# Patient Record
Sex: MALE | Race: WHITE
[De-identification: names, ages, dates, MRNs, and addresses within clinical notes are randomized per-mention and may not be internally consistent; named-entity substitution may affect disease eponyms.]

---

## 2022-06-24 ENCOUNTER — HOSPITAL ENCOUNTER (INPATIENT)
Dept: HOSPITAL 75 - IRF | Age: 72
LOS: 12 days | Discharge: HOME | DRG: 560 | End: 2022-07-06
Attending: INTERNAL MEDICINE | Admitting: INTERNAL MEDICINE
Payer: MEDICARE

## 2022-06-24 VITALS — WEIGHT: 151.68 LBS | BODY MASS INDEX: 25.27 KG/M2 | HEIGHT: 65 IN

## 2022-06-24 VITALS — SYSTOLIC BLOOD PRESSURE: 127 MMHG | DIASTOLIC BLOOD PRESSURE: 46 MMHG

## 2022-06-24 VITALS — DIASTOLIC BLOOD PRESSURE: 53 MMHG | SYSTOLIC BLOOD PRESSURE: 127 MMHG
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
## 2022-06-24 DIAGNOSIS — K59.00: ICD-10-CM

## 2022-06-24 DIAGNOSIS — E11.65: ICD-10-CM

## 2022-06-24 DIAGNOSIS — E03.9: ICD-10-CM

## 2022-06-24 DIAGNOSIS — R44.2: ICD-10-CM

## 2022-06-24 DIAGNOSIS — I70.222: ICD-10-CM
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
## 2022-06-24 DIAGNOSIS — B35.1: ICD-10-CM

## 2022-06-24 DIAGNOSIS — T42.6X5A: ICD-10-CM

## 2022-06-24 DIAGNOSIS — I25.10: ICD-10-CM

## 2022-06-24 DIAGNOSIS — Z47.81: Primary | ICD-10-CM

## 2022-06-24 DIAGNOSIS — L89.610: ICD-10-CM

## 2022-06-24 DIAGNOSIS — I10: ICD-10-CM

## 2022-06-24 DIAGNOSIS — M19.91: ICD-10-CM

## 2022-06-24 DIAGNOSIS — K21.9: ICD-10-CM

## 2022-06-24 DIAGNOSIS — T87.89: ICD-10-CM

## 2022-06-24 DIAGNOSIS — E11.51: ICD-10-CM

## 2022-06-24 DIAGNOSIS — N40.0: ICD-10-CM

## 2022-06-24 DIAGNOSIS — E78.00: ICD-10-CM

## 2022-06-24 DIAGNOSIS — S46.012A: ICD-10-CM

## 2022-06-24 DIAGNOSIS — L85.9: ICD-10-CM

## 2022-06-24 DIAGNOSIS — E11.40: ICD-10-CM

## 2022-06-24 DIAGNOSIS — Z79.84: ICD-10-CM

## 2022-06-24 DIAGNOSIS — Z79.01: ICD-10-CM

## 2022-06-24 DIAGNOSIS — D64.9: ICD-10-CM

## 2022-06-24 DIAGNOSIS — Z88.8: ICD-10-CM

## 2022-06-24 DIAGNOSIS — L97.519: ICD-10-CM

## 2022-06-24 DIAGNOSIS — E11.649: ICD-10-CM

## 2022-06-24 PROCEDURE — 73030 X-RAY EXAM OF SHOULDER: CPT

## 2022-06-24 PROCEDURE — 82947 ASSAY GLUCOSE BLOOD QUANT: CPT

## 2022-06-24 PROCEDURE — 36415 COLL VENOUS BLD VENIPUNCTURE: CPT

## 2022-06-24 PROCEDURE — 73000 X-RAY EXAM OF COLLAR BONE: CPT

## 2022-06-24 PROCEDURE — 85025 COMPLETE CBC W/AUTO DIFF WBC: CPT

## 2022-06-24 PROCEDURE — 70450 CT HEAD/BRAIN W/O DYE: CPT

## 2022-06-24 PROCEDURE — 80053 COMPREHEN METABOLIC PANEL: CPT

## 2022-06-24 RX ADMIN — GLIMEPIRIDE SCH MG: 4 TABLET ORAL at 17:39

## 2022-06-24 RX ADMIN — INSULIN ASPART SCH UNIT: 100 INJECTION, SOLUTION INTRAVENOUS; SUBCUTANEOUS at 17:39

## 2022-06-24 RX ADMIN — DOCUSATE SODIUM AND SENNOSIDES SCH EA: 8.6; 5 TABLET, FILM COATED ORAL at 20:55

## 2022-06-24 RX ADMIN — METFORMIN HYDROCHLORIDE SCH MG: 500 TABLET, FILM COATED ORAL at 20:48

## 2022-06-24 RX ADMIN — POLYETHYLENE GLYCOL (3350) SCH GM: 17 POWDER, FOR SOLUTION ORAL at 20:46

## 2022-06-24 RX ADMIN — METFORMIN HYDROCHLORIDE SCH MG: 500 TABLET, FILM COATED ORAL at 17:39

## 2022-06-24 RX ADMIN — INSULIN ASPART SCH UNIT: 100 INJECTION, SOLUTION INTRAVENOUS; SUBCUTANEOUS at 20:55

## 2022-06-24 RX ADMIN — DOCUSATE SODIUM SCH MG: 100 CAPSULE ORAL at 20:48

## 2022-06-24 RX ADMIN — APIXABAN SCH MG: 5 TABLET, FILM COATED ORAL at 20:48

## 2022-06-24 RX ADMIN — POTASSIUM CHLORIDE SCH MEQ: 1500 TABLET, EXTENDED RELEASE ORAL at 17:39

## 2022-06-24 NOTE — PHYSICAL THERAPY EVALUATION
PT Evaluation-General


Medical Diagnosis


Admission Date


Jun 24, 2022 at 14:00


Medical Diagnosis:  s/p L BKA


Onset Date:  Jun 21, 2022





Therapy Diagnosis


Therapy Diagnosis:  impaired mobility, strength, endurance





Precautions


Precautions/Isolations:  Fall Prevention, Standard Precautions





Referral


Physician:  Noy Velez DO


Reason for Referral:  Evaluation/Treatment





Medical History


Pertinent Medical History:  DM, PVD


Additional Medical History


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


Current History


underwent stenting of occluded L superficial femoral artery ~6/17, necrosis of 

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 Pt 

transferred to ARU 6/24/22 for skilled therapy and medication management.


Reviewed History:  Yes





Social History


Home:  Single Level


Current Living Status:  Spouse


Entry Into Home:  Stairs With Railing


PT Steps Into Home:  4





Prior


Prior Level of Function


SCALE: Activities may be completed with or without assistive devices.





6-Indepedent-patient completes the activity by him/herself with no assistance 

from a helper.


5-Set-up or Clean-up Assistance-helper sets up or cleans up; patient completes 

activity. Ruth assists only prior to or  


    following the activity.


4-Supervision or Touching Assistance-helper provides verbal cues and/or 

touching/steadying and/or contact guard assistance as patient completes 

activity. Assistance may be provided   


    throughout the activity or intermittently.


3-Partial/Moderate Assistance-helper does LESS THAN HALF the effort. Ruth 

lifts, holds or supports trunk or limbs, but provides less than half the effort.


2-Substantial/Maximal Assistance-helper does MORE THAN HALF the effort. Ruth 

lifts or holds trunk or limbs and provides more than half the effort.


6-Zecoxbmku-fdtzbb does ALL the effort. Patient does none of the effort to 

complete the activity. Or, the assistance of 2 or more helpers is required for 

the patient to complete the  


    activity.


If activity was not attempted, code reason:


7-Patient Refused.


9-Not Applicable-not attempted and the patient did not perform the activity 

before the current illness, exacerbation or injury.


10-Not Attempted due to Environmental Limitations-(lack of equipment, weather 

restraints, etc.).


88-Not Attempted due to Medical Conditions or Safety Concerns.


Bed Mobility:  6


Transfers (B,C,W/C):  3


Gait:  3


Stairs:  3


Wheelchair Mobility:  6


Indoor Mobility (Ambulation):  Needed Some Help


Stairs:  Needed Some Help


Prior Devices Use:  Manual wheelchair, Walker





PT Evaluation-Current


Subjective


Patient in bed pre tx, agrees to PT, has 3/10 pain in left residual limb.  Will 

be co-treating with OT for part of tx due to poor patient mobility, strength, 

endurance, severe debility, coordinate UE and LE during activity, safety and 

reduce risk of falls.





Pt/Family Goals


to be independent at home





Objective


Patient Orientation:  Person, Place, Situation





ROM/Strength


ROM Lower Extremities


WNL, except left knee is in immobilizer


Strength Lower Extremities


RLE (hip flexion 3+/5, knee flexion 3+/5, knee extension 4-/5, dorsiflexion 

3/5), LLE hip flexion 3+/5





Integumentary/Posture


Integumentary


see nurse notes





Sensory


Vision:  Functional


Hearing:  Functional


Hand Dominance:  Right


Sensation Right Lower Extremit:  Impaired


Sensation Left Lower Extremity:  Impaired





Transfers


Roll Left & Right (QC):  4


Sit to Lying (QC):  3


Lying to Sitting/Side of Bed(Q:  3


Sit to Stand (QC):  3


Chair/Bed-to-Chair Xfer(QC):  3


Toilet Transfer (QC):  3


Car Transfer (QC):  3


Patient performed rolling with SBA, supine <-> sit mod assist, sit <-> stand and

transfers mod assist, car transfer mod assist.  Patient used a RW for transfers,

was able to pivot on right foot with some difficulty.





Gait


Does the Patient Walk?:  Yes


Mode of Locomotion:  Both


Anticipated Mode of Locomotion:  Both


Walk 10 feet (QC):  88


Walk 50 ft with 2 Turns(QC):  88


Walk 150 ft (QC):  88


Walking 10ft/uneven surface-QC:  88


Distance:  2'x3


Gait Assistive Device:  Parallel Bars


Comments/Gait Description


Patient can ambulate 2' in the parallel bars with mod assist, patient can take a

few small hops with right foot, poor foot clearance





Wheelchair Training


Does the Pt Use a Wheelchair?:  Yes


Distance:  150'x2


Wheel 50 ft with 2 turns (QC):  4


Wheel 150 ft (QC):  4


Type of Wheelchair:  Manual


SBA





Stairs


1 Step (curb) (QC):  88


4 Steps (QC):  88


12 Steps (QC):  88





Balance


Sitting Static:  Fair


Sitting Dynamic:  Fair


Standing Static:  Poor


 Standing Dynamic:  Poor


Picking up an Object (QC):  88





Treatment


Dressing and bathing, manually resisted leg press 3 sets of 10, standing 

activity in parallel bars working on letting go with one hand/alternating.  PT 

performed bed mobility and transfer training, WC mobility, ambulation, standing 

and balance and positioning during dressing and bathing, standing during 

standing activity, LE strengthening, OT performed bathing, dressing, standing 

activity, UE positioning and safety during activity.





Assessment/Needs


Patient in recliner post tx with nurse call, phone, tray, all needs met.  

Patient has impaired mobility, strength, endurance, balance.  Patient needs mod 

assist for sit to stand and transfers.  Good motivation.


Rehab Potential:  Fair





PT Short Term Goals


Short Term Goals


Time Frame:  Jul 1, 2022


Roll Left & Right:  6


Sit to lying:  3 (Kacie)


Lying to sitting on side of be:  3 (Kacie)


Sit to stand:  3 (Kacie)


Chair/bed-to-chair transfer:  3


Walk 10 feet:  3 (Kacie)





PT Long Term Goals


Long Term Goals


PT Long Term Goals Time Frame:  Jul 15, 2022


Roll Left & Right (QC):  6


Sit to Lying (QC):  4 (CGA)


Lying-Sitting on Side/Bed(QC):  4 (CGA)


Sit to Stand (QC):  4 (CGA)


Chair/Bed-to-Chair Xfer(QC):  4 (CGA)


Toilet Transfer (QC):  4 (CGA)


Car Transfer (QC):  4 (CGA)


Does the Patient Walk:  Yes


Walk 10 feet (QC):  4 (CGA)


Walk 50ft with 2 Turns (QC):  88


Walk 150 ft (QC):  88


Walking 10ft on Uneven Surface:  4 (CGA)


1 Step (curb) (QC):  88


4 Steps (QC):  88


12 Steps (QC):  88


Picking up an Object (QC):  4 (CGA)


Wheel 50 feet with 2 turns (QC:  6


Wheel 150 feet:  6





PT Plan


Problem List


Problem List:  Activity Tolerance, Functional Strength, Safety, Balance, Gait, 

Transfer, Bed Mobility, ROM





Treatment/Plan


Treatment Plan:  Continue Plan of Care


Treatment Plan:  Bed Mobility, Education, Functional Activity [place], Functional 

Strength, Group Therapy, Gait, Safety, Therapeutic Exercise, Transfers


Treatment Duration:  Jul 15, 2022


Frequency:  At least 5 of 7 days/Wk (IRF)


Estimated Hrs Per Day:  1.5 hours per day


Patient and/or Family Agrees t:  Yes





Safety Risks/Education


Patient Education:  Gait Training, Transfer Techniques, W/C Management, Safety 

Issues


Teaching Recipient:  Patient


Teaching Methods:  Demonstration, Discussion


Response to Teaching:  Reinforcement Needed





Discharge Recommendations


Plan


Patient will perform bed mobility and transfer training, balance and endurance 

training, functional strengthening, stair training, gait training, and 

education, to improve functional mobility and independence at home.


Therapy Discharge Recommendati:  Scheduled Assistance, Home & Family, Post Acute

PT





Time/GCodes


Time In:  1400


Time Out:  1540


Total Billed Treatment Time:  90


Total Billed Treatment


1 visit


EVM 10'


FA 80'





PT eval from [id number], OT eval from [id number], co-treat from [id number]











KIRAN RANGEL PT                Jun 24, 2022 15:34

## 2022-06-24 NOTE — PM&R POST ADMISSION ASSESSMENT
PM&R HP


Date of Visit:  2022


Time of Visit:  16:00


History of Present Illness


CC: Left BKA





HPI: This is a 71yoWM clinic patient of Dr Beltran in [place] and Dr Hickey 

[facility] [place] who presents to ARU following a left BKA on 22 following

vascular procedure of left leg who is in need of assistive devices and 

aggressive rehab in order to return home to live independently. He is currently 

constipated and obtains good pain control with Hydrocodone. I have reviewed his 

home meds and restarted all. Voiding well due to Lasix. PLOF was independent 

without use of assistive devices. CLOF moderate assist with bed mobility and 

maximum assist with sit to stand and total dependence in transfers.





Past Medical-Social-Family Hx


Past Med/Social Hx:  Reviewed Nursing Past Med/Soc Hx, Reviewed and Corrections 

made


Patient Social History


Marrital Status:  


Employed/Student:  retired


Alcohol Use:  Denies Use


Smoking Status:  Never a Smoker





Past Medical History


Surgeries:  Orthopedic


Cardiac:  Atrial Fibrillation, Chronic Edema/Swelling, Coronary Artery Disease, 

High Cholesterol, Hypertension, Peripheral Vascular


Neurological:  Neuropathy


Genitourinary:  Benign Prostatic Hyperpl


Gastrointestinal:  Gastroesophageal Reflux


Musculoskeletal:  Amputee, Arthritis


Endocrine:  Hypothyroidsim, Diabetes, Non-Insulin dep





Self Care:  Needed Some Help


Functional Cognition:  Independent


Eatin (per pt report)


Oral Hygiene:  5 (per pt report)


Shower/Bathe Self:  3 (Min A with sponge bath at bed level, pt required 

assistance washing buttocks.)


Upper Body Dressing:  3 (Mod A overall. Max A doffing shirt at bed level, min A 

donning shirt overhead at EOB.)


Lower Body Dressin (Max A donning/doffing pants at bed level. Total assist 

would be required with stump immobilizer)


On/Off Footwear:  3 (Min A donning/doffing R gripper sock.)


Toileting Hygiene:  2 (Max A at bed level with clothing management and posterior

hygiene. Pt able to complete pericare.)





PM&R Allergy/Meds/Data Review


Allergies


Coded Allergies:  


     lisinopril (Verified  Allergy, Unknown, 22)


     niacin (Verified  Allergy, Unknown, 22)


     oxycodone (Verified  Allergy, Unknown, 22)


     rivaroxaban (Verified  Allergy, Unknown, 22)


Uncoded Allergies:  


     CI Pigment Blue 63 (Adverse Reaction, Severe, Itching, 22)





Home Medications


Scheduled


Amiodarone HCl (Amiodarone HCl), 200 MG PO DAILY, (Reported)


Apixaban (Eliquis), 5 MG PO BID, (Reported)


Atorvastatin Calcium (Atorvastatin Calcium), 20 MG PO DAILY, (Reported)


Cholecalciferol (Vitamin D3) (Vitamin D3), 25 MCG PO DAILY, (Reported)


Clopidogrel Bisulfate (Plavix), 75 MG PO DAILY, (Reported)


Furosemide (Furosemide), 80 MG PO DAILY, (Reported)


Glimepiride (Glimepiride), 4 MG PO BID, (Reported)


Levothyroxine Sodium (Levothyroxine Sodium), 100 MCG PO DAILY, (Reported)


Losartan Potassium (Losartan Potassium), 50 MG PO DAILY, (Reported)


Metformin HCl (Metformin HCl), 500 MG PO QID, (Reported)


Metoprolol Succinate (Metoprolol Succinate), 25 MG PO DAILY, (Reported)


Pantoprazole Sodium (Pantoprazole Sodium), 40 MG PO DAILY, (Reported)


Potassium Chloride (K-Tab ER), 20 MEQ PO TID, (Reported)





Scheduled PRN


Hydrocodone/Acetaminophen (Hydrocodone-Acetamin  mg), 1 EACH PO Q6H PRN 

for PAIN-MODERATE (5-7), (Reported)





Current Medications


Current Medications


Reviewed





Review of Systems


Constitutional:  see HPI, malaise, weakness


EENTM:  no symptoms reported


Respiratory:  no symptoms reported


Cardiovascular:  no symptoms reported


Gastrointestinal:  constipation


Genitourinary:  no symptoms reported


Musculoskeletal:  no symptoms reported


Skin:  no symptoms reported


Psychiatric/Neurological:  No Symptoms Reported


All Other Systems Reviewed


Negative Unless Noted:  Yes





Physical Exam


Physical Exam


Vital Signs


Capillary Refill :


Height, Weight, BMI


Height: '"


Weight: lbs. oz. kg;  BMI


Method:


General Appearance:  No Apparent Distress, WD/WN, Chronically ill


Eyes:  Bilateral Eye Normal Inspection, Bilateral Eye PERRL


HEENT:  PERRL/EOMI, Normal ENT Inspection, Pharynx Normal


Neck:  Full Range of Motion, Normal Inspection, Non Tender, Supple, Carotid 

Bruit


Respiratory:  Chest Non Tender, Lungs Clear, Normal Breath Sounds, No Accessory 

Muscle Use, No Respiratory Distress


Cardiovascular:  No Edema, No Gallop, No JVD, No Murmur, Normal Peripheral 

Pulses, Irregularly Irregular


Gastrointestinal:  Normal Bowel Sounds, No Organomegaly, No Pulsatile Mass, Non 

Tender, Soft


Back:  Normal Inspection, No CVA Tenderness, No Vertebral Tenderness


Extremity:  Normal Capillary Refill, Normal Inspection, Normal Range of Motion, 

Non Tender, No Calf Tenderness, No Pedal Edema


Neurologic/Psychiatric:  Alert, Oriented x3, Normal Mood/Affect, CNs II-XII Norm

as Tested, Abnormal Gait, Motor Weakness (left sided lower extremity BKA)


Skin:  Normal Color, Warm/Dry


Lymphatic:  No Adenopathy





PM&R Medical Assessment & Plan


REHAB/MEDICAL ASSESSMENT AND PLAN:





REHAB IMPAIRMENT GROUP: 


[facility]





ETIOLOGIC DIAGNOSIS: 


LBKA





The comorbidities that impact the patients function and/or functional outcome 

by: left BKA, fall risk, dependence on transfers with sit to stand





REHAB PLAN:


The patient is being admitted to our comprehensive inpatient rehabilitation 

facility and can tolerate the intensity of service consisting of at least:


      180 minutes of therapy a day, 5 out of 7 days a week 


    


Rehab treatment will consist of:  PT OT will focus on regaining function in 

order to regain enough independence in order to return home with wife





The patient/family has a good understanding of our discharge process and will 

benefit from an interdisciplinary inpatient rehabilitation program. The patient 

has potential to make improvement and is in need of at least two of the 

following multidisciplinary therapies including but not limited to physical, 

occupational, speech, and prosthetics and orthotics.  Additionally the patient 

will need services from respiratory, nutritional services, wound care, 

psychology, etc. (Customize this to each patient). Given the patients complex 

condition and risk of further medical complications, rehabilitation services 

cannot be safely or effectively provided at a lower level of care such as a 

skilled nursing facility.





BARRIERS TO DISCHARGE:


[facility]





ESTIMATED LOS:


14 days





DISPOSITION:


Home





RELEVANT CHANGES SINCE PREADMISSION SCREENING: 


I have compared the patients medical and functional status at the time of the 

preadmission screening and there are: 


      no changes





PROGNOSIS:


Good





REHABILITATION GOALS:  


1.PT OT will focus on regaining function in order to regain enough independence 

in order to return home with wife











All the above goals were reviewed with the patient and he/she is in agreement.


By signing this document, I acknowledge that I have personally performed a full 

physical examination on this patient within 24 hours of admission to this 

inpatient rehabilitation facility and have determined the patient to be able to 

tolerate the above course of treatment at an intensive level for a reasonable 

period of time. I will be completing a detailed individualized Plan of Care for 

this patient by day #4 of the patients stay based upon the Preadmission Screen,

the Post-Admission Evaluation, and the therapy evaluations.


Admission Dx/Comorbidities:  


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


ICD Codes:  S88.112A - Complete traumatic amputation at level between knee and 

ankle, left lower leg, initial encounter





Assessment/Plan


Assessment and Plan


Assess & Plan/Chief Complaint


Assessment:


CORTNEY 22


Severe PVD


PAF


OAC


HTN


HLP


DM


Hypothyroidism





Plan:


Home meds


SSI


PT OT


Pain control


BM treatment











DIANA HENRIQUEZ DO                2022 15:36

## 2022-06-24 NOTE — OCCUPATIONAL THERAPY EVAL
OT Evaluation-General/PLF


Medical Diagnosis


Admission Date


Jun 24, 2022 at 14:00


Medical Diagnosis:  s/p L BKA


Onset Date:  Jun 21, 2022





Therapy Diagnosis


Therapy Diagnosis:  decreased ADL status, weakness





Precautions


Precautions/Isolations:  Fall Prevention, Standard Precautions





Weight Bear Status


Weight Bearing Restriction:  Non Weight Bearing


Location Restriction:  L LE





Referral


Physician:  Rosie Alegria Reason:  Evaluation/Treatment





Medical History


Additional Medical History


PVD, DM, afib, R 1st toe amputation


Current History


underwent stenting of occluded L superficial femoral artery ~6/17, necrosis of 

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 Pt 

transferred to ARU 6/24/22 for skilled therapy and medication management.





Social History


Home:  Single Level


Current Living Status:  Spouse


Entry Into Home:  Stairs With Railing


 Steps Into Home:  4





ADL-Prior Level of Function


SCALE: Activities may be completed with or without assistive devices.





6-Indepedent-patient completes the activity by him/herself with no assistance 

from a helper.


5-Set-up or Clean-up Assistance-helper sets up or cleans up; patient completes 

activity. [place] assists only prior to or  


    following the activity.


4-Supervision or Touching Assistance-helper provides verbal cues and/or 

touching/steadying and/or contact guard assistance as patient completes 

activity. Assistance may be provided   


    throughout the activity or intermittently.


3-Partial/Moderate Assistance-helper does LESS THAN HALF the effort. [place] 

lifts, holds or supports trunk or limbs, but provides less than half the effort.


2-Substantial/Maximal Assistance-helper does MORE THAN HALF the effort. [place] 

lifts or holds trunk or limbs and provides more than half the effort.


0-Qjyewhxtp-qeguvg does ALL the effort. Patient does none of the effort to 

complete the activity. Or, the assistance of 2 or more helpers is required for 

the patient to complete the  


    activity.


If activity was not attempted, code reason:


7-Patient Refused.


9-Not Applicable-not attempted and the patient did not perform the activity befo

re the current illness, exacerbation or injury.


10-Not Attempted due to Environmental Limitations-(lack of equipment, weather re

straints, etc.).


88-Not Attempted due to Medical Conditions or Safety Concerns.


ADL PLOF Comments


Pt reports requiring assistance with ADLs at PLOF, using a walker and w/c for 

functional mobility. His wife assists with showering, UE/LE dressing, and 

footwear, pt able to complete toileting, oral care and eating independently. He 

has a walk in shower with SC, GBS and hand held shower head


Self Care:  Needed Some Help


Functional Cognition:  Independent


DME/Equipment:  Bath Chair, Grab Bars, Shower, Shower Hose Extender


DME/Equipment Comments


walker, w/c





OT Current Status


Subjective


Pt agreeable to OT evaluation, then OT/PT cotreat. Rates pain 3-4/10 in L stump





Mental Status/Objective


Patient Orientation:  Person, Place, Situation


Attachments:  Other-See Comments (stump immobilizer L)





Current


Glasses/Contacts:  No


Hearing Aids:  No


Dentures/Partials:  No


Hand Dominance:  Right


Upper Extremity ROM


Decreased shoulder movement bilaterally (pt's PLOF). WFL at elbow/wrist/fingers.


RUE shoulder abduction to approx 40 degrees, LUE approx 20 degrees.


Upper Extremity Coordination


WFL


Upper Extremity Sensation


Tingling/numbness bilateral hands/fingers.


Upper Extremity Strength


grossly 2+/5 BUEs





ADL-Treatment


Eating (QC):  6 (per pt report)


Oral Hygiene (QC):  5 (per pt report)


Shower/Bathe Self (QC):  3 (Min A with sponge bath at bed level, pt required 

assistance washing buttocks.)


Upper Body Dressing (QC):  3 (Mod A overall. Max A doffing shirt at bed level, 

min A donning shirt overhead at EOB.)


Lower Body Dressing (QC):  2 (Max A donning/doffing pants at bed level. Total 

assist would be required with stump immobilizer)


On/Off Footwear (QC):  3 (Min A donning/doffing R gripper sock.)


Toileting Hygiene (QC):  2 (Max A at bed level with clothing management and 

posterior hygiene. Pt able to complete pericare.)





Other Treatments


OT evaluation complete. OT/PT cotreat due to skill of 2 clinicians required 

which a rehab tech could not perform in order to coordinate UE/LEs, decrease 

fall risk, and due to pt's limitations in strength, activity tolerance, 

transfers and mobility. Pt completed sponge bath and dressing at bed level. 

Transferred supine to sit EOB, then SPT to w/c. Pt performed w/c mobility in 

hallways, then to therapy gym. Pt stood x3 in parallel bars, attempted to take 

steps "hopping" on R foot with stands, seated rest breaks between trials. Pt 

then seated exercises, with focus on strengthening muscles in preparation for 

standing tasks. Pt stood in parallel bars, completing reaching task while 

standing. Pt attempted to reach for objects on L side and transition them across

midline towards R side, but pt had difficulty. On the next stand, pt focused on 

just removing 1 hand from the bar at a time, pt had difficulty removing L hand 

from parallel bar, only able to complete with R, x5 reps, min A with standing 

balance. Pt propelled w/c around ARU common area, then back to his room. SPT to 

recliner, mod A. Post tx, pt in recliner, call light in reach and all needs met.





Education


OT Patient Education:  Correct positioning, Energy conservation, Modified ADL 

techniques, Progress toward Goal/Update tx plan, Purpose of tx/functional 

activities, Rehab process


Teaching Recipient:  Patient


Teaching Methods:  Discussion


Response to Teaching:  Verbalize Understanding





OT Short Term Goals


Short Term Goals


Time Frame:  Jul 8, 2022


Shower/bathe self:  3


Lower body dressing:  3





OT Long Term Goals


Long Term Goals


Time Frame:  Jul 22, 2022


Eating (QC):  6


Oral Hygiene (QC):  6


Toileting Hygiene (QC):  6


Shower/Bathe Self (QC):  4


Upper Body Dressing (QC):  4


Lower Body Dressing (QC):  3


On/Off Footwear (QC):  3


Additional Goals:  1-Demonstrate ADL Tasks, 2-Verbalize Understanding, 3-

ImproveStrength/[place]


1=Demonstrate adherence to instructed precautions during ADL tasks.


2=Patient will verbalize/demonstrate understanding of assistive 

devices/modifications for ADL.


3=Patient will improve strength/tolerance for activity to enable patient to 

perform ADL's.





OT Education/Plan


Problem List/Assessment


Assessment:  Decreased Activ Tolerance, Decreased UE Strength, Dependent 

Transfers, Impaired Bed Mobility, Impaired Funct Balance, Impaired I ADL's, 

Impaired Self-Care Skills, Restricted Funct UE ROM





Discharge Recommendations


Plan/Recommendations:  Continue POC





Treatment Plan/Plan of Care


Patient would benefit from OT for education, treatment and training to promote 

independence in ADL's, mobility, safety and/or upper extremity function for 

ADL's.


Plan of Care:  ADL Retraining, Functional Mobility, Group Exercise/Act as Ind, 

UE Funct Exercise/Act, UE Neuromus Re-Ed/Coord, W/C Management Training


Treatment Duration:  Jul 22, 2022


Frequency:  At least 5 of 7 days/Wk (IRF)


Estimated Hrs Per Day:  1.5 hours per day


Agreement:  Yes


Rehab Potential:  Fair





Time/GCodes


Start Time:  14:10


Stop Time:  15:40


Total Time Billed (hr/min):  90


Billed Treatment Time


9588-1903 OT eval, [id number] OT/PT cotreat





1, EVM (10'), ADL 2 (30'), FA 3 (50')











DHIRAJ EDWARDS OT          Jun 24, 2022 14:51

## 2022-06-25 VITALS — SYSTOLIC BLOOD PRESSURE: 169 MMHG | DIASTOLIC BLOOD PRESSURE: 65 MMHG

## 2022-06-25 VITALS — SYSTOLIC BLOOD PRESSURE: 95 MMHG | DIASTOLIC BLOOD PRESSURE: 58 MMHG

## 2022-06-25 VITALS — DIASTOLIC BLOOD PRESSURE: 38 MMHG | SYSTOLIC BLOOD PRESSURE: 99 MMHG

## 2022-06-25 VITALS — DIASTOLIC BLOOD PRESSURE: 47 MMHG | SYSTOLIC BLOOD PRESSURE: 107 MMHG

## 2022-06-25 LAB
ALBUMIN SERPL-MCNC: 3.3 GM/DL (ref 3.2–4.5)
ALP SERPL-CCNC: 56 U/L (ref 40–136)
ALT SERPL-CCNC: 13 U/L (ref 0–55)
BASOPHILS # BLD AUTO: 0 10^3/UL (ref 0–0.1)
BASOPHILS NFR BLD AUTO: 0 % (ref 0–10)
BILIRUB SERPL-MCNC: 0.5 MG/DL (ref 0.1–1)
BUN/CREAT SERPL: 20
CALCIUM SERPL-MCNC: 9.3 MG/DL (ref 8.5–10.1)
CHLORIDE SERPL-SCNC: 100 MMOL/L (ref 98–107)
CO2 SERPL-SCNC: 26 MMOL/L (ref 21–32)
CREAT SERPL-MCNC: 0.94 MG/DL (ref 0.6–1.3)
EOSINOPHIL # BLD AUTO: 0.1 10^3/UL (ref 0–0.3)
EOSINOPHIL NFR BLD AUTO: 1 % (ref 0–10)
GFR SERPLBLD BASED ON 1.73 SQ M-ARVRAT: 87 ML/MIN
GLUCOSE SERPL-MCNC: 64 MG/DL (ref 70–105)
HCT VFR BLD CALC: 36 % (ref 40–54)
HGB BLD-MCNC: 10.8 G/DL (ref 13.3–17.7)
LYMPHOCYTES # BLD AUTO: 1.2 10^3/UL (ref 1–4)
LYMPHOCYTES NFR BLD AUTO: 12 % (ref 12–44)
MANUAL DIFFERENTIAL PERFORMED BLD QL: NO
MCH RBC QN AUTO: 25 PG (ref 25–34)
MCHC RBC AUTO-ENTMCNC: 30 G/DL (ref 32–36)
MCV RBC AUTO: 83 FL (ref 80–99)
MONOCYTES # BLD AUTO: 0.9 10^3/UL (ref 0–1)
MONOCYTES NFR BLD AUTO: 9 % (ref 0–12)
NEUTROPHILS # BLD AUTO: 7.7 10^3/UL (ref 1.8–7.8)
NEUTROPHILS NFR BLD AUTO: 78 % (ref 42–75)
PLATELET # BLD: 228 10^3/UL (ref 130–400)
PMV BLD AUTO: 11.4 FL (ref 9–12.2)
POTASSIUM SERPL-SCNC: 3.7 MMOL/L (ref 3.6–5)
PROT SERPL-MCNC: 7.3 GM/DL (ref 6.4–8.2)
SODIUM SERPL-SCNC: 139 MMOL/L (ref 135–145)
WBC # BLD AUTO: 9.9 10^3/UL (ref 4.3–11)

## 2022-06-25 RX ADMIN — INSULIN ASPART SCH UNIT: 100 INJECTION, SOLUTION INTRAVENOUS; SUBCUTANEOUS at 17:10

## 2022-06-25 RX ADMIN — GLIMEPIRIDE SCH MG: 4 TABLET ORAL at 10:38

## 2022-06-25 RX ADMIN — METFORMIN HYDROCHLORIDE SCH MG: 500 TABLET, FILM COATED ORAL at 17:26

## 2022-06-25 RX ADMIN — DOCUSATE SODIUM AND SENNOSIDES SCH EA: 8.6; 5 TABLET, FILM COATED ORAL at 10:37

## 2022-06-25 RX ADMIN — POTASSIUM CHLORIDE SCH MEQ: 1500 TABLET, EXTENDED RELEASE ORAL at 19:35

## 2022-06-25 RX ADMIN — PANTOPRAZOLE SODIUM SCH MG: 40 TABLET, DELAYED RELEASE ORAL at 10:38

## 2022-06-25 RX ADMIN — METFORMIN HYDROCHLORIDE SCH MG: 500 TABLET, FILM COATED ORAL at 14:24

## 2022-06-25 RX ADMIN — FUROSEMIDE SCH MG: 40 TABLET ORAL at 10:38

## 2022-06-25 RX ADMIN — AMIODARONE HYDROCHLORIDE SCH MG: 200 TABLET ORAL at 10:39

## 2022-06-25 RX ADMIN — INSULIN ASPART SCH UNIT: 100 INJECTION, SOLUTION INTRAVENOUS; SUBCUTANEOUS at 11:29

## 2022-06-25 RX ADMIN — APIXABAN SCH MG: 5 TABLET, FILM COATED ORAL at 20:30

## 2022-06-25 RX ADMIN — POLYETHYLENE GLYCOL (3350) SCH GM: 17 POWDER, FOR SOLUTION ORAL at 10:40

## 2022-06-25 RX ADMIN — CLOPIDOGREL BISULFATE SCH MG: 75 TABLET, FILM COATED ORAL at 10:39

## 2022-06-25 RX ADMIN — APIXABAN SCH MG: 5 TABLET, FILM COATED ORAL at 10:38

## 2022-06-25 RX ADMIN — LEVOTHYROXINE SODIUM SCH MCG: 100 TABLET ORAL at 05:42

## 2022-06-25 RX ADMIN — Medication SCH MCG: at 10:37

## 2022-06-25 RX ADMIN — POTASSIUM CHLORIDE SCH MEQ: 1500 TABLET, EXTENDED RELEASE ORAL at 10:39

## 2022-06-25 RX ADMIN — DOCUSATE SODIUM SCH MG: 100 CAPSULE ORAL at 20:29

## 2022-06-25 RX ADMIN — POTASSIUM CHLORIDE SCH MEQ: 1500 TABLET, EXTENDED RELEASE ORAL at 14:24

## 2022-06-25 RX ADMIN — GLIMEPIRIDE SCH MG: 4 TABLET ORAL at 17:26

## 2022-06-25 RX ADMIN — INSULIN ASPART SCH UNIT: 100 INJECTION, SOLUTION INTRAVENOUS; SUBCUTANEOUS at 20:30

## 2022-06-25 RX ADMIN — DOCUSATE SODIUM AND SENNOSIDES SCH EA: 8.6; 5 TABLET, FILM COATED ORAL at 20:30

## 2022-06-25 RX ADMIN — POLYETHYLENE GLYCOL (3350) SCH GM: 17 POWDER, FOR SOLUTION ORAL at 20:30

## 2022-06-25 RX ADMIN — DOCUSATE SODIUM SCH MG: 100 CAPSULE ORAL at 10:37

## 2022-06-25 RX ADMIN — METFORMIN HYDROCHLORIDE SCH MG: 500 TABLET, FILM COATED ORAL at 10:39

## 2022-06-25 RX ADMIN — INSULIN ASPART SCH UNIT: 100 INJECTION, SOLUTION INTRAVENOUS; SUBCUTANEOUS at 06:16

## 2022-06-25 NOTE — PM&R PROGRESS NOTE
Subjective


HPI/CC On Admission


Date Seen by Provider:  Jun 25, 2022


Time Seen by Provider:  11:30


Subjective/Events-last exam


6/25/2022:


Patient doing really well


Blood sugars reviewed and holding metformin and oral hypoglycemic agent due to 

hypoglycemia


Pain is controlled


Laxatives given


Checked meds and labs





Review of Systems


General:  Fatigue


Musculoskeletal:  leg pain





Objective


Exam


Vital Signs





Vital Signs








  Date Time  Temp Pulse Resp B/P (MAP) Pulse Ox O2 Delivery O2 Flow Rate FiO2


 


6/25/22 20:42      Room Air  


 


6/25/22 20:00 36.8 77 20 169/65 (99) 97   





Capillary Refill :


General Appearance:  No Apparent Distress, WD/WN, Chronically ill


HEENT:  PERRL/EOMI, Normal ENT Inspection, Pharynx Normal


Neck:  Full Range of Motion, Normal Inspection, Non Tender, Supple, Carotid 

Bruit


Respiratory:  Chest Non Tender, Lungs Clear, Normal Breath Sounds, No Accessory 

Muscle Use, No Respiratory Distress


Cardiovascular:  No Edema, No Gallop, No JVD, No Murmur, Normal Peripheral 

Pulses, Irregularly Irregular


Gastrointestinal:  Normal Bowel Sounds, No Organomegaly, No Pulsatile Mass, Non 

Tender, Soft


Back:  Normal Inspection, No CVA Tenderness, No Vertebral Tenderness


Extremity:  Normal Capillary Refill, Normal Inspection, Normal Range of Motion, 

Non Tender, No Calf Tenderness, No Pedal Edema


Neurologic/Psychiatric:  Alert, Oriented x3, Normal Mood/Affect, CNs II-XII Norm

as Tested, Abnormal Gait, Motor Weakness (left sided lower extremity BKA)


Skin:  Normal Color, Warm/Dry


Lymphatic:  No Adenopathy





Results/Procedures


Lab


Patient resulted labs reviewed.





FIM


Transfers


Therapy Code Descriptions/Definitions 





Functional [place] Measure:


0=Not Assessed/NA        4=Minimal Assistance


1=Total Assistance        5=Supervision or Setup


2=Maximal Assistance  6=Modified [place]


3=Moderate Assistance 7=Complete IndependenceSCALE: Activities may be completed 

with or without assistive devices.





6-Indepedent-patient completes the activity by him/herself with no assistance 

from a helper.


5-Set-up or Clean-up Assistance-helper sets up or cleans up; patient completes 

activity. [place] assists only prior to or  


    following the activity.


4-Supervision or Touching Assistance-helper provides verbal cues and/or 

touching/steadying and/or contact guard assistance as patient completes 

activity. Assistance may be provided   


    throughout the activity or intermittently.


3-Partial/Moderate Assistance-helper does LESS THAN HALF the effort. [place] 

lifts, holds or supports trunk or limbs, but provides less than half the effort.


2-Substantial/Maximal Assistance-helper does MORE THAN HALF the effort. [place] 

lifts or holds trunk or limbs and provides more than half the effort.


5-Grjxgunnj-xloexc does ALL the effort. Patient does none of the effort to 

complete the activity. Or, the assistance of 2 or more helpers is required for 

the patient to complete the  


    activity.


If activity was not attempted, code reason:


7-Patient Refused.


9-Not Applicable-not attempted and the patient did not perform the activity 

before the current illness, exacerbation or injury.


10-Not Attempted due to Environmental Limitations-(lack of equipment, weather 

restraints, etc.).


88-Not Attempted due to Medical Conditions or Safety Concerns.


Roll Left to Right (QC):  4


Sit to Lying (QC):  3


Sit to Stand (QC):  3


Chair/Bed-to-Chair Xfer(QC):  3


Car Transfer (QC):  3





Gait Training


Does the Patient Walk?:  Yes


Walk 10 feet (QC):  88


Walk 50 ft with 2 Turns(QC):  88


Walk 150 ft (QC):  88


Walking 10ft/uneven surface-QC:  88


Gait Assistive Device:  Parallel Bars





Wheelchair Training


Does the Pt Use a Wheelchair?:  Yes


Distance:  150'x2


Wheel 50 ft with 2 turns (QC):  4


Wheel 150 ft (QC):  4


Type of Wheelchair:  Manual





Stair Training


1 Step (curb) (QC):  88


4 Steps (QC):  88


12 Steps (QC):  88





Balance


Picking up an Object (QC):  88





ADL-Treatment


Eating (QC):  6 (per pt report)


Oral Hygiene (QC):  5 (per pt report)


Shower/Bathe Self (QC):  3 (Min A with sponge bath at bed level, pt required 

assistance washing buttocks.)


Upper Body Dressing (QC):  3 (Mod A overall. Max A doffing shirt at bed level, 

min A donning shirt overhead at EOB.)


Lower Body Dressing (QC):  2 (Max A donning/doffing pants at bed level. Total 

assist would be required with stump immobilizer)


On/Off Footwear (QC):  3 (Min A donning/doffing R gripper sock.)


Toileting Hygiene (QC):  2 (Max A at bed level with clothing management and 

posterior hygiene. Pt able to complete pericare.)





Assessment/Plan


Assessment and Plan


Assess & Plan/Chief Complaint


Assessment:


CORTNEY 6/21/22


Severe PVD


PAF


OAC


HTN


HLP


DM


Hypothyroidism


Hypoglycemia holding metformin and glimepiride


Constipation





Plan:


Home meds


SSI


PT OT


Pain control


BM treatment





6/25/2022:


Hold diabetic meds due to hypoglycemia


Pain control


Bowel regimen





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











DIANA HENRIQUEZ DO                Jun 25, 2022 06:07

## 2022-06-25 NOTE — PHYSICAL THERAPY DAILY NOTE
PT Daily Note-Current


Subjective


Pt up in chair, initially agreeable but then kept stating, "I'm sorry, I just 

don't feel well." Pt groaning frequently and stating he was "really cold". 

Nursing made aware of Pt's poor tolerance and current c/o.





Mental Status


Patient Orientation:  Person, Place, Time, Situation


Attachments:  Knee Immobilizer





Transfers


SCALE: Activities may be completed with or without assistive devices.





6-Indepedent-patient completes the activity by him/herself with no assistance 

from a helper.


5-Set-up or Clean-up Assistance-helper sets up or cleans up; patient completes 

activity. [place] assists only prior to or  


    following the activity.


4-Supervision or Touching Assistance-helper provides verbal cues and/or 

touching/steadying and/or contact guard assistance as patient completes 

activity. Assistance may be provided   


    throughout the activity or intermittently.


3-Partial/Moderate Assistance-helper does LESS THAN HALF the effort. [place] 

lifts, holds or supports trunk or limbs, but provides less than half the effort.


2-Substantial/Maximal Assistance-helper does MORE THAN HALF the effort. [place] 

lifts or holds trunk or limbs and provides more than half the effort.


6-Qazvtqcem-mnohzv does ALL the effort. Patient does none of the effort to 

complete the activity. Or, the assistance of 2 or more helpers is required for 

the patient to complete the  


    activity.


If activity was not attempted, code reason:


7-Patient Refused.


9-Not Applicable-not attempted and the patient did not perform the activity be

fore the current illness, exacerbation or injury.


10-Not Attempted due to Environmental Limitations-(lack of equipment, weather 

restraints, etc.).


88-Not Attempted due to Medical Conditions or Safety Concerns.


Roll Left & Right (QC):  6


Sit to Lying (QC):  6


Sit to Stand (QC):  3


Chair/Bed-to-Chair Xfer(QC):  3


Attempted sit<->stand to walker for transfer to [facility]. Pt stood with mod A x 1 but

unable to obtain upright balance and returned to sitting in recliner. Pt 

reported feeling significantly weaker and agreed to return to bed for LE ex. At 

that time, recliner moved to allow transfer to (L) and performed SPT to (L) with

mod A x 1.





Exercises


Supine Ex:  Ankle pumps, Heel Slides, Hip abd/add


Supine Reps:  15





Treatments


Transfers and LE ex. Treatment modified due to Pt feeling unwell. In bed on (R) 

side with all needs met and nursing made aware of Pt c/o.





Assessment


Current Status:  Poor Progress


Pt activity limited this date due to feeling unwell.





PT Short Term Goals


Short Term Goals


Time Frame:  Jul 1, 2022


Roll Left & Right:  6


Sit to lying:  3 (Kacie)


Lying to sitting on side of be:  3 (Kacie)


Sit to stand:  3 (Kacie)


Chair/bed-to-chair transfer:  3


Walk 10 feet:  3 (Kacie)





PT Long Term Goals


Long Term Goals


PT Long Term Goals Time Frame:  Jul 15, 2022


Roll Left & Right (QC):  6


Sit to Lying (QC):  4 (CGA)


Lying-Sitting on Side/Bed(QC):  4 (CGA)


Sit to Stand (QC):  4 (CGA)


Chair/Bed-to-Chair Xfer(QC):  4 (CGA)


Toilet Transfer (QC):  4 (CGA)


Car Transfer (QC):  4 (CGA)


Does the Patient Walk:  Yes


Walk 10 feet (QC):  4 (CGA)


Walk 50ft with 2 Turns (QC):  88


Walk 150 ft (QC):  88


Walking 10ft on Uneven Surface:  4 (CGA)


1 Step (curb) (QC):  88


4 Steps (QC):  88


12 Steps (QC):  88


Picking up an Object (QC):  4 (CGA)


Wheel 50 feet with 2 turns (QC:  6


Wheel 150 feet:  6





PT Plan


Problem List


Problem List:  Activity Tolerance, Functional Strength, Safety, Balance, Gait, 

Transfer, Bed Mobility, ROM





Treatment/Plan


Treatment Plan:  Continue Plan of Care


Treatment Plan:  Bed Mobility, Education, Functional Activity [place], Functional 

Strength, Group Therapy, Gait, Safety, Therapeutic Exercise, Transfers


Treatment Duration:  Jul 15, 2022


Frequency:  At least 5 of 7 days/Wk (IRF)


Estimated Hrs Per Day:  1.5 hours per day


Patient and/or Family Agrees t:  Yes





Time/GCodes


Time In:  0800


Time Out:  0824


Total Billed Treatment Time:  24


Total Billed Treatment


1, FA x 16', Ex x 8'











SHANEKA VICENTE DPT              Jun 25, 2022 10:27

## 2022-06-25 NOTE — INDIVIDUALIZED PLAN OF CARE
Individualized Plan of Care


Rehab Nursing IPOC Order


Admission Date


Jun 24, 2022 at 14:00


Current Orders





Orders


Admission Order(Inpt,Obs,Sdc) (6/24/22 11:40)


Vital Signs: Per Unit Policy ( 08,16,00 (6/24/22 11:40)


Mario Hose 09,21 (6/24/22 11:40)


Sequential Compression Device (6/24/22 11:40)


-Inpt Rehab Con (6/24/22 11:40)


Rehab Nursing Orders-Ipoc (6/24/22 11:40)


Physical Therapy Rehab Orders (6/24/22 11:40)


Occupational Therapy Rehab Ord (6/24/22 11:40)


Speech Therapy Rehab Orders (6/24/22 11:40)


Cbc With Automated Diff (6/25/22 06:00)


Comprehensive Metabolic Panel (6/25/22 06:00)


Precautions (Aru) (6/24/22 11:40)


Weekly Weight WEEK (6/24/22 11:40)


Rehab-Intensity Of Therapy (6/24/22 11:40)


Initiate Admission Nursing Pro .admission (6/24/22 11:40)


Alprazolam Tablet (Xanax Tablet) (6/24/22 11:45)


Calcium Carbonate Chew Tablet (Antacid C (6/24/22 11:45)


Diphenhydramine Tablet (Benadryl Tablet) (6/24/22 11:45)


Docusate Sodium Capsule (Colace Capsule) (6/24/22 21:00)


Docusate Sodium Capsule (Colace Capsule) (6/24/22 11:45)


Bisacodyl Suppository (Dulcolax Supposit (6/24/22 11:45)


Lactulose Oral Solution (Enulose Oral So (6/24/22 11:45)


Na Phos/Na Biphos Enema (Fleet Enema Angelito (6/24/22 11:45)


Guaifenesin/Codeine Syrup (Robitussin Ac (6/24/22 11:45)


Loperamide Tablet (Imodium Tablet) (6/24/22 11:45)


Melatonin  Tablet (Melatonin  Tablet) (6/24/22 11:45)


Polyethylene Glycol Powder Pkt (Miralax (6/24/22 21:00)


Ondansetron  Oral Dissolve Tab (Zofran (6/24/22 11:45)


Senna S Tablet (Senokot S Tablet) (6/24/22 21:00)


Acetaminophen Tablet/Caplet (Tylenol  T (6/24/22 11:45)


Code/Resuscitation (6/24/22 11:40)


Initiate Admission Nursing Pro .admission (6/24/22 11:40)


Admission Arrival Bed Request (6/24/22 14:04)


Patient Visit (6/24/22 )


Pt Eval Moderate Complexity (6/24/22 )


Functional Activities, Ea 15 (6/24/22 )


Amiodarone Tablet (Cordarone Tablet) (6/25/22 09:00)


Apixaban Tablet (Eliquis Tablet) (6/24/22 21:00)


Atorvastatin Tablet (Lipitor Tablet) (6/25/22 09:00)


Clopidogrel Tablet (Plavix Tablet) (6/25/22 09:00)


Glimepiride Tablet (Amaryl Tablet) (6/24/22 18:00)


Hydrocodone/Apap 10/325 Tablet (Lortab 1 (6/24/22 15:45)


Levothyroxine Tablet (Synthroid Tablet) (6/25/22 06:30)


Losartan Tablet (Cozaar Tablet) (6/25/22 09:00)


Metformin Tablet (Glucophage Tablet) (6/24/22 17:00)


Metoprolol Succinate (Xl) Tab (Toprol Xl (6/25/22 09:00)


Pantoprazole Tablet (Protonix Tablet) (6/25/22 09:00)


(Nf) Cholecalciferol (Vitamin D3) (Vitam (6/25/22 09:00)


(Nf) Furosemide (6/25/22 09:00)


(Nf) Potassium Chloride (K-Tab Er) (6/24/22 21:00)


Accucheck Achs ACHS (6/24/22 15:41)


Insulin Aspart (Novolog) (Novolog (Charg (6/24/22 16:00)


Potassium Chloride (Tablet) (K Dur Table (6/24/22 18:00)


Furosemide  Tablet (Lasix  Tablet) (6/25/22 09:00)


Cholecalciferol Capsule/Tablet (Vitamin (6/25/22 09:00)


Cho 75g/M 1snack ( Jamari) (6/24/22 Dinner)


Patient Visit (6/25/22 )


Functional Activities, Ea 15 (6/25/22 )


Exercise Therap, Ea 15 Min (6/25/22 )


Magnesium Citrate Oral Soln (Citrate Of (6/25/22 12:30)


Consult Podiatry (6/25/22 12:29)


Consult Wound Care Physician (6/25/22 12:29)





Rehab Nursing Orders:  Ongoing Assess. of Function Status, Bladder Management, 

Bladder Scan, Bladder Training, Bowel Management, Bowel Training, Disease 

Management & Educaiton, DVT Prophylaxis, Fall Prevention, 

Fluid/Electrolyte/Nutrition Mgmt, Infection Prevention, Medication Management & 

Education, Management of Risks & Complications, Management of Skin Intergrity, 

Nutrition Management, Pain Management, Patient/Family Support, Safety 

Management, Wound Management





Intensity of Therapy to be met


Patient to be seen:  Min.3h per day/5 of 7d





PT IPOC


Problem List:  Activity Tolerance, Functional Strength, Safety, Balance, Gait, 

Transfer, Bed Mobility, ROM


Treatment Plan:  Continue Plan of Care


Bed Mobility, Education, Functional Activity [place], Functional Strength, Group 

Therapy, Gait, Safety, Therapeutic Exercise, Transfers


Treatment Duration:  Jul 15, 2022


Frequency:  At least 5 of 7 days/Wk (IRF)


Estimated Hrs Per Day:  1.5 hours per day





OT IPOC


Problems:  Decreased Activ Tolerance, Decreased UE Strength, Dependent 

Transfers, Impaired Bed Mobility, Impaired Funct Balance, Impaired I ADL's, 

Impaired Self-Care Skills, Restricted Funct UE ROM


OT Treatment, Training and Edu:  Yes


Plan of Care:  ADL Retraining, Functional Mobility, Group Exercise/Act as Ind, 

UE Funct Exercise/Act, UE Neuromus Re-Ed/Coord, W/C Management Training


Treatment Duration:  Jul 22, 2022


Frequency:  At least 5 of 7 days/Wk (IRF)


Estimated Hrs Per Day:  1.5 hours per day





ST IPOC


Speech Therapy Treatment Plan:  Discontinue ST


Treatment Duration:  Jun 24, 2022


Frequency:  Modified Program (IRF)


Estimated Hrs Per Day:  Other





/Case Mgmt


/Case Managemen:  Discharge Planning





Dietitian/Nutritionist


Dietitian/Nutritionist to monitor nutritional status and make changes and/or 

recommendations as needed and work with speech pathology on dietary upgrades as 

the occur.





Physician IPOC


Medical Issues being managed closely and that require the 24 hour availability 

of a physician:





Complicated cardiac history with diabetes and hypoglycemia episodes with recent 

left below the knee amputation will require close monitoring for any 

decompensation


Medical Issues:  Bowel/Bladder Function, DVT Prophylaxis, Falls Precautions, 

Fluid/Electrolyte/Nutrition Balance, Infection Protection, Pain Management, 

Wound Care


Brief Synthesis of Preadmission Screen, Post-Admission Evaluation, and Therapy 

Evaluations:





PT and OT will focus on the use of assistive devices in order to prevent falls 

at home and regain return back to independent living with spouse


Medical Prognosis:  Fair


Anticipated Length of Stay:  14 days











DIANA HENRIQUEZ DO                Jun 25, 2022 06:07

## 2022-06-26 VITALS — DIASTOLIC BLOOD PRESSURE: 65 MMHG | SYSTOLIC BLOOD PRESSURE: 144 MMHG

## 2022-06-26 VITALS — DIASTOLIC BLOOD PRESSURE: 46 MMHG | SYSTOLIC BLOOD PRESSURE: 101 MMHG

## 2022-06-26 RX ADMIN — DOCUSATE SODIUM AND SENNOSIDES SCH EA: 8.6; 5 TABLET, FILM COATED ORAL at 08:26

## 2022-06-26 RX ADMIN — PANTOPRAZOLE SODIUM SCH MG: 40 TABLET, DELAYED RELEASE ORAL at 08:26

## 2022-06-26 RX ADMIN — CLOPIDOGREL BISULFATE SCH MG: 75 TABLET, FILM COATED ORAL at 08:26

## 2022-06-26 RX ADMIN — DOCUSATE SODIUM AND SENNOSIDES SCH EA: 8.6; 5 TABLET, FILM COATED ORAL at 21:02

## 2022-06-26 RX ADMIN — INSULIN ASPART SCH UNIT: 100 INJECTION, SOLUTION INTRAVENOUS; SUBCUTANEOUS at 17:11

## 2022-06-26 RX ADMIN — INSULIN ASPART SCH UNIT: 100 INJECTION, SOLUTION INTRAVENOUS; SUBCUTANEOUS at 11:58

## 2022-06-26 RX ADMIN — FUROSEMIDE SCH MG: 40 TABLET ORAL at 08:27

## 2022-06-26 RX ADMIN — APIXABAN SCH MG: 5 TABLET, FILM COATED ORAL at 08:26

## 2022-06-26 RX ADMIN — POLYETHYLENE GLYCOL (3350) SCH GM: 17 POWDER, FOR SOLUTION ORAL at 08:27

## 2022-06-26 RX ADMIN — INSULIN ASPART SCH UNIT: 100 INJECTION, SOLUTION INTRAVENOUS; SUBCUTANEOUS at 20:57

## 2022-06-26 RX ADMIN — Medication SCH MCG: at 08:26

## 2022-06-26 RX ADMIN — POTASSIUM CHLORIDE SCH MEQ: 1500 TABLET, EXTENDED RELEASE ORAL at 08:26

## 2022-06-26 RX ADMIN — DOCUSATE SODIUM SCH MG: 100 CAPSULE ORAL at 08:26

## 2022-06-26 RX ADMIN — INSULIN ASPART SCH UNIT: 100 INJECTION, SOLUTION INTRAVENOUS; SUBCUTANEOUS at 05:18

## 2022-06-26 RX ADMIN — POTASSIUM CHLORIDE SCH MEQ: 1500 TABLET, EXTENDED RELEASE ORAL at 12:45

## 2022-06-26 RX ADMIN — GABAPENTIN SCH MG: 100 CAPSULE ORAL at 20:56

## 2022-06-26 RX ADMIN — POLYETHYLENE GLYCOL (3350) SCH GM: 17 POWDER, FOR SOLUTION ORAL at 21:01

## 2022-06-26 RX ADMIN — APIXABAN SCH MG: 5 TABLET, FILM COATED ORAL at 20:56

## 2022-06-26 RX ADMIN — POTASSIUM CHLORIDE SCH MEQ: 1500 TABLET, EXTENDED RELEASE ORAL at 17:12

## 2022-06-26 RX ADMIN — AMIODARONE HYDROCHLORIDE SCH MG: 200 TABLET ORAL at 08:26

## 2022-06-26 RX ADMIN — LEVOTHYROXINE SODIUM SCH MCG: 100 TABLET ORAL at 06:30

## 2022-06-26 RX ADMIN — DOCUSATE SODIUM SCH MG: 100 CAPSULE ORAL at 20:55

## 2022-06-26 RX ADMIN — GABAPENTIN SCH MG: 100 CAPSULE ORAL at 21:02

## 2022-06-26 NOTE — PM&R PROGRESS NOTE
Subjective


HPI/CC On Admission


Date Seen by Provider:  Jun 26, 2022


Time Seen by Provider:  12:00


Subjective/Events-last exam


6/26/2022:


Patient doing pretty well


Wife at bedside


Blood sugar is 99 after being very low


Suppository produced a small bowel movement so we will initiate more laxatives o

r


Holding metoprolol due to low blood pressure


Holding losartan as of yesterday due to low blood pressure


Mag citrate is being used for constipation today











6/25/2022:


Patient doing really well


Blood sugars reviewed and holding metformin and oral hypoglycemic agent due to 

hypoglycemia


Pain is controlled


Laxatives given


Checked meds and labs





Review of Systems


Gastrointestinal:  Constipation


Musculoskeletal:  leg pain





Objective


Exam


Vital Signs





Vital Signs








  Date Time  Temp Pulse Resp B/P (MAP) Pulse Ox O2 Delivery O2 Flow Rate FiO2


 


6/26/22 08:18 36.7 78 20 101/46 (64) 97 Room Air  





Capillary Refill :


General Appearance:  No Apparent Distress, WD/WN, Chronically ill


HEENT:  PERRL/EOMI, Normal ENT Inspection, Pharynx Normal


Neck:  Full Range of Motion, Normal Inspection, Non Tender, Supple, Carotid 

Bruit


Respiratory:  Chest Non Tender, Lungs Clear, Normal Breath Sounds, No Accessory 

Muscle Use, No Respiratory Distress


Cardiovascular:  No Edema, No Gallop, No JVD, No Murmur, Normal Peripheral 

Pulses, Irregularly Irregular


Gastrointestinal:  Normal Bowel Sounds, No Organomegaly, No Pulsatile Mass, Non 

Tender, Soft


Back:  Normal Inspection, No CVA Tenderness, No Vertebral Tenderness


Extremity:  Normal Capillary Refill, Normal Inspection, Normal Range of Motion, 

Non Tender, No Calf Tenderness, No Pedal Edema


Neurologic/Psychiatric:  Alert, Oriented x3, Normal Mood/Affect, CNs II-XII Norm

as Tested, Abnormal Gait, Motor Weakness (left sided lower extremity BKA)


Skin:  Normal Color, Warm/Dry


Lymphatic:  No Adenopathy





Results/Procedures


Lab


Patient resulted labs reviewed.





FIM


Transfers


Therapy Code Descriptions/Definitions 





Functional [place] Measure:


0=Not Assessed/NA        4=Minimal Assistance


1=Total Assistance        5=Supervision or Setup


2=Maximal Assistance  6=Modified [place]


3=Moderate Assistance 7=Complete IndependenceSCALE: Activities may be completed 

with or without assistive devices.





6-Indepedent-patient completes the activity by him/herself with no assistance 

from a helper.


5-Set-up or Clean-up Assistance-helper sets up or cleans up; patient completes 

activity. [place] assists only prior to or  


    following the activity.


4-Supervision or Touching Assistance-helper provides verbal cues and/or 

touching/steadying and/or contact guard assistance as patient completes activ

ity. Assistance may be provided   


    throughout the activity or intermittently.


3-Partial/Moderate Assistance-helper does LESS THAN HALF the effort. [place] 

lifts, holds or supports trunk or limbs, but provides less than half the effort.


2-Substantial/Maximal Assistance-helper does MORE THAN HALF the effort. [place] 

lifts or holds trunk or limbs and provides more than half the effort.


5-Sqmwkktqd-yeyvds does ALL the effort. Patient does none of the effort to 

complete the activity. Or, the assistance of 2 or more helpers is required for 

the patient to complete the  


    activity.


If activity was not attempted, code reason:


7-Patient Refused.


9-Not Applicable-not attempted and the patient did not perform the activity 

before the current illness, exacerbation or injury.


10-Not Attempted due to Environmental Limitations-(lack of equipment, weather 

restraints, etc.).


88-Not Attempted due to Medical Conditions or Safety Concerns.


Roll Left to Right (QC):  6


Sit to Lying (QC):  6


Sit to Stand (QC):  3


Chair/Bed-to-Chair Xfer(QC):  3


Car Transfer (QC):  3





Gait Training


Does the Patient Walk?:  Yes


Walk 10 feet (QC):  88


Walk 50 ft with 2 Turns(QC):  88


Walk 150 ft (QC):  88


Walking 10ft/uneven surface-QC:  88


Gait Assistive Device:  Parallel Bars





Wheelchair Training


Does the Pt Use a Wheelchair?:  Yes


Distance:  150'x2


Wheel 50 ft with 2 turns (QC):  4


Wheel 150 ft (QC):  4


Type of Wheelchair:  Manual





Stair Training


1 Step (curb) (QC):  88


4 Steps (QC):  88


12 Steps (QC):  88





Balance


Picking up an Object (QC):  88





ADL-Treatment


Eating (QC):  6 (per pt report)


Oral Hygiene (QC):  5 (per pt report)


Shower/Bathe Self (QC):  3 (Min A with sponge bath at bed level, pt required 

assistance washing buttocks.)


Upper Body Dressing (QC):  3 (Mod A overall. Max A doffing shirt at bed level, 

min A donning shirt overhead at EOB.)


Lower Body Dressing (QC):  2 (Max A donning/doffing pants at bed level. Total 

assist would be required with stump immobilizer)


On/Off Footwear (QC):  3 (Min A donning/doffing R gripper sock.)


Toileting Hygiene (QC):  2 (Max A at bed level with clothing management and 

posterior hygiene. Pt able to complete pericare.)





Assessment/Plan


Assessment and Plan


Assess & Plan/Chief Complaint


Assessment:


[place] 6/21/22


Severe PVD


PAF


OAC


HTN


HLP


DM


Hypothyroidism


Hypoglycemia holding metformin and glimepiride


Constipation





Plan:


Home meds


SSI


PT OT


Pain control


BM treatment





6/25/2022:


Hold diabetic meds due to hypoglycemia


Pain control


Bowel regimen





6/26/2022:


BM regimen


Monitor glucose





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











DIANA HENRIQUEZ DO                Jun 26, 2022 07:35

## 2022-06-27 VITALS — DIASTOLIC BLOOD PRESSURE: 64 MMHG | SYSTOLIC BLOOD PRESSURE: 143 MMHG

## 2022-06-27 VITALS — SYSTOLIC BLOOD PRESSURE: 140 MMHG | DIASTOLIC BLOOD PRESSURE: 57 MMHG

## 2022-06-27 RX ADMIN — DOCUSATE SODIUM AND SENNOSIDES SCH EA: 8.6; 5 TABLET, FILM COATED ORAL at 20:18

## 2022-06-27 RX ADMIN — POLYETHYLENE GLYCOL (3350) SCH GM: 17 POWDER, FOR SOLUTION ORAL at 08:05

## 2022-06-27 RX ADMIN — FUROSEMIDE SCH MG: 40 TABLET ORAL at 07:50

## 2022-06-27 RX ADMIN — INSULIN ASPART SCH UNIT: 100 INJECTION, SOLUTION INTRAVENOUS; SUBCUTANEOUS at 11:48

## 2022-06-27 RX ADMIN — AMIODARONE HYDROCHLORIDE SCH MG: 200 TABLET ORAL at 07:49

## 2022-06-27 RX ADMIN — POLYETHYLENE GLYCOL (3350) SCH GM: 17 POWDER, FOR SOLUTION ORAL at 20:19

## 2022-06-27 RX ADMIN — POTASSIUM CHLORIDE SCH MEQ: 1500 TABLET, EXTENDED RELEASE ORAL at 14:01

## 2022-06-27 RX ADMIN — CLOPIDOGREL BISULFATE SCH MG: 75 TABLET, FILM COATED ORAL at 07:49

## 2022-06-27 RX ADMIN — APIXABAN SCH MG: 5 TABLET, FILM COATED ORAL at 07:49

## 2022-06-27 RX ADMIN — PANTOPRAZOLE SODIUM SCH MG: 40 TABLET, DELAYED RELEASE ORAL at 07:50

## 2022-06-27 RX ADMIN — GABAPENTIN SCH MG: 100 CAPSULE ORAL at 14:01

## 2022-06-27 RX ADMIN — APIXABAN SCH MG: 5 TABLET, FILM COATED ORAL at 20:18

## 2022-06-27 RX ADMIN — DOCUSATE SODIUM SCH MG: 100 CAPSULE ORAL at 08:05

## 2022-06-27 RX ADMIN — INSULIN ASPART SCH UNIT: 100 INJECTION, SOLUTION INTRAVENOUS; SUBCUTANEOUS at 05:55

## 2022-06-27 RX ADMIN — DOCUSATE SODIUM AND SENNOSIDES SCH EA: 8.6; 5 TABLET, FILM COATED ORAL at 08:05

## 2022-06-27 RX ADMIN — GABAPENTIN SCH MG: 100 CAPSULE ORAL at 20:18

## 2022-06-27 RX ADMIN — INSULIN ASPART SCH UNIT: 100 INJECTION, SOLUTION INTRAVENOUS; SUBCUTANEOUS at 20:19

## 2022-06-27 RX ADMIN — GABAPENTIN SCH MG: 100 CAPSULE ORAL at 07:50

## 2022-06-27 RX ADMIN — POTASSIUM CHLORIDE SCH MEQ: 1500 TABLET, EXTENDED RELEASE ORAL at 17:21

## 2022-06-27 RX ADMIN — POTASSIUM CHLORIDE SCH MEQ: 1500 TABLET, EXTENDED RELEASE ORAL at 07:49

## 2022-06-27 RX ADMIN — Medication SCH MCG: at 07:49

## 2022-06-27 RX ADMIN — LEVOTHYROXINE SODIUM SCH MCG: 100 TABLET ORAL at 06:28

## 2022-06-27 RX ADMIN — DOCUSATE SODIUM SCH MG: 100 CAPSULE ORAL at 20:18

## 2022-06-27 RX ADMIN — INSULIN ASPART SCH UNIT: 100 INJECTION, SOLUTION INTRAVENOUS; SUBCUTANEOUS at 17:22

## 2022-06-27 NOTE — OCCUPATIONAL THER DAILY NOTE
OT Current Status-Daily Note


Subjective


Pt alert, sitting EOB.  Pt stated wife assisted with sponge bath and dressing 

today.  Pt agrees to therapy.  Discussed with wife about teaching pt to do more 

bathing by himself, wife in agreement.





Mental Status/Objective


Patient Orientation:  Person, Place, Time, Situation





ADL-Treatment


Wife stated that pt was able to don/doff sock in bed though pt stated that it 

was hard to do.  Equipment given and pt educated on using for donning/doffing 

sock with increased independence.  Pt will need wide sock aide with hip kit at 

discharge.  Pt will need practice to become proficient with AE for footwear.  Pt

attempted to stand 2x's to urinate in toilet, pt very fearful and decided to 

just sit and use urinal then pt able to place and empty by self.  Later in 

session, pt requested to use toilet for BM.  Max A x1 for SPT and assist from 

2nd person to manipulate hike clothing over hips.  Pt able to cleanse self on 

toilet then hike pants while sitting on toilet prior to transfer.  Pt washed 

hands at sink then completed oral care sitting at sink independently.


Therapy Code Descriptions/Definitions 





Functional [place] Measure:


0=Not Assessed/NA        4=Minimal Assistance


1=Total Assistance        5=Supervision or Setup


2=Maximal Assistance  6=Modified [place]


3=Moderate Assistance 7=Complete IndependenceSCALE: Activities may be completed 

with or without assistive devices.





6-Indepedent-patient completes the activity by him/herself with no assistance 

from a helper.


5-Set-up or Clean-up Assistance-helper sets up or cleans up; patient completes 

activity. [place] assists only prior to or  


    following the activity.


4-Supervision or Touching Assistance-helper provides verbal cues and/or touch

ing/steadying and/or contact guard assistance as patient completes activity. 

Assistance may be provided   


    throughout the activity or intermittently.


3-Partial/Moderate Assistance-helper does LESS THAN HALF the effort. [place] 

lifts, holds or supports trunk or limbs, but provides less than half the effort.


2-Substantial/Maximal Assistance-helper does MORE THAN HALF the effort. [place] 

lifts or holds trunk or limbs and provides more than half the effort.


7-Nrshtjqjs-ygcska does ALL the effort. Patient does none of the effort to 

complete the activity. Or, the assistance of 2 or more helpers is required for 

the patient to complete the  


    activity.


If activity was not attempted, code reason:


7-Patient Refused.


9-Not Applicable-not attempted and the patient did not perform the activity 

before the current illness, exacerbation or injury.


10-Not Attempted due to Environmental Limitations-(lack of equipment, weather 

restraints, etc.).


88-Not Attempted due to Medical Conditions or Safety Concerns.


Oral Hygiene (QC):  6


On/Off Footwear:  2


Toileting Hygiene (QC):  1


Toilet Transfer (QC):  2





Other Treatment


Pt propelled w/c to therapy gym.  Mod A for squat pivot transfer to therapy mat.

 Pt working on dynamic sitting balance by leaning side to side and front to back

20+ reps without only SBA no LOB noted.  Pt then simulated lifting hip for 

toileting/dressing on mat table by placing 5 bean bags under each hip, with SBA 

for safety.  Pt then requested to use toilet.  After therapy, pt sitting in 

recliner with call light/phone in reach.  All needs met in room.





Education


OT Patient Education:  Instructions to caregiver, Modified ADL techniques, 

Transfer techniques, Use of adapted equipment, W/C management


Teaching Recipient:  Patient, Family


Teaching Methods:  Demonstration, Discussion


Response to Teaching:  Verbalize Understanding, Return Demonstration, 

Reinforcement Needed





OT Short Term Goals


Short Term Goals


Time Frame:  Jul 8, 2022


Shower/bathe self:  3


Lower body dressing:  3





OT Long Term Goals


Long Term Goals


Time Frame:  Jul 22, 2022


Eating (QC):  6


Oral Hygiene (QC):  6


Toileting Hygiene (QC):  6


Shower/Bathe Self (QC):  4


Upper Body Dressing (QC):  4


Lower Body Dressing (QC):  3


On/Off Footwear (QC):  3


Additional Goals:  1-Demonstrate ADL Tasks, 2-Verbalize Understanding, 3-

ImproveStrength/[place]


1=Demonstrate adherence to instructed precautions during ADL tasks.


2=Patient will verbalize/demonstrate understanding of assistive 

devices/modifications for ADL.


3=Patient will improve strength/tolerance for activity to enable patient to 

perform ADL's.





OT Education/Plan


Problem List/Assessment


Assessment:  Decreased Activ Tolerance, Decreased UE Strength, Impaired Funct 

Balance, Impaired Self-Care Skills, Restricted Funct UE ROM





Discharge Recommendations


Plan/Recommendations:  Continue POC





Treatment Plan/Plan of Care


Patient would benefit from OT for education, treatment and training to promote 

independence in ADL's, mobility, safety and/or upper extremity function for 

ADL's.


Plan of Care:  ADL Retraining, Functional Mobility, Group Exercise/Act as Ind, 

UE Funct Exercise/Act, UE Neuromus Re-Ed/Coord, W/C Management Training


Treatment Duration:  Jul 22, 2022


Frequency:  At least 5 of 7 days/Wk (IRF)


Estimated Hrs Per Day:  1.5 hours per day


Agreement:  Yes


Rehab Potential:  Fair





Time/GCodes


Start Time:  08:45


Stop Time:  10:00


Total Time Billed (hr/min):  75


Billed Treatment Time


1 visit-ADL 3 (45 min)  FA 2 (30 min)











MICHAEL ALVAREZ               Jun 27, 2022 10:18

## 2022-06-27 NOTE — ST COGNITIVE LINGUISTIC EVAL
Speech Evaluation-General


Medical Diagnosis


s/p L BKA


Onset Date:  Jun 21, 2022





Therapy Diagnosis


Therapy Diagnosis:  Intact Cognitive Linguistic Skills





Precautions


Precautions:  Fall


Precautions/Isolations:  Fall Prevention, Standard Precautions





Referral


Referring Physician:  Dr. Noy Velez


Reason for Referral:  Evaluation/Treatment





Medical History


Pertinent Medical History:  DM, PVD


Current History


The patient is a 71 year-old male with a past medical history of atrial 

fibrillation, CAD, high cholesterol, HTN GERD, diabetes, and hyothyroidism, who 

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 

the left leg.


Reviewed History:  Yes





Social History


Current Living Status:  Spouse





Speech PLF-Current Status


Prior Level of Function





The patient denied prior challenges with his speech, language, or


cognition.





Subjective


The patient was seated upright in his recliner, awake and alert upon entrance to

his room by the clinician. The patient greeted the clinician appropriately and 

was agreeable to participation in the cognitive linguistic assessment.





Language Eval: Auditory


Comprehends Simple Yes/No Ques:  Functional


Indent/Objects Multiple Fields:  Functional


Ident/Pics in Multiple Fields:  Functional


Follows 1-Step Commands:  Functional


Follows Complex Directions:  Functional


Follows General Conversations:  Functional





Language Eval: Verbal Language


Completes Spontaneous Greeting:  Functional


Produces Auto, Serial Info:  Functional


Imitates Simple Words/Phrases:  Functional


Word Finding:  Functional


Requests Basic Needs:  Functional


States Basic Personal Info:  Functional


Expresses Complex Ideas:  Functional





Language Evaluation: Reading


Follows Simple Written Direct:  Functional





Language Evaluation: Writing


Writes to Simple Dictation:  Functional





Cognitive


Patient Orientation


The patient was independently oriented to self, location, month, day of week, 

date, and year.





Objective Cognitive Domain


Attention:  WNL


Memory:  WNL


Problem Solving:  Functional


Executive Functions:  WNL


Visuospatial Skills:  WNL


Composite Severity Rating:  WNL


Clock Drawing Severity Rating:  WNL





Objective


Formal/Standardized Tests


[employer] Mental Status Evaluation ([facility])


Results


The patient demonstrated a result of +27/30 correlating to a score of within 

normal limits.


Oral Motor/Speech Production


The patient does not display dysarthria or apraxia of speech. The patient is 

100% intelligible in known and unknown contexts.


Impression


The patient displays cognitive linguistic skills within normal limits. The 

patient displayed one error throughout delayed recall of paragraph information, 

question 11.





Speech Patient Assess


Expression of Ideas/Wants:  Expression (4)


Understanding Verbal Content:  Understands (4)


Brief Interview-Mental Status:  Yes


Repetition of Three Words:  Three (3)


Temporal Orientation: Year:  Correct (3)


Temporal Orientation: Month:  Accurate within 5 days(2)


Temporal Orientation: Day:  Correct (1)


Recall : Wear to say "Sock":  Yes, no cue required (2)


Recall : Color:  Yes, no cue required (2)


Recall : Bed:  Yes,after cueing (1)


Memory/Recall Ability:  Current season, Staff names and faces, That he or she is

in a hsp/hsp unit





Speech-Plan


Treatment Plan


Speech Therapy Treatment Plan:  Discontinue ST


Treatment Duration:  Jun 24, 2022


Frequency:  1 time per week


Estimated Hrs Per Day:  .5 hour per day


Rehab Potential:  Fair





Safety Risks/Education


Teaching Recipient:  Patient, Family


Teaching Methods:  Discussion


Response to Teaching:  Verbalize Understanding


Education Topics Provided:  


Results of ALLEY POC





Time


Speech Therapy Time In:  11:15


Speech Therapy Time Out:  11:45


Total Billed Time:  30


Billed Treatment Time


1, MARY FINCH [address] ST               Jun 27, 2022 10:56

## 2022-06-27 NOTE — PM&R PROGRESS NOTE
Subjective


HPI/CC On Admission


Date Seen by Provider:  Jun 27, 2022


Time Seen by Provider:  10:30


Subjective/Events-last exam


6/27/2022:


Patient doing a lot better


Blood sugars 164, 113


Declines to take morphine for addiction potential


Wife really helps the patient








6/26/2022:


Patient doing pretty well


Wife at bedside


Blood sugar is 99 after being very low


Suppository produced a small bowel movement so we will initiate more laxatives 

or


Holding metoprolol due to low blood pressure


Holding losartan as of yesterday due to low blood pressure


Mag citrate is being used for constipation today











6/25/2022:


Patient doing really well


Blood sugars reviewed and holding metformin and oral hypoglycemic agent due to 

hypoglycemia


Pain is controlled


Laxatives given


Checked meds and labs





Review of Systems


General:  Fatigue, Malaise


Musculoskeletal:  leg pain





Objective


Exam


Vital Signs





Vital Signs








  Date Time  Temp Pulse Resp B/P (MAP) Pulse Ox O2 Delivery O2 Flow Rate FiO2


 


6/27/22 20:34     97 Room Air  


 


6/27/22 20:33 36.6 72 18 140/57 (84)    





Capillary Refill :


General Appearance:  No Apparent Distress, WD/WN, Chronically ill


HEENT:  PERRL/EOMI, Normal ENT Inspection, Pharynx Normal


Neck:  Full Range of Motion, Normal Inspection, Non Tender, Supple, Carotid Bru

it


Respiratory:  Chest Non Tender, Lungs Clear, Normal Breath Sounds, No Accessory 

Muscle Use, No Respiratory Distress


Cardiovascular:  No Edema, No Gallop, No JVD, No Murmur, Normal Peripheral 

Pulses, Irregularly Irregular


Gastrointestinal:  Normal Bowel Sounds, No Organomegaly, No Pulsatile Mass, Non 

Tender, Soft


Back:  Normal Inspection, No CVA Tenderness, No Vertebral Tenderness


Extremity:  Normal Capillary Refill, Normal Inspection, Normal Range of Motion, 

Non Tender, No Calf Tenderness, No Pedal Edema


Neurologic/Psychiatric:  Alert, Oriented x3, Normal Mood/Affect, CNs II-XII Norm

as Tested, Abnormal Gait, Motor Weakness (left sided lower extremity BKA)


Skin:  Normal Color, Warm/Dry


Lymphatic:  No Adenopathy





Results/Procedures


Lab


Patient resulted labs reviewed.





FIM


Transfers


Therapy Code Descriptions/Definitions 





Functional [place] Measure:


0=Not Assessed/NA        4=Minimal Assistance


1=Total Assistance        5=Supervision or Setup


2=Maximal Assistance  6=Modified [place]


3=Moderate Assistance 7=Complete IndependenceSCALE: Activities may be completed 

with or without assistive devices.





6-Indepedent-patient completes the activity by him/herself with no assistance 

from a helper.


5-Set-up or Clean-up Assistance-helper sets up or cleans up; patient completes 

activity. [place] assists only prior to or  


    following the activity.


4-Supervision or Touching Assistance-helper provides verbal cues and/or 

touching/steadying and/or contact guard assistance as patient completes 

activity. Assistance may be provided   


    throughout the activity or intermittently.


3-Partial/Moderate Assistance-helper does LESS THAN HALF the effort. [place] 

lifts, holds or supports trunk or limbs, but provides less than half the effort.


2-Substantial/Maximal Assistance-helper does MORE THAN HALF the effort. [place] 

lifts or holds trunk or limbs and provides more than half the effort.


4-[id number]-qmfwog does ALL the effort. Patient does none of the effort to 

complete the activity. Or, the assistance of 2 or more helpers is required for 

the patient to complete the  


    activity.


If activity was not attempted, code reason:


7-Patient Refused.


9-Not Applicable-not attempted and the patient did not perform the activity 

before the current illness, exacerbation or injury.


10-Not Attempted due to Environmental Limitations-(lack of equipment, weather 

restraints, etc.).


88-Not Attempted due to Medical Conditions or Safety Concerns.


Roll Left to Right (QC):  6


Sit to Lying (QC):  6


Sit to Stand (QC):  3


Chair/Bed-to-Chair Xfer(QC):  3


Car Transfer (QC):  3





Gait Training


Does the Patient Walk?:  Yes


Walk 10 feet (QC):  88


Walk 50 ft with 2 Turns(QC):  88


Walk 150 ft (QC):  88


Walking 10ft/uneven surface-QC:  88


Gait Assistive Device:  Parallel Bars





Wheelchair Training


Does the Pt Use a Wheelchair?:  Yes


Distance:  150'x2


Wheel 50 ft with 2 turns (QC):  4


Wheel 150 ft (QC):  4


Type of Wheelchair:  Manual





Stair Training


1 Step (curb) (QC):  88


4 Steps (QC):  88


12 Steps (QC):  88





Balance


Picking up an Object (QC):  88





ADL-Treatment


Eating (QC):  6 (per pt report)


Oral Hygiene (QC):  5 (per pt report)


Shower/Bathe Self (QC):  3 (Min A with sponge bath at bed level, pt required 

assistance washing buttocks.)


Upper Body Dressing (QC):  3 (Mod A overall. Max A doffing shirt at bed level, 

min A donning shirt overhead at EOB.)


Lower Body Dressing (QC):  2 (Max A donning/doffing pants at bed level. Total 

assist would be required with stump immobilizer)


On/Off Footwear (QC):  3 (Min A donning/doffing R gripper sock.)


Toileting Hygiene (QC):  2 (Max A at bed level with clothing management and 

posterior hygiene. Pt able to complete pericare.)





Assessment/Plan


Assessment and Plan


Assess & Plan/Chief Complaint


Assessment:


LBKA 6/21/22


Severe PVD


PAF


OAC


HTN


HLP


DM


Hypothyroidism


Hypoglycemia holding metformin and glimepiride


Constipation





Plan:


Home meds


SSI


PT OT


Pain control


BM treatment





6/25/2022:


Hold diabetic meds due to hypoglycemia


Pain control


Bowel regimen





6/26/2022:


BM regimen


Monitor glucose





6/27/2022:


Monitor sugar


Hypoglycemia high risk





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











DIANA HENRIQUEZ DO                Jun 27, 2022 06:15

## 2022-06-27 NOTE — WOUND CARE ASSESSMENT
Wound Care Assessment


Date Seen by Provider:  Jun 27, 2022


Time Seen by Provider:  16:00


Chief Complaint


L. BKA incision


R. heel ulcer


HPI


This pleasant 71 year old gentleman with h/o severe PAD was admitted to 

inpatient rehab following BKA. Per patient and wife, surgery on 6-21-22. He had 

a long h/o PAD with intervention/angioplasty per Dr. Hickey in past. His wound

healing will be complicated by PAD, DM2, obesity, CAD and anemia (post-

operative). Eddie has an area of ecchymosis with some associated sloughing 

superior to his incision site. The incision is clean and dry and appears to be 

healing well. The stump is cool to touch. He states that he initially had 

significant swelling  but this is now resolved. He was initially in a 

compressive brace that was very painful. I do wonder if the area of ecchymosis 

and sloughing is as result of this device. He is at risk of flap failure with 

underlying arterial disease. At this point I would recommend a moist dressing to

reduce chances of further sloughing. I would also recommend against compressive 

dressings that might cause further injury to the area of concern. Nursing staff 

will need to clarify when staples are to be removed and follow up with surgery. 

Unstageable PU to right heel as well. He had arterial intervention on this side 

as well. Stable eschar noted on exam today.


Smoking Status:  Never a Smoker


Alcohol Use:  Denies Use


Exam





Vital Signs








  Date Time  Temp Pulse Resp B/P (MAP) Pulse Ox O2 Delivery O2 Flow Rate FiO2


 


6/27/22 07:30 36.5 81 16 143/64 (90) 96 Room Air  





Capillary Refill :


General Appearance:  WD/WN, no apparent distress


Neck:  full range of motion


Respiratory:  no respiratory distress, no accessory muscle use


Extremities:  normal range of motion, pedal edema, other (Significant pain at 

stump site. No erythema or induration)


Neurologic/Psychiatric:  alert, normal mood/affect, oriented x 3


Skin:  cool (stump site), ecchymosis (superior to incision site), pallor (at 

stump site), other (sloughing blister superior to stump site)


Wound assessment:





Incision: clean, dry, intact. No dehiscence. No erythema, no induration. Tender 

to touch. No drainage


Nonwound: Ecchymosis with evidence of recent blistering and sloughing superior 

to incision. No drainage. Not currently open.


R. heel: the epithelialization is none. There is no tunneling or undermining. 

There is no drainage. Granulation is none. Necrotic is large and eschar.





Results


Laboratory Tests


6/26/22 17:07: Glucometer 154H


6/26/22 20:47: Glucometer 164H


6/27/22 05:37: Glucometer 113H


6/27/22 11:36: Glucometer 165H


6/27/22 15:21: Glucometer 199H





Assessment/Plan/Dx


Assessment:





1. L. BKA incision


2. PAD s/p angioplasty


3. DM2 with hyperglycemia


4. Anemia (post-operative)


5. Lymphedema


6. Unstageable pressure ulcer R. heel with stable eschar





Plan:


1. Cleanse daily with saline or wound cleanser. Apply xeroform to incision and 

blister. Cover with kerlix and stump sock. Change daily. 


2. Monitor closely for signs of flap failure. Clarify date of staple removal and

follow up from surgeon


3. Good glycemic control per primary team


4. Per primary team


5. Stump sock. I would avoid tightly compressive braces to stump at this time. 

Cool to touch and at risk for flap failure. 


6. Paint liberally daily with betadine. Cover wtih BFD and use Primo to further 

off load in bed











JOSE G GALLARDO MD            Jun 27, 2022 16:58

## 2022-06-28 VITALS — DIASTOLIC BLOOD PRESSURE: 56 MMHG | SYSTOLIC BLOOD PRESSURE: 134 MMHG

## 2022-06-28 VITALS — SYSTOLIC BLOOD PRESSURE: 132 MMHG | DIASTOLIC BLOOD PRESSURE: 59 MMHG

## 2022-06-28 RX ADMIN — DOCUSATE SODIUM SCH MG: 100 CAPSULE ORAL at 20:35

## 2022-06-28 RX ADMIN — ACETAMINOPHEN PRN MG: 325 TABLET ORAL at 08:59

## 2022-06-28 RX ADMIN — POTASSIUM CHLORIDE SCH MEQ: 1500 TABLET, EXTENDED RELEASE ORAL at 07:52

## 2022-06-28 RX ADMIN — INSULIN ASPART SCH UNIT: 100 INJECTION, SOLUTION INTRAVENOUS; SUBCUTANEOUS at 20:35

## 2022-06-28 RX ADMIN — GABAPENTIN SCH MG: 100 CAPSULE ORAL at 20:35

## 2022-06-28 RX ADMIN — POTASSIUM CHLORIDE SCH MEQ: 1500 TABLET, EXTENDED RELEASE ORAL at 18:05

## 2022-06-28 RX ADMIN — AMIODARONE HYDROCHLORIDE SCH MG: 200 TABLET ORAL at 07:52

## 2022-06-28 RX ADMIN — INSULIN ASPART SCH UNIT: 100 INJECTION, SOLUTION INTRAVENOUS; SUBCUTANEOUS at 05:38

## 2022-06-28 RX ADMIN — Medication SCH MCG: at 07:53

## 2022-06-28 RX ADMIN — INSULIN ASPART SCH UNIT: 100 INJECTION, SOLUTION INTRAVENOUS; SUBCUTANEOUS at 11:31

## 2022-06-28 RX ADMIN — CLOPIDOGREL BISULFATE SCH MG: 75 TABLET, FILM COATED ORAL at 07:53

## 2022-06-28 RX ADMIN — POLYETHYLENE GLYCOL (3350) SCH GM: 17 POWDER, FOR SOLUTION ORAL at 07:56

## 2022-06-28 RX ADMIN — APIXABAN SCH MG: 5 TABLET, FILM COATED ORAL at 20:35

## 2022-06-28 RX ADMIN — GABAPENTIN SCH MG: 100 CAPSULE ORAL at 12:44

## 2022-06-28 RX ADMIN — FUROSEMIDE SCH MG: 40 TABLET ORAL at 07:52

## 2022-06-28 RX ADMIN — POLYETHYLENE GLYCOL (3350) SCH GM: 17 POWDER, FOR SOLUTION ORAL at 20:35

## 2022-06-28 RX ADMIN — INSULIN ASPART SCH UNIT: 100 INJECTION, SOLUTION INTRAVENOUS; SUBCUTANEOUS at 16:27

## 2022-06-28 RX ADMIN — APIXABAN SCH MG: 5 TABLET, FILM COATED ORAL at 07:52

## 2022-06-28 RX ADMIN — GABAPENTIN SCH MG: 100 CAPSULE ORAL at 07:52

## 2022-06-28 RX ADMIN — DOCUSATE SODIUM AND SENNOSIDES SCH EA: 8.6; 5 TABLET, FILM COATED ORAL at 20:35

## 2022-06-28 RX ADMIN — DOCUSATE SODIUM SCH MG: 100 CAPSULE ORAL at 07:52

## 2022-06-28 RX ADMIN — DOCUSATE SODIUM AND SENNOSIDES SCH EA: 8.6; 5 TABLET, FILM COATED ORAL at 07:52

## 2022-06-28 RX ADMIN — LEVOTHYROXINE SODIUM SCH MCG: 100 TABLET ORAL at 06:12

## 2022-06-28 RX ADMIN — POTASSIUM CHLORIDE SCH MEQ: 1500 TABLET, EXTENDED RELEASE ORAL at 12:44

## 2022-06-28 RX ADMIN — PANTOPRAZOLE SODIUM SCH MG: 40 TABLET, DELAYED RELEASE ORAL at 07:53

## 2022-06-28 NOTE — OCCUPATIONAL THER DAILY NOTE
OT Current Status-Daily Note


Subjective


Pt seated EOB upon OT arrival, agreeable to tx. Pt reported being more tired 

today, and he required additional encouragement throughout some standing 

activities d/t fear of falling.





Mental Status/Objective


Patient Orientation:  Person, Place, Situation





ADL-Treatment


Therapy Code Descriptions/Definitions 





Functional [place] Measure:


0=Not Assessed/NA        4=Minimal Assistance


1=Total Assistance        5=Supervision or Setup


2=Maximal Assistance  6=Modified [place]


3=Moderate Assistance 7=Complete IndependenceSCALE: Activities may be completed 

with or without assistive devices.





6-Indepedent-patient completes the activity by him/herself with no assistance 

from a helper.


5-Set-up or Clean-up Assistance-helper sets up or cleans up; patient completes 

activity. [place] assists only prior to or  


    following the activity.


4-Supervision or Touching Assistance-helper provides verbal cues and/or 

touching/steadying and/or contact guard assistance as patient completes 

activity. Assistance may be provided   


    throughout the activity or intermittently.


3-Partial/Moderate Assistance-helper does LESS THAN HALF the effort. [place] 

lifts, holds or supports trunk or limbs, but provides less than half the effort.


2-Substantial/Maximal Assistance-helper does MORE THAN HALF the effort. [place] 

lifts or holds trunk or limbs and provides more than half the effort.


6-Ulriouvon-deazty does ALL the effort. Patient does none of the effort to 

complete the activity. Or, the assistance of 2 or more helpers is required for 

the patient to complete the  


    activity.


If activity was not attempted, code reason:


7-Patient Refused.


9-Not Applicable-not attempted and the patient did not perform the activity 

before the current illness, exacerbation or injury.


10-Not Attempted due to Environmental Limitations-(lack of equipment, weather 

restraints, etc.).


88-Not Attempted due to Medical Conditions or Safety Concerns.


Shower/Bathe Self (QC):  5 (cover dressings and wounds)


Upper Body Dressing (QC):  3 (Min A to don shirt in back)


Lower Body Dressing (QC):  1 (Assist x2 in stand. Pt attempted to don pants 

seated but unable to fully get over hips.)


On/Off Footwear:  4 (v/c's for sequencing with sock aid for R foot)





Other Treatment


OT/PT cotreat due to skill of 2 clinicians required which a rehab tech could not

perform in order to coordinate UE/LEs, decrease fall risk, and due to pt's 

limitations in strength, activity tolerance, transfers and mobility. Pt used FWW

to transfer seated EOB to shower w/c, Mod A. Pt pushed to bathroom to complete 

showering, dressing, and footwear, 100% in seated position. Pt transferred back 

to w/c, Mod A, and wheeled to therapy gym to participate in standing functional 

activities at the parallel bars. Pt stood with Mod A, and alternated between 

raising L and R hands off bars, ~10x each hand. He then progressed to two rounds

of standing while touching hands to contralateral shoulder, ~10 each hand, 

seated RB between rounds. Pt required v/c's to stand upright during standing 

activities. Pt pushed back to room, and transferred from w/c to recliner with 

FWW, Mod A. Post tx, pt left in recliner with call light in reach and all needs 

met.





Education


OT Patient Education:  Correct positioning, Energy conservation, Exercise 

program, Modified ADL techniques, Progress toward Goal/Update tx plan, Purpose 

of tx/functional activities, Rehab process, Transfer techniques, Use of adapted 

equipment, W/C management


Teaching Recipient:  Patient


Teaching Methods:  Demonstration, Discussion


Response to Teaching:  Verbalize Understanding, Return Demonstration





OT Short Term Goals


Short Term Goals


Time Frame:  Jul 8, 2022


Shower/bathe self:  3


Lower body dressing:  3





OT Long Term Goals


Long Term Goals


Time Frame:  Jul 22, 2022


Eating (QC):  6


Oral Hygiene (QC):  6


Toileting Hygiene (QC):  6


Shower/Bathe Self (QC):  4


Upper Body Dressing (QC):  4


Lower Body Dressing (QC):  3


On/Off Footwear (QC):  3


Additional Goals:  1-Demonstrate ADL Tasks, 2-Verbalize Understanding, 

3-ImproveStrength/[place]


1=Demonstrate adherence to instructed precautions during ADL tasks.


2=Patient will verbalize/demonstrate understanding of assistive 

devices/modifications for ADL.


3=Patient will improve strength/tolerance for activity to enable patient to 

perform ADL's.





OT Education/Plan


Problem List/Assessment


Assessment:  Decreased Activ Tolerance, Decreased UE Strength, Impaired Funct 

Balance, Impaired I ADL's, Impaired Self-Care Skills, Restricted Funct UE ROM





Discharge Recommendations


Plan/Recommendations:  Continue POC


Equpiment Recommendations-D/C:  Extended Bath Bench, Hip Kit (wide sock aide)





Treatment Plan/Plan of Care


Patient would benefit from OT for education, treatment and training to promote 

independence in ADL's, mobility, safety and/or upper extremity function for 

ADL's.


Plan of Care:  ADL Retraining, Functional Mobility, Group Exercise/Act as Ind, 

UE Funct Exercise/Act, UE Neuromus Re-Ed/Coord, W/C Management Training


Treatment Duration:  Jul 22, 2022


Frequency:  At least 5 of 7 days/Wk (IRF)


Estimated Hrs Per Day:  1.5 hours per day


Agreement:  Yes


Rehab Potential:  Fair





Time/GCodes


Start Time:  09:15


Stop Time:  10:15


Total Time Billed (hr/min):  60


Billed Treatment Time


[id number]: OT/PT cotreat


1, ADL 3 (40), FA 1 (20')











DHIRAJ EDWARDS OT          Jun 28, 2022 10:13

## 2022-06-28 NOTE — OCCUPATIONAL THER DAILY NOTE
OT Current Status-Daily Note


Subjective


Pt alert, sitting in recliner. Pt pleasant to work with.  Wife present in room. 

Pt agrees to therapy.  Co-treat with PT [id number], skills of 2 clinicians 

required for instruction and modifications on B UE/LE exercises due to increase 

shldr, hip and wound pain.  PT focusing on B LE strengthening and OT focusing on

B UE strengthening.





Mental Status/Objective


Patient Orientation:  Person, Place, Time, Situation





ADL-Treatment


Using shoe funnel, pt able to don shoe independently.


Therapy Code Descriptions/Definitions 





Functional [place] Measure:


0=Not Assessed/NA        4=Minimal Assistance


1=Total Assistance        5=Supervision or Setup


2=Maximal Assistance  6=Modified [place]


3=Moderate Assistance 7=Complete IndependenceSCALE: Activities may be completed 

with or without assistive devices.





6-Indepedent-patient completes the activity by him/herself with no assistance 

from a helper.


5-Set-up or Clean-up Assistance-helper sets up or cleans up; patient completes 

activity. [place] assists only prior to or  


    following the activity.


4-Supervision or Touching Assistance-helper provides verbal cues and/or 

touching/steadying and/or contact guard assistance as patient completes 

activity. Assistance may be provided   


    throughout the activity or intermittently.


3-Partial/Moderate Assistance-helper does LESS THAN HALF the effort. [place] 

lifts, holds or supports trunk or limbs, but provides less than half the effort.


2-Substantial/Maximal Assistance-helper does MORE THAN HALF the effort. [place] 

lifts or holds trunk or limbs and provides more than half the effort.


3-Afsldxdmm-fkrltl does ALL the effort. Patient does none of the effort to 

complete the activity. Or, the assistance of 2 or more helpers is required for 

the patient to complete the  


    activity.


If activity was not attempted, code reason:


7-Patient Refused.


9-Not Applicable-not attempted and the patient did not perform the activity 

before the current illness, exacerbation or injury.


10-Not Attempted due to Environmental Limitations-(lack of equipment, weather 

restraints, etc.).


88-Not Attempted due to Medical Conditions or Safety Concerns.





Other Treatment


Pt given blue 5.8# resistance theraband and green medium heavy therapy sponge to

work on strengthening B UE.  Skilled instruction to complete each exercise with 

correct technique and modifications when necessary.  Pt has limited AROM of B 

shldrs requiring modifications of positioning with shldr exercises.  Pt has 

previous broken L thumb that required assistance to complete pinch/ 

exercises with sponge using correct technique.  Pt fatigues quickly with 

exercises and requires recovery breaks between each.  See PT notes for B LE 

exercises.  After session, pt sitting in recliner with call light/phone in 

reach.  All needs met in room.





OT Short Term Goals


Short Term Goals


Time Frame:  Jul 8, 2022


Shower/bathe self:  3


Lower body dressing:  3





OT Long Term Goals


Long Term Goals


Time Frame:  Jul 22, 2022


Eating (QC):  6


Oral Hygiene (QC):  6


Toileting Hygiene (QC):  6


Shower/Bathe Self (QC):  4


Upper Body Dressing (QC):  4


Lower Body Dressing (QC):  3


On/Off Footwear (QC):  3


Additional Goals:  1-Demonstrate ADL Tasks, 2-Verbalize Understanding, 3-

ImproveStrength/[place]


1=Demonstrate adherence to instructed precautions during ADL tasks.


2=Patient will verbalize/demonstrate understanding of assistive 

devices/modifications for ADL.


3=Patient will improve strength/tolerance for activity to enable patient to 

perform ADL's.





OT Education/Plan


Problem List/Assessment


Assessment:  Decreased Activ Tolerance, Decreased UE Strength, Impaired Self-

Care Skills





Discharge Recommendations


Plan/Recommendations:  Continue POC





Treatment Plan/Plan of Care


Patient would benefit from OT for education, treatment and training to promote 

independence in ADL's, mobility, safety and/or upper extremity function for 

ADL's.


Plan of Care:  ADL Retraining, Functional Mobility, Group Exercise/Act as Ind, 

UE Funct Exercise/Act, UE Neuromus Re-Ed/Coord, W/C Management Training


Treatment Duration:  Jul 22, 2022


Frequency:  At least 5 of 7 days/Wk (IRF)


Estimated Hrs Per Day:  1.5 hours per day


Agreement:  Yes


Rehab Potential:  Fair





Time/GCodes


Start Time:  13:15


Stop Time:  13:45


Total Time Billed (hr/min):  30


Billed Treatment Time


1 visit-EX 2 (30 min)











MICHAEL ALVAREZ               Jun 28, 2022 14:05

## 2022-06-28 NOTE — PODIATRY PROGRESS NOTE
Standard Progress Note


Progress Notes/Assess & Plan


Date Seen by a Provider:  Jun 28, 2022


Time Seen by a Provider:  17:28


Progress/Assessment & Plan


Consultation dictated.  Foot care given.


Final Diagnosis


Diabetic Neuropathy,


PVD


Onychomycosis


Partial thickness wound, right foot











GABY BROUSSARD DPM              Jun 28, 2022 17:29

## 2022-06-28 NOTE — PHYSICAL THERAPY DAILY NOTE
PT Daily Note-Current


Subjective


Patient in bed pre tx, agrees to PT, has unrated pain in left residual limb.  

Will be co-treating with OT due to poor patient mobility, strength, endurance, 

severe pain with activity, coordinate UE and LE with activity, reduce risk of 

falls.





Appearance


Patient in recliner post tx with nurse call, phone, tray, all needs met.





Mental Status


Patient Orientation:  Person, Place, Situation





Transfers


SCALE: Activities may be completed with or without assistive devices.





6-Indepedent-patient completes the activity by him/herself with no assistance 

from a helper.


5-Set-up or Clean-up Assistance-helper sets up or cleans up; patient completes 

activity. [place] assists only prior to or  


    following the activity.


4-Supervision or Touching Assistance-helper provides verbal cues and/or 

touching/steadying and/or contact guard assistance as patient completes 

activity. Assistance may be provided   


    throughout the activity or intermittently.


3-Partial/Moderate Assistance-helper does LESS THAN HALF the effort. [place] 

lifts, holds or supports trunk or limbs, but provides less than half the effort.


2-Substantial/Maximal Assistance-helper does MORE THAN HALF the effort. [place] 

lifts or holds trunk or limbs and provides more than half the effort.


2-Qgromzodm-dfmnnc does ALL the effort. Patient does none of the effort to 

complete the activity. Or, the assistance of 2 or more helpers is required for 

the patient to complete the  


    activity.


If activity was not attempted, code reason:


7-Patient Refused.


9-Not Applicable-not attempted and the patient did not perform the activity 

before the current illness, exacerbation or injury.


10-Not Attempted due to Environmental Limitations-(lack of equipment, weather 

restraints, etc.).


88-Not Attempted due to Medical Conditions or Safety Concerns.


Roll Left & Right (QC):  6


Lying to Sitting/Side of Bed(Q:  6


Sit to Stand (QC):  3


Chair/Bed-to-Chair Xfer(QC):  3


Patient transfers to shower chair, undresses, put in shower, showers, dresses 

again, transfers to .





Wheelchair Training


Does the Pt Use a Wheelchair?:  Yes


Wheel 50 ft with 2 turns (QC):  4


Type of Wheelchair:  Manual


100' SBA, very slow, only goes a few feet before resting, patient states he is 

very tired today





Exercises


standing in parallel bars x3 for about 45sec to a minute each time, letting go 

with one hand, crossing chest





Treatments


PT performed bed mobility and transfers, standing, WC mobility, positioning and 

safety during bathing and dressing, OT performed bathing, dressing, UE activity,

UE positioning and safety during activity





Assessment


Current Status:  Fair Progress


slightly improved transfers and sit to stand but still needs mod assist.





PT Short Term Goals


Short Term Goals


Time Frame:  Jul 1, 2022


Roll Left & Right:  6


Sit to lying:  3 (Kacie)


Lying to sitting on side of be:  3 (Kacie)


Sit to stand:  3 (Kacie)


Chair/bed-to-chair transfer:  3


Walk 10 feet:  3 (Kacie)





PT Long Term Goals


Long Term Goals


PT Long Term Goals Time Frame:  Jul 15, 2022


Roll Left & Right (QC):  6


Sit to Lying (QC):  4 (CGA)


Lying-Sitting on Side/Bed(QC):  4 (CGA)


Sit to Stand (QC):  4 (CGA)


Chair/Bed-to-Chair Xfer(QC):  4 (CGA)


Toilet Transfer (QC):  4 (CGA)


Car Transfer (QC):  4 (CGA)


Does the Patient Walk:  Yes


Walk 10 feet (QC):  4 (CGA)


Walk 50ft with 2 Turns (QC):  88


Walk 150 ft (QC):  88


Walking 10ft on Uneven Surface:  4 (CGA)


1 Step (curb) (QC):  88


4 Steps (QC):  88


12 Steps (QC):  88


Picking up an Object (QC):  4 (CGA)


Wheel 50 feet with 2 turns (QC:  6


Wheel 150 feet:  6





PT Plan


Problem List


Problem List:  Activity Tolerance, Functional Strength, Safety, Balance, Gait, 

Transfer, Bed Mobility, ROM





Treatment/Plan


Treatment Plan:  Continue Plan of Care


Treatment Plan:  Bed Mobility, Education, Functional Activity [place], Functional 

Strength, Group Therapy, Gait, Safety, Therapeutic Exercise, Transfers


Treatment Duration:  Jul 15, 2022


Frequency:  At least 5 of 7 days/Wk (IRF)


Estimated Hrs Per Day:  1.5 hours per day


Patient and/or Family Agrees t:  Yes





Safety Risks/Education


Patient Education:  Transfer Techniques, Correct Positioning, W/C Management, 

Safety Issues


Teaching Recipient:  Patient


Teaching Methods:  Demonstration, Discussion


Response to Teaching:  Reinforcement Needed





Time/GCodes


Time In:  0915


Time Out:  1015


Total Billed Treatment Time:  60


Total Billed Treatment


1 visit


EX 15'


FA 45'











KIRAN RANGEL PT                Jun 28, 2022 10:12

## 2022-06-28 NOTE — PHYSICAL THERAPY DAILY NOTE
PT Daily Note-Current


Subjective


Pt alert, sitting in recliner. Pt pleasant to work with.  Wife present in room. 

Pt agrees to therapy.  Co-treat with PT [id number], skills of 2 clinicians 

required for instruction and modifications on B UE/LE exercises due to increase 

shldr, hip and wound pain.  PT focusing on B LE strengthening and OT focusing on

B UE strengthening.





Pain





   Location:  No Pain Reported





Mental Status


Patient Orientation:  Person, Place, Situation





Transfers


SCALE: Activities may be completed with or without assistive devices.





6-Indepedent-patient completes the activity by him/herself with no assistance 

from a helper.


5-Set-up or Clean-up Assistance-helper sets up or cleans up; patient completes 

activity. [place] assists only prior to or  


    following the activity.


4-Supervision or Touching Assistance-helper provides verbal cues and/or 

touching/steadying and/or contact guard assistance as patient completes 

activity. Assistance may be provided   


    throughout the activity or intermittently.


3-Partial/Moderate Assistance-helper does LESS THAN HALF the effort. [place] 

lifts, holds or supports trunk or limbs, but provides less than half the effort.


2-Substantial/Maximal Assistance-helper does MORE THAN HALF the effort. [place] 

lifts or holds trunk or limbs and provides more than half the effort.


3-Frfxjfuhr-kogdxi does ALL the effort. Patient does none of the effort to 

complete the activity. Or, the assistance of 2 or more helpers is required for 

the patient to complete the  


    activity.


If activity was not attempted, code reason:


7-Patient Refused.


9-Not Applicable-not attempted and the patient did not perform the activity 

before the current illness, exacerbation or injury.


10-Not Attempted due to Environmental Limitations-(lack of equipment, weather 

restraints, etc.).


88-Not Attempted due to Medical Conditions or Safety Concerns.





Exercises


Seated Therapy Exercises:  Ankle pumps, Long arc quads, Hip flexion, Hip 

abd/add, Glut set


Seated Reps:  15





Treatments


Pt given blue 5.8# resistance theraband and green medium heavy therapy sponge to

work on strengthening B UE.  Skilled instruction to complete each exercise with 

correct technique and modifications when necessary.  Pt has limited AROM of B 

shldrs requiring modifications of positioning with shldr exercises. Pt completes

Seated EX at recliner.  Pt fatigues quickly with exercises and requires recovery

breaks between each.  After session, pt sitting in recliner with call 

light/phone in reach.  All needs met in room.





Assessment


Current Status:  Fair Progress


Pt fatigues easily, needing frequent RB.





PT Short Term Goals


Short Term Goals


Time Frame:  Jul 1, 2022


Roll Left & Right:  6


Sit to lying:  3 (Kacie)


Lying to sitting on side of be:  3 (Kacie)


Sit to stand:  3 (Kacie)


Chair/bed-to-chair transfer:  3


Walk 10 feet:  3 (Kacie)





PT Long Term Goals


Long Term Goals


PT Long Term Goals Time Frame:  Jul 15, 2022


Roll Left & Right (QC):  6


Sit to Lying (QC):  4 (CGA)


Lying-Sitting on Side/Bed(QC):  4 (CGA)


Sit to Stand (QC):  4 (CGA)


Chair/Bed-to-Chair Xfer(QC):  4 (CGA)


Toilet Transfer (QC):  4 (CGA)


Car Transfer (QC):  4 (CGA)


Does the Patient Walk:  Yes


Walk 10 feet (QC):  4 (CGA)


Walk 50ft with 2 Turns (QC):  88


Walk 150 ft (QC):  88


Walking 10ft on Uneven Surface:  4 (CGA)


1 Step (curb) (QC):  88


4 Steps (QC):  88


12 Steps (QC):  88


Picking up an Object (QC):  4 (CGA)


Wheel 50 feet with 2 turns (QC:  6


Wheel 150 feet:  6





PT Plan


Problem List


Problem List:  Activity Tolerance, Functional Strength





Treatment/Plan


Treatment Plan:  Continue Plan of Care


Treatment Plan:  Bed Mobility, Education, Functional Activity [place], Functional 

Strength, Group Therapy, Gait, Safety, Therapeutic Exercise, Transfers


Treatment Duration:  Jul 15, 2022


Frequency:  At least 5 of 7 days/Wk (IRF)


Estimated Hrs Per Day:  1.5 hours per day


Patient and/or Family Agrees t:  Yes





Safety Risks/Education


Patient Education:  Correct Positioning


Teaching Recipient:  Patient, Significant Other


Teaching Methods:  Discussion


Response to Teaching:  Verbalize Understanding





Time/GCodes


Time In:  1315


Time Out:  1345


Total Billed Treatment Time:  30


Total Billed Treatment


Co-treat w/OT for 30m   1, EX x2 (30m)











SERGO AGUILERA PTA              Jun 28, 2022 14:58

## 2022-06-28 NOTE — PM&R PROGRESS NOTE
Subjective


HPI/CC On Admission


Date Seen by Provider:  Jun 28, 2022


Time Seen by Provider:  10:00


Subjective/Events-last exam


6/28/2022:


Patient doing a lot better


Complaining of pain but declines any pain medication


Sugar is much improved after hypoglycemia


Bowels are moving now


Wife at bedside








6/27/2022:


Patient doing a lot better


Blood sugars 164, 113


Declines to take morphine for addiction potential


Wife really helps the patient








6/26/2022:


Patient doing pretty well


Wife at bedside


Blood sugar is 99 after being very low


Suppository produced a small bowel movement so we will initiate more laxatives 

or


Holding metoprolol due to low blood pressure


Holding losartan as of yesterday due to low blood pressure


Mag citrate is being used for constipation today











6/25/2022:


Patient doing really well


Blood sugars reviewed and holding metformin and oral hypoglycemic agent due to 

hypoglycemia


Pain is controlled


Laxatives given


Checked meds and labs





Review of Systems


General:  Fatigue, Malaise


Musculoskeletal:  leg pain





Objective


Exam


Vital Signs





Vital Signs








  Date Time  Temp Pulse Resp B/P (MAP) Pulse Ox O2 Delivery O2 Flow Rate FiO2


 


6/28/22 20:45      Room Air  


 


6/28/22 19:42 36.6 67 18 134/56 (82) 96   





Capillary Refill :


General Appearance:  No Apparent Distress, WD/WN, Chronically ill


HEENT:  PERRL/EOMI, Normal ENT Inspection, Pharynx Normal


Neck:  Full Range of Motion, Normal Inspection, Non Tender, Supple, Carotid 

Bruit


Respiratory:  Chest Non Tender, Lungs Clear, Normal Breath Sounds, No Accessory 

Muscle Use, No Respiratory Distress


Cardiovascular:  No Edema, No Gallop, No JVD, No Murmur, Normal Peripheral 

Pulses, Irregularly Irregular


Gastrointestinal:  Normal Bowel Sounds, No Organomegaly, No Pulsatile Mass, Non 

Tender, Soft


Back:  Normal Inspection, No CVA Tenderness, No Vertebral Tenderness


Extremity:  Normal Capillary Refill, Normal Inspection, Normal Range of Motion, 

Non Tender, No Calf Tenderness, No Pedal Edema


Neurologic/Psychiatric:  Alert, Oriented x3, Normal Mood/Affect, CNs II-XII Norm

as Tested, Abnormal Gait, Motor Weakness (left sided lower extremity BKA)


Skin:  Normal Color, Warm/Dry


Lymphatic:  No Adenopathy





Results/Procedures


Lab


Patient resulted labs reviewed.





FIM


Transfers


Therapy Code Descriptions/Definitions 





Functional [place] Measure:


0=Not Assessed/NA        4=Minimal Assistance


1=Total Assistance        5=Supervision or Setup


2=Maximal Assistance  6=Modified [place]


3=Moderate Assistance 7=Complete IndependenceSCALE: Activities may be completed 

with or without assistive devices.





6-Indepedent-patient completes the activity by him/herself with no assistance 

from a helper.


5-Set-up or Clean-up Assistance-helper sets up or cleans up; patient completes 

activity. [place] assists only prior to or  


    following the activity.


4-Supervision or Touching Assistance-helper provides verbal cues and/or 

touching/steadying and/or contact guard assistance as patient completes 

activity. Assistance may be provided   


    throughout the activity or intermittently.


3-Partial/Moderate Assistance-helper does LESS THAN HALF the effort. [place] 

lifts, holds or supports trunk or limbs, but provides less than half the effort.


2-Substantial/Maximal Assistance-helper does MORE THAN HALF the effort. [place] 

lifts or holds trunk or limbs and provides more than half the effort.


8-Dttdejqja-kcqzbz does ALL the effort. Patient does none of the effort to 

complete the activity. Or, the assistance of 2 or more helpers is required for 

the patient to complete the  


    activity.


If activity was not attempted, code reason:


7-Patient Refused.


9-Not Applicable-not attempted and the patient did not perform the activity 

before the current illness, exacerbation or injury.


10-Not Attempted due to Environmental Limitations-(lack of equipment, weather 

restraints, etc.).


88-Not Attempted due to Medical Conditions or Safety Concerns.


Roll Left to Right (QC):  6


Sit to Lying (QC):  4


Sit to Stand (QC):  3


Chair/Bed-to-Chair Xfer(QC):  3


Car Transfer (QC):  3





Gait Training


Does the Patient Walk?:  Yes


Walk 10 feet (QC):  88


Walk 50 ft with 2 Turns(QC):  88


Walk 150 ft (QC):  88


Walking 10ft/uneven surface-QC:  88


Gait Assistive Device:  Parallel Bars





Wheelchair Training


Does the Pt Use a Wheelchair?:  Yes


Distance:  150'x2


Wheel 50 ft with 2 turns (QC):  4


Wheel 150 ft (QC):  4


Type of Wheelchair:  Manual





Stair Training


1 Step (curb) (QC):  88


4 Steps (QC):  88


12 Steps (QC):  88





Balance


Picking up an Object (QC):  88





ADL-Treatment


Eating (QC):  6 (per pt report)


Oral Hygiene (QC):  6


Shower/Bathe Self (QC):  3 (Min A with sponge bath at bed level, pt required 

assistance washing buttocks.)


Upper Body Dressing (QC):  3 (Mod A overall. Max A doffing shirt at bed level, 

min A donning shirt overhead at EOB.)


Lower Body Dressing (QC):  2 (Max A donning/doffing pants at bed level. Total 

assist would be required with stump immobilizer)


On/Off Footwear (QC):  2


Toileting Hygiene (QC):  1


Toilet Transfer (QC):  2





Assessment/Plan


Assessment and Plan


Assess & Plan/Chief Complaint


Assessment:


CORTNEY 6/21/22


Severe PVD


PAF


OAC


HTN


HLP


DM


Hypothyroidism


Hypoglycemia holding metformin and glimepiride


Constipation





Plan:


Home meds


SSI


PT OT


Pain control


BM treatment





6/25/2022:


Hold diabetic meds due to hypoglycemia


Pain control


Bowel regimen





6/26/2022:


BM regimen


Monitor glucose





6/27/2022:


Monitor sugar


Hypoglycemia high risk





6/28/2022:


Monitor closely


Pain control 


Sugar monitoring





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











DIANA HENRIQUEZ DO                Jun 28, 2022 06:06

## 2022-06-29 VITALS — SYSTOLIC BLOOD PRESSURE: 115 MMHG | DIASTOLIC BLOOD PRESSURE: 51 MMHG

## 2022-06-29 VITALS — SYSTOLIC BLOOD PRESSURE: 146 MMHG | DIASTOLIC BLOOD PRESSURE: 75 MMHG

## 2022-06-29 RX ADMIN — Medication SCH MCG: at 08:11

## 2022-06-29 RX ADMIN — APIXABAN SCH MG: 5 TABLET, FILM COATED ORAL at 08:12

## 2022-06-29 RX ADMIN — DOCUSATE SODIUM SCH MG: 100 CAPSULE ORAL at 20:17

## 2022-06-29 RX ADMIN — INSULIN ASPART SCH UNIT: 100 INJECTION, SOLUTION INTRAVENOUS; SUBCUTANEOUS at 20:17

## 2022-06-29 RX ADMIN — POTASSIUM CHLORIDE SCH MEQ: 1500 TABLET, EXTENDED RELEASE ORAL at 08:11

## 2022-06-29 RX ADMIN — LEVOTHYROXINE SODIUM SCH MCG: 100 TABLET ORAL at 05:59

## 2022-06-29 RX ADMIN — GABAPENTIN SCH MG: 100 CAPSULE ORAL at 20:17

## 2022-06-29 RX ADMIN — APIXABAN SCH MG: 5 TABLET, FILM COATED ORAL at 20:17

## 2022-06-29 RX ADMIN — GABAPENTIN SCH MG: 100 CAPSULE ORAL at 08:11

## 2022-06-29 RX ADMIN — POTASSIUM CHLORIDE SCH MEQ: 1500 TABLET, EXTENDED RELEASE ORAL at 17:46

## 2022-06-29 RX ADMIN — INSULIN ASPART SCH UNIT: 100 INJECTION, SOLUTION INTRAVENOUS; SUBCUTANEOUS at 12:31

## 2022-06-29 RX ADMIN — GABAPENTIN SCH MG: 100 CAPSULE ORAL at 12:30

## 2022-06-29 RX ADMIN — DOCUSATE SODIUM AND SENNOSIDES SCH EA: 8.6; 5 TABLET, FILM COATED ORAL at 20:17

## 2022-06-29 RX ADMIN — DOCUSATE SODIUM AND SENNOSIDES SCH EA: 8.6; 5 TABLET, FILM COATED ORAL at 08:11

## 2022-06-29 RX ADMIN — POLYETHYLENE GLYCOL (3350) SCH GM: 17 POWDER, FOR SOLUTION ORAL at 20:17

## 2022-06-29 RX ADMIN — POTASSIUM CHLORIDE SCH MEQ: 1500 TABLET, EXTENDED RELEASE ORAL at 12:29

## 2022-06-29 RX ADMIN — PANTOPRAZOLE SODIUM SCH MG: 40 TABLET, DELAYED RELEASE ORAL at 08:12

## 2022-06-29 RX ADMIN — CLOPIDOGREL BISULFATE SCH MG: 75 TABLET, FILM COATED ORAL at 08:11

## 2022-06-29 RX ADMIN — INSULIN ASPART SCH UNIT: 100 INJECTION, SOLUTION INTRAVENOUS; SUBCUTANEOUS at 05:41

## 2022-06-29 RX ADMIN — POLYETHYLENE GLYCOL (3350) SCH GM: 17 POWDER, FOR SOLUTION ORAL at 08:12

## 2022-06-29 RX ADMIN — FUROSEMIDE SCH MG: 40 TABLET ORAL at 08:11

## 2022-06-29 RX ADMIN — AMIODARONE HYDROCHLORIDE SCH MG: 200 TABLET ORAL at 08:12

## 2022-06-29 RX ADMIN — INSULIN ASPART SCH UNIT: 100 INJECTION, SOLUTION INTRAVENOUS; SUBCUTANEOUS at 15:56

## 2022-06-29 RX ADMIN — DOCUSATE SODIUM SCH MG: 100 CAPSULE ORAL at 08:11

## 2022-06-29 RX ADMIN — METFORMIN HYDROCHLORIDE SCH MG: 500 TABLET, FILM COATED ORAL at 15:58
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
## 2022-06-29 NOTE — PHYSICAL THERAPY DAILY NOTE
PT Daily Note-Current


Subjective


Pt. and wife happy to announce that Sabine WILKINSON will be installing a ramp at pts home.

Pt. agrees to Rx and shares that he likes the recliner but does not know how to 

recline the head. This was taught. Wife also willing to participate in TRFs and 

was taught to apply gait belt





Pain





   Numeric Pain Scale:  5-Moderate Pain


   Location:  Left


   Location Body Site:  Hip


   Pain Description:  Ache





Mental Status


Patient Orientation:  Normal For Age





Transfers


SCALE: Activities may be completed with or without assistive devices.





6-Indepedent-patient completes the activity by him/herself with no assistance 

from a helper.


5-Set-up or Clean-up Assistance-helper sets up or cleans up; patient completes 

activity. [place] assists only prior to or  


    following the activity.


4-Supervision or Touching Assistance-helper provides verbal cues and/or 

touching/steadying and/or contact guard assistance as patient completes act

ivity. Assistance may be provided   


    throughout the activity or intermittently.


3-Partial/Moderate Assistance-helper does LESS THAN HALF the effort. [place] 

lifts, holds or supports trunk or limbs, but provides less than half the effort.


2-Substantial/Maximal Assistance-helper does MORE THAN HALF the effort. [place] 

lifts or holds trunk or limbs and provides more than half the effort.


0-Xvcltwwic-hgcshl does ALL the effort. Patient does none of the effort to 

complete the activity. Or, the assistance of 2 or more helpers is required for 

the patient to complete the  


    activity.


If activity was not attempted, code reason:


7-Patient Refused.


9-Not Applicable-not attempted and the patient did not perform the activity 

before the current illness, exacerbation or injury.


10-Not Attempted due to Environmental Limitations-(lack of equipment, weather 

restraints, etc.).


88-Not Attempted due to Medical Conditions or Safety Concerns.


SPT w/c to recliner with mod assist, wife present and was instructed in use of 

gait belt as well as safe stance to assist pt with TRFs





Exercises


Supine Ex:  Bridging, Quad Set, Glut sets, Knee to chest, Straight leg raise, 

Hip abd/add


Supine Reps:  15





Treatments


TRFs, caregiver instruction, therex in recliner and use of recliner





Assessment


Current Status:  Good Progress


slight improvement in SPT noted





PT Short Term Goals


Short Term Goals


Time Frame:  Jul 1, 2022


Roll Left & Right:  6


Sit to lying:  3 (Kacie)


Lying to sitting on side of be:  3 (Kacie)


Sit to stand:  3 (Kacie)


Chair/bed-to-chair transfer:  3


Walk 10 feet:  3 (Kacie)





PT Long Term Goals


Long Term Goals


PT Long Term Goals Time Frame:  Jul 15, 2022


Roll Left & Right (QC):  6


Sit to Lying (QC):  4 (CGA)


Lying-Sitting on Side/Bed(QC):  4 (CGA)


Sit to Stand (QC):  4 (CGA)


Chair/Bed-to-Chair Xfer(QC):  4 (CGA)


Toilet Transfer (QC):  4 (CGA)


Car Transfer (QC):  4 (CGA)


Does the Patient Walk:  Yes


Walk 10 feet (QC):  4 (CGA)


Walk 50ft with 2 Turns (QC):  88


Walk 150 ft (QC):  88


Walking 10ft on Uneven Surface:  4 (CGA)


1 Step (curb) (QC):  88


4 Steps (QC):  88


12 Steps (QC):  88


Picking up an Object (QC):  4 (CGA)


Wheel 50 feet with 2 turns (QC:  6


Wheel 150 feet:  6





PT Plan


Treatment/Plan


Treatment Plan:  Continue Plan of Care


Treatment Plan:  Bed Mobility, Education, Functional Activity [place], Functional 

Strength, Group Therapy, Gait, Safety, Therapeutic Exercise, Transfers


Treatment Duration:  Jul 15, 2022


Frequency:  At least 5 of 7 days/Wk (IRF)


Estimated Hrs Per Day:  1.5 hours per day


Patient and/or Family Agrees t:  Yes





Safety Risks/Education


Patient Education:  Transfer Techniques, Issued Written HEP, Correct 

Positioning, Safety Issues


Teaching Recipient:  Family, Significant Other


Teaching Methods:  Demonstration, Discussion


Response to Teaching:  Verbalize Understanding, Return Demonstration, 

Reinforcement Needed





Time/GCodes


Time In:  1100


Time Out:  1130


Total Billed Treatment Time:  30


Total Billed Treatment


1,FA20m,EX10m











AARON PORTILLO PTA             Jun 29, 2022 11:33

## 2022-06-29 NOTE — OCCUPATIONAL THER DAILY NOTE
OT Current Status-Daily Note


Subjective


Pt resting in recliner upon OT arrival, agreeable to tx.





Mental Status/Objective


Patient Orientation:  Person, Place, Situation





ADL-Treatment


Therapy Code Descriptions/Definitions 





Functional [place] Measure:


0=Not Assessed/NA        4=Minimal Assistance


1=Total Assistance        5=Supervision or Setup


2=Maximal Assistance  6=Modified [place]


3=Moderate Assistance 7=Complete IndependenceSCALE: Activities may be completed 

with or without assistive devices.





6-Indepedent-patient completes the activity by him/herself with no assistance 

from a helper.


5-Set-up or Clean-up Assistance-helper sets up or cleans up; patient completes 

activity. [place] assists only prior to or  


    following the activity.


4-Supervision or Touching Assistance-helper provides verbal cues and/or 

touching/steadying and/or contact guard assistance as patient completes 

activity. Assistance may be provided   


    throughout the activity or intermittently.


3-Partial/Moderate Assistance-helper does LESS THAN HALF the effort. [place] 

lifts, holds or supports trunk or limbs, but provides less than half the effort.


2-Substantial/Maximal Assistance-helper does MORE THAN HALF the effort. [place] 

lifts or holds trunk or limbs and provides more than half the effort.


5-Fdlunayca-kyxkyj does ALL the effort. Patient does none of the effort to 

complete the activity. Or, the assistance of 2 or more helpers is required for 

the patient to complete the  


    activity.


If activity was not attempted, code reason:


7-Patient Refused.


9-Not Applicable-not attempted and the patient did not perform the activity be

fore the current illness, exacerbation or injury.


10-Not Attempted due to Environmental Limitations-(lack of equipment, weather 

restraints, etc.).


88-Not Attempted due to Medical Conditions or Safety Concerns.





Other Treatment


Pt remained reclined in recliner to complete BUE AAROM exercises with a dowel 

lien. Pt educated on the importance of increasing bilateral shoulder ROM to 

increase IND in ADLs and IADLs, pt and wife agreeable. Pt completed 2x10 of 

shoulder flexion (OT assist above ~ degrees), chest presses, horizontal 

abduction and adduction, shoulder internal/external rotation, and elbow flexion.

Pt required v/c's to breathe during exercises and take RBs PRN as he verbalized 

tightness and discomfort as ROM increased. OT also modified his shoes with 

adhesive velcro in order for pt's shoes to fit better, pt and wife verbalized 

agreement to modification. Post tx, pt left in recliner with call light in reach

and all needs met.





Education


OT Patient Education:  Correct positioning, Energy conservation, Exercise 

program, Instructions to caregiver, Modified ADL techniques, Progress toward 

Goal/Update tx plan, Purpose of tx/functional activities, Rehab process


Teaching Recipient:  Patient, Family


Teaching Methods:  Demonstration, Discussion


Response to Teaching:  Verbalize Understanding, Return Demonstration, 

Reinforcement Needed





OT Short Term Goals


Short Term Goals


Time Frame:  Jul 8, 2022


Shower/bathe self:  3


Lower body dressing:  3





OT Long Term Goals


Long Term Goals


Time Frame:  Jul 22, 2022


Eating (QC):  6


Oral Hygiene (QC):  6


Toileting Hygiene (QC):  6


Shower/Bathe Self (QC):  4


Upper Body Dressing (QC):  4


Lower Body Dressing (QC):  3


On/Off Footwear (QC):  3


Additional Goals:  1-Demonstrate ADL Tasks, 2-Verbalize Understanding, 3-

ImproveStrength/[place]


1=Demonstrate adherence to instructed precautions during ADL tasks.


2=Patient will verbalize/demonstrate understanding of assistive 

devices/modifications for ADL.


3=Patient will improve strength/tolerance for activity to enable patient to 

perform ADL's.





OT Education/Plan


Problem List/Assessment


Assessment:  Decreased Activ Tolerance, Decreased UE Strength, Impaired Funct 

Balance, Impaired I ADL's, Impaired Self-Care Skills, Restricted Funct UE ROM





Discharge Recommendations


Plan/Recommendations:  Continue POC





Treatment Plan/Plan of Care


Patient would benefit from OT for education, treatment and training to promote 

independence in ADL's, mobility, safety and/or upper extremity function for 

ADL's.


Plan of Care:  ADL Retraining, Functional Mobility, Group Exercise/Act as Ind, 

UE Funct Exercise/Act, UE Neuromus Re-Ed/Coord, W/C Management Training


Treatment Duration:  Jul 22, 2022


Frequency:  At least 5 of 7 days/Wk (IRF)


Estimated Hrs Per Day:  1.5 hours per day


Agreement:  Yes


Rehab Potential:  Fair





Time/GCodes


Start Time:  13:00


Stop Time:  13:30


Total Time Billed (hr/min):  30


Billed Treatment Time


1, Ex 2 (30')











DHIRAJ EDWARDS OT          Jun 29, 2022 13:33

## 2022-06-29 NOTE — PHYSICAL THERAPY DAILY NOTE
PT Daily Note-Current


Subjective


Pt. sitting EOB, head resting on pillow on bedside table, wife states " he 

sleeps like this a lot" Pt. and wife explain they have 4 steps with one rail to 

get in to the house. This PTA recommends pt and wife pursue someone to build a 

ramp that is w/c ready. Wife shares she would not be able to pull the pt. up the

steps in a w/c . 


Pt. c/o pain in left residual limb at 4/10 .





Pain





   Numeric Pain Scale:  4


   Location:  Left


   Location Body Site:  Calf


   Pain Description:  Throbbing





Mental Status


Patient Orientation:  Normal For Age





Transfers


SCALE: Activities may be completed with or without assistive devices.





6-Indepedent-patient completes the activity by him/herself with no assistance 

from a helper.


5-Set-up or Clean-up Assistance-helper sets up or cleans up; patient completes 

activity. [place] assists only prior to or  


    following the activity.


4-Supervision or Touching Assistance-helper provides verbal cues and/or 

touching/steadying and/or contact guard assistance as patient completes 

activity. Assistance may be provided   


    throughout the activity or intermittently.


3-Partial/Moderate Assistance-helper does LESS THAN HALF the effort. [place] 

lifts, holds or supports trunk or limbs, but provides less than half the effort.


2-Substantial/Maximal Assistance-helper does MORE THAN HALF the effort. [place] 

lifts or holds trunk or limbs and provides more than half the effort.


0-Jsqumiktx-alyvqm does ALL the effort. Patient does none of the effort to 

complete the activity. Or, the assistance of 2 or more helpers is required for 

the patient to complete the  


    activity.


If activity was not attempted, code reason:


7-Patient Refused.


9-Not Applicable-not attempted and the patient did not perform the activity 

before the current illness, exacerbation or injury.


10-Not Attempted due to Environmental Limitations-(lack of equipment, weather 

restraints, etc.).


88-Not Attempted due to Medical Conditions or Safety Concerns.


Roll Left & Right (QC):  6


Sit to Lying (QC):  6


Lying to Sitting/Side of Bed(Q:  6


Sit to Stand (QC):  3


Chair/Bed-to-Chair Xfer(QC):  3


pt required mod assist of 1-2 for SPT w/c to bed and bed to w/c , pt. reaching 

for destination with RUE/ hand and requires assist in front and back for mod 

plus asst. pt. does not come to full standing with right knee extended . Pt. 

admits fear of standing and states it is very hard to overcome, pt. resists 

somewhat during the TRF and limits himself





Wheelchair Training


Does the Pt Use a Wheelchair?:  Yes


Wheel 50 ft with 2 turns (QC):  4


Type of Wheelchair:  Manual


pts own w/c was brought in from wifes car to practice with as this what pt. will

use at home. cushion was placed in seat for height





Exercises


Supine Ex:  Bridging, Ankle pumps (ight), Quad Set, Rolling, Glut sets, Heel 

Slides (right), Short Arc Quads, Straight leg raise, Hip abd/add


Supine Reps:  15


Seated Therapy Exercises:  Ankle pumps, Sit to stand, Long arc quads, Hip 

flexion


Seated Reps:  10





Treatments


PT OT co Rx secondary to poor safety, poor strength and mobility, coordination 

for UE and LE skills for TRFs and stance, as well as assessment of w/c for 

efficiency and viability of use.


see Rx above.





Assessment


Current Status:  Good Progress


pt. fearful of falling, dependent of wife at home. mod asst for TRFs at this 

point





PT Short Term Goals


Short Term Goals


Time Frame:  Jul 1, 2022


Roll Left & Right:  6


Sit to lying:  3 (Kacie)


Lying to sitting on side of be:  3 (Kacie)


Sit to stand:  3 (Kacie)


Chair/bed-to-chair transfer:  3


Walk 10 feet:  3 (Kacie)





PT Long Term Goals


Long Term Goals


PT Long Term Goals Time Frame:  Jul 15, 2022


Roll Left & Right (QC):  6


Sit to Lying (QC):  4 (CGA)


Lying-Sitting on Side/Bed(QC):  4 (CGA)


Sit to Stand (QC):  4 (CGA)


Chair/Bed-to-Chair Xfer(QC):  4 (CGA)


Toilet Transfer (QC):  4 (CGA)


Car Transfer (QC):  4 (CGA)


Does the Patient Walk:  Yes


Walk 10 feet (QC):  4 (CGA)


Walk 50ft with 2 Turns (QC):  88


Walk 150 ft (QC):  88


Walking 10ft on Uneven Surface:  4 (CGA)


1 Step (curb) (QC):  88


4 Steps (QC):  88


12 Steps (QC):  88


Picking up an Object (QC):  4 (CGA)


Wheel 50 feet with 2 turns (QC:  6


Wheel 150 feet:  6





PT Plan


Treatment/Plan


Treatment Plan:  Continue Plan of Care


Treatment Plan:  Bed Mobility, Education, Functional Activity [place], Functional 

Strength, Group Therapy, Gait, Safety, Therapeutic Exercise, Transfers


Treatment Duration:  Jul 15, 2022


Frequency:  At least 5 of 7 days/Wk (IRF)


Estimated Hrs Per Day:  1.5 hours per day


Patient and/or Family Agrees t:  Yes





Safety Risks/Education


Patient Education:  Transfer Techniques, Correct Positioning, W/C Management, 

Safety Issues


Teaching Recipient:  Patient


Teaching Methods:  Demonstration, Discussion


Response to Teaching:  Verbalize Understanding, Return Demonstration, 

Reinforcement Needed





Time/GCodes


Time In:  900


Time Out:  1000


Total Billed Treatment Time:  60


Total Billed Treatment


1,EX20m,FA25m,w/c 15m (45 m co Rx)











AARON PORTILLO PTA             Jun 29, 2022 10:00

## 2022-06-29 NOTE — PM&R PROGRESS NOTE
Subjective


HPI/CC On Admission


Date Seen by Provider:  Jun 29, 2022


Time Seen by Provider:  10:00


Subjective/Events-last exam


6/29/2022:


Pt is doing really well


Bowels moved two days ago


Dr. Parsons did treat his toenails


Dr. Duenas wants a general surgeon to debride it or the pt can go back over to 

Dr. Duenas at [place] to have that done with his leg








6/28/2022:


Patient doing a lot better


Complaining of pain but declines any pain medication


Sugar is much improved after hypoglycemia


Bowels are moving now


Wife at bedside








6/27/2022:


Patient doing a lot better


Blood sugars 164, 113


Declines to take morphine for addiction potential


Wife really helps the patient








6/26/2022:


Patient doing pretty well


Wife at bedside


Blood sugar is 99 after being very low


Suppository produced a small bowel movement so we will initiate more laxatives 

or


Holding metoprolol due to low blood pressure


Holding losartan as of yesterday due to low blood pressure


Mag citrate is being used for constipation today











6/25/2022:


Patient doing really well


Blood sugars reviewed and holding metformin and oral hypoglycemic agent due to 

hypoglycemia


Pain is controlled


Laxatives given


Checked meds and labs





Review of Systems


General:  Fatigue, Malaise


Musculoskeletal:  leg pain





Objective


Exam


Vital Signs





Vital Signs








  Date Time  Temp Pulse Resp B/P (MAP) Pulse Ox O2 Delivery O2 Flow Rate FiO2


 


6/29/22 19:56 36.4 90 18 115/51 (72) 91 Room Air  





Capillary Refill :


General Appearance:  No Apparent Distress, WD/WN, Chronically ill


HEENT:  PERRL/EOMI, Normal ENT Inspection, Pharynx Normal


Neck:  Full Range of Motion, Normal Inspection, Non Tender, Supple, Carotid 

Bruit


Respiratory:  Chest Non Tender, Lungs Clear, Normal Breath Sounds, No Accessory 

Muscle Use, No Respiratory Distress


Cardiovascular:  No Edema, No Gallop, No JVD, No Murmur, Normal Peripheral 

Pulses, Irregularly Irregular


Gastrointestinal:  Normal Bowel Sounds, No Organomegaly, No Pulsatile Mass, Non 

Tender, Soft


Back:  Normal Inspection, No CVA Tenderness, No Vertebral Tenderness


Extremity:  Normal Capillary Refill, Normal Inspection, Normal Range of Motion, 

Non Tender, No Calf Tenderness, No Pedal Edema


Neurologic/Psychiatric:  Alert, Oriented x3, Normal Mood/Affect, CNs II-XII Norm

as Tested, Abnormal Gait, Motor Weakness (left sided lower extremity BKA)


Skin:  Normal Color, Warm/Dry


Lymphatic:  No Adenopathy





Results/Procedures


Lab


Patient resulted labs reviewed.





FIM


Transfers


Therapy Code Descriptions/Definitions 





Functional [place] Measure:


0=Not Assessed/NA        4=Minimal Assistance


1=Total Assistance        5=Supervision or Setup


2=Maximal Assistance  6=Modified [place]


3=Moderate Assistance 7=Complete IndependenceSCALE: Activities may be completed 

with or without assistive devices.





6-Indepedent-patient completes the activity by him/herself with no assistance 

from a helper.


5-Set-up or Clean-up Assistance-helper sets up or cleans up; patient completes 

activity. [place] assists only prior to or  


    following the activity.


4-Supervision or Touching Assistance-helper provides verbal cues and/or 

touching/steadying and/or contact guard assistance as patient completes 

activity. Assistance may be provided   


    throughout the activity or intermittently.


3-Partial/Moderate Assistance-helper does LESS THAN HALF the effort. [place] 

lifts, holds or supports trunk or limbs, but provides less than half the effort.


2-Substantial/Maximal Assistance-helper does MORE THAN HALF the effort. [place] 

lifts or holds trunk or limbs and provides more than half the effort.


8-Vxecwvhed-qdqsvh does ALL the effort. Patient does none of the effort to 

complete the activity. Or, the assistance of 2 or more helpers is required for 

the patient to complete the  


    activity.


If activity was not attempted, code reason:


7-Patient Refused.


9-Not Applicable-not attempted and the patient did not perform the activity 

before the current illness, exacerbation or injury.


10-Not Attempted due to Environmental Limitations-(lack of equipment, weather 

restraints, etc.).


88-Not Attempted due to Medical Conditions or Safety Concerns.


Roll Left to Right (QC):  6


Sit to Lying (QC):  4


Sit to Stand (QC):  3


Chair/Bed-to-Chair Xfer(QC):  3


Car Transfer (QC):  3





Gait Training


Does the Patient Walk?:  Yes


Walk 10 feet (QC):  88


Walk 50 ft with 2 Turns(QC):  88


Walk 150 ft (QC):  88


Walking 10ft/uneven surface-QC:  88


Gait Assistive Device:  Parallel Bars





Wheelchair Training


Does the Pt Use a Wheelchair?:  Yes


Distance:  150'x2


Wheel 50 ft with 2 turns (QC):  4


Wheel 150 ft (QC):  4


Type of Wheelchair:  Manual





Stair Training


1 Step (curb) (QC):  88


4 Steps (QC):  88


12 Steps (QC):  88





Balance


Picking up an Object (QC):  88





ADL-Treatment


Eating (QC):  6 (per pt report)


Oral Hygiene (QC):  6


Shower/Bathe Self (QC):  5 (cover dressings and wounds)


Upper Body Dressing (QC):  3 (Min A to don shirt in back)


Lower Body Dressing (QC):  1 (Assist x2 in stand. Pt attempted to don pants 

seated but unable to fully get over hips.)


On/Off Footwear (QC):  4 (v/c's for sequencing with sock aid for R foot)


Toileting Hygiene (QC):  1


Toilet Transfer (QC):  2





Assessment/Plan


Assessment and Plan


Assess & Plan/Chief Complaint


Assessment:


[place] 6/21/22


Severe PVD


PAF


OAC


HTN


HLP


DM


Hypothyroidism


Hypoglycemia holding metformin and glimepiride


Constipation





Plan:


Home meds


SSI


PT OT


Pain control


BM treatment





6/25/2022:


Hold diabetic meds due to hypoglycemia


Pain control


Bowel regimen





6/26/2022:


BM regimen


Monitor glucose





6/27/2022:


Monitor sugar


Hypoglycemia high risk





6/28/2022:


Monitor closely


Pain control 


Sugar monitoring





6/29/2022:


Consult Dr Kaiser


Restart Metformin





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











DIANA HENRIQUEZ DO                Jun 29, 2022 06:10

## 2022-06-29 NOTE — OCCUPATIONAL THER DAILY NOTE
OT Current Status-Daily Note


Subjective


Pt seated at EOB with PT upon OT arrival. Pt says he feels "tired" today, but is

agreeable to OT/PT cotreat.





Mental Status/Objective


Patient Orientation:  Person, Place, Situation





ADL-Treatment


Therapy Code Descriptions/Definitions 





Functional [place] Measure:


0=Not Assessed/NA        4=Minimal Assistance


1=Total Assistance        5=Supervision or Setup


2=Maximal Assistance  6=Modified [place]


3=Moderate Assistance 7=Complete IndependenceSCALE: Activities may be completed 

with or without assistive devices.





6-Indepedent-patient completes the activity by him/herself with no assistance 

from a helper.


5-Set-up or Clean-up Assistance-helper sets up or cleans up; patient completes 

activity. [place] assists only prior to or  


    following the activity.


4-Supervision or Touching Assistance-helper provides verbal cues and/or 

touching/steadying and/or contact guard assistance as patient completes 

activity. Assistance may be provided   


    throughout the activity or intermittently.


3-Partial/Moderate Assistance-helper does LESS THAN HALF the effort. [place] 

lifts, holds or supports trunk or limbs, but provides less than half the effort.


2-Substantial/Maximal Assistance-helper does MORE THAN HALF the effort. [place] 

lifts or holds trunk or limbs and provides more than half the effort.


2-Unvhafbos-lqrahe does ALL the effort. Patient does none of the effort to 

complete the activity. Or, the assistance of 2 or more helpers is required for 

the patient to complete the  


    activity.


If activity was not attempted, code reason:


7-Patient Refused.


9-Not Applicable-not attempted and the patient did not perform the activity 

before the current illness, exacerbation or injury.


10-Not Attempted due to Environmental Limitations-(lack of equipment, weather 

restraints, etc.).


88-Not Attempted due to Medical Conditions or Safety Concerns.


Toileting Hygiene (QC):  5 (with urinal at EOB)





Other Treatment


OT/PT cotreat due to skill of 2 clinicians required which a rehab tech could not

perform in order to coordinate UE/LEs, decrease fall risk, and due to pt's 

limitations in strength, activity tolerance, transfers and mobility. OT focused 

on UE placement and cues for sequencing and safety, PT focused on LE placement, 

gross overall movement, transfers/mobility. Pt and wife educated on need for 

ramp to enter home, both are in agreement and wife searched for possible 

contractors during tx. Pt transferred from seated EOB to w/c using modified 

squat pivot, Mod A. He wheeled into therapy gym to participate in BUE and BLE 

exercises and functional activities. He required mod assist x2 to transfer from 

w/c to therapy mat. Pt completed BLE exercises with PT (see PT note for 

exercises) and BUE AAROM shoulder flexion exercises with dowel lien in supine, 

x5. He was able to flex his shoulders ~30 degrees before needing assistance to 

raise dowel lien to ~110 degrees of shoulder flexion. Pt's wife brought in his 

personal w/c, so PT and OT assessed/adjusted w/c to meet pt's current medical 

needs. He required mod assist x2 to transfer off mat to w/c and back again to 

mat. End of cotreat. Pt participated in functional activities while seated on 

the edge of the mat to focus on increasing BUE/core strength and endurance and 

seated balance. He shuffled R and then L to edge of the mat 1x. He then 

simulated LBD with looped yellow Theraband first then simulated LBD with bean 

bags (4bags per LE, 2 under thigh and 2 under buttocks) 1x. Pt transferred back 

to w/c, mod assist x2, and wheeled back to room, pt requested to remain in w/c. 

Post tx, pt left in w/c with call light in reach and all needs met.





Education


OT Patient Education:  Correct positioning, Energy conservation, Exercise 

program, Instructions to caregiver, Modified ADL techniques, Progress toward 

Goal/Update tx plan, Purpose of tx/functional activities, Rehab process, Safety 

issues, Transfer techniques, W/C management


Teaching Recipient:  Patient, Significant Other


Teaching Methods:  Demonstration, Discussion


Response to Teaching:  Verbalize Understanding, Return Demonstration, 

Reinforcement Needed





OT Short Term Goals


Short Term Goals


Time Frame:  Jul 8, 2022


Shower/bathe self:  3


Lower body dressing:  3





OT Long Term Goals


Long Term Goals


Time Frame:  Jul 22, 2022


Eating (QC):  6


Oral Hygiene (QC):  6


Toileting Hygiene (QC):  6


Shower/Bathe Self (QC):  4


Upper Body Dressing (QC):  4


Lower Body Dressing (QC):  3


On/Off Footwear (QC):  3


Additional Goals:  1-Demonstrate ADL Tasks, 2-Verbalize Understanding, 3-

ImproveStrength/[place]


1=Demonstrate adherence to instructed precautions during ADL tasks.


2=Patient will verbalize/demonstrate understanding of assistive 

devices/modifications for ADL.


3=Patient will improve strength/tolerance for activity to enable patient to 

perform ADL's.





OT Education/Plan


Problem List/Assessment


Assessment:  Decreased Activ Tolerance, Decreased UE Strength, Impaired Funct 

Balance, Impaired I ADL's, Impaired Self-Care Skills, Restricted Funct UE ROM





Discharge Recommendations


Plan/Recommendations:  Continue POC





Treatment Plan/Plan of Care


Patient would benefit from OT for education, treatment and training to promote 

independence in ADL's, mobility, safety and/or upper extremity function for 

ADL's.


Plan of Care:  ADL Retraining, Functional Mobility, Group Exercise/Act as Ind, 

UE Funct Exercise/Act, UE Neuromus Re-Ed/Coord, W/C Management Training


Treatment Duration:  Jul 22, 2022


Frequency:  At least 5 of 7 days/Wk (IRF)


Estimated Hrs Per Day:  1.5 hours per day


Agreement:  Yes


Rehab Potential:  Fair





Time/GCodes


Start Time:  09:15


Stop Time:  10:15


Total Time Billed (hr/min):  60


Billed Treatment Time


8925-7790: PT/OT cotreat


[id number]: OT tx





1, Ex 2 (30), FA 2 (30)











DHIRAJ EDWARDS OT          Jun 29, 2022 10:06

## 2022-06-30 VITALS — SYSTOLIC BLOOD PRESSURE: 154 MMHG | DIASTOLIC BLOOD PRESSURE: 60 MMHG

## 2022-06-30 VITALS — SYSTOLIC BLOOD PRESSURE: 130 MMHG | DIASTOLIC BLOOD PRESSURE: 56 MMHG

## 2022-06-30 RX ADMIN — POTASSIUM CHLORIDE SCH MEQ: 1500 TABLET, EXTENDED RELEASE ORAL at 07:56

## 2022-06-30 RX ADMIN — GABAPENTIN SCH MG: 100 CAPSULE ORAL at 07:56

## 2022-06-30 RX ADMIN — INSULIN ASPART SCH UNIT: 100 INJECTION, SOLUTION INTRAVENOUS; SUBCUTANEOUS at 05:38

## 2022-06-30 RX ADMIN — INSULIN ASPART SCH UNIT: 100 INJECTION, SOLUTION INTRAVENOUS; SUBCUTANEOUS at 21:00

## 2022-06-30 RX ADMIN — METFORMIN HYDROCHLORIDE SCH MG: 500 TABLET, FILM COATED ORAL at 06:04

## 2022-06-30 RX ADMIN — ACETAMINOPHEN PRN MG: 325 TABLET ORAL at 21:45

## 2022-06-30 RX ADMIN — POTASSIUM CHLORIDE SCH MEQ: 1500 TABLET, EXTENDED RELEASE ORAL at 12:33

## 2022-06-30 RX ADMIN — INSULIN ASPART SCH UNIT: 100 INJECTION, SOLUTION INTRAVENOUS; SUBCUTANEOUS at 16:19

## 2022-06-30 RX ADMIN — GABAPENTIN SCH MG: 100 CAPSULE ORAL at 12:33

## 2022-06-30 RX ADMIN — MELATONIN 3 MG ORAL TABLET PRN MG: 3 TABLET ORAL at 21:44

## 2022-06-30 RX ADMIN — GABAPENTIN SCH MG: 100 CAPSULE ORAL at 21:44

## 2022-06-30 RX ADMIN — AMIODARONE HYDROCHLORIDE SCH MG: 200 TABLET ORAL at 07:56

## 2022-06-30 RX ADMIN — APIXABAN SCH MG: 5 TABLET, FILM COATED ORAL at 07:55

## 2022-06-30 RX ADMIN — INSULIN ASPART SCH UNIT: 100 INJECTION, SOLUTION INTRAVENOUS; SUBCUTANEOUS at 11:22

## 2022-06-30 RX ADMIN — PANTOPRAZOLE SODIUM SCH MG: 40 TABLET, DELAYED RELEASE ORAL at 07:57

## 2022-06-30 RX ADMIN — GLIMEPIRIDE SCH MG: 1 TABLET ORAL at 13:43

## 2022-06-30 RX ADMIN — CLOPIDOGREL BISULFATE SCH MG: 75 TABLET, FILM COATED ORAL at 07:56

## 2022-06-30 RX ADMIN — FUROSEMIDE SCH MG: 40 TABLET ORAL at 07:55

## 2022-06-30 RX ADMIN — METFORMIN HYDROCHLORIDE SCH MG: 500 TABLET, FILM COATED ORAL at 16:16

## 2022-06-30 RX ADMIN — Medication SCH MCG: at 07:56

## 2022-06-30 RX ADMIN — POLYETHYLENE GLYCOL (3350) SCH GM: 17 POWDER, FOR SOLUTION ORAL at 08:00

## 2022-06-30 RX ADMIN — POTASSIUM CHLORIDE SCH MEQ: 1500 TABLET, EXTENDED RELEASE ORAL at 18:08

## 2022-06-30 RX ADMIN — DOCUSATE SODIUM SCH MG: 100 CAPSULE ORAL at 07:55

## 2022-06-30 RX ADMIN — APIXABAN SCH MG: 5 TABLET, FILM COATED ORAL at 21:44

## 2022-06-30 RX ADMIN — DOCUSATE SODIUM AND SENNOSIDES SCH EA: 8.6; 5 TABLET, FILM COATED ORAL at 21:00

## 2022-06-30 RX ADMIN — LEVOTHYROXINE SODIUM SCH MCG: 100 TABLET ORAL at 06:04

## 2022-06-30 RX ADMIN — DOCUSATE SODIUM AND SENNOSIDES SCH EA: 8.6; 5 TABLET, FILM COATED ORAL at 07:56

## 2022-06-30 RX ADMIN — DOCUSATE SODIUM SCH MG: 100 CAPSULE ORAL at 21:00

## 2022-06-30 RX ADMIN — POLYETHYLENE GLYCOL (3350) SCH GM: 17 POWDER, FOR SOLUTION ORAL at 21:00

## 2022-06-30 NOTE — PM&R PROGRESS NOTE
Subjective


HPI/CC On Admission


Date Seen by Provider:  Jun 30, 2022


Time Seen by Provider:  12:30


Subjective/Events-last exam


6/30/2022:


Pt is doing well


Dr. Kaiser thought the leg looked better today and didn't require surgery


Will take Melatonin tonight to help him sleep


Bowels moved yesterday


Sugars are elevated so will start Amaryl 1 mg to the Metformin 500 BID started 

yesterday














6/29/2022:


Pt is doing really well


Bowels moved two days ago


Dr. Parsons did treat his toenails


Dr. Duenas wants a general surgeon to debride it or the pt can go back over to 

Dr. Duenas at [facility] to have that done with his leg








6/28/2022:


Patient doing a lot better


Complaining of pain but declines any pain medication


Sugar is much improved after hypoglycemia


Bowels are moving now


Wife at bedside








6/27/2022:


Patient doing a lot better


Blood sugars 164, 113


Declines to take morphine for addiction potential


Wife really helps the patient








6/26/2022:


Patient doing pretty well


Wife at bedside


Blood sugar is 99 after being very low


Suppository produced a small bowel movement so we will initiate more laxatives 

or


Holding metoprolol due to low blood pressure


Holding losartan as of yesterday due to low blood pressure


Mag citrate is being used for constipation today











6/25/2022:


Patient doing really well


Blood sugars reviewed and holding metformin and oral hypoglycemic agent due to 

hypoglycemia


Pain is controlled


Laxatives given


Checked meds and labs





Review of Systems


General:  Fatigue, Malaise


Musculoskeletal:  leg pain





Objective


Exam


Vital Signs





Vital Signs








  Date Time  Temp Pulse Resp B/P (MAP) Pulse Ox O2 Delivery O2 Flow Rate FiO2


 


6/30/22 09:00      Room Air  


 


6/30/22 07:56 35.8 73 14 154/60 (91) 96   





Capillary Refill :


General Appearance:  No Apparent Distress, WD/WN, Chronically ill


HEENT:  PERRL/EOMI, Normal ENT Inspection, Pharynx Normal


Neck:  Full Range of Motion, Normal Inspection, Non Tender, Supple, Carotid 

Bruit


Respiratory:  Chest Non Tender, Lungs Clear, Normal Breath Sounds, No Accessory 

Muscle Use, No Respiratory Distress


Cardiovascular:  No Edema, No Gallop, No JVD, No Murmur, Normal Peripheral 

Pulses, Irregularly Irregular


Gastrointestinal:  Normal Bowel Sounds, No Organomegaly, No Pulsatile Mass, Non 

Tender, Soft


Back:  Normal Inspection, No CVA Tenderness, No Vertebral Tenderness


Extremity:  Normal Capillary Refill, Normal Inspection, Normal Range of Motion, 

Non Tender, No Calf Tenderness, No Pedal Edema


Neurologic/Psychiatric:  Alert, Oriented x3, Normal Mood/Affect, CNs II-XII Norm

as Tested, Abnormal Gait, Motor Weakness (left sided lower extremity BKA)


Skin:  Normal Color, Warm/Dry


Lymphatic:  No Adenopathy





Results/Procedures


Lab


Patient resulted labs reviewed.





FIM


Transfers


Therapy Code Descriptions/Definitions 





Functional [place] Measure:


0=Not Assessed/NA        4=Minimal Assistance


1=Total Assistance        5=Supervision or Setup


2=Maximal Assistance  6=Modified [place]


3=Moderate Assistance 7=Complete IndependenceSCALE: Activities may be completed 

with or without assistive devices.





6-Indepedent-patient completes the activity by him/herself with no assistance f

rom a helper.


5-Set-up or Clean-up Assistance-helper sets up or cleans up; patient completes 

activity. [place] assists only prior to or  


    following the activity.


4-Supervision or Touching Assistance-helper provides verbal cues and/or 

touching/steadying and/or contact guard assistance as patient completes 

activity. Assistance may be provided   


    throughout the activity or intermittently.


3-Partial/Moderate Assistance-helper does LESS THAN HALF the effort. [place] 

lifts, holds or supports trunk or limbs, but provides less than half the effort.


2-Substantial/Maximal Assistance-helper does MORE THAN HALF the effort. [place] 

lifts or holds trunk or limbs and provides more than half the effort.


9-Pxesakjjo-mnckml does ALL the effort. Patient does none of the effort to 

complete the activity. Or, the assistance of 2 or more helpers is required for 

the patient to complete the  


    activity.


If activity was not attempted, code reason:


7-Patient Refused.


9-Not Applicable-not attempted and the patient did not perform the activity 

before the current illness, exacerbation or injury.


10-Not Attempted due to Environmental Limitations-(lack of equipment, weather 

restraints, etc.).


88-Not Attempted due to Medical Conditions or Safety Concerns.


Roll Left to Right (QC):  6


Sit to Lying (QC):  6


Sit to Stand (QC):  3


Chair/Bed-to-Chair Xfer(QC):  3


Car Transfer (QC):  3





Gait Training


Does the Patient Walk?:  No and Walking Goal IS indicated


Walk 10 feet (QC):  88


Walk 50 ft with 2 Turns(QC):  88


Walk 150 ft (QC):  88


Walking 10ft/uneven surface-QC:  88


Gait Assistive Device:  Parallel Bars





Wheelchair Training


Does the Pt Use a Wheelchair?:  Yes


Distance:  150'x2


Wheel 50 ft with 2 turns (QC):  4


Wheel 150 ft (QC):  4


Type of Wheelchair:  Manual





Stair Training


1 Step (curb) (QC):  88


4 Steps (QC):  88


12 Steps (QC):  88





Balance


Picking up an Object (QC):  88





ADL-Treatment


Eating (QC):  6 (per pt report)


Oral Hygiene (QC):  6


Shower/Bathe Self (QC):  5 (cover dressings and wounds)


Upper Body Dressing (QC):  3 (Min A to don shirt in back)


Lower Body Dressing (QC):  1 (Assist x2 in stand. Pt attempted to don pants 

seated but unable to fully get over hips.)


On/Off Footwear (QC):  4 (v/c's for sequencing with sock aid for R foot)


Toileting Hygiene (QC):  5 (with urinal at EOB)


Toilet Transfer (QC):  2





Assessment/Plan


Assessment and Plan


Assess & Plan/Chief Complaint


Assessment:


CORTNEY 6/21/22


Severe PVD


PAF


OAC


HTN


HLP


DM


Hypothyroidism


Hypoglycemia holding metformin and glimepiride


Constipation





Plan:


Home meds


SSI


PT OT


Pain control


BM treatment





6/25/2022:


Hold diabetic meds due to hypoglycemia


Pain control


Bowel regimen





6/26/2022:


BM regimen


Monitor glucose





6/27/2022:


Monitor sugar


Hypoglycemia high risk





6/28/2022:


Monitor closely


Pain control 


Sugar monitoring





6/29/2022:


Consult Dr Kaiser


Restart Metformin





6/30/2022:


Appreciate Dr Kaiser


Add Amaryl





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











DIANA HENRIQUEZ DO                Jun 30, 2022 06:06

## 2022-06-30 NOTE — OCCUPATIONAL THER DAILY NOTE
OT Current Status-Daily Note


Subjective


Pt alert, sitting in recliner.  Pt agrees to therapy.  No c/o pain.





Mental Status/Objective


Patient Orientation:  Person, Place, Time, Situation





ADL-Treatment


Pt requests to use toilet.  Family training with wife for squat pivot transfer 

from chair to w/c, w/c <--> toilet.  Wife demonstrates understanding though 

requires practice to become proficient.  Pt able to doff/don pants and complete 

hygiene while sitting on toilet.  Mod A for transfer, independent for toileting.

 Pt educated on donning/doffing shirt with modified technique due to decreased 

AROM of B shldrs.  Pt demonstrated understanding of technique though will need 

further practice to become proficient.  Discussion with pt and wife on what AE 

to order for dressing.  Pt verbalized understanding and found items on [employer] to

get.  After session, pt sitting in w/c with call light/phone in reach.  All 

needs met in room.


Therapy Code Descriptions/Definitions 





Functional [place] Measure:


0=Not Assessed/NA        4=Minimal Assistance


1=Total Assistance        5=Supervision or Setup


2=Maximal Assistance  6=Modified [place]


3=Moderate Assistance 7=Complete IndependenceSCALE: Activities may be completed 

with or without assistive devices.





6-Indepedent-patient completes the activity by him/herself with no assistance 

from a helper.


5-Set-up or Clean-up Assistance-helper sets up or cleans up; patient completes 

activity. [place] assists only prior to or  


    following the activity.


4-Supervision or Touching Assistance-helper provides verbal cues and/or 

touching/steadying and/or contact guard assistance as patient completes 

activity. Assistance may be provided   


    throughout the activity or intermittently.


3-Partial/Moderate Assistance-helper does LESS THAN HALF the effort. [place] 

lifts, holds or supports trunk or limbs, but provides less than half the effort.


2-Substantial/Maximal Assistance-helper does MORE THAN HALF the effort. [place] 

lifts or holds trunk or limbs and provides more than half the effort.


5-Ytivlkvdh-bzivvb does ALL the effort. Patient does none of the effort to 

complete the activity. Or, the assistance of 2 or more helpers is required for 

the patient to complete the  


    activity.


If activity was not attempted, code reason:


7-Patient Refused.


9-Not Applicable-not attempted and the patient did not perform the activity 

before the current illness, exacerbation or injury.


10-Not Attempted due to Environmental Limitations-(lack of equipment, weather 

restraints, etc.).


88-Not Attempted due to Medical Conditions or Safety Concerns.


Oral Hygiene (QC):  6


Upper Body Dressing (QC):  5


Toileting Hygiene (QC):  6


Toilet Transfer (QC):  3





OT Short Term Goals


Short Term Goals


Time Frame:  Jul 8, 2022


Shower/bathe self:  3


Lower body dressing:  3





OT Long Term Goals


Long Term Goals


Time Frame:  Jul 22, 2022


Eating (QC):  6


Oral Hygiene (QC):  6


Toileting Hygiene (QC):  6


Shower/Bathe Self (QC):  4


Upper Body Dressing (QC):  4


Lower Body Dressing (QC):  3


On/Off Footwear (QC):  3


Additional Goals:  1-Demonstrate ADL Tasks, 2-Verbalize Understanding, 3-

ImproveStrength/[place]


1=Demonstrate adherence to instructed precautions during ADL tasks.


2=Patient will verbalize/demonstrate understanding of assistive 

devices/modifications for ADL.


3=Patient will improve strength/tolerance for activity to enable patient to 

perform ADL's.





OT Education/Plan


Problem List/Assessment


Assessment:  Decreased Activ Tolerance, Impaired Self-Care Skills, Restricted 

Funct UE ROM





Discharge Recommendations


Plan/Recommendations:  Continue POC





Treatment Plan/Plan of Care


Patient would benefit from OT for education, treatment and training to promote 

independence in ADL's, mobility, safety and/or upper extremity function for 

ADL's.


Plan of Care:  ADL Retraining, Functional Mobility, Group Exercise/Act as Ind, 

UE Funct Exercise/Act, UE Neuromus Re-Ed/Coord, W/C Management Training


Treatment Duration:  Jul 22, 2022


Frequency:  At least 5 of 7 days/Wk (IRF)


Estimated Hrs Per Day:  1.5 hours per day


Agreement:  Yes


Rehab Potential:  Fair





Time/GCodes


Start Time:  12:36


Stop Time:  13:20


Total Time Billed (hr/min):  44


Billed Treatment Time


1 visit-ADL 3 (44 min)











MICHAEL ALVAREZ               Jun 30, 2022 13:41

## 2022-06-30 NOTE — PHYSICAL THERAPY DAILY NOTE
PT Daily Note-Current


Subjective


Pt. agrees to Rx, wife present, supportive and involved.  Pt. c/o pain in 

residual limb off on during Rx, positional and brushing up against something 

etc.





Mental Status


Patient Orientation:  Normal For Age





Transfers


SCALE: Activities may be completed with or without assistive devices.





6-Indepedent-patient completes the activity by him/herself with no assistance 

from a helper.


5-Set-up or Clean-up Assistance-helper sets up or cleans up; patient completes 

activity. [place] assists only prior to or  


    following the activity.


4-Supervision or Touching Assistance-helper provides verbal cues and/or 

touching/steadying and/or contact guard assistance as patient completes 

activity. Assistance may be provided   


    throughout the activity or intermittently.


3-Partial/Moderate Assistance-helper does LESS THAN HALF the effort. [place] 

lifts, holds or supports trunk or limbs, but provides less than half the effort.


2-Substantial/Maximal Assistance-helper does MORE THAN HALF the effort. [place] 

lifts or holds trunk or limbs and provides more than half the effort.


9-Zvsomvhne-sgsdqo does ALL the effort. Patient does none of the effort to comp

lete the activity. Or, the assistance of 2 or more helpers is required for the 

patient to complete the  


    activity.


If activity was not attempted, code reason:


7-Patient Refused.


9-Not Applicable-not attempted and the patient did not perform the activity 

before the current illness, exacerbation or injury.


10-Not Attempted due to Environmental Limitations-(lack of equipment, weather 

restraints, etc.).


88-Not Attempted due to Medical Conditions or Safety Concerns.


Roll Left & Right (QC):  6


Sit to Lying (QC):  6


Lying to Sitting/Side of Bed(Q:  6


Sit to Stand (QC):  4


Chair/Bed-to-Chair Xfer(QC):  3


improved stance with TRFs, pt. gives full effort and seems less fearful today





Wheelchair Training


Does the Pt Use a Wheelchair?:  Yes


Wheel 50 ft with 2 turns (QC):  6


Type of Wheelchair:  Manual


braking, in out elevator, over threshhold etc, 180 deg turns all with 

instruction only, up down ADA ramp 75 ft x 2, down forwrd, up frwrd and back 

with steer asst





Exercises


Supine Ex:  Ankle pumps, Quad Set, Rolling, Glut sets, Scooting, Straight leg 

raise, Hip abd/add


Supine Reps:  15





Treatments


emphasis on sit to stand, stood x 3 in // bars approx 30-40 sec with good 

alignment, practiced 1st pulling on // bars , then advanced to pushing up from 

w/c arms, mod asst , then in stance nolt CGA, w/c mob as described above, supine

ex, ramp w/c mob , wife present for all and helped brain storm their bed and 

bedroom space for TRFs.


co Rx with OT for TRFs, U&L ext coordination as well as planning for possible 

new w/c for pt.





Assessment


Current Status:  Good Progress


good progress and noted increased confidence today





PT Short Term Goals


Short Term Goals


Time Frame:  Jul 1, 2022


Roll Left & Right:  6


Sit to lying:  3 (Kacie)


Lying to sitting on side of be:  3 (Kacie)


Sit to stand:  3 (Kacie)


Chair/bed-to-chair transfer:  3


Walk 10 feet:  3 (Kacie)





PT Long Term Goals


Long Term Goals


PT Long Term Goals Time Frame:  Jul 15, 2022


Roll Left & Right (QC):  6


Sit to Lying (QC):  4 (CGA)


Lying-Sitting on Side/Bed(QC):  4 (CGA)


Sit to Stand (QC):  4 (CGA)


Chair/Bed-to-Chair Xfer(QC):  4 (CGA)


Toilet Transfer (QC):  4 (CGA)


Car Transfer (QC):  4 (CGA)


Does the Patient Walk:  Yes


Walk 10 feet (QC):  4 (CGA)


Walk 50ft with 2 Turns (QC):  88


Walk 150 ft (QC):  88


Walking 10ft on Uneven Surface:  4 (CGA)


1 Step (curb) (QC):  88


4 Steps (QC):  88


12 Steps (QC):  88


Picking up an Object (QC):  4 (CGA)


Wheel 50 feet with 2 turns (QC:  6


Wheel 150 feet:  6





PT Plan


Treatment/Plan


Treatment Plan:  Continue Plan of Care


Treatment Plan:  Bed Mobility, Education, Functional Activity [place], Functional 

Strength, Group Therapy, Gait, Safety, Therapeutic Exercise, Transfers


Treatment Duration:  Jul 15, 2022


Frequency:  At least 5 of 7 days/Wk (IRF)


Estimated Hrs Per Day:  1.5 hours per day


Patient and/or Family Agrees t:  Yes





Safety Risks/Education


Patient Education:  Transfer Techniques, Correct Positioning, W/C Management, 

Safety Issues


Teaching Recipient:  Patient, Family


Teaching Methods:  Demonstration, Discussion


Response to Teaching:  Verbalize Understanding, Return Demonstration, Apolinar

nforcement Needed





Time/GCodes


Time In:  900


Time Out:  1000


Total Billed Treatment Time:  60


Total Billed Treatment


1,FA35m,w/c 15m,EX10m (60m co Rx)











AARON PORTILLO PTA             Jun 30, 2022 10:05

## 2022-06-30 NOTE — OCCUPATIONAL THER DAILY NOTE
OT Current Status-Daily Note


Subjective


Pt sitting in recliner upon OT arrival. Pt reports being tired, but agreeable to

OT/PT cotreat.





Mental Status/Objective


Patient Orientation:  Person, Place, Situation





ADL-Treatment


Therapy Code Descriptions/Definitions 





Functional [place] Measure:


0=Not Assessed/NA        4=Minimal Assistance


1=Total Assistance        5=Supervision or Setup


2=Maximal Assistance  6=Modified [place]


3=Moderate Assistance 7=Complete IndependenceSCALE: Activities may be completed 

with or without assistive devices.





6-Indepedent-patient completes the activity by him/herself with no assistance 

from a helper.


5-Set-up or Clean-up Assistance-helper sets up or cleans up; patient completes 

activity. [place] assists only prior to or  


    following the activity.


4-Supervision or Touching Assistance-helper provides verbal cues and/or 

touching/steadying and/or contact guard assistance as patient completes 

activity. Assistance may be provided   


    throughout the activity or intermittently.


3-Partial/Moderate Assistance-helper does LESS THAN HALF the effort. [place] 

lifts, holds or supports trunk or limbs, but provides less than half the effort.


2-Substantial/Maximal Assistance-helper does MORE THAN HALF the effort. [place] 

lifts or holds trunk or limbs and provides more than half the effort.


2-Wjgluyodt-ppcpca does ALL the effort. Patient does none of the effort to 

complete the activity. Or, the assistance of 2 or more helpers is required for 

the patient to complete the  


    activity.


If activity was not attempted, code reason:


7-Patient Refused.


9-Not Applicable-not attempted and the patient did not perform the activity 

before the current illness, exacerbation or injury.


10-Not Attempted due to Environmental Limitations-(lack of equipment, weather 

restraints, etc.).


88-Not Attempted due to Medical Conditions or Safety Concerns.


Oral Hygiene (QC):  5 (Bedside set up per pt and wife report)


On/Off Footwear:  3 (Min A with velcro straps per pt and wife report. Pt able to

put shoe on with shoe funnel.)


Toileting Hygiene (QC):  5 (with urinal at bedside )





Other Treatment


OT/PT cotreat due to skill of 2 clinicians required which a rehab tech could not

perform in order to coordinate UE/LEs, decrease fall risk, and due to pt's 

limitations in strength, activity tolerance, transfers and mobility. OT focused 

on UE placement and cues for sequencing and safety, PT focused on LE placement, 

gross overall movement, transfers/mobility. Pt and wife educated on safe w/c 

transfers, focusing on w/c placement, transferring to the R, using BUE to push 

off w/c arms, and full R knee extension in standing. Both demonstrated 

understanding, implementing techniques during SPT from recliner to w/c. Pt stood

in parallel bars x3, 30-40 seconds each stand. Pt wheeled to therapy gym for fu

nctional transfer training and BUE and BLE exercises (BUE ex: shuffled R and 

then L to edge of the mat 2x; BLE ex: see PT note) that focused on increasing 

IND in transfers. SPT from edge of mat to w/c to address community mobility. Pt 

navigated hallways, elevator, and ramp with w/c. OT/PT provided education about 

navigating ramps, such as going up ramp backwards, using R leg to propel/push 

w/c, and using marks in floor to assist wheeling in straight line when going up 

the ramp backwards. Pt pushed back to room and transferred back to recliner. Pt 

and wife educated on keeping L knee extended to prevent contractures and elevate

legs to promote circulation, both verbalized understanding. Post tx, pt left in 

recliner with call light in reach and all needs met. 





SPT ranged from min-mod A, with assist of 1-2, cues required for UE placement 

and sequencing with transfers.





Education


OT Patient Education:  Correct positioning, Energy conservation, Exercise 

program, Instructions to caregiver, Modified ADL techniques, Progress toward 

Goal/Update tx plan, Purpose of tx/functional activities, Rehab process, Safety 

issues, Transfer techniques, W/C management


Teaching Recipient:  Patient, Significant Other


Teaching Methods:  Demonstration, Discussion


Response to Teaching:  Verbalize Understanding, Return Demonstration





OT Short Term Goals


Short Term Goals


Time Frame:  Jul 8, 2022


Shower/bathe self:  3


Lower body dressing:  3





OT Long Term Goals


Long Term Goals


Time Frame:  Jul 22, 2022


Eating (QC):  6


Oral Hygiene (QC):  6


Toileting Hygiene (QC):  6


Shower/Bathe Self (QC):  4


Upper Body Dressing (QC):  4


Lower Body Dressing (QC):  3


On/Off Footwear (QC):  3


Additional Goals:  1-Demonstrate ADL Tasks, 2-Verbalize Understanding, 3-

ImproveStrength/[place]


1=Demonstrate adherence to instructed precautions during ADL tasks.


2=Patient will verbalize/demonstrate understanding of assistive devices/crys

fications for ADL.


3=Patient will improve strength/tolerance for activity to enable patient to 

perform ADL's.





OT Education/Plan


Problem List/Assessment


Assessment:  Decreased Activ Tolerance, Decreased UE Strength, Impaired Funct 

Balance, Impaired I ADL's, Impaired Self-Care Skills, Restricted Funct UE ROM





Discharge Recommendations


Plan/Recommendations:  Continue POC





Treatment Plan/Plan of Care


Patient would benefit from OT for education, treatment and training to promote 

independence in ADL's, mobility, safety and/or upper extremity function for 

ADL's.


Plan of Care:  ADL Retraining, Functional Mobility, Group Exercise/Act as Ind, 

UE Funct Exercise/Act, UE Neuromus Re-Ed/Coord, W/C Management Training


Treatment Duration:  Jul 22, 2022


Frequency:  At least 5 of 7 days/Wk (IRF)


Estimated Hrs Per Day:  1.5 hours per day


Agreement:  Yes


Rehab Potential:  Fair





Time/GCodes


Start Time:  09:00


Stop Time:  10:00


Total Time Billed (hr/min):  60


Billed Treatment Time


2725-5194: OT/PT cotreat





1, Ex 2 (30'), FA 2 (30')











DHIRAJ EDWARDS OT          Jun 30, 2022 10:13

## 2022-06-30 NOTE — PHYSICAL THERAPY DAILY NOTE
PT Daily Note-Current


Subjective


Pt. and wife present.  Pt. states he is feeling so much more confident in 

himself and knows he will be protected and guided well here.  Pt. states , 

"Wanna see me try to do that TRF by myself"? PT: "not just yet" Wife laughs





Pain





   Numeric Pain Scale:  4


   Location:  Left


   Location Body Site:  Calf (residual limb)


   Pain Description:  Throbbing





Mental Status


Patient Orientation:  Normal For Age





Transfers


SCALE: Activities may be completed with or without assistive devices.





6-Indepedent-patient completes the activity by him/herself with no assistance 

from a helper.


5-Set-up or Clean-up Assistance-helper sets up or cleans up; patient completes a

ctivity. [place] assists only prior to or  


    following the activity.


4-Supervision or Touching Assistance-helper provides verbal cues and/or 

touching/steadying and/or contact guard assistance as patient completes 

activity. Assistance may be provided   


    throughout the activity or intermittently.


3-Partial/Moderate Assistance-helper does LESS THAN HALF the effort. [place] 

lifts, holds or supports trunk or limbs, but provides less than half the effort.


2-Substantial/Maximal Assistance-helper does MORE THAN HALF the effort. [place] 

lifts or holds trunk or limbs and provides more than half the effort.


9-Hrbikdgum-qyiaun does ALL the effort. Patient does none of the effort to 

complete the activity. Or, the assistance of 2 or more helpers is required for 

the patient to complete the  


    activity.


If activity was not attempted, code reason:


7-Patient Refused.


9-Not Applicable-not attempted and the patient did not perform the activity 

before the current illness, exacerbation or injury.


10-Not Attempted due to Environmental Limitations-(lack of equipment, weather 

restraints, etc.).


88-Not Attempted due to Medical Conditions or Safety Concerns.


SPT w/c to recliner mod to min assist. Pt. positioning himself by wheeling w/c 

to recliner in good readied position.  Wife was reviewed in how to apply gait 

belt, pt. reaches for arm of recliner and moves safely with mod assist.





Exercises


Supine Ex:  Knee to chest, Straight leg raise, Hip abd/add


Supine Reps:  10





Treatments


SPTs, pt. positions self in recliner and utilizes all levers etc with cues, pt. 

instructed again in elevating LEs for circulation etc.





Assessment


Current Status:  Good Progress





PT Short Term Goals


Short Term Goals


Time Frame:  Jul 1, 2022


Roll Left & Right:  6


Sit to lying:  3 (Kacie)


Lying to sitting on side of be:  3 (Kaice)


Sit to stand:  3 (Kacie)


Chair/bed-to-chair transfer:  3


Walk 10 feet:  3 (Kacie)





PT Long Term Goals


Long Term Goals


PT Long Term Goals Time Frame:  Jul 15, 2022


Roll Left & Right (QC):  6


Sit to Lying (QC):  4 (CGA)


Lying-Sitting on Side/Bed(QC):  4 (CGA)


Sit to Stand (QC):  4 (CGA)


Chair/Bed-to-Chair Xfer(QC):  4 (CGA)


Toilet Transfer (QC):  4 (CGA)


Car Transfer (QC):  4 (CGA)


Does the Patient Walk:  Yes


Walk 10 feet (QC):  4 (CGA)


Walk 50ft with 2 Turns (QC):  88


Walk 150 ft (QC):  88


Walking 10ft on Uneven Surface:  4 (CGA)


1 Step (curb) (QC):  88


4 Steps (QC):  88


12 Steps (QC):  88


Picking up an Object (QC):  4 (CGA)


Wheel 50 feet with 2 turns (QC:  6


Wheel 150 feet:  6





PT Plan


Treatment/Plan


Treatment Plan:  Continue Plan of Care


Treatment Plan:  Bed Mobility, Education, Functional Activity [place], Functional 

Strength, Group Therapy, Gait, Safety, Therapeutic Exercise, Transfers


Treatment Duration:  Jul 15, 2022


Frequency:  At least 5 of 7 days/Wk (IRF)


Estimated Hrs Per Day:  1.5 hours per day


Patient and/or Family Agrees t:  Yes





Safety Risks/Education


Patient Education:  Transfer Techniques, Correct Positioning, Safety Issues


Teaching Recipient:  Patient, Family


Teaching Methods:  Demonstration, Discussion


Response to Teaching:  Verbalize Understanding, Return Demonstration, 

Reinforcement Needed





Time/GCodes


Time In:  1330


Time Out:  1400


Total Billed Treatment Time:  30


Total Billed Treatment


1,EX10m,FA20m











AARON PORTILLO PTA             Jun 30, 2022 14:08

## 2022-06-30 NOTE — PROGRESS NOTE - SURGERY
Subjective


Time Seen by a Provider:  11:31


Subjective/Events-last exam


Pt seen and examined, he states he thinks he is doing better.


Review of Systems


Pulmonary:  No Dyspnea, No Cough


Cardiovascular:  No: Chest Pain, Palpitations


Gastrointestinal:  No: Nausea, Vomiting, Abdominal Pain


Musculoskeletal:  leg pain





Objective


Exam





Vital Signs








  Date Time  Temp Pulse Resp B/P (MAP) Pulse Ox O2 Delivery O2 Flow Rate FiO2


 


6/30/22 09:00      Room Air  


 


6/30/22 07:56 35.8 73 14 154/60 (91) 96 Room Air  


 


6/29/22 20:30      Room Air  


 


6/29/22 19:56 36.4 90 18 115/51 (72) 91 Room Air  





Capillary Refill :


General Appearance:  No Apparent Distress, Chronically ill


HEENT:  PERRL/EOMI, Moist Mucous Membranes


Neck:  Carotid Bruit


Respiratory:  Chest Non Tender, Lungs Clear, Normal Breath Sounds, No Accessory 

Muscle Use, No Respiratory Distress


Cardiovascular:  No Murmur, Irregularly Irregular


Peripheral Pulses:  0 Femoral (R) (I can't feel either, pt states they get them 

with doppler), 0 Femoral (L); 1+ Radial Pulses (R), 1+ Radial Pulses (L)


Gastrointestinal:  non tender, soft, no organomegaly


Extremity:  Other (Left BKA, skin at staple line actually looks much better, 

compression damage is about the same)


Neurologic/Psychiatric:  Alert, Oriented x3





Results


Lab


Laboratory Tests


6/29/22 15:17: Glucometer 180H


6/29/22 20:11: Glucometer 180H


6/30/22 05:34: Glucometer 141H


6/30/22 10:46: Glucometer 243H





Assessment/Plan


Left BKA


Peripheral Vascular Disease


Ischemia of Left limb





The skin actually looks less whitish today, the rest is not worse.  I think it 

can recover a little bit more at least and would recommend continuing non-

operative management.  The damage was caused by the compression and stabilizing 

devices they put on after surgery. 


We are trying to avoid an AKA.  He is also at risk for ischemia because he has 

such bad peripheral vascular disease.  Will continue to monitor.











GUMARO PENG DO               Jun 30, 2022 14:08

## 2022-07-01 VITALS — SYSTOLIC BLOOD PRESSURE: 143 MMHG | DIASTOLIC BLOOD PRESSURE: 66 MMHG

## 2022-07-01 VITALS — DIASTOLIC BLOOD PRESSURE: 68 MMHG | SYSTOLIC BLOOD PRESSURE: 162 MMHG

## 2022-07-01 RX ADMIN — POLYETHYLENE GLYCOL (3350) SCH GM: 17 POWDER, FOR SOLUTION ORAL at 09:33

## 2022-07-01 RX ADMIN — INSULIN ASPART SCH UNIT: 100 INJECTION, SOLUTION INTRAVENOUS; SUBCUTANEOUS at 20:30

## 2022-07-01 RX ADMIN — GLIMEPIRIDE SCH MG: 1 TABLET ORAL at 06:16

## 2022-07-01 RX ADMIN — DOCUSATE SODIUM AND SENNOSIDES SCH EA: 8.6; 5 TABLET, FILM COATED ORAL at 09:33

## 2022-07-01 RX ADMIN — POTASSIUM CHLORIDE SCH MEQ: 1500 TABLET, EXTENDED RELEASE ORAL at 13:50

## 2022-07-01 RX ADMIN — DOCUSATE SODIUM AND SENNOSIDES SCH EA: 8.6; 5 TABLET, FILM COATED ORAL at 21:30

## 2022-07-01 RX ADMIN — INSULIN ASPART SCH UNIT: 100 INJECTION, SOLUTION INTRAVENOUS; SUBCUTANEOUS at 11:33

## 2022-07-01 RX ADMIN — DOCUSATE SODIUM SCH MG: 100 CAPSULE ORAL at 09:33

## 2022-07-01 RX ADMIN — CLOPIDOGREL BISULFATE SCH MG: 75 TABLET, FILM COATED ORAL at 10:04

## 2022-07-01 RX ADMIN — Medication SCH MCG: at 08:23

## 2022-07-01 RX ADMIN — INSULIN ASPART SCH UNIT: 100 INJECTION, SOLUTION INTRAVENOUS; SUBCUTANEOUS at 06:11

## 2022-07-01 RX ADMIN — ACETAMINOPHEN PRN MG: 325 TABLET ORAL at 18:35

## 2022-07-01 RX ADMIN — APIXABAN SCH MG: 5 TABLET, FILM COATED ORAL at 08:23

## 2022-07-01 RX ADMIN — INSULIN ASPART SCH UNIT: 100 INJECTION, SOLUTION INTRAVENOUS; SUBCUTANEOUS at 16:14

## 2022-07-01 RX ADMIN — DOCUSATE SODIUM SCH MG: 100 CAPSULE ORAL at 21:30

## 2022-07-01 RX ADMIN — GABAPENTIN SCH MG: 100 CAPSULE ORAL at 13:50

## 2022-07-01 RX ADMIN — PANTOPRAZOLE SODIUM SCH MG: 40 TABLET, DELAYED RELEASE ORAL at 10:04

## 2022-07-01 RX ADMIN — POTASSIUM CHLORIDE SCH MEQ: 1500 TABLET, EXTENDED RELEASE ORAL at 17:28

## 2022-07-01 RX ADMIN — GABAPENTIN SCH MG: 100 CAPSULE ORAL at 21:25

## 2022-07-01 RX ADMIN — LEVOTHYROXINE SODIUM SCH MCG: 100 TABLET ORAL at 06:16

## 2022-07-01 RX ADMIN — POTASSIUM CHLORIDE SCH MEQ: 1500 TABLET, EXTENDED RELEASE ORAL at 08:23

## 2022-07-01 RX ADMIN — APIXABAN SCH MG: 5 TABLET, FILM COATED ORAL at 21:25

## 2022-07-01 RX ADMIN — GABAPENTIN SCH MG: 100 CAPSULE ORAL at 08:23

## 2022-07-01 RX ADMIN — POLYETHYLENE GLYCOL (3350) SCH GM: 17 POWDER, FOR SOLUTION ORAL at 21:30

## 2022-07-01 RX ADMIN — METFORMIN HYDROCHLORIDE SCH MG: 500 TABLET, FILM COATED ORAL at 16:41

## 2022-07-01 RX ADMIN — MELATONIN 3 MG ORAL TABLET PRN MG: 3 TABLET ORAL at 21:25

## 2022-07-01 RX ADMIN — AMIODARONE HYDROCHLORIDE SCH MG: 200 TABLET ORAL at 08:23

## 2022-07-01 RX ADMIN — FUROSEMIDE SCH MG: 40 TABLET ORAL at 08:23

## 2022-07-01 RX ADMIN — METFORMIN HYDROCHLORIDE SCH MG: 500 TABLET, FILM COATED ORAL at 06:16

## 2022-07-01 NOTE — PHYSICAL THERAPY DAILY NOTE
PT Daily Note-Current


Subjective


Pt. states he slept in the recliner again, "its so much more comfortable" wife 

defends pt. and says he will probably sleep in his recliner at home. Educated 

again regarding skin protection and position changes. Pt. states pain in approx 

5/10. Dr Kaiser enters not long in to Rx time to undress residual limb to view 

wound/incision.





Pain





   Numeric Pain Scale:  5-Moderate Pain


   Location:  Left


   Location Body Site:  Calf


   Pain Description:  Pressure





Appearance


incision veiwed





Mental Status


Patient Orientation:  Normal For Age





Transfers


SCALE: Activities may be completed with or without assistive devices.





6-Indepedent-patient completes the activity by him/herself with no assistance 

from a helper.


5-Set-up or Clean-up Assistance-helper sets up or cleans up; patient completes 

activity. [place] assists only prior to or  


    following the activity.


4-Supervision or Touching Assistance-helper provides verbal cues and/or 

touching/steadying and/or contact guard assistance as patient completes 

activity. Assistance may be provided   


    throughout the activity or intermittently.


3-Partial/Moderate Assistance-helper does LESS THAN HALF the effort. [place] 

lifts, holds or supports trunk or limbs, but provides less than half the effort.


2-Substantial/Maximal Assistance-helper does MORE THAN HALF the effort. [place] 

lifts or holds trunk or limbs and provides more than half the effort.


8-Yxfukypar-ybenix does ALL the effort. Patient does none of the effort to 

complete the activity. Or, the assistance of 2 or more helpers is required for 

the patient to complete the  


    activity.


If activity was not attempted, code reason:


7-Patient Refused.


9-Not Applicable-not attempted and the patient did not perform the activity 

before the current illness, exacerbation or injury.


10-Not Attempted due to Environmental Limitations-(lack of equipment, weather 

restraints, etc.).


88-Not Attempted due to Medical Conditions or Safety Concerns.


Roll Left & Right (QC):  6


Sit to Stand (QC):  4


Chair/Bed-to-Chair Xfer(QC):  4


sit to stand and SPT training with wife requiring only CGA of OT and min asst of

wife to SPT toward right recliner into w/c





Exercises


Supine Ex:  Ankle pumps (right), Quad Set, Glut sets, Heel Slides, Scooting (up 

in recliner), Straight leg raise, Hip abd/add


Supine Reps:  15


Seated Therapy Exercises:  Sit to stand, Long arc quads


Seated Reps:  10





Treatments


supine LE ex as above as well as education for wife and pt. on donning stump 

sock, SPTs etc, wound was uncovered until nurse came to redress giving pt. and 

wife opportunity to view it and watch dressing and help reapply etc, pt. was 

actively involved in this as well





Assessment


Current Status:  Good Progress


improved TRF skills and increased mobility





PT Short Term Goals


Short Term Goals


Time Frame:  Jul 1, 2022


Roll Left & Right:  6


Sit to lying:  3 (Kacie)


Lying to sitting on side of be:  3 (Kacie)


Sit to stand:  3 (Kacie)


Chair/bed-to-chair transfer:  3


Walk 10 feet:  3 (Kacie)





PT Long Term Goals


Long Term Goals


PT Long Term Goals Time Frame:  Jul 15, 2022


Roll Left & Right (QC):  6


Sit to Lying (QC):  4 (CGA)


Lying-Sitting on Side/Bed(QC):  4 (CGA)


Sit to Stand (QC):  4 (CGA)


Chair/Bed-to-Chair Xfer(QC):  4 (CGA)


Toilet Transfer (QC):  4 (CGA)


Car Transfer (QC):  4 (CGA)


Does the Patient Walk:  Yes


Walk 10 feet (QC):  4 (CGA)


Walk 50ft with 2 Turns (QC):  88


Walk 150 ft (QC):  88


Walking 10ft on Uneven Surface:  4 (CGA)


1 Step (curb) (QC):  88


4 Steps (QC):  88


12 Steps (QC):  88


Picking up an Object (QC):  4 (CGA)


Wheel 50 feet with 2 turns (QC:  6


Wheel 150 feet:  6





PT Plan


Treatment/Plan


Treatment Plan:  Continue Plan of Care


Treatment Plan:  Bed Mobility, Education, Functional Activity [place], Functional 

Strength, Group Therapy, Gait, Safety, Therapeutic Exercise, Transfers


Treatment Duration:  Jul 15, 2022


Frequency:  At least 5 of 7 days/Wk (IRF)


Estimated Hrs Per Day:  1.5 hours per day


Patient and/or Family Agrees t:  Yes





Safety Risks/Education


Patient Education:  Transfer Techniques, Reviewed Precautions, Correct 

Positioning, Disease Process, Safety Issues


Teaching Recipient:  Patient


Teaching Methods:  Demonstration, Discussion


Response to Teaching:  Verbalize Understanding, Return Demonstration, 

Reinforcement Needed





Time/GCodes


Time In:  845


Time Out:  930


Total Billed Treatment Time:  45


Total Billed Treatment


1,EX25m,FA20m (OT co Rx 30m) for TRF skills and family education/training











AARON PORTILLO PTA              Jul 1, 2022 09:37

## 2022-07-01 NOTE — OCCUPATIONAL THER DAILY NOTE
OT Current Status-Daily Note


Subjective


Pt sitting EOB, resting head on tray table upon OT arrival, agreeable to tx.





Mental Status/Objective


Patient Orientation:  Person, Place, Situation





ADL-Treatment


Therapy Code Descriptions/Definitions 





Functional [place] Measure:


0=Not Assessed/NA        4=Minimal Assistance


1=Total Assistance        5=Supervision or Setup


2=Maximal Assistance  6=Modified [place]


3=Moderate Assistance 7=Complete IndependenceSCALE: Activities may be completed 

with or without assistive devices.





6-Indepedent-patient completes the activity by him/herself with no assistance 

from a helper.


5-Set-up or Clean-up Assistance-helper sets up or cleans up; patient completes 

activity. [place] assists only prior to or  


    following the activity.


4-Supervision or Touching Assistance-helper provides verbal cues and/or 

touching/steadying and/or contact guard assistance as patient completes 

activity. Assistance may be provided   


    throughout the activity or intermittently.


3-Partial/Moderate Assistance-helper does LESS THAN HALF the effort. [place] 

lifts, holds or supports trunk or limbs, but provides less than half the effort.


2-Substantial/Maximal Assistance-helper does MORE THAN HALF the effort. [place] 

lifts or holds trunk or limbs and provides more than half the effort.


9-Mcnztgqrh-dwhtpq does ALL the effort. Patient does none of the effort to 

complete the activity. Or, the assistance of 2 or more helpers is required for 

the patient to complete the  


    activity.


If activity was not attempted, code reason:


7-Patient Refused.


9-Not Applicable-not attempted and the patient did not perform the activity 

before the current illness, exacerbation or injury.


10-Not Attempted due to Environmental Limitations-(lack of equipment, weather 

restraints, etc.).


88-Not Attempted due to Medical Conditions or Safety Concerns.


Eating (QC):  5 (Assistance opening some containers per pt)





Other Treatment


Pt remained seated EOB, unsupported, throughout tx. Pt participated in nuts and 

bolts activity with the goal of increasing bilateral shoulder ROM, fine motor 

coordination, and BUE activity tolerance. He completed the whole board but 

required physical assistance with L elbow placement with nuts and bolts at top 

of board and he rotated the board to reach nuts and bolts furthest away from him

(d/t limited bilateral shoulder ROM). Pt was educated on the importance of 

increasing ROM prior to extensive strengthening and the importance of using BUE 

to complete tasks to prevent further restrictions from non-use. Pt understood 

and verbalized agreement. Post tx, pt left seated EOB with tray table in front, 

call light within reach and all needs met.





Education


OT Patient Education:  Correct positioning, Energy conservation, Exercise 

program, Modified ADL techniques, Progress toward Goal/Update tx plan, Purpose 

of tx/functional activities, Rehab process


Teaching Recipient:  Patient


Teaching Methods:  Discussion


Response to Teaching:  Verbalize Understanding





OT Short Term Goals


Short Term Goals


Time Frame:  Jul 8, 2022


Shower/bathe self:  3


Lower body dressing:  3





OT Long Term Goals


Long Term Goals


Time Frame:  Jul 22, 2022


Eating (QC):  6


Oral Hygiene (QC):  6


Toileting Hygiene (QC):  6


Shower/Bathe Self (QC):  4


Upper Body Dressing (QC):  4


Lower Body Dressing (QC):  3


On/Off Footwear (QC):  3


Additional Goals:  1-Demonstrate ADL Tasks, 2-Verbalize Understanding, 3-

ImproveStrength/[place]


1=Demonstrate adherence to instructed precautions during ADL tasks.


2=Patient will verbalize/demonstrate understanding of assistive 

devices/modifications for ADL.


3=Patient will improve strength/tolerance for activity to enable patient to 

perform ADL's.





OT Education/Plan


Problem List/Assessment


Assessment:  Decreased Activ Tolerance, Decreased UE Strength, Impaired Funct 

Balance, Impaired I ADL's, Impaired Self-Care Skills, Restricted Funct UE ROM





Discharge Recommendations


Plan/Recommendations:  Continue POC





Treatment Plan/Plan of Care


Patient would benefit from OT for education, treatment and training to promote 

independence in ADL's, mobility, safety and/or upper extremity function for 

ADL's.


Plan of Care:  ADL Retraining, Functional Mobility, Group Exercise/Act as Ind, 

UE Funct Exercise/Act, UE Neuromus Re-Ed/Coord, W/C Management Training


Treatment Duration:  Jul 22, 2022


Frequency:  At least 5 of 7 days/Wk (IRF)


Estimated Hrs Per Day:  1.5 hours per day


Agreement:  Yes


Rehab Potential:  Fair





Time/GCodes


Start Time:  13:00


Stop Time:  13:30


Total Time Billed (hr/min):  30


Billed Treatment Time


1, FA 2 (30')











DHIRAJ EDWARDS OT           Jul 1, 2022 13:46

## 2022-07-01 NOTE — PROGRESS NOTE - SURGERY
MAUREEN WALLACE S 7/1/22 0809:


Subjective


Date Seen by a Provider:  Jul 1, 2022


Time Seen by a Provider:  07:20


Subjective/Events-last exam


Mr. Rico is being followed for wound care of his left leg stump s/p left BKA.

This morning he reports pain but he thinks it is improved. His appetite is adeq

uate and he reports having a bowel movement yesterday. No other concerns of 

complaints this morning.


Review of Systems


General:  No Chills, No Fatigue


HEENT:  No Head Aches, No Visual Changes


Pulmonary:  No Dyspnea, No Cough


Cardiovascular:  No: Chest Pain, Palpitations


Gastrointestinal:  No: Nausea, Vomiting


Musculoskeletal:  leg pain


Neurological:  No: Weakness, Confusion





Objective


Exam





Vital Signs








  Date Time  Temp Pulse Resp B/P (MAP) Pulse Ox O2 Delivery O2 Flow Rate FiO2


 


7/1/22 07:56 36.3 78 18 143/66 (91) 93 Room Air  


 


6/30/22 21:00      Room Air  


 


6/30/22 20:25 36.1 67 18 130/56 (80) 97 Room Air  


 


6/30/22 09:00      Room Air  





Capillary Refill :


General Appearance:  No Apparent Distress, WD/WN, Chronically ill


HEENT:  PERRL/EOMI; No Scleral Icterus (L), No Scleral Icterus (R)


Respiratory:  Chest Non Tender, Lungs Clear, Normal Breath Sounds, No Accessory 

Muscle Use, No Respiratory Distress


Cardiovascular:  Regular Rate, Rhythm (Patient has paroxysmal AFIB), No Murmur; 

No Normal Peripheral Pulses


Peripheral Pulses:  0 Dorsalis Pedis (R); 2+ Radial Pulses (R), 2+ Radial Pulses

(L)


Extremity:  Other (Pain in left leg stump. Dressing and stocking in place. 

Stocking removed and dressing in tact, clean, and dry. Tender with 

manipulation.)


Neurologic/Psychiatric:  Alert, Oriented x3, No Motor/Sensory Deficits, Normal 

Mood/Affect





Results


Lab


Laboratory Tests


6/30/22 10:46: Glucometer 243H


6/30/22 15:53: Glucometer 171H


6/30/22 20:24: Glucometer 126H


7/1/22 06:04: Glucometer 101





Assessment/Plan


Assessment:





s/p Left BKA


Peripheral Vascular Disease


Ischemia of Left limb





Plan:





Continue with non-operative management at this time.


Damage likely d/t compression and stabilizing devices placed on stump after 

surgery. Trying to avoid AKA.


Patient at risk for ischemia.


Continue pain control as needed, monitor for status changes.


Dressing changes daily.





ANTOINE KAISER DO 7/1/22 1028:


Subjective


Time Seen by a Provider:  08:52


Subjective/Events-last exam


Pt seen and examined, states he thinks the pain is better.  Tolerating diet, +BM


Review of Systems


General:  No Chills


Pulmonary:  No Dyspnea, No Cough


Cardiovascular:  No: Chest Pain, Palpitations


Gastrointestinal:  No: Nausea, Vomiting





Objective


Exam


General Appearance:  No Apparent Distress, Chronically ill


HEENT:  PERRL/EOMI, Moist Mucous Membranes


Respiratory:  Lungs Clear, Normal Breath Sounds, No Accessory Muscle Use, No 

Respiratory Distress


Cardiovascular:  Regular Rate, Rhythm (Patient has paroxysmal AFIB), No Murmur


Extremity:  Other (Pain in left leg stump. Dressing and stocking removed on top 

just below knee looks like bad sunburn with loss of superficial skin, on lateral

aspect it appear a little worse color is dark yellow, below skin which will 

probably fall off, medial aspect the skin is black (unsure if this is an eschar 

or necrosis, could be both).)





Assessment/Plan


s/p Left BKA


Peripheral Vascular Disease


Ischemia of Left limb





Plan:





Continue with non-operative management at this time. I told pt that we were not 

going to be aggressive and will monitor to see how it heals and then what needs 

to be done.  Damage likely d/t compression and stabilizing devices placed on 

stump after surgery. Trying to avoid AKA.


Patient at risk for ischemia. Continue pain control as needed, monitor for 

status changes. Dressing changes daily.





Supervisory-Addendum Brief


Verification & Attestation


Participated in pt care:  history, MDM, physical


Personally performed:  exam, history, MDM, supervision of care


Care discussed with:  Medical Student


Procedures:  n/a


Verification and Attestation of Medical Student E/M Service





A medical student performed and documented this service. I then reviewed and 

verified all information documented by the medical student and made 

modifications to such information, when appropriate. I personally performed a 

physical exam, medical decision making and then discussed any differences 

between the notes and made revisions as necessary to create one note.





Antoine Kaiser , 7/1/22 , 10:28











MAUREEN WALLACE                  Jul 1, 2022 08:09


ANTOINE KAISER DO                Jul 1, 2022 10:28

## 2022-07-01 NOTE — PHYSICAL THERAPY DAILY NOTE
PT Daily Note-Current


Subjective


Patient is very agreeable to participate with PT.





Mental Status


Patient Orientation:  Normal For Age





Transfers


SCALE: Activities may be completed with or without assistive devices.





6-Indepedent-patient completes the activity by him/herself with no assistance 

from a helper.


5-Set-up or Clean-up Assistance-helper sets up or cleans up; patient completes 

activity. [place] assists only prior to or  


    following the activity.


4-Supervision or Touching Assistance-helper provides verbal cues and/or 

touching/steadying and/or contact guard assistance as patient completes 

activity. Assistance may be provided   


    throughout the activity or intermittently.


3-Partial/Moderate Assistance-helper does LESS THAN HALF the effort. [place] 

lifts, holds or supports trunk or limbs, but provides less than half the effort.


2-Substantial/Maximal Assistance-helper does MORE THAN HALF the effort. [place] 

lifts or holds trunk or limbs and provides more than half the effort.


1-Trsaxdmkh-mojacr does ALL the effort. Patient does none of the effort to 

complete the activity. Or, the assistance of 2 or more helpers is required for 

the patient to complete the  


    activity.


If activity was not attempted, code reason:


7-Patient Refused.


9-Not Applicable-not attempted and the patient did not perform the activity 

before the current illness, exacerbation or injury.


10-Not Attempted due to Environmental Limitations-(lack of equipment, weather 

restraints, etc.).


88-Not Attempted due to Medical Conditions or Safety Concerns.


Sit to Stand (QC):  3 (x 5 sets in // bars standing x 1.5 min each)


Chair/Bed-to-Chair Xfer(QC):  3





Wheelchair Training


Does the Pt Use a Wheelchair?:  Yes


Wheel 50 ft with 2 turns (QC):  4


Wheel 150 ft (QC):  4


Type of Wheelchair:  Manual


200' x 4 SBA with VC's for hand placement to propel





Exercises


Seated Therapy Exercises:  Sit to stand (x 5 sets), Long arc quads (20 reps)





Assessment


Patient requires recovery periods due to fatigue.  Patient improving with gross 

motor skill. Noted increase in assistance this p.m. due to fatigue.





PT Short Term Goals


Short Term Goals


Time Frame:  Jul 1, 2022


Roll Left & Right:  6


Sit to lying:  3 (Kacie)


Lying to sitting on side of be:  3 (Kacie)


Sit to stand:  3 (Kacie)


Chair/bed-to-chair transfer:  3


Walk 10 feet:  3 (Kacie)





PT Long Term Goals


Long Term Goals


PT Long Term Goals Time Frame:  Jul 15, 2022


Roll Left & Right (QC):  6


Sit to Lying (QC):  4 (CGA)


Lying-Sitting on Side/Bed(QC):  4 (CGA)


Sit to Stand (QC):  4 (CGA)


Chair/Bed-to-Chair Xfer(QC):  4 (CGA)


Toilet Transfer (QC):  4 (CGA)


Car Transfer (QC):  4 (CGA)


Does the Patient Walk:  Yes


Walk 10 feet (QC):  4 (CGA)


Walk 50ft with 2 Turns (QC):  88


Walk 150 ft (QC):  88


Walking 10ft on Uneven Surface:  4 (CGA)


1 Step (curb) (QC):  88


4 Steps (QC):  88


12 Steps (QC):  88


Picking up an Object (QC):  4 (CGA)


Wheel 50 feet with 2 turns (QC:  6


Wheel 150 feet:  6





PT Plan


Treatment/Plan


Treatment Plan:  Continue Plan of Care


Treatment Plan:  Bed Mobility, Education, Functional Activity [place], Functional 

Strength, Group Therapy, Gait, Safety, Therapeutic Exercise, Transfers


Treatment Duration:  Jul 15, 2022


Frequency:  At least 5 of 7 days/Wk (IRF)


Estimated Hrs Per Day:  1.5 hours per day


Patient and/or Family Agrees t:  Yes





Time/GCodes


Time In:  1330


Time Out:  1415


Total Billed Treatment Time:  45


Total Billed Treatment


1 visit


[facility] x 2 27 min


EX 18 min











DOYLE MOORE PT               Jul 1, 2022 14:16

## 2022-07-01 NOTE — OCCUPATIONAL THER DAILY NOTE
OT Current Status-Daily Note


Subjective


Pt seated in recliner, working with PT upon OT arrival, pt agreeable to PT/OT 

cotreat.





Mental Status/Objective


Patient Orientation:  Person, Place, Situation





ADL-Treatment


Therapy Code Descriptions/Definitions 





Functional [place] Measure:


0=Not Assessed/NA        4=Minimal Assistance


1=Total Assistance        5=Supervision or Setup


2=Maximal Assistance  6=Modified [place]


3=Moderate Assistance 7=Complete IndependenceSCALE: Activities may be completed 

with or without assistive devices.





6-Indepedent-patient completes the activity by him/herself with no assistance 

from a helper.


5-Set-up or Clean-up Assistance-helper sets up or cleans up; patient completes 

activity. [place] assists only prior to or  


    following the activity.


4-Supervision or Touching Assistance-helper provides verbal cues and/or touchi

ng/steadying and/or contact guard assistance as patient completes activity. 

Assistance may be provided   


    throughout the activity or intermittently.


3-Partial/Moderate Assistance-helper does LESS THAN HALF the effort. [place] 

lifts, holds or supports trunk or limbs, but provides less than half the effort.


2-Substantial/Maximal Assistance-helper does MORE THAN HALF the effort. [place] 

lifts or holds trunk or limbs and provides more than half the effort.


9-Hpwfdczpk-nuphqx does ALL the effort. Patient does none of the effort to 

complete the activity. Or, the assistance of 2 or more helpers is required for 

the patient to complete the  


    activity.


If activity was not attempted, code reason:


7-Patient Refused.


9-Not Applicable-not attempted and the patient did not perform the activity 

before the current illness, exacerbation or injury.


10-Not Attempted due to Environmental Limitations-(lack of equipment, weather 

restraints, etc.).


88-Not Attempted due to Medical Conditions or Safety Concerns.


Shower/Bathe Self (QC):  5 (Set up at bed level per pt and wife report)


Upper Body Dressing (QC):  5 (set up at bed level per pt and wife report)


Lower Body Dressing (QC):  3 (Min A with pant hike over hips per pt and wife 

report)





Other Treatment


OT/PT cotreat due to skill of 2 clinicians required which a rehab tech could not

perform in order to coordinate UE/LEs, decrease fall risk, and due to pt's 

limitations in strength, activity tolerance, transfers and mobility. OT focused 

on UE placement and cues for sequencing and safety, PT focused on LE placement, 

gross overall movement, transfers/mobility. Pt completed preparatory exercises 

for mobility/transfers, wife education on donning stump sock and SPTs. End of 

cotreat. He transferred from recliner to w/c with Min A from wife, CGA from OT, 

then wheeled to therapy gym to participate in exercises and functional 

activities focused on increasing ROM in bilateral shoulders and increasing 

overall BUE strength and endurance. He completed 7minutes of controlled 

alternating shoulder flexion while seated at the pulley, and then two rounds 

seated at the ring arc (12 rings per hand), RBs PRN. Pt performed w/c mobility 

throughout common area and hallways on 2nd floor before returning to room. Pt 

transferred from w/c to bed, min A x2 (wife and OT) and SBA for EOB to supine. 

Post tx, pt left in bed with call light in reach and all needs met.





Education


OT Patient Education:  Correct positioning, Energy conservation, Exercise 

program, Instructions to caregiver, Modified ADL techniques, Progress toward Go

al/Update tx plan, Purpose of tx/functional activities, Rehab process, Safety 

issues, Transfer techniques, W/C management


Teaching Recipient:  Patient, Significant Other


Teaching Methods:  Discussion


Response to Teaching:  Verbalize Understanding





OT Short Term Goals


Short Term Goals


Time Frame:  Jul 8, 2022


Shower/bathe self:  3


Lower body dressing:  3





OT Long Term Goals


Long Term Goals


Time Frame:  Jul 22, 2022


Eating (QC):  6


Oral Hygiene (QC):  6


Toileting Hygiene (QC):  6


Shower/Bathe Self (QC):  4


Upper Body Dressing (QC):  4


Lower Body Dressing (QC):  3


On/Off Footwear (QC):  3


Additional Goals:  1-Demonstrate ADL Tasks, 2-Verbalize Understanding, 3-

ImproveStrength/[place]


1=Demonstrate adherence to instructed precautions during ADL tasks.


2=Patient will verbalize/demonstrate understanding of assistive 

devices/modifications for ADL.


3=Patient will improve strength/tolerance for activity to enable patient to 

perform ADL's.





OT Education/Plan


Problem List/Assessment


Assessment:  Decreased Activ Tolerance, Decreased UE Strength, Impaired Funct 

Balance, Impaired I ADL's, Impaired Self-Care Skills, Restricted Funct UE ROM





Discharge Recommendations


Plan/Recommendations:  Continue POC





Treatment Plan/Plan of Care


Patient would benefit from OT for education, treatment and training to promote 

independence in ADL's, mobility, safety and/or upper extremity function for 

ADL's.


Plan of Care:  ADL Retraining, Functional Mobility, Group Exercise/Act as Ind, 

UE Funct Exercise/Act, UE Neuromus Re-Ed/Coord, W/C Management Training


Treatment Duration:  Jul 22, 2022


Frequency:  At least 5 of 7 days/Wk (IRF)


Estimated Hrs Per Day:  1.5 hours per day


Agreement:  Yes


Rehab Potential:  Fair





Time/GCodes


Start Time:  09:00


Stop Time:  10:00


Total Time Billed (hr/min):  60


Billed Treatment Time


8165-3643: OT/PT cotreat


0637-7497: OT tx





1, Ex 3 (40'), FA (20')











DHIRAJ EDWARDS OT           Jul 1, 2022 10:06

## 2022-07-01 NOTE — PM&R PROGRESS NOTE
Subjective


HPI/CC On Admission


Date Seen by Provider:  Jul 1, 2022


Time Seen by Provider:  11:30


Subjective/Events-last exam


7/1/2022:


Pt is doing a lot better


Loose bowel movement noted


Gabapentin 200 mg TID has given much improvement in pain


No other falls


Checked meds and labs








6/30/2022:


Pt is doing well


Dr. Kaiser thought the leg looked better today and didn't require surgery


Will take Melatonin tonight to help him sleep


Bowels moved yesterday


Sugars are elevated so will start Amaryl 1 mg to the Metformin 500 BID started 

yesterday








6/29/2022:


Pt is doing really well


Bowels moved two days ago


Dr. Parsons did treat his toenails


Dr. Duenas wants a general surgeon to debride it or the pt can go back over to 

Dr. Duenas at [place] to have that done with his leg








6/28/2022:


Patient doing a lot better


Complaining of pain but declines any pain medication


Sugar is much improved after hypoglycemia


Bowels are moving now


Wife at bedside








6/27/2022:


Patient doing a lot better


Blood sugars 164, 113


Declines to take morphine for addiction potential


Wife really helps the patient








6/26/2022:


Patient doing pretty well


Wife at bedside


Blood sugar is 99 after being very low


Suppository produced a small bowel movement so we will initiate more laxatives 

or


Holding metoprolol due to low blood pressure


Holding losartan as of yesterday due to low blood pressure


Mag citrate is being used for constipation today











6/25/2022:


Patient doing really well


Blood sugars reviewed and holding metformin and oral hypoglycemic agent due to 

hypoglycemia


Pain is controlled


Laxatives given


Checked meds and labs





Review of Systems


General:  Fatigue, Malaise


Musculoskeletal:  leg pain





Objective


Exam


Vital Signs





Vital Signs








  Date Time  Temp Pulse Resp B/P (MAP) Pulse Ox O2 Delivery O2 Flow Rate FiO2


 


7/1/22 19:44 36.6 75 20 162/68 (99) 97 Room Air  





Capillary Refill :


General Appearance:  No Apparent Distress, WD/WN, Chronically ill


HEENT:  PERRL/EOMI, Normal ENT Inspection, Pharynx Normal


Neck:  Full Range of Motion, Normal Inspection, Non Tender, Supple, Carotid 

Bruit


Respiratory:  Chest Non Tender, Lungs Clear, Normal Breath Sounds, No Accessory 

Muscle Use, No Respiratory Distress


Cardiovascular:  No Edema, No Gallop, No JVD, No Murmur, Normal Peripheral 

Pulses, Irregularly Irregular


Gastrointestinal:  Normal Bowel Sounds, No Organomegaly, No Pulsatile Mass, Non 

Tender, Soft


Back:  Normal Inspection, No CVA Tenderness, No Vertebral Tenderness


Extremity:  Normal Capillary Refill, Normal Inspection, Normal Range of Motion, 

Non Tender, No Calf Tenderness, No Pedal Edema


Neurologic/Psychiatric:  Alert, Oriented x3, Normal Mood/Affect, CNs II-XII Norm

as Tested, Abnormal Gait, Motor Weakness (left sided lower extremity BKA)


Skin:  Normal Color, Warm/Dry


Lymphatic:  No Adenopathy





Results/Procedures


Lab


Patient resulted labs reviewed.





FIM


Transfers


Therapy Code Descriptions/Definitions 





Functional [place] Measure:


0=Not Assessed/NA        4=Minimal Assistance


1=Total Assistance        5=Supervision or Setup


2=Maximal Assistance  6=Modified [place]


3=Moderate Assistance 7=Complete IndependenceSCALE: Activities may be completed 

with or without assistive devices.





6-Indepedent-patient completes the activity by him/herself with no assistance 

from a helper.


5-Set-up or Clean-up Assistance-helper sets up or cleans up; patient completes 

activity. [place] assists only prior to or  


    following the activity.


4-Supervision or Touching Assistance-helper provides verbal cues and/or 

touching/steadying and/or contact guard assistance as patient completes 

activity. Assistance may be provided   


    throughout the activity or intermittently.


3-Partial/Moderate Assistance-helper does LESS THAN HALF the effort. [place] 

lifts, holds or supports trunk or limbs, but provides less than half the effort.


2-Substantial/Maximal Assistance-helper does MORE THAN HALF the effort. [place] 

lifts or holds trunk or limbs and provides more than half the effort.


9-Ghhzvpsyh-nnsgxz does ALL the effort. Patient does none of the effort to 

complete the activity. Or, the assistance of 2 or more helpers is required for 

the patient to complete the  


    activity.


If activity was not attempted, code reason:


7-Patient Refused.


9-Not Applicable-not attempted and the patient did not perform the activity 

before the current illness, exacerbation or injury.


10-Not Attempted due to Environmental Limitations-(lack of equipment, weather 

restraints, etc.).


88-Not Attempted due to Medical Conditions or Safety Concerns.


Roll Left to Right (QC):  6


Sit to Lying (QC):  6


Sit to Stand (QC):  4


Chair/Bed-to-Chair Xfer(QC):  3


Car Transfer (QC):  3





Gait Training


Does the Patient Walk?:  No and Walking Goal IS indicated


Walk 10 feet (QC):  88


Walk 50 ft with 2 Turns(QC):  88


Walk 150 ft (QC):  88


Walking 10ft/uneven surface-QC:  88


Gait Assistive Device:  Parallel Bars





Wheelchair Training


Does the Pt Use a Wheelchair?:  Yes


Distance:  150'x2


Wheel 50 ft with 2 turns (QC):  6


Wheel 150 ft (QC):  4


Type of Wheelchair:  Manual





Stair Training


1 Step (curb) (QC):  88


4 Steps (QC):  88


12 Steps (QC):  88





Balance


Picking up an Object (QC):  88





ADL-Treatment


Eating (QC):  6 (per pt report)


Oral Hygiene (QC):  6


Shower/Bathe Self (QC):  5 (cover dressings and wounds)


Upper Body Dressing (QC):  5


Lower Body Dressing (QC):  1 (Assist x2 in stand. Pt attempted to don pants 

seated but unable to fully get over hips.)


On/Off Footwear (QC):  3 (Min A with velcro straps per pt and wife report. Pt 

able to put shoe on with shoe funnel.)


Toileting Hygiene (QC):  6


Toilet Transfer (QC):  3





Assessment/Plan


Assessment and Plan


Assess & Plan/Chief Complaint


Assessment:


CORTNEY 6/21/22


Severe PVD


PAF


OAC


HTN


HLP


DM


Hypothyroidism


Hypoglycemia holding metformin and glimepiride


Constipation





Plan:


Home meds


SSI


PT OT


Pain control


BM treatment





6/25/2022:


Hold diabetic meds due to hypoglycemia


Pain control


Bowel regimen





6/26/2022:


BM regimen


Monitor glucose





6/27/2022:


Monitor sugar


Hypoglycemia high risk





6/28/2022:


Monitor closely


Pain control 


Sugar monitoring





6/29/2022:


Consult Dr Kaiser


Restart Metformin





6/30/2022:


Appreciate Dr Kaiser


Add Amaryl





7/1/2022:


Improved sugars


Increase Gabapentin slowly





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











DIANA HENRIQUEZ DO                 Jul 1, 2022 06:13

## 2022-07-02 VITALS — DIASTOLIC BLOOD PRESSURE: 75 MMHG | SYSTOLIC BLOOD PRESSURE: 134 MMHG

## 2022-07-02 VITALS — SYSTOLIC BLOOD PRESSURE: 119 MMHG | DIASTOLIC BLOOD PRESSURE: 63 MMHG

## 2022-07-02 RX ADMIN — INSULIN ASPART SCH UNIT: 100 INJECTION, SOLUTION INTRAVENOUS; SUBCUTANEOUS at 06:10

## 2022-07-02 RX ADMIN — APIXABAN SCH MG: 5 TABLET, FILM COATED ORAL at 20:57

## 2022-07-02 RX ADMIN — AMIODARONE HYDROCHLORIDE SCH MG: 200 TABLET ORAL at 08:16

## 2022-07-02 RX ADMIN — DOCUSATE SODIUM SCH MG: 100 CAPSULE ORAL at 19:45

## 2022-07-02 RX ADMIN — Medication SCH MCG: at 08:16

## 2022-07-02 RX ADMIN — GABAPENTIN SCH MG: 100 CAPSULE ORAL at 13:42

## 2022-07-02 RX ADMIN — FUROSEMIDE SCH MG: 40 TABLET ORAL at 08:16

## 2022-07-02 RX ADMIN — DOCUSATE SODIUM SCH MG: 100 CAPSULE ORAL at 09:00

## 2022-07-02 RX ADMIN — METFORMIN HYDROCHLORIDE SCH MG: 500 TABLET, FILM COATED ORAL at 18:00

## 2022-07-02 RX ADMIN — INSULIN ASPART SCH UNIT: 100 INJECTION, SOLUTION INTRAVENOUS; SUBCUTANEOUS at 17:18

## 2022-07-02 RX ADMIN — POLYETHYLENE GLYCOL (3350) SCH GM: 17 POWDER, FOR SOLUTION ORAL at 19:45

## 2022-07-02 RX ADMIN — POTASSIUM CHLORIDE SCH MEQ: 1500 TABLET, EXTENDED RELEASE ORAL at 13:41

## 2022-07-02 RX ADMIN — APIXABAN SCH MG: 5 TABLET, FILM COATED ORAL at 08:16

## 2022-07-02 RX ADMIN — GABAPENTIN SCH MG: 100 CAPSULE ORAL at 08:17

## 2022-07-02 RX ADMIN — PANTOPRAZOLE SODIUM SCH MG: 40 TABLET, DELAYED RELEASE ORAL at 08:16

## 2022-07-02 RX ADMIN — DOCUSATE SODIUM AND SENNOSIDES SCH EA: 8.6; 5 TABLET, FILM COATED ORAL at 19:45

## 2022-07-02 RX ADMIN — DOCUSATE SODIUM AND SENNOSIDES SCH EA: 8.6; 5 TABLET, FILM COATED ORAL at 09:00

## 2022-07-02 RX ADMIN — MELATONIN 3 MG ORAL TABLET PRN MG: 3 TABLET ORAL at 20:57

## 2022-07-02 RX ADMIN — POTASSIUM CHLORIDE SCH MEQ: 1500 TABLET, EXTENDED RELEASE ORAL at 08:16

## 2022-07-02 RX ADMIN — METFORMIN HYDROCHLORIDE SCH MG: 500 TABLET, FILM COATED ORAL at 06:19

## 2022-07-02 RX ADMIN — INSULIN ASPART SCH UNIT: 100 INJECTION, SOLUTION INTRAVENOUS; SUBCUTANEOUS at 11:49

## 2022-07-02 RX ADMIN — POTASSIUM CHLORIDE SCH MEQ: 1500 TABLET, EXTENDED RELEASE ORAL at 18:00

## 2022-07-02 RX ADMIN — GLIMEPIRIDE SCH MG: 1 TABLET ORAL at 06:19

## 2022-07-02 RX ADMIN — LEVOTHYROXINE SODIUM SCH MCG: 100 TABLET ORAL at 06:19

## 2022-07-02 RX ADMIN — GABAPENTIN SCH MG: 100 CAPSULE ORAL at 20:57

## 2022-07-02 RX ADMIN — INSULIN ASPART SCH UNIT: 100 INJECTION, SOLUTION INTRAVENOUS; SUBCUTANEOUS at 20:56

## 2022-07-02 RX ADMIN — CLOPIDOGREL BISULFATE SCH MG: 75 TABLET, FILM COATED ORAL at 08:16

## 2022-07-02 RX ADMIN — POLYETHYLENE GLYCOL (3350) SCH GM: 17 POWDER, FOR SOLUTION ORAL at 09:00

## 2022-07-02 NOTE — PHYSICAL THERAPY DAILY NOTE
PT Daily Note-Current


Subjective


Pt reported (L) residual limb pain on arrival.





Pain





   Numeric Pain Scale:  5-Moderate Pain


   Location:  Left, Lower


   Location Body Site:  Knee


   Pain Description:  Ache, Dull





Mental Status


Patient Orientation:  Person, Place, Time, Situation





Transfers


SCALE: Activities may be completed with or without assistive devices.





6-Indepedent-patient completes the activity by him/herself with no assistance 

from a helper.


5-Set-up or Clean-up Assistance-helper sets up or cleans up; patient completes 

activity. [place] assists only prior to or  


    following the activity.


4-Supervision or Touching Assistance-helper provides verbal cues and/or 

touching/steadying and/or contact guard assistance as patient completes 

activity. Assistance may be provided   


    throughout the activity or intermittently.


3-Partial/Moderate Assistance-helper does LESS THAN HALF the effort. [place] 

lifts, holds or supports trunk or limbs, but provides less than half the effort.


2-Substantial/Maximal Assistance-helper does MORE THAN HALF the effort. [place] 

lifts or holds trunk or limbs and provides more than half the effort.


8-Xaqeunerx-twnbps does ALL the effort. Patient does none of the effort to 

complete the activity. Or, the assistance of 2 or more helpers is required for 

the patient to complete the  


    activity.


If activity was not attempted, code reason:


7-Patient Refused.


9-Not Applicable-not attempted and the patient did not perform the activity 

before the current illness, exacerbation or injury.


10-Not Attempted due to Environmental Limitations-(lack of equipment, weather 

restraints, etc.).


88-Not Attempted due to Medical Conditions or Safety Concerns.


Sit to Stand (QC):  2


Chair/Bed-to-Chair Xfer(QC):  2





Gait Training


Does the Patient Walk?:  No and Walking Goal IS indicated





Wheelchair Training


Does the Pt Use a Wheelchair?:  Yes


Wheel 50 ft with 2 turns (QC):  4


Wheel 150 ft (QC):  4


Type of Wheelchair:  Manual





Exercises


Seated Therapy Exercises:  LE Protocol


Seated Reps:  20


Standing:  Mini squats, Sit to Stand


Standing Reps:  20


NuStep Minutes:  6


 NuStep Workload:  5





Assessment


Current Status:  Good Progress


Pt notes (R) LE is too weak to hold him during transfers.  Worked on partial 

squat with prolonged hold in partial flexion (3-5 second hold).  Pt performed 5 

reps for 4 sets.  He was very fatigued when done.





PT Short Term Goals


Short Term Goals


Time Frame:  Jul 1, 2022


Roll Left & Right:  6


Sit to lying:  3 ([place]acie)


Lying to sitting on side of be:  3 (Kacie)


Sit to stand:  3 (Kacie)


Chair/bed-to-chair transfer:  3


Walk 10 feet:  3 (Kacie)





PT Long Term Goals


Long Term Goals


PT Long Term Goals Time Frame:  Jul 15, 2022


Roll Left & Right (QC):  6


Sit to Lying (QC):  4 (CGA)


Lying-Sitting on Side/Bed(QC):  4 (CGA)


Sit to Stand (QC):  4 (CGA)


Chair/Bed-to-Chair Xfer(QC):  4 (CGA)


Toilet Transfer (QC):  4 (CGA)


Car Transfer (QC):  4 (CGA)


Does the Patient Walk:  Yes


Walk 10 feet (QC):  4 (CGA)


Walk 50ft with 2 Turns (QC):  88


Walk 150 ft (QC):  88


Walking 10ft on Uneven Surface:  4 (CGA)


1 Step (curb) (QC):  88


4 Steps (QC):  88


12 Steps (QC):  88


Picking up an Object (QC):  4 (CGA)


Wheel 50 feet with 2 turns (QC:  6


Wheel 150 feet:  6





PT Plan


Treatment/Plan


Treatment Plan:  Continue Plan of Care


Treatment Plan:  Bed Mobility, Education, Functional Activity [place], Functional 

Strength, Group Therapy, Gait, Safety, Therapeutic Exercise, Transfers


Treatment Duration:  Jul 15, 2022


Frequency:  At least 5 of 7 days/Wk (IRF)


Estimated Hrs Per Day:  1.5 hours per day


Patient and/or Family Agrees t:  Yes





Time/GCodes


Time In:  1050


Time Out:  1120


Total Billed Treatment Time:  30


Total Billed Treatment


1, ex x2 (30)











TAYLOR KEMP PT             Jul 2, 2022 12:38

## 2022-07-02 NOTE — PM&R PROGRESS NOTE
Subjective


HPI/CC On Admission


Date Seen by Provider:  Jul 2, 2022


Subjective/Events-last exam


7/2/2022:


Pt doing pretty well


Is more comfortable sitting on the side of the bed leaned over on a pillow and 

his leg doesn't hurt as much


Blood sugars are much improved with the Amaryl 1mg and Metformin 500mg BID








7/1/2022:


Pt is doing a lot better


Loose bowel movement noted


Gabapentin 200 mg TID has given much improvement in pain


No other falls


Checked meds and labs








6/30/2022:


Pt is doing well


Dr. Kaiser thought the leg looked better today and didn't require surgery


Will take Melatonin tonight to help him sleep


Bowels moved yesterday


Sugars are elevated so will start Amaryl 1 mg to the Metformin 500 BID started 

yesterday








6/29/2022:


Pt is doing really well


Bowels moved two days ago


Dr. Parsons did treat his toenails


Dr. Duenas wants a general surgeon to debride it or the pt can go back over to 

Dr. Duenas at [place] to have that done with his leg








6/28/2022:


Patient doing a lot better


Complaining of pain but declines any pain medication


Sugar is much improved after hypoglycemia


Bowels are moving now


Wife at bedside








6/27/2022:


Patient doing a lot better


Blood sugars 164, 113


Declines to take morphine for addiction potential


Wife really helps the patient








6/26/2022:


Patient doing pretty well


Wife at bedside


Blood sugar is 99 after being very low


Suppository produced a small bowel movement so we will initiate more laxatives 

or


Holding metoprolol due to low blood pressure


Holding losartan as of yesterday due to low blood pressure


Mag citrate is being used for constipation today











6/25/2022:


Patient doing really well


Blood sugars reviewed and holding metformin and oral hypoglycemic agent due to 

hypoglycemia


Pain is controlled


Laxatives given


Checked meds and labs





Review of Systems


General:  Fatigue, Malaise





Objective


Exam


Vital Signs





Vital Signs








  Date Time  Temp Pulse Resp B/P (MAP) Pulse Ox O2 Delivery O2 Flow Rate FiO2


 


7/2/22 09:00      Room Air  


 


7/2/22 07:30 36.0 64 18 134/75 (94) 98   





Capillary Refill :


General Appearance:  No Apparent Distress, WD/WN, Chronically ill


HEENT:  PERRL/EOMI, Normal ENT Inspection, Pharynx Normal


Neck:  Full Range of Motion, Normal Inspection, Non Tender, Supple, Carotid 

Bruit


Respiratory:  Chest Non Tender, Lungs Clear, Normal Breath Sounds, No Accessory 

Muscle Use, No Respiratory Distress


Cardiovascular:  No Edema, No Gallop, No JVD, No Murmur, Normal Peripheral 

Pulses, Irregularly Irregular


Gastrointestinal:  Normal Bowel Sounds, No Organomegaly, No Pulsatile Mass, Non 

Tender, Soft


Back:  Normal Inspection, No CVA Tenderness, No Vertebral Tenderness


Extremity:  Normal Capillary Refill, Normal Inspection, Normal Range of Motion, 

Non Tender, No Calf Tenderness, No Pedal Edema


Neurologic/Psychiatric:  Alert, Oriented x3, Normal Mood/Affect, CNs II-XII Norm

as Tested, Abnormal Gait, Motor Weakness (left sided lower extremity BKA)


Skin:  Normal Color, Warm/Dry


Lymphatic:  No Adenopathy





Results/Procedures


Lab


Patient resulted labs reviewed.





FIM


Transfers


Therapy Code Descriptions/Definitions 





Functional [place] Measure:


0=Not Assessed/NA        4=Minimal Assistance


1=Total Assistance        5=Supervision or Setup


2=Maximal Assistance  6=Modified [place]


3=Moderate Assistance 7=Complete IndependenceSCALE: Activities may be completed 

with or without assistive devices.





6-Indepedent-patient completes the activity by him/herself with no assistance 

from a helper.


5-Set-up or Clean-up Assistance-helper sets up or cleans up; patient completes 

activity. [place] assists only prior to or  


    following the activity.


4-Supervision or Touching Assistance-helper provides verbal cues and/or touch

ing/steadying and/or contact guard assistance as patient completes activity. 

Assistance may be provided   


    throughout the activity or intermittently.


3-Partial/Moderate Assistance-helper does LESS THAN HALF the effort. [place] 

lifts, holds or supports trunk or limbs, but provides less than half the effort.


2-Substantial/Maximal Assistance-helper does MORE THAN HALF the effort. [place] 

lifts or holds trunk or limbs and provides more than half the effort.


5-Yqovjqmwz-eqzovh does ALL the effort. Patient does none of the effort to 

complete the activity. Or, the assistance of 2 or more helpers is required for 

the patient to complete the  


    activity.


If activity was not attempted, code reason:


7-Patient Refused.


9-Not Applicable-not attempted and the patient did not perform the activity 

before the current illness, exacerbation or injury.


10-Not Attempted due to Environmental Limitations-(lack of equipment, weather 

restraints, etc.).


88-Not Attempted due to Medical Conditions or Safety Concerns.


Roll Left to Right (QC):  6


Sit to Lying (QC):  6


Sit to Stand (QC):  3 (x 5 sets in // bars standing x 1.5 min each)


Chair/Bed-to-Chair Xfer(QC):  3


Car Transfer (QC):  3





Gait Training


Does the Patient Walk?:  No and Walking Goal IS indicated


Walk 10 feet (QC):  88


Walk 50 ft with 2 Turns(QC):  88


Walk 150 ft (QC):  88


Walking 10ft/uneven surface-QC:  88


Gait Assistive Device:  Parallel Bars





Wheelchair Training


Does the Pt Use a Wheelchair?:  Yes


Distance:  150'x2


Wheel 50 ft with 2 turns (QC):  4


Wheel 150 ft (QC):  4


Type of Wheelchair:  Manual





Stair Training


1 Step (curb) (QC):  88


4 Steps (QC):  88


12 Steps (QC):  88





Balance


Picking up an Object (QC):  88





ADL-Treatment


Eating (QC):  5 (Assistance opening some containers per pt)


Oral Hygiene (QC):  6


Shower/Bathe Self (QC):  5 (Set up at bed level per pt and wife report)


Upper Body Dressing (QC):  5 (set up at bed level per pt and wife report)


Lower Body Dressing (QC):  3 (Min A with pant hike over hips per pt and wife 

report)


On/Off Footwear (QC):  3 (Min A with velcro straps per pt and wife report. Pt 

able to put shoe on with shoe funnel.)


Toileting Hygiene (QC):  6


Toilet Transfer (QC):  3





Assessment/Plan


Assessment and Plan


Assess & Plan/Chief Complaint


Assessment:


LBKA 6/21/22


Severe PVD


PAF


OAC


HTN


HLP


DM


Hypothyroidism


Hypoglycemia holding metformin and glimepiride


Constipation





Plan:


Home meds


SSI


PT OT


Pain control


BM treatment





6/25/2022:


Hold diabetic meds due to hypoglycemia


Pain control


Bowel regimen





6/26/2022:


BM regimen


Monitor glucose





6/27/2022:


Monitor sugar


Hypoglycemia high risk





6/28/2022:


Monitor closely


Pain control 


Sugar monitoring





6/29/2022:


Consult Dr Kaiser


Restart Metformin





6/30/2022:


Appreciate Dr Job Lee Amaryl





7/1/2022:


Improved sugars


Increase Gabapentin slowly





7/2/2022:


Pain is improved


Supportive care





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











DIANA HENRIQUEZ DO                 Jul 2, 2022 05:56

## 2022-07-02 NOTE — PROGRESS NOTE - SURGERY
Subjective


Date Seen by a Provider:  Jul 2, 2022


Time Seen by a Provider:  11:15


Subjective/Events-last exam


Pain controlled. Feels leg is looking better.  Doing PT exercises.  Denies n/v 

fever sweats chills shortness of breath or chest pain at this time.





Objective


Exam





Vital Signs








  Date Time  Temp Pulse Resp B/P (MAP) Pulse Ox O2 Delivery O2 Flow Rate FiO2


 


7/2/22 07:30 36.0 64 18 134/75 (94) 98 Room Air  


 


7/1/22 20:20      Room Air  


 


7/1/22 19:44 36.6 75 20 162/68 (99) 97 Room Air  





Capillary Refill :


General Appearance:  No Apparent Distress, WD/WN, Chronically ill


HEENT:  PERRL/EOMI, Normal ENT Inspection


Neck:  Full Range of Motion, Normal Inspection, Non Tender, Supple


Respiratory:  Chest Non Tender, No Accessory Muscle Use, No Respiratory Distress


Cardiovascular:  No JVD, Irregularly Irregular


Peripheral Pulses:  0 Dorsalis Pedis (R); 2+ Radial Pulses (R), 2+ Radial Pulses

(L)


Gastrointestinal:  non tender, soft


Extremity:  Other (left bka, left variable degrees of wound ischemia to distal 

stump likely from compression)


Neurologic/Psychiatric:  Alert, Oriented x3, Normal Mood/Affect, CNs II-XII Norm

as Tested, Abnormal Gait, Motor Weakness (left sided lower extremity BKA)


Skin:  Normal Color, Warm/Dry


Lymphatic:  No Adenopathy





Results


Lab


Laboratory Tests


7/1/22 11:21: Glucometer 141H


7/1/22 15:38: Glucometer 150H


7/1/22 20:27: Glucometer 140H


7/2/22 06:06: Glucometer 87


7/2/22 10:59: Glucometer 104





Assessment/Plan


s/p Left BKA


Peripheral Vascular Disease


Ischemia of Left limb





Pain control


Dressing changes daily.


Dressing changed today.


Currently possibility of ischemia that is superficial, and will heal from inside

out.  Patient does understand potential for aka.











BELKIS PACK DO               Jul 2, 2022 11:15

## 2022-07-03 VITALS — DIASTOLIC BLOOD PRESSURE: 76 MMHG | SYSTOLIC BLOOD PRESSURE: 143 MMHG

## 2022-07-03 VITALS — SYSTOLIC BLOOD PRESSURE: 143 MMHG | DIASTOLIC BLOOD PRESSURE: 75 MMHG

## 2022-07-03 RX ADMIN — APIXABAN SCH MG: 5 TABLET, FILM COATED ORAL at 08:41

## 2022-07-03 RX ADMIN — METFORMIN HYDROCHLORIDE SCH MG: 500 TABLET, FILM COATED ORAL at 16:58

## 2022-07-03 RX ADMIN — POTASSIUM CHLORIDE SCH MEQ: 1500 TABLET, EXTENDED RELEASE ORAL at 08:41

## 2022-07-03 RX ADMIN — POLYETHYLENE GLYCOL (3350) SCH GM: 17 POWDER, FOR SOLUTION ORAL at 19:44

## 2022-07-03 RX ADMIN — APIXABAN SCH MG: 5 TABLET, FILM COATED ORAL at 20:57

## 2022-07-03 RX ADMIN — POLYETHYLENE GLYCOL (3350) SCH GM: 17 POWDER, FOR SOLUTION ORAL at 09:34

## 2022-07-03 RX ADMIN — GLIMEPIRIDE SCH MG: 1 TABLET ORAL at 06:12

## 2022-07-03 RX ADMIN — DOCUSATE SODIUM SCH MG: 100 CAPSULE ORAL at 09:34

## 2022-07-03 RX ADMIN — AMIODARONE HYDROCHLORIDE SCH MG: 200 TABLET ORAL at 08:41

## 2022-07-03 RX ADMIN — GABAPENTIN SCH MG: 100 CAPSULE ORAL at 08:41

## 2022-07-03 RX ADMIN — DOCUSATE SODIUM AND SENNOSIDES SCH EA: 8.6; 5 TABLET, FILM COATED ORAL at 09:34

## 2022-07-03 RX ADMIN — INSULIN ASPART SCH UNIT: 100 INJECTION, SOLUTION INTRAVENOUS; SUBCUTANEOUS at 06:08

## 2022-07-03 RX ADMIN — MELATONIN 3 MG ORAL TABLET PRN MG: 3 TABLET ORAL at 20:57

## 2022-07-03 RX ADMIN — METFORMIN HYDROCHLORIDE SCH MG: 500 TABLET, FILM COATED ORAL at 06:12

## 2022-07-03 RX ADMIN — DOCUSATE SODIUM AND SENNOSIDES SCH EA: 8.6; 5 TABLET, FILM COATED ORAL at 19:44

## 2022-07-03 RX ADMIN — INSULIN ASPART SCH UNIT: 100 INJECTION, SOLUTION INTRAVENOUS; SUBCUTANEOUS at 16:52

## 2022-07-03 RX ADMIN — LEVOTHYROXINE SODIUM SCH MCG: 100 TABLET ORAL at 06:12

## 2022-07-03 RX ADMIN — CLOPIDOGREL BISULFATE SCH MG: 75 TABLET, FILM COATED ORAL at 08:41

## 2022-07-03 RX ADMIN — DOCUSATE SODIUM SCH MG: 100 CAPSULE ORAL at 19:44

## 2022-07-03 RX ADMIN — INSULIN ASPART SCH UNIT: 100 INJECTION, SOLUTION INTRAVENOUS; SUBCUTANEOUS at 20:55

## 2022-07-03 RX ADMIN — GABAPENTIN SCH MG: 100 CAPSULE ORAL at 20:58

## 2022-07-03 RX ADMIN — GABAPENTIN SCH MG: 100 CAPSULE ORAL at 13:14

## 2022-07-03 RX ADMIN — FUROSEMIDE SCH MG: 40 TABLET ORAL at 08:41

## 2022-07-03 RX ADMIN — PANTOPRAZOLE SODIUM SCH MG: 40 TABLET, DELAYED RELEASE ORAL at 08:41

## 2022-07-03 RX ADMIN — INSULIN ASPART SCH UNIT: 100 INJECTION, SOLUTION INTRAVENOUS; SUBCUTANEOUS at 11:47

## 2022-07-03 RX ADMIN — Medication SCH MCG: at 08:41

## 2022-07-03 NOTE — PM&R PROGRESS NOTE
Subjective


HPI/CC On Admission


Date Seen by Provider:  Jul 3, 2022


Time Seen by Provider:  06:00


Subjective/Events-last exam


7/3/2022:


Patient doing really well


Blood sugars reviewed


Pain is improved








7/2/2022:


Pt doing pretty well


Is more comfortable sitting on the side of the bed leaned over on a pillow and 

his leg doesn't hurt as much


Blood sugars are much improved with the Amaryl 1mg and Metformin 500mg BID








7/1/2022:


Pt is doing a lot better


Loose bowel movement noted


Gabapentin 200 mg TID has given much improvement in pain


No other falls


Checked meds and labs








6/30/2022:


Pt is doing well


Dr. Kaiser thought the leg looked better today and didn't require surgery


Will take Melatonin tonight to help him sleep


Bowels moved yesterday


Sugars are elevated so will start Amaryl 1 mg to the Metformin 500 BID started 

yesterday








6/29/2022:


Pt is doing really well


Bowels moved two days ago


Dr. Parsons did treat his toenails


Dr. Duenas wants a general surgeon to debride it or the pt can go back over to 

Dr. Duenas at [place] to have that done with his leg








6/28/2022:


Patient doing a lot better


Complaining of pain but declines any pain medication


Sugar is much improved after hypoglycemia


Bowels are moving now


Wife at bedside








6/27/2022:


Patient doing a lot better


Blood sugars 164, 113


Declines to take morphine for addiction potential


Wife really helps the patient








6/26/2022:


Patient doing pretty well


Wife at bedside


Blood sugar is 99 after being very low


Suppository produced a small bowel movement so we will initiate more laxatives 

or


Holding metoprolol due to low blood pressure


Holding losartan as of yesterday due to low blood pressure


Mag citrate is being used for constipation today











6/25/2022:


Patient doing really well


Blood sugars reviewed and holding metformin and oral hypoglycemic agent due to 

hypoglycemia


Pain is controlled


Laxatives given


Checked meds and labs





Review of Systems


General:  Fatigue, Malaise


Musculoskeletal:  leg pain





Objective


Exam


Vital Signs





Vital Signs








  Date Time  Temp Pulse Resp B/P (MAP) Pulse Ox O2 Delivery O2 Flow Rate FiO2


 


7/3/22 09:00      Room Air  


 


7/3/22 07:30 36.7 75 20 143/76 (98) 95   





Capillary Refill :


General Appearance:  No Apparent Distress, WD/WN, Chronically ill


HEENT:  PERRL/EOMI, Normal ENT Inspection, Pharynx Normal


Neck:  Full Range of Motion, Normal Inspection, Non Tender, Supple, Carotid 

Bruit


Respiratory:  Chest Non Tender, Lungs Clear, Normal Breath Sounds, No Accessory 

Muscle Use, No Respiratory Distress


Cardiovascular:  No Edema, No Gallop, No JVD, No Murmur, Normal Peripheral 

Pulses, Irregularly Irregular


Gastrointestinal:  Normal Bowel Sounds, No Organomegaly, No Pulsatile Mass, Non 

Tender, Soft


Back:  Normal Inspection, No CVA Tenderness, No Vertebral Tenderness


Extremity:  Normal Capillary Refill, Normal Inspection, Normal Range of Motion, 

Non Tender, No Calf Tenderness, No Pedal Edema


Neurologic/Psychiatric:  Alert, Oriented x3, Normal Mood/Affect, CNs II-XII Norm

as Tested, Abnormal Gait, Motor Weakness (left sided lower extremity BKA)


Skin:  Normal Color, Warm/Dry


Lymphatic:  No Adenopathy





Results/Procedures


Lab


Patient resulted labs reviewed.





FIM


Transfers


Therapy Code Descriptions/Definitions 





Functional [place] Measure:


0=Not Assessed/NA        4=Minimal Assistance


1=Total Assistance        5=Supervision or Setup


2=Maximal Assistance  6=Modified [place]


3=Moderate Assistance 7=Complete IndependenceSCALE: Activities may be completed 

with or without assistive devices.





6-Indepedent-patient completes the activity by him/herself with no assistance 

from a helper.


5-Set-up or Clean-up Assistance-helper sets up or cleans up; patient completes 

activity. [place] assists only prior to or  


    following the activity.


4-Supervision or Touching Assistance-helper provides verbal cues and/or 

touching/steadying and/or contact guard assistance as patient completes 

activity. Assistance may be provided   


    throughout the activity or intermittently.


3-Partial/Moderate Assistance-helper does LESS THAN HALF the effort. [place] 

lifts, holds or supports trunk or limbs, but provides less than half the effort.


2-Substantial/Maximal Assistance-helper does MORE THAN HALF the effort. [place] 

lifts or holds trunk or limbs and provides more than half the effort.


4-Qnhpnocgu-hijsfn does ALL the effort. Patient does none of the effort to 

complete the activity. Or, the assistance of 2 or more helpers is required for 

the patient to complete the  


    activity.


If activity was not attempted, code reason:


7-Patient Refused.


9-Not Applicable-not attempted and the patient did not perform the activity 

before the current illness, exacerbation or injury.


10-Not Attempted due to Environmental Limitations-(lack of equipment, weather 

restraints, etc.).


88-Not Attempted due to Medical Conditions or Safety Concerns.


Roll Left to Right (QC):  6


Sit to Lying (QC):  6


Sit to Stand (QC):  2


Chair/Bed-to-Chair Xfer(QC):  2


Car Transfer (QC):  3





Gait Training


Does the Patient Walk?:  No and Walking Goal IS indicated


Walk 10 feet (QC):  88


Walk 50 ft with 2 Turns(QC):  88


Walk 150 ft (QC):  88


Walking 10ft/uneven surface-QC:  88


Gait Assistive Device:  Parallel Bars





Wheelchair Training


Does the Pt Use a Wheelchair?:  Yes


Distance:  150'x2


Wheel 50 ft with 2 turns (QC):  4


Wheel 150 ft (QC):  4


Type of Wheelchair:  Manual





Stair Training


1 Step (curb) (QC):  88


4 Steps (QC):  88


12 Steps (QC):  88





Balance


Picking up an Object (QC):  88





ADL-Treatment


Eating (QC):  5 (Assistance opening some containers per pt)


Oral Hygiene (QC):  6


Shower/Bathe Self (QC):  5 (Set up at bed level per pt and wife report)


Upper Body Dressing (QC):  5 (set up at bed level per pt and wife report)


Lower Body Dressing (QC):  3 (Min A with pant hike over hips per pt and wife 

report)


On/Off Footwear (QC):  3 (Min A with velcro straps per pt and wife report. Pt 

able to put shoe on with shoe funnel.)


Toileting Hygiene (QC):  6


Toilet Transfer (QC):  3





Assessment/Plan


Assessment and Plan


Assess & Plan/Chief Complaint


Assessment:


LBKA 6/21/22


Severe PVD


PAF


OAC


HTN


HLP


DM


Hypothyroidism


Hypoglycemia holding metformin and glimepiride


Constipation





Plan:


Home meds


SSI


PT OT


Pain control


BM treatment





6/25/2022:


Hold diabetic meds due to hypoglycemia


Pain control


Bowel regimen





6/26/2022:


BM regimen


Monitor glucose





6/27/2022:


Monitor sugar


Hypoglycemia high risk





6/28/2022:


Monitor closely


Pain control 


Sugar monitoring





6/29/2022:


Consult Dr Kaiser


Restart Metformin





6/30/2022:


Appreciate Dr Kaiser


Add Amaryl





7/1/2022:


Improved sugars


Increase Gabapentin slowly





7/2/2022:


Pain is improved


Supportive care





7/3/2022:


Pain is improved 


Blood sugar improved





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











DIANA HENRIQUEZ DO                 Jul 3, 2022 05:55

## 2022-07-03 NOTE — PROGRESS NOTE - SURGERY
Subjective


Date Seen by a Provider:  Jul 3, 2022


Time Seen by a Provider:  12:02


Subjective/Events-last exam


Patient slight discomfort left leg.  Pain fairly controlled. Working on range of

motion.  Sitting in chair.  No other complaints at this time.


Denies n/v fever sweats chills shortness of breath or chest pain.





Objective


Exam





Vital Signs








  Date Time  Temp Pulse Resp B/P (MAP) Pulse Ox O2 Delivery O2 Flow Rate FiO2


 


7/3/22 09:00      Room Air  


 


7/3/22 07:30 36.7 75 20 143/76 (98) 95 Room Air  


 


7/2/22 20:20      Room Air  


 


7/2/22 20:01 36.6 64 20 119/63 (81) 98 Room Air  





Capillary Refill :


General Appearance:  No Apparent Distress, WD/WN, Chronically ill


HEENT:  PERRL/EOMI, Normal ENT Inspection, Pharynx Normal


Neck:  Full Range of Motion, Normal Inspection, Non Tender, Supple, Carotid 

Bruit


Respiratory:  Chest Non Tender, No Accessory Muscle Use, No Respiratory Distress


Cardiovascular:  No JVD, Irregularly Irregular


Peripheral Pulses:  0 Dorsalis Pedis (R); 2+ Radial Pulses (R), 2+ Radial Pulses

(L)


Gastrointestinal:  non tender, soft


Extremity:  Normal Capillary Refill, No Pedal Edema, Other (left bka with skin 

ischemia variable thickness, multiple areas of  the stump left side.)


Neurologic/Psychiatric:  Alert, Oriented x3, Normal Mood/Affect, CNs II-XII Norm

as Tested, Abnormal Gait, Motor Weakness (left sided lower extremity BKA)


Skin:  Normal Color, Warm/Dry


Lymphatic:  No Adenopathy





Results


Lab


Laboratory Tests


7/2/22 15:28: Glucometer 115H


7/2/22 20:39: Glucometer 123H


7/3/22 05:55: Glucometer 112H


7/3/22 11:08: Glucometer 126H





Assessment/Plan


s/p Left BKA


Peripheral Vascular Disease


Ischemia of Left limb





Pain control


Dressing changes daily.


Dressing changed today tolerated without difficulty


Currently possibility of ischemia that is superficial, and will heal from inside

out.  Patient does understand potential for aka.











BELKIS PACK DO               Jul 3, 2022 12:47

## 2022-07-04 VITALS — SYSTOLIC BLOOD PRESSURE: 143 MMHG | DIASTOLIC BLOOD PRESSURE: 62 MMHG

## 2022-07-04 VITALS — DIASTOLIC BLOOD PRESSURE: 57 MMHG | SYSTOLIC BLOOD PRESSURE: 136 MMHG

## 2022-07-04 RX ADMIN — LEVOTHYROXINE SODIUM SCH MCG: 100 TABLET ORAL at 06:07

## 2022-07-04 RX ADMIN — FUROSEMIDE SCH MG: 40 TABLET ORAL at 08:49

## 2022-07-04 RX ADMIN — METFORMIN HYDROCHLORIDE SCH MG: 500 TABLET, FILM COATED ORAL at 18:03

## 2022-07-04 RX ADMIN — APIXABAN SCH MG: 5 TABLET, FILM COATED ORAL at 21:29

## 2022-07-04 RX ADMIN — POLYETHYLENE GLYCOL (3350) SCH GM: 17 POWDER, FOR SOLUTION ORAL at 21:14

## 2022-07-04 RX ADMIN — INSULIN ASPART SCH UNIT: 100 INJECTION, SOLUTION INTRAVENOUS; SUBCUTANEOUS at 20:33

## 2022-07-04 RX ADMIN — Medication SCH MCG: at 08:49

## 2022-07-04 RX ADMIN — POTASSIUM CHLORIDE SCH MEQ: 1500 TABLET, EXTENDED RELEASE ORAL at 08:49

## 2022-07-04 RX ADMIN — GABAPENTIN SCH MG: 100 CAPSULE ORAL at 21:29

## 2022-07-04 RX ADMIN — DOCUSATE SODIUM AND SENNOSIDES SCH EA: 8.6; 5 TABLET, FILM COATED ORAL at 21:14

## 2022-07-04 RX ADMIN — CLOPIDOGREL BISULFATE SCH MG: 75 TABLET, FILM COATED ORAL at 08:49

## 2022-07-04 RX ADMIN — GABAPENTIN SCH MG: 100 CAPSULE ORAL at 08:49

## 2022-07-04 RX ADMIN — INSULIN ASPART SCH UNIT: 100 INJECTION, SOLUTION INTRAVENOUS; SUBCUTANEOUS at 05:39

## 2022-07-04 RX ADMIN — AMIODARONE HYDROCHLORIDE SCH MG: 200 TABLET ORAL at 09:58

## 2022-07-04 RX ADMIN — GABAPENTIN SCH MG: 100 CAPSULE ORAL at 12:12

## 2022-07-04 RX ADMIN — POLYETHYLENE GLYCOL (3350) SCH GM: 17 POWDER, FOR SOLUTION ORAL at 09:07

## 2022-07-04 RX ADMIN — DOCUSATE SODIUM AND SENNOSIDES SCH EA: 8.6; 5 TABLET, FILM COATED ORAL at 09:07

## 2022-07-04 RX ADMIN — DOCUSATE SODIUM SCH MG: 100 CAPSULE ORAL at 21:14

## 2022-07-04 RX ADMIN — METFORMIN HYDROCHLORIDE SCH MG: 500 TABLET, FILM COATED ORAL at 06:07

## 2022-07-04 RX ADMIN — DOCUSATE SODIUM SCH MG: 100 CAPSULE ORAL at 09:07

## 2022-07-04 RX ADMIN — INSULIN ASPART SCH UNIT: 100 INJECTION, SOLUTION INTRAVENOUS; SUBCUTANEOUS at 11:59

## 2022-07-04 RX ADMIN — MELATONIN 3 MG ORAL TABLET PRN MG: 3 TABLET ORAL at 21:29

## 2022-07-04 RX ADMIN — APIXABAN SCH MG: 5 TABLET, FILM COATED ORAL at 08:49

## 2022-07-04 RX ADMIN — GLIMEPIRIDE SCH MG: 1 TABLET ORAL at 06:07

## 2022-07-04 RX ADMIN — INSULIN ASPART SCH UNIT: 100 INJECTION, SOLUTION INTRAVENOUS; SUBCUTANEOUS at 16:20

## 2022-07-04 RX ADMIN — PANTOPRAZOLE SODIUM SCH MG: 40 TABLET, DELAYED RELEASE ORAL at 08:49

## 2022-07-04 NOTE — OCCUPATIONAL THER DAILY NOTE
OT Current Status-Daily Note


Subjective


Pt resting in w/c upon OT arrival. Pt verbalized being tired and felt that his 

RLE was weaker today, but he was agreeable to tx.





Mental Status/Objective


Patient Orientation:  Person, Place, Situation





ADL-Treatment


Therapy Code Descriptions/Definitions 





Functional [place] Measure:


0=Not Assessed/NA        4=Minimal Assistance


1=Total Assistance        5=Supervision or Setup


2=Maximal Assistance  6=Modified [place]


3=Moderate Assistance 7=Complete IndependenceSCALE: Activities may be completed 

with or without assistive devices.





6-Indepedent-patient completes the activity by him/herself with no assistance 

from a helper.


5-Set-up or Clean-up Assistance-helper sets up or cleans up; patient completes 

activity. [place] assists only prior to or  


    following the activity.


4-Supervision or Touching Assistance-helper provides verbal cues and/or 

touching/steadying and/or contact guard assistance as patient completes 

activity. Assistance may be provided   


    throughout the activity or intermittently.


3-Partial/Moderate Assistance-helper does LESS THAN HALF the effort. [place] 

lifts, holds or supports trunk or limbs, but provides less than half the effort.


2-Substantial/Maximal Assistance-helper does MORE THAN HALF the effort. [place] 

lifts or holds trunk or limbs and provides more than half the effort.


1-Xugpinbcx-uvebeq does ALL the effort. Patient does none of the effort to 

complete the activity. Or, the assistance of 2 or more helpers is required for 

the patient to complete the  


    activity.


If activity was not attempted, code reason:


7-Patient Refused.


9-Not Applicable-not attempted and the patient did not perform the activity 

before the current illness, exacerbation or injury.


10-Not Attempted due to Environmental Limitations-(lack of equipment, weather 

restraints, etc.).


88-Not Attempted due to Medical Conditions or Safety Concerns.


Oral Hygiene (QC):  5 (seated at the sink)


Shower/Bathe Self (QC):  5 (Covering wound dressings. Shower completed 100% 

seated)


Upper Body Dressing (QC):  3 (Min A overall. Pt required v/c's to recall 

technique taught last week. Assistance pulling shirt down in back.)


Lower Body Dressing (QC):  1 (Pt able to doff pants while seated, needed min A 

to thread legs through pants, and was able to hike pants to buttocks, assist x2 

to hike remainder of way in stand)


On/Off Footwear:  3 (Mod A overall. Pt able to doff gripper sock, but required 

total A to don compression sock. Pt able to don sock and shoe with min A.)


Toileting Hygiene (QC):  1 (Assist x2 for doffing/donning cloths)





Other Treatment


[facility] Pt pushed into

shower to complete undressing and bathing. After showering, pt completed 

toileting while seated in wheeled SC, placed over toilet. [facility], requiring assist x2 to hike pants up over buttocks 

in stand. Cont education provided to wife on the importance of letting pt be as 

IND as possible in self-care, she is understanding and verbalized that she is 

working on letting him do more on his own. Pt transferred back to / with mod 

assistx2, then wheeled to sink to complete oral hygiene/grooming tasks. 

Afterwards, he returned to recliner, min-mod assistx2. Post tx, pt left in 

recliner with call light in reach and all needs met.





Education


OT Patient Education:  Correct positioning, Energy conservation, Exercise 

program, Instructions to caregiver, Modified ADL techniques, Progress toward 

Goal/Update tx plan, Purpose of tx/functional activities, Rehab process, 

Transfer techniques, Use of adapted equipment


Teaching Recipient:  Patient, Significant Other


Teaching Methods:  Discussion


Response to Teaching:  Verbalize Understanding, Reinforcement Needed





OT Short Term Goals


Short Term Goals


Time Frame:  Jul 8, 2022


Shower/bathe self:  3


Lower body dressing:  3





OT Long Term Goals


Long Term Goals


Time Frame:  Jul 22, 2022


Eating (QC):  6


Oral Hygiene (QC):  6


Toileting Hygiene (QC):  6


Shower/Bathe Self (QC):  4


Upper Body Dressing (QC):  4


Lower Body Dressing (QC):  3


On/Off Footwear (QC):  3


Additional Goals:  1-Demonstrate ADL Tasks, 2-Verbalize Understanding, 3-

ImproveStrength/[place]


1=Demonstrate adherence to instructed precautions during ADL tasks.


2=Patient will verbalize/demonstrate understanding of assistive 

devices/modifications for ADL.


3=Patient will improve strength/tolerance for activity to enable patient to 

perform ADL's.





OT Education/Plan


Problem List/Assessment


Assessment:  Decreased Activ Tolerance, Decreased UE Strength, Impaired 

Coordination, Impaired Funct Balance, Impaired I ADL's, Impaired Self-Care 

Skills, Restricted Funct UE ROM





Discharge Recommendations


Plan/Recommendations:  Continue POC





Treatment Plan/Plan of Care


Patient would benefit from OT for education, treatment and training to promote 

independence in ADL's, mobility, safety and/or upper extremity function for 

ADL's.


Plan of Care:  ADL Retraining, Functional Mobility, Group Exercise/Act as Ind, 

UE Funct Exercise/Act, UE Neuromus Re-Ed/Coord, W/C Management Training


Treatment Duration:  Jul 22, 2022


Frequency:  At least 5 of 7 days/Wk (IRF)


Estimated Hrs Per Day:  1.5 hours per day


Agreement:  Yes


Rehab Potential:  Fair





Time/GCodes


Start Time:  09:00


Stop Time:  10:00


Total Time Billed (hr/min):  60


Billed Treatment Time


1, ADL 4 (60')











DHIRAJ EDWARDS OT           Jul 4, 2022 10:00

## 2022-07-04 NOTE — PM&R PROGRESS NOTE
Subjective


HPI/CC On Admission


Date Seen by Provider:  Jul 4, 2022


Time Seen by Provider:  06:00


Subjective/Events-last exam


7/4/2022:


Patient doing really well


Blood sugars are perfect


Pain is an issue








7/3/2022:


Patient doing really well


Blood sugars reviewed


Pain is improved








7/2/2022:


Pt doing pretty well


Is more comfortable sitting on the side of the bed leaned over on a pillow and 

his leg doesn't hurt as much


Blood sugars are much improved with the Amaryl 1mg and Metformin 500mg BID








7/1/2022:


Pt is doing a lot better


Loose bowel movement noted


Gabapentin 200 mg TID has given much improvement in pain


No other falls


Checked meds and labs








6/30/2022:


Pt is doing well


Dr. Kaiser thought the leg looked better today and didn't require surgery


Will take Melatonin tonight to help him sleep


Bowels moved yesterday


Sugars are elevated so will start Amaryl 1 mg to the Metformin 500 BID started 

yesterday








6/29/2022:


Pt is doing really well


Bowels moved two days ago


Dr. Parsons did treat his toenails


Dr. Duenas wants a general surgeon to debride it or the pt can go back over to 

Dr. Duenas at [place] to have that done with his leg








6/28/2022:


Patient doing a lot better


Complaining of pain but declines any pain medication


Sugar is much improved after hypoglycemia


Bowels are moving now


Wife at bedside








6/27/2022:


Patient doing a lot better


Blood sugars 164, 113


Declines to take morphine for addiction potential


Wife really helps the patient








6/26/2022:


Patient doing pretty well


Wife at bedside


Blood sugar is 99 after being very low


Suppository produced a small bowel movement so we will initiate more laxatives 

or


Holding metoprolol due to low blood pressure


Holding losartan as of yesterday due to low blood pressure


Mag citrate is being used for constipation today











6/25/2022:


Patient doing really well


Blood sugars reviewed and holding metformin and oral hypoglycemic agent due to 

hypoglycemia


Pain is controlled


Laxatives given


Checked meds and labs





Review of Systems


Musculoskeletal:  leg pain





Objective


Exam


Vital Signs





Vital Signs








  Date Time  Temp Pulse Resp B/P (MAP) Pulse Ox O2 Delivery O2 Flow Rate FiO2


 


7/4/22 09:04      Room Air  


 


7/4/22 07:56 36.6 72 14 136/57 (83) 91   





Capillary Refill :


General Appearance:  No Apparent Distress, WD/WN, Chronically ill


HEENT:  PERRL/EOMI, Normal ENT Inspection, Pharynx Normal


Neck:  Full Range of Motion, Normal Inspection, Non Tender, Supple, Carotid 

Bruit


Respiratory:  Chest Non Tender, Lungs Clear, Normal Breath Sounds, No Accessory 

Muscle Use, No Respiratory Distress


Cardiovascular:  No Edema, No Gallop, No JVD, No Murmur, Normal Peripheral 

Pulses, Irregularly Irregular


Gastrointestinal:  Normal Bowel Sounds, No Organomegaly, No Pulsatile Mass, Non 

Tender, Soft


Back:  Normal Inspection, No CVA Tenderness, No Vertebral Tenderness


Extremity:  Normal Capillary Refill, Normal Inspection, Normal Range of Motion, 

Non Tender, No Calf Tenderness, No Pedal Edema


Neurologic/Psychiatric:  Alert, Oriented x3, Normal Mood/Affect, CNs II-XII Norm

as Tested, Abnormal Gait, Motor Weakness (left sided lower extremity BKA)


Skin:  Normal Color, Warm/Dry


Lymphatic:  No Adenopathy





Results/Procedures


Lab


Patient resulted labs reviewed.





FIM


Transfers


Therapy Code Descriptions/Definitions 





Functional [place] Measure:


0=Not Assessed/NA        4=Minimal Assistance


1=Total Assistance        5=Supervision or Setup


2=Maximal Assistance  6=Modified [place]


3=Moderate Assistance 7=Complete IndependenceSCALE: Activities may be completed 

with or without assistive devices.





6-Indepedent-patient completes the activity by him/herself with no assistance 

from a helper.


5-Set-up or Clean-up Assistance-helper sets up or cleans up; patient completes 

activity. [place] assists only prior to or  


    following the activity.


4-Supervision or Touching Assistance-helper provides verbal cues and/or 

touching/steadying and/or contact guard assistance as patient completes 

activity. Assistance may be provided   


    throughout the activity or intermittently.


3-Partial/Moderate Assistance-helper does LESS THAN HALF the effort. [place] 

lifts, holds or supports trunk or limbs, but provides less than half the effort.


2-Substantial/Maximal Assistance-helper does MORE THAN HALF the effort. [place] 

lifts or holds trunk or limbs and provides more than half the effort.


0-Ulmwjrsqa-lywive does ALL the effort. Patient does none of the effort to 

complete the activity. Or, the assistance of 2 or more helpers is required for 

the patient to complete the  


    activity.


If activity was not attempted, code reason:


7-Patient Refused.


9-Not Applicable-not attempted and the patient did not perform the activity 

before the current illness, exacerbation or injury.


10-Not Attempted due to Environmental Limitations-(lack of equipment, weather 

restraints, etc.).


88-Not Attempted due to Medical Conditions or Safety Concerns.


Roll Left to Right (QC):  6


Sit to Lying (QC):  6


Sit to Stand (QC):  2


Chair/Bed-to-Chair Xfer(QC):  2


Car Transfer (QC):  3





Gait Training


Does the Patient Walk?:  No and Walking Goal IS indicated


Walk 10 feet (QC):  88


Walk 50 ft with 2 Turns(QC):  88


Walk 150 ft (QC):  88


Walking 10ft/uneven surface-QC:  88


Gait Assistive Device:  Parallel Bars





Wheelchair Training


Does the Pt Use a Wheelchair?:  Yes


Distance:  150'x2


Wheel 50 ft with 2 turns (QC):  4


Wheel 150 ft (QC):  4


Type of Wheelchair:  Manual





Stair Training


1 Step (curb) (QC):  88


4 Steps (QC):  88


12 Steps (QC):  88





Balance


Picking up an Object (QC):  88





ADL-Treatment


Eating (QC):  5 (Assistance opening some containers per pt)


Oral Hygiene (QC):  6


Shower/Bathe Self (QC):  5 (Set up at bed level per pt and wife report)


Upper Body Dressing (QC):  5 (set up at bed level per pt and wife report)


Lower Body Dressing (QC):  3 (Min A with pant hike over hips per pt and wife 

report)


On/Off Footwear (QC):  3 (Min A with velcro straps per pt and wife report. Pt 

able to put shoe on with shoe funnel.)


Toileting Hygiene (QC):  6


Toilet Transfer (QC):  3





Assessment/Plan


Assessment and Plan


Assess & Plan/Chief Complaint


Assessment:


LBKA 6/21/22


Severe PVD


PAF


OAC


HTN


HLP


DM


Hypothyroidism


Hypoglycemia holding metformin and glimepiride


Constipation





Plan:


Home meds


SSI


PT OT


Pain control


BM treatment





6/25/2022:


Hold diabetic meds due to hypoglycemia


Pain control


Bowel regimen





6/26/2022:


BM regimen


Monitor glucose





6/27/2022:


Monitor sugar


Hypoglycemia high risk





6/28/2022:


Monitor closely


Pain control 


Sugar monitoring





6/29/2022:


Consult Dr Kaiser


Restart Metformin





6/30/2022:


Appreciate Dr Kaiser


Add Amaryl





7/1/2022:


Improved sugars


Increase Gabapentin slowly





7/2/2022:


Pain is improved


Supportive care





7/3/2022:


Pain is improved 


Blood sugar improved





7/4/2022:


Blood sugars perfect





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











DIANA HENRIQUEZ DO                 Jul 4, 2022 06:02

## 2022-07-04 NOTE — THERAPY GROUP DAILY NOTE
Therapy Daily Group Note


Patient Education Topic


Home Safety, Exercises, Other List Below (Nutrition/Health)





Exercises


LE Seated Exercise, UE Exercise





Session


Ratio (pt:therapist):  4:1


Goal of Session:  Home Safety Strategies, UE/LE Strengthing, Other (list) 

(Nutrition/Health)


Goal Met for this Session:  Yes


Pt Benefit of Group:  Contributions to Others, F/U Use of Strategies @Home, 

Increased Functional Safety, Increased Functional Strength, Improved Cognition, 

Recognition of Peers, Socialization





Other/Notes


Pt participated in  Nutrition and health promotion group in [facility] of [facility]. Pt 

participated in group session, first with introductions (Name, where pt is from,

and favorite place to watch fireworks). This group treatment was better than 

individual to address socialization and recognition of peers. Pt met goals of 

group as demonstrated by answering questions related to overall health and 

nutrition, and participating in UE/LE seated exercises. Pt demonstrated the 

following abilities during group activity: good problem solving and safety 

skills, improved attention span and ability to participate in group without 

being distracted by the environment. Pt returned to his room via w/c, SPT to 

recliner. Post tx, pt in recliner, call light in reach and all needs met.


Start Time:  12:00


Stop Time:  13:00


Total Billed Treatment Time:  60


Total Billed Treatment


1, GRP











DHIRAJ EDWARDS OT           Jul 4, 2022 13:18

## 2022-07-04 NOTE — PHYSICAL THERAPY DAILY NOTE
PT Daily Note-Current


Subjective


Patient in recliner pre tx, agrees to PT, has 3/10 pain in left residual limb





Appearance


Patient in WC at bedside post tx with nurse call, phone, tray, all needs met, h

as OT right after PT





Mental Status


Patient Orientation:  Person, Place, Situation





Transfers


SCALE: Activities may be completed with or without assistive devices.





6-Indepedent-patient completes the activity by him/herself with no assistance 

from a helper.


5-Set-up or Clean-up Assistance-helper sets up or cleans up; patient completes 

activity. [place] assists only prior to or  


    following the activity.


4-Supervision or Touching Assistance-helper provides verbal cues and/or 

touching/steadying and/or contact guard assistance as patient completes 

activity. Assistance may be provided   


    throughout the activity or intermittently.


3-Partial/Moderate Assistance-helper does LESS THAN HALF the effort. [place] 

lifts, holds or supports trunk or limbs, but provides less than half the effort.


2-Substantial/Maximal Assistance-helper does MORE THAN HALF the effort. [place] 

lifts or holds trunk or limbs and provides more than half the effort.


0-Plygcesyp-cukgkr does ALL the effort. Patient does none of the effort to 

complete the activity. Or, the assistance of 2 or more helpers is required for 

the patient to complete the  


    activity.


If activity was not attempted, code reason:


7-Patient Refused.


9-Not Applicable-not attempted and the patient did not perform the activity 

before the current illness, exacerbation or injury.


10-Not Attempted due to Environmental Limitations-(lack of equipment, weather 

restraints, etc.).


88-Not Attempted due to Medical Conditions or Safety Concerns.


Roll Left & Right (QC):  4


Sit to Lying (QC):  4


Lying to Sitting/Side of Bed(Q:  3


Sit to Stand (QC):  3


Chair/Bed-to-Chair Xfer(QC):  3


Patient needed min assist for supine to sit but SBA for sit to supine, he could 

not stand from the recliner but could from the parallel bars with mod assist 

(stood 3 times for about 1-2 min each time), from the recliner to the  patient

required max assist to stand pivot but from the therapy table he was able to 

perform a sliding transfer (without the use of a board) with min assist





Wheelchair Training


Does the Pt Use a Wheelchair?:  Yes


Wheel 50 ft with 2 turns (QC):  4


Wheel 150 ft (QC):  4


Type of Wheelchair:  Manual


300', 150'





Exercises


prone hip stretch 5 min, supine knee extension stretch 5 min, alternating LAQ 

for 5 min





Treatments


bed mobility and transfers, standing, WC mobility, stretching, ROM





Assessment


Current Status:  Poor Progress


Patient declined in sit to stand, right leg seems weaker.  Patient has poor pain

tolerance and that prevents him from fully extending his left knee.  Patient has

bad shoulders and a manual WC wouldn't be good for him, he would do better with 

a power WC or scooter.





PT Short Term Goals


Short Term Goals


Time Frame:  Jul 1, 2022


Roll Left & Right:  6


Sit to lying:  3 (Kacie)


Lying to sitting on side of be:  3 (Kacie)


Sit to stand:  3 (Kacie)


Chair/bed-to-chair transfer:  3


Walk 10 feet:  3 (Kacie)





PT Long Term Goals


Long Term Goals


PT Long Term Goals Time Frame:  Jul 15, 2022


Roll Left & Right (QC):  6


Sit to Lying (QC):  4 (CGA)


Lying-Sitting on Side/Bed(QC):  4 (CGA)


Sit to Stand (QC):  4 (CGA)


Chair/Bed-to-Chair Xfer(QC):  4 (CGA)


Toilet Transfer (QC):  4 (CGA)


Car Transfer (QC):  4 (CGA)


Does the Patient Walk:  Yes


Walk 10 feet (QC):  4 (CGA)


Walk 50ft with 2 Turns (QC):  88


Walk 150 ft (QC):  88


Walking 10ft on Uneven Surface:  4 (CGA)


1 Step (curb) (QC):  88


4 Steps (QC):  88


12 Steps (QC):  88


Picking up an Object (QC):  4 (CGA)


Wheel 50 feet with 2 turns (QC:  6


Wheel 150 feet:  6





PT Plan


Problem List


Problem List:  Activity Tolerance, Functional Strength, Safety, Balance, Gait, 

Transfer, Bed Mobility, ROM





Treatment/Plan


Treatment Plan:  Continue Plan of Care


Treatment Plan:  Bed Mobility, Education, Functional Activity [place], Functional 

Strength, Group Therapy, Gait, Safety, Therapeutic Exercise, Transfers


Treatment Duration:  Jul 15, 2022


Frequency:  At least 5 of 7 days/Wk (IRF)


Estimated Hrs Per Day:  1.5 hours per day


Patient and/or Family Agrees t:  Yes





Safety Risks/Education


Patient Education:  Transfer Techniques, Correct Positioning, W/C Management, 

Safety Issues


Teaching Recipient:  Patient


Teaching Methods:  Demonstration, Discussion


Response to Teaching:  Reinforcement Needed





Time/GCodes


Time In:  0800


Time Out:  0900


Total Billed Treatment Time:  60


Total Billed Treatment


1 visit


EX 30'


FA 30'











KIRAN RANGEL PT                 Jul 4, 2022 08:55

## 2022-07-05 VITALS — DIASTOLIC BLOOD PRESSURE: 57 MMHG | SYSTOLIC BLOOD PRESSURE: 131 MMHG

## 2022-07-05 VITALS — SYSTOLIC BLOOD PRESSURE: 116 MMHG | DIASTOLIC BLOOD PRESSURE: 81 MMHG

## 2022-07-05 LAB
ALBUMIN SERPL-MCNC: 3.6 GM/DL (ref 3.2–4.5)
ALP SERPL-CCNC: 59 U/L (ref 40–136)
ALT SERPL-CCNC: 17 U/L (ref 0–55)
BASOPHILS # BLD AUTO: 0.1 10^3/UL (ref 0–0.1)
BASOPHILS NFR BLD AUTO: 0 % (ref 0–10)
BILIRUB SERPL-MCNC: 0.5 MG/DL (ref 0.1–1)
BUN/CREAT SERPL: 32
CALCIUM SERPL-MCNC: 9.7 MG/DL (ref 8.5–10.1)
CHLORIDE SERPL-SCNC: 97 MMOL/L (ref 98–107)
CO2 SERPL-SCNC: 23 MMOL/L (ref 21–32)
CREAT SERPL-MCNC: 0.92 MG/DL (ref 0.6–1.3)
EOSINOPHIL # BLD AUTO: 0.2 10^3/UL (ref 0–0.3)
EOSINOPHIL NFR BLD AUTO: 1 % (ref 0–10)
GFR SERPLBLD BASED ON 1.73 SQ M-ARVRAT: 89 ML/MIN
GLUCOSE SERPL-MCNC: 100 MG/DL (ref 70–105)
HCT VFR BLD CALC: 35 % (ref 40–54)
HGB BLD-MCNC: 10.4 G/DL (ref 13.3–17.7)
LYMPHOCYTES # BLD AUTO: 1.4 10^3/UL (ref 1–4)
LYMPHOCYTES NFR BLD AUTO: 13 % (ref 12–44)
MANUAL DIFFERENTIAL PERFORMED BLD QL: NO
MCH RBC QN AUTO: 25 PG (ref 25–34)
MCHC RBC AUTO-ENTMCNC: 30 G/DL (ref 32–36)
MCV RBC AUTO: 83 FL (ref 80–99)
MONOCYTES # BLD AUTO: 1.1 10^3/UL (ref 0–1)
MONOCYTES NFR BLD AUTO: 10 % (ref 0–12)
NEUTROPHILS # BLD AUTO: 8.4 10^3/UL (ref 1.8–7.8)
NEUTROPHILS NFR BLD AUTO: 75 % (ref 42–75)
PLATELET # BLD: 327 10^3/UL (ref 130–400)
PMV BLD AUTO: 11.7 FL (ref 9–12.2)
POTASSIUM SERPL-SCNC: 3.7 MMOL/L (ref 3.6–5)
PROT SERPL-MCNC: 7.3 GM/DL (ref 6.4–8.2)
SODIUM SERPL-SCNC: 134 MMOL/L (ref 135–145)
WBC # BLD AUTO: 11.2 10^3/UL (ref 4.3–11)

## 2022-07-05 RX ADMIN — APIXABAN SCH MG: 5 TABLET, FILM COATED ORAL at 07:35

## 2022-07-05 RX ADMIN — COLLAGENASE SANTYL SCH GM: 250 OINTMENT TOPICAL at 18:46

## 2022-07-05 RX ADMIN — AMIODARONE HYDROCHLORIDE SCH MG: 200 TABLET ORAL at 07:36

## 2022-07-05 RX ADMIN — POLYETHYLENE GLYCOL (3350) SCH GM: 17 POWDER, FOR SOLUTION ORAL at 21:08

## 2022-07-05 RX ADMIN — INSULIN ASPART SCH UNIT: 100 INJECTION, SOLUTION INTRAVENOUS; SUBCUTANEOUS at 15:43

## 2022-07-05 RX ADMIN — GABAPENTIN SCH MG: 100 CAPSULE ORAL at 13:00

## 2022-07-05 RX ADMIN — INSULIN ASPART SCH UNIT: 100 INJECTION, SOLUTION INTRAVENOUS; SUBCUTANEOUS at 05:58

## 2022-07-05 RX ADMIN — GLIMEPIRIDE SCH MG: 1 TABLET ORAL at 06:01

## 2022-07-05 RX ADMIN — METFORMIN HYDROCHLORIDE SCH MG: 500 TABLET, FILM COATED ORAL at 06:01

## 2022-07-05 RX ADMIN — POLYETHYLENE GLYCOL (3350) SCH GM: 17 POWDER, FOR SOLUTION ORAL at 08:28

## 2022-07-05 RX ADMIN — DOCUSATE SODIUM AND SENNOSIDES SCH EA: 8.6; 5 TABLET, FILM COATED ORAL at 21:08

## 2022-07-05 RX ADMIN — INSULIN ASPART SCH UNIT: 100 INJECTION, SOLUTION INTRAVENOUS; SUBCUTANEOUS at 12:57

## 2022-07-05 RX ADMIN — PANTOPRAZOLE SODIUM SCH MG: 40 TABLET, DELAYED RELEASE ORAL at 07:36

## 2022-07-05 RX ADMIN — DOCUSATE SODIUM AND SENNOSIDES SCH EA: 8.6; 5 TABLET, FILM COATED ORAL at 07:35

## 2022-07-05 RX ADMIN — GABAPENTIN SCH MG: 100 CAPSULE ORAL at 07:35

## 2022-07-05 RX ADMIN — DOCUSATE SODIUM SCH MG: 100 CAPSULE ORAL at 07:36

## 2022-07-05 RX ADMIN — LEVOTHYROXINE SODIUM SCH MCG: 100 TABLET ORAL at 06:01

## 2022-07-05 RX ADMIN — INSULIN ASPART SCH UNIT: 100 INJECTION, SOLUTION INTRAVENOUS; SUBCUTANEOUS at 21:07

## 2022-07-05 RX ADMIN — Medication SCH MCG: at 07:35

## 2022-07-05 RX ADMIN — APIXABAN SCH MG: 5 TABLET, FILM COATED ORAL at 21:07

## 2022-07-05 RX ADMIN — FUROSEMIDE SCH MG: 40 TABLET ORAL at 07:36

## 2022-07-05 RX ADMIN — POTASSIUM CHLORIDE SCH MEQ: 1500 TABLET, EXTENDED RELEASE ORAL at 07:35

## 2022-07-05 RX ADMIN — METFORMIN HYDROCHLORIDE SCH MG: 500 TABLET, FILM COATED ORAL at 16:14

## 2022-07-05 RX ADMIN — CLOPIDOGREL BISULFATE SCH MG: 75 TABLET, FILM COATED ORAL at 07:36

## 2022-07-05 RX ADMIN — DOCUSATE SODIUM SCH MG: 100 CAPSULE ORAL at 21:13

## 2022-07-05 RX ADMIN — GABAPENTIN SCH MG: 100 CAPSULE ORAL at 21:07

## 2022-07-05 NOTE — PROGRESS NOTE - SURGERY
MAUREEN WALLACE 7/5/22 0759:


Subjective


Date Seen by a Provider:  Jul 5, 2022


Time Seen by a Provider:  07:15


Subjective/Events-last exam


Mr. Rico is being followed for wound care of his left leg stump s/p BKA. This

morning he reports his pain is under control and he only has minimal pain with 

dressing changes. He thinks dressing changes are done daily but he is not 

positive. Tolerating diet well. His only complaint this morning is having a hard

time sleeping. He endorses vivid dreams and hallucinations that are preventing 

him from sleeping. He thinks it started about 4 days ago about he was started on

a medicine; he is not sure which medicine.


Review of Systems


General:  No Chills, No Fatigue


HEENT:  No Head Aches, No Visual Changes


Pulmonary:  No Dyspnea, No Cough


Cardiovascular:  No: Chest Pain, Palpitations


Gastrointestinal:  No: Nausea, Vomiting, Abdominal Pain


Musculoskeletal:  leg pain (Minor with pressure, left)


Neurological:  No: Weakness, Confusion





Objective


Exam





Vital Signs








  Date Time  Temp Pulse Resp B/P (MAP) Pulse Ox O2 Delivery O2 Flow Rate FiO2


 


7/5/22 07:19 36.3 74 16 116/81 (93) 95 Room Air  


 


7/4/22 21:12      Room Air  


 


7/4/22 19:57 36.4 75 18 143/62 (89) 94 Room Air  


 


7/4/22 09:04      Room Air  


 


7/4/22 07:56 36.6 72 14 136/57 (83) 91 Room Air  





Capillary Refill :


General Appearance:  No Apparent Distress, WD/WN


HEENT:  PERRL/EOMI; No Scleral Icterus (L), No Scleral Icterus (R)


Neck:  Non Tender, Supple


Respiratory:  Chest Non Tender, Lungs Clear, Normal Breath Sounds, No Accessory 

Muscle Use, No Respiratory Distress


Cardiovascular:  Regular Rate, Rhythm (PAF), No Edema, Normal Peripheral Pulses


Peripheral Pulses:  2+ Radial Pulses (R), 2+ Radial Pulses (L)


Gastrointestinal:  non tender, soft


Extremity:  No Pedal Edema, Other (Left BKA with sock and dressing in place. 

Minor dried blood present on dressing and sock.)


Neurologic/Psychiatric:  Alert, Oriented x3, Normal Mood/Affect, CNs II-XII Norm

as Tested


Skin:  Normal Color, Warm/Dry





Results


Lab


Laboratory Tests


7/4/22 11:05: Glucometer 194H


7/4/22 15:37: Glucometer 125H


7/4/22 20:10: Glucometer 129H


7/5/22 05:07: 


White Blood Count 11.2H, Red Blood Count 4.19L, Hemoglobin 10.4L, Hematocrit 35L

, Mean Corpuscular Volume 83, Mean Corpuscular Hemoglobin 25, Mean Corpuscular 

Hemoglobin Concent 30L, Red Cell Distribution Width 19.1H, Platelet Count 327, 

Mean Platelet Volume 11.7, Immature Granulocyte % (Auto) 0, Neutrophils (%) 

(Auto) 75, Lymphocytes (%) (Auto) 13, Monocytes (%) (Auto) 10, Eosinophils (%) 

(Auto) 1, Basophils (%) (Auto) 0, Neutrophils # (Auto) 8.4H, Lymphocytes # 

(Auto) 1.4, Monocytes # (Auto) 1.1H, Eosinophils # (Auto) 0.2, Basophils # 

(Auto) 0.1, Immature Granulocyte # (Auto) 0.0, Sodium Level 134L, Potassium 

Level 3.7, Chloride Level 97L, Carbon Dioxide Level 23, Anion Gap 14, Blood Urea

Nitrogen 29H, Creatinine 0.92, Estimat Glomerular Filtration Rate 89, 

BUN/Creatinine Ratio 32, Glucose Level 100, Calcium Level 9.7, Corrected Calcium

10.0, Total Bilirubin 0.5, Aspartate Amino Transf (AST/SGOT) 21, Alanine 

Aminotransferase (ALT/SGPT) 17, Alkaline Phosphatase 59, Total Protein 7.3, 

Albumin 3.6





Assessment/Plan


Assessment:





s/p Left BKA


Peripheral Vascular Disease


Ischemia of Left limb


Elevated BUN


- BUN/Cr > 20:1





Plan:





Consider d/c melatonin d/t vivid dreams


Encourage adequate fluid intake


Pain control


Dressing changes daily.


Currently possibility of ischemia that is superficial, and will heal from inside

out.  Patient does understand potential for aka.





ANTOINE KAISER DO 7/5/22 1203:


Subjective


Time Seen by a Provider:  11:36


Subjective/Events-last exam


Pt seen and examined, states pain is under control but does have some when they 

change bandage.


Review of Systems


General:  No Chills


HEENT:  No Head Aches, No Visual Changes


Pulmonary:  No Dyspnea, No Cough


Cardiovascular:  No: Chest Pain, Palpitations


Gastrointestinal:  No: Nausea, Vomiting, Abdominal Pain


Musculoskeletal:  leg pain (Minor with pressure, left)





Objective


Exam


General Appearance:  No Apparent Distress, WD/WN


HEENT:  PERRL/EOMI


Respiratory:  Chest Non Tender, Lungs Clear, Normal Breath Sounds, No Accessory 

Muscle Use, No Respiratory Distress


Cardiovascular:  Regular Rate, Rhythm (PAF), Normal Peripheral Pulses


Gastrointestinal:  non tender, soft


Extremity:  Other (Left BKA leg is looking worse, starting to demarcate and you 

can see on the skin where the compression brace caused ischemia, there is some 

dead skin sloughing off, areas of erythem and serous drainage.  Does not appear 

to be infected, but the area around the staples looks worse than it did when I 

saw it Friday.)





Assessment/Plan


s/p Left BKA


Peripheral Vascular Disease


Ischemia of Left limb


Elevated BUN


- BUN/Cr > 20:1





Plan:





Consider d/c melatonin d/t vivid dreams


Encourage adequate fluid intake


Pain control


Dressing changes daily.


Currently possibility of ischemia that is superficial, and will heal from inside

out.  Patient does understand potential for AKA if it does not start healing or 

if it gets infected





Supervisory-Addendum Brief


Verification & Attestation


Participated in pt care:  history, MDM, physical


Personally performed:  exam, history, MDM, supervision of care


Care discussed with:  Medical Student


Procedures:  n/a


Verification and Attestation of Medical Student E/M Service





A medical student performed and documented this service. I then reviewed and 

verified all information documented by the medical student and made 

modifications to such information, when appropriate. I personally performed a 

physical exam, medical decision making and then discussed any differences 

between the notes and made revisions as necessary to create one note.





Antoine Kaiser , 7/5/22 , 12:04











MAUREEN WALLACE                  Jul 5, 2022 07:59


ANTOINE KAISER DO                Jul 5, 2022 12:03

## 2022-07-05 NOTE — OCCUPATIONAL THER DAILY NOTE
OT Current Status-Daily Note


Subjective


Pt on toilet upon OT arrival, agreeable for OT to take over for nursing and 

participate in therapy.





Mental Status/Objective


Patient Orientation:  Person, Place, Situation





ADL-Treatment


Therapy Code Descriptions/Definitions 





Functional [place] Measure:


0=Not Assessed/NA        4=Minimal Assistance


1=Total Assistance        5=Supervision or Setup


2=Maximal Assistance  6=Modified [place]


3=Moderate Assistance 7=Complete IndependenceSCALE: Activities may be completed 

with or without assistive devices.





6-Indepedent-patient completes the activity by him/herself with no assistance 

from a helper.


5-Set-up or Clean-up Assistance-helper sets up or cleans up; patient completes 

activity. [place] assists only prior to or  


    following the activity.


4-Supervision or Touching Assistance-helper provides verbal cues and/or 

touching/steadying and/or contact guard assistance as patient completes 

activity. Assistance may be provided   


    throughout the activity or intermittently.


3-Partial/Moderate Assistance-helper does LESS THAN HALF the effort. [place] 

lifts, holds or supports trunk or limbs, but provides less than half the effort.


2-Substantial/Maximal Assistance-helper does MORE THAN HALF the effort. [place] 

lifts or holds trunk or limbs and provides more than half the effort.


4-Tekasdsrg-cmwbjq does ALL the effort. Patient does none of the effort to 

complete the activity. Or, the assistance of 2 or more helpers is required for 

the patient to complete the  


    activity.


If activity was not attempted, code reason:


7-Patient Refused.


9-Not Applicable-not attempted and the patient did not perform the activity 

before the current illness, exacerbation or injury.


10-Not Attempted due to Environmental Limitations-(lack of equipment, weather 

restraints, etc.).


88-Not Attempted due to Medical Conditions or Safety Concerns.


Toileting Hygiene (QC):  1 (Assist x2 to hike pants up )





Other Treatment


Pt transferred from toilet to w/c, mod A assist x2. Pt pushed to therapy gym to 

participate in functional activities and exercises focused on strengthening BUE 

and increasing shoulder ROM in order to increase IND in ADLs. Pt stood at 

parallel bars, mod-max assistx2, and touched shoulder with alternating hands, 

x20. Pt unable to lift hands off bars on the second round d/t poor positioning, 

but he was able to complete ~10 touches on the third round before needing a RB. 

OT provided continued education on importance of extending R leg to stand tall 

to increase stability and enable BUE function while standing, pt verbalized 

understanding but needed reinforcement throughout standing activities. While 

sitting in w/c, he pushed dowel lien forwards across top of parallel bars to inc

rease shoulder flexion, x10. A yellow theraband (low resistance) was held in 

place on the dowel lien and pt completed ~15 modified rows followed by ~15 chest 

presses, RBs in between. Pt pushed back to room and requested to remain in w/c. 

Post tx, pt left in w/c with call light in reach and all needs met.





Education


OT Patient Education:  Correct positioning, Energy conservation, Exercise 

program, Modified ADL techniques, Progress toward Goal/Update tx plan, Purpose 

of tx/functional activities, Rehab process, Safety issues, Transfer techniques, 

Use of adapted equipment


Teaching Recipient:  Patient


Teaching Methods:  Demonstration, Discussion


Response to Teaching:  Verbalize Understanding, Return Demonstration, 

Reinforcement Needed





OT Short Term Goals


Short Term Goals


Time Frame:  Jul 8, 2022


Shower/bathe self:  3


Lower body dressing:  3





OT Long Term Goals


Long Term Goals


Time Frame:  Jul 22, 2022


Eating (QC):  6 (not met)


Oral Hygiene (QC):  6 (not met)


Toileting Hygiene (QC):  6 (not met)


Shower/Bathe Self (QC):  4 (met)


Upper Body Dressing (QC):  4 (not met)


Lower Body Dressing (QC):  3 (not met)


On/Off Footwear (QC):  3 (nt met)


Additional Goals:  1-Demonstrate ADL Tasks, 2-Verbalize Understanding, 3-

ImproveStrength/[place]


1=Demonstrate adherence to instructed precautions during ADL tasks.


2=Patient will verbalize/demonstrate understanding of assistive 

devices/modifications for ADL.


3=Patient will improve strength/tolerance for activity to enable patient to 

perform ADL's.





OT Education/Plan


Problem List/Assessment


Assessment:  Decreased Activ Tolerance, Decreased UE Strength, Impaired Funct 

Balance, Impaired I ADL's, Impaired Self-Care Skills, Restricted Funct UE ROM





Discharge Recommendations


Plan/Recommendations:  Continue POC





Treatment Plan/Plan of Care


Patient would benefit from OT for education, treatment and training to promote 

independence in ADL's, mobility, safety and/or upper extremity function for 

ADL's.


Plan of Care:  ADL Retraining, Functional Mobility, Group Exercise/Act as Ind, 

UE Funct Exercise/Act, UE Neuromus Re-Ed/Coord, W/C Management Training


Treatment Duration:  Jul 22, 2022


Frequency:  At least 5 of 7 days/Wk (IRF)


Estimated Hrs Per Day:  1.5 hours per day


Agreement:  Yes


Rehab Potential:  Fair





Time/GCodes


Start Time:  13:30


Stop Time:  14:00


Total Time Billed (hr/min):  30


Billed Treatment Time


1, Ex (15'), FA (15')











DHIRAJ EDWARDS OT           Jul 5, 2022 14:09

## 2022-07-05 NOTE — PHYSICAL THERAPY DAILY NOTE
PT Daily Note-Current


Subjective


Patient in recliner pre tx, agrees to PT, voices no complaints of pain





Appearance


Patient in WC at bedside post tx, has OT right after PT, has nurse call and 

tray.





Mental Status


Patient Orientation:  Normal For Age





Transfers


SCALE: Activities may be completed with or without assistive devices.





6-Indepedent-patient completes the activity by him/herself with no assistance 

from a helper.


5-Set-up or Clean-up Assistance-helper sets up or cleans up; patient completes 

activity. [place] assists only prior to or  


    following the activity.


4-Supervision or Touching Assistance-helper provides verbal cues and/or to

uching/steadying and/or contact guard assistance as patient completes activity. 

Assistance may be provided   


    throughout the activity or intermittently.


3-Partial/Moderate Assistance-helper does LESS THAN HALF the effort. [place] 

lifts, holds or supports trunk or limbs, but provides less than half the effort.


2-Substantial/Maximal Assistance-helper does MORE THAN HALF the effort. [place] 

lifts or holds trunk or limbs and provides more than half the effort.


4-Xpeauqwvr-wxpcnx does ALL the effort. Patient does none of the effort to 

complete the activity. Or, the assistance of 2 or more helpers is required for 

the patient to complete the  


    activity.


If activity was not attempted, code reason:


7-Patient Refused.


9-Not Applicable-not attempted and the patient did not perform the activity 

before the current illness, exacerbation or injury.


10-Not Attempted due to Environmental Limitations-(lack of equipment, weather 

restraints, etc.).


88-Not Attempted due to Medical Conditions or Safety Concerns.


Sit to Stand (QC):  3


Chair/Bed-to-Chair Xfer(QC):  3


During therapy patient had to go back to his room and use his urinal, did not 

need assist with this.





Wheelchair Training


Does the Pt Use a Wheelchair?:  Yes


Wheel 50 ft with 2 turns (QC):  4


Wheel 150 ft (QC):  4


Type of Wheelchair:  Manual


300', SBA, very slow, needs occasional cues for direction





Exercises


Standing:  Sit to Stand (stood only for about 30 sec each time)


Standing Reps:  2


NuStep Minutes:  15


 NuStep Workload:  4





Treatments


transfers, standing, functional strengthening, WC mobility





Assessment


Current Status:  Poor Progress


patient seems to be declining a little in RLE strength, patient states his right

foot if numb





PT Short Term Goals


Short Term Goals


Time Frame:  Jul 1, 2022


Roll Left & Right:  6


Sit to lying:  3 (Kacie)


Lying to sitting on side of be:  3 (Kacie)


Sit to stand:  3 (Kacie)


Chair/bed-to-chair transfer:  3


Walk 10 feet:  3 (Kacie)





PT Long Term Goals


Long Term Goals


PT Long Term Goals Time Frame:  Jul 15, 2022


Roll Left & Right (QC):  6


Sit to Lying (QC):  4 (CGA)


Lying-Sitting on Side/Bed(QC):  4 (CGA)


Sit to Stand (QC):  4 (CGA)


Chair/Bed-to-Chair Xfer(QC):  4 (CGA)


Toilet Transfer (QC):  4 (CGA)


Car Transfer (QC):  4 (CGA)


Does the Patient Walk:  Yes


Walk 10 feet (QC):  4 (CGA)


Walk 50ft with 2 Turns (QC):  88


Walk 150 ft (QC):  88


Walking 10ft on Uneven Surface:  4 (CGA)


1 Step (curb) (QC):  88


4 Steps (QC):  88


12 Steps (QC):  88


Picking up an Object (QC):  4 (CGA)


Wheel 50 feet with 2 turns (QC:  6


Wheel 150 feet:  6





PT Plan


Problem List


Problem List:  Activity Tolerance, Functional Strength, Safety, Balance, 

Transfer, Bed Mobility, ROM





Treatment/Plan


Treatment Plan:  Continue Plan of Care


Treatment Plan:  Bed Mobility, Education, Functional Activity [place], Functional 

Strength, Group Therapy, Gait, Safety, Therapeutic Exercise, Transfers


Treatment Duration:  Jul 15, 2022


Frequency:  At least 5 of 7 days/Wk (IRF)


Estimated Hrs Per Day:  1.5 hours per day


Patient and/or Family Agrees t:  Yes





Safety Risks/Education


Patient Education:  Transfer Techniques, Correct Positioning, W/C Management, 

Safety Issues


Teaching Recipient:  Patient


Teaching Methods:  Demonstration, Discussion


Response to Teaching:  Reinforcement Needed





Time/GCodes


Time In:  0800


Time Out:  0900


Total Billed Treatment Time:  60


Total Billed Treatment


1 visit


EX 15'


FA 45'











KIRAN RANGEL PT                 Jul 5, 2022 08:54

## 2022-07-05 NOTE — PHYSICAL THERAPY DAILY NOTE
PT Daily Note-Current


Subjective


Patient sitting in chair upon PT arrival, agreeable to treatment.





Transfers


SCALE: Activities may be completed with or without assistive devices.





6-Indepedent-patient completes the activity by him/herself with no assistance 

from a helper.


5-Set-up or Clean-up Assistance-helper sets up or cleans up; patient completes 

activity. [place] assists only prior to or  


    following the activity.


4-Supervision or Touching Assistance-helper provides verbal cues and/or 

touching/steadying and/or contact guard assistance as patient completes activi

ty. Assistance may be provided   


    throughout the activity or intermittently.


3-Partial/Moderate Assistance-helper does LESS THAN HALF the effort. [place] 

lifts, holds or supports trunk or limbs, but provides less than half the effort.


2-Substantial/Maximal Assistance-helper does MORE THAN HALF the effort. [place] 

lifts or holds trunk or limbs and provides more than half the effort.


9-Sallqwynt-hwxwat does ALL the effort. Patient does none of the effort to 

complete the activity. Or, the assistance of 2 or more helpers is required for 

the patient to complete the  


    activity.


If activity was not attempted, code reason:


7-Patient Refused.


9-Not Applicable-not attempted and the patient did not perform the activity 

before the current illness, exacerbation or injury.


10-Not Attempted due to Environmental Limitations-(lack of equipment, weather 

restraints, etc.).


88-Not Attempted due to Medical Conditions or Safety Concerns.


Roll Left & Right (QC):  6


Sit to Lying (QC):  6


Lying to Sitting/Side of Bed(Q:  4


Sit to Stand (QC):  3


Chair/Bed-to-Chair Xfer(QC):  2


Toilet Transfer (QC):  3


Car Transfer (QC):  3





Gait Training


Does the Patient Walk?:  No and Walking Goal IS indicated


Walk 10 feet (QC):  88


Walk 50 ft with 2 Turns(QC):  88


Walk 150 ft (QC):  88


Walking 10ft/uneven surface-QC:  88





Wheelchair Training


Does the Pt Use a Wheelchair?:  Yes


Wheel 50 ft with 2 turns (QC):  6


Wheel 150 ft (QC):  4


Type of Wheelchair:  Manual





Stair Training


1 Step (curb) (QC):  88


4 Steps (QC):  88


12 Steps (QC):  88





Balance


Picking up an Object (QC):  88





Assessment


Current Status:  Fair Progress


Patient tolerated treatment fair.  Demonstrates mod/max A for all transfers.  

Has difficulty achieving TKE with right LE.  Patient lets go of FWW and attempts

to reach for the bed rail while right Knee flexing.  Patient requires max A for 

chair to bed to avoid falling.  Patient propels w/c 350 feet with SBA and verbal

cues for objects and safety.  Patient in w/c post treatment with all needs met, 

nursing notified, call light in reach.





PT Short Term Goals


Short Term Goals


Time Frame:  Jul 1, 2022


Roll Left & Right:  6


Sit to lying:  3 (Kacie)


Lying to sitting on side of be:  3 (Kacie)


Sit to stand:  3 (Kacie)


Chair/bed-to-chair transfer:  3


Walk 10 feet:  3 (Kacie)





PT Long Term Goals


Long Term Goals


PT Long Term Goals Time Frame:  Jul 15, 2022


Roll Left & Right (QC):  6


Sit to Lying (QC):  4 (CGA)


Lying-Sitting on Side/Bed(QC):  4 (CGA)


Sit to Stand (QC):  4 (CGA)


Chair/Bed-to-Chair Xfer(QC):  4 (CGA)


Toilet Transfer (QC):  4 (CGA)


Car Transfer (QC):  4 (CGA)


Does the Patient Walk:  Yes


Walk 10 feet (QC):  4 (CGA)


Walk 50ft with 2 Turns (QC):  88


Walk 150 ft (QC):  88


Walking 10ft on Uneven Surface:  4 (CGA)


1 Step (curb) (QC):  88


4 Steps (QC):  88


12 Steps (QC):  88


Picking up an Object (QC):  4 (CGA)


Wheel 50 feet with 2 turns (QC:  6


Wheel 150 feet:  6





PT Plan


Treatment/Plan


Treatment Plan:  Continue Plan of Care


Treatment Plan:  Bed Mobility, Education, Functional Activity [place], Functional 

Strength, Group Therapy, Gait, Safety, Therapeutic Exercise, Transfers


Treatment Duration:  Jul 15, 2022


Frequency:  At least 5 of 7 days/Wk (IRF)


Estimated Hrs Per Day:  1.5 hours per day


Patient and/or Family Agrees t:  Yes





Safety Risks/Education


Patient Education:  Transfer Techniques


Teaching Recipient:  Patient


Teaching Methods:  Demonstration, Discussion


Response to Teaching:  Verbalize Understanding, Return Demonstration





Time/GCodes


Time In:  1430


Time Out:  1500


Total Billed Treatment Time:  30


Total Billed Treatment


Visit, MELIZA W/C











NESTOR FOFANA PT                 Jul 5, 2022 15:36

## 2022-07-05 NOTE — WOUND CARE ASSESSMENT
Wound Care Assessment


Date Seen by Provider:  Jul 5, 2022


Time Seen by Provider:  15:30


Chief Complaint


L. BKA incision


R. heel ulcer


R. great toe stump ulcer


HPI


This pleasant 71 year old gentleman with h/o severe PAD was admitted to 

inpatient rehab following BKA. Per patient and wife, surgery on 6-21-22. He had 

a long h/o PAD with intervention/angioplasty per Dr. Hickey in recent past. 

His wound healing will be complicated by PAD, DM2, obesity, CAD and anemia 

(post-operative). Unstageable PU to right heel evaluated today. This is unch

anged from last week. There is stable eschar present. Continue current plan for 

dressings and offloading. He had arterial intervention on this side as well. Per

patient, ichemic R. great toe that "fell off" on its own in recent past. Stump 

site with callous and slough. No obvious bone exposed today on exam but this was

limited at bedside. This will require wound care following discharge. He does 

have a history with wound care at [facility]. He prefers not to return to that 

facility. Will arrange for follow up with our clinic.


Past Medical History:  Admits Diabetes Type II, Admits Heart Disease, Admits 

Peripheral Artery Disease


PVD, atrial fibrillation


Smoking Status:  Never a Smoker


Alcohol Use:  Denies Use


Exam





Vital Signs








  Date Time  Temp Pulse Resp B/P (MAP) Pulse Ox O2 Delivery O2 Flow Rate FiO2


 


7/5/22 08:39      Room Air  


 


7/5/22 07:19 36.3 74 16 116/81 (93) 95   





Capillary Refill :


General Appearance:  obese


HEENT:  other (hearing normal)


Neck:  full range of motion


Respiratory:  no respiratory distress, no accessory muscle use


Extremities:  other (Left BKA with concern for flap failure)


Neurologic/Psychiatric:  alert, normal mood/affect, oriented x 3


Skin:  cool (L. BKA stump), pallor (left BKA site)


Wound assessment: 





1. R. great toe stump site 2x2x0.2cm. The epithelialization is small. There is 

no tunneling or undermining. Drainage is small and serous. Granulation is small 

and pink, Necrotic is large and slough/eschar. Margins are epibole. 


2. R. heel: 0.5x1.5x0.1 cm. The epithelialization is none. There is no tunneling

or undermining. Drainage is none. Granulation is none. Necrotic is large and 

eschar. The margins are flat. 


3. Stump site not examined today





Results


Laboratory Tests


7/4/22 20:10: Glucometer 129H


7/5/22 05:07: 


White Blood Count 11.2H, Red Blood Count 4.19L, Hemoglobin 10.4L, Hematocrit 35L

, Mean Corpuscular Volume 83, Mean Corpuscular Hemoglobin 25, Mean Corpuscular 

Hemoglobin Concent 30L, Red Cell Distribution Width 19.1H, Platelet Count 327, 

Mean Platelet Volume 11.7, Immature Granulocyte % (Auto) 0, Neutrophils (%) 

(Auto) 75, Lymphocytes (%) (Auto) 13, Monocytes (%) (Auto) 10, Eosinophils (%) 

(Auto) 1, Basophils (%) (Auto) 0, Neutrophils # (Auto) 8.4H, Lymphocytes # 

(Auto) 1.4, Monocytes # (Auto) 1.1H, Eosinophils # (Auto) 0.2, Basophils # 

(Auto) 0.1, Immature Granulocyte # (Auto) 0.0, Sodium Level 134L, Potassium 

Level 3.7, Chloride Level 97L, Carbon Dioxide Level 23, Anion Gap 14, Blood Urea

Nitrogen 29H, Creatinine 0.92, Estimat Glomerular Filtration Rate 89, 

BUN/Creatinine Ratio 32, Glucose Level 100, Calcium Level 9.7, Corrected Calcium

10.0, Total Bilirubin 0.5, Aspartate Amino Transf (AST/SGOT) 21, Alanine 

Aminotransferase (ALT/SGPT) 17, Alkaline Phosphatase 59, Total Protein 7.3, 

Albumin 3.6


7/5/22 10:59: Glucometer 157H


7/5/22 15:27: Glucometer 171H





Assessment/Plan/Dx


Assessment:





1. L. BKA incision


2. PAD s/p angioplasty


3. DM2 with hyperglycemia


4. Anemia (post-operative)


5. Lymphedema


6. Unstageable pressure ulcer R. heel with stable eschar


7. Non-pressure ulcer R. 1st toe stump site





Plan:


1. Cleanse daily with saline or wound cleanser. Apply xeroform to incision and 

blister. Cover with kerlix and stump sock. Change daily. 


2. Monitor closely due to concern for flap failure. Clarify date of staple 

removal and follow up from surgeon prior to d/c home


3. Good glycemic control per primary team


4. Per primary team


5. Stump sock. I would avoid tightly compressive braces to stump at this time. 

Cool to touch and at risk for flap failure. 


6. Paint liberally daily with betadine. Cover with BFD and use Primo to further 

off load in bed. Would advise surgical shoe for off loading upon d/c (when estab

lished in outpatient wound care clinic)


7. Cleanse daily with wound cleanser or saline. Apply thick layer of santyl to 

wound bed. Cover with gauze and tape. Change daily. 


8. Outpatient wound care follow up is adviseable.











JOSE G GALLARDO MD             Jul 5, 2022 17:28

## 2022-07-05 NOTE — PM&R PROGRESS NOTE
Subjective


HPI/CC On Admission


Date Seen by Provider:  Jul 5, 2022


Time Seen by Provider:  12:30


Subjective/Events-last exam


7/5/2022:


Pt is doing really well


Left leg could very well have to be transitioned to an AKA


Wants to go home soon


No other concerns


Pain is still an issue


Gabapentin is giving him hallucinations so we'll stop that








7/4/2022:


Patient doing really well


Blood sugars are perfect


Pain is an issue








7/3/2022:


Patient doing really well


Blood sugars reviewed


Pain is improved








7/2/2022:


Pt doing pretty well


Is more comfortable sitting on the side of the bed leaned over on a pillow and 

his leg doesn't hurt as much


Blood sugars are much improved with the Amaryl 1mg and Metformin 500mg BID








7/1/2022:


Pt is doing a lot better


Loose bowel movement noted


Gabapentin 200 mg TID has given much improvement in pain


No other falls


Checked meds and labs








6/30/2022:


Pt is doing well


Dr. Kaiser thought the leg looked better today and didn't require surgery


Will take Melatonin tonight to help him sleep


Bowels moved yesterday


Sugars are elevated so will start Amaryl 1 mg to the Metformin 500 BID started 

yesterday








6/29/2022:


Pt is doing really well


Bowels moved two days ago


Dr. Parsons did treat his toenails


Dr. Duenas wants a general surgeon to debride it or the pt can go back over to 

Dr. Duenas at [place] to have that done with his leg








6/28/2022:


Patient doing a lot better


Complaining of pain but declines any pain medication


Sugar is much improved after hypoglycemia


Bowels are moving now


Wife at bedside








6/27/2022:


Patient doing a lot better


Blood sugars 164, 113


Declines to take morphine for addiction potential


Wife really helps the patient








6/26/2022:


Patient doing pretty well


Wife at bedside


Blood sugar is 99 after being very low


Suppository produced a small bowel movement so we will initiate more laxatives 

or


Holding metoprolol due to low blood pressure


Holding losartan as of yesterday due to low blood pressure


Mag citrate is being used for constipation today











6/25/2022:


Patient doing really well


Blood sugars reviewed and holding metformin and oral hypoglycemic agent due to 

hypoglycemia


Pain is controlled


Laxatives given


Checked meds and labs





Review of Systems


General:  Fatigue





Objective


Exam


Vital Signs





Vital Signs








  Date Time  Temp Pulse Resp B/P (MAP) Pulse Ox O2 Delivery O2 Flow Rate FiO2


 


7/5/22 19:53 36.9 89 20 131/57 (81) 93 Room Air  





Capillary Refill :


General Appearance:  No Apparent Distress, WD/WN, Chronically ill


HEENT:  PERRL/EOMI, Normal ENT Inspection, Pharynx Normal


Neck:  Full Range of Motion, Normal Inspection, Non Tender, Supple, Carotid 

Bruit


Respiratory:  Chest Non Tender, Lungs Clear, Normal Breath Sounds, No Accessory 

Muscle Use, No Respiratory Distress


Cardiovascular:  No Edema, No Gallop, No JVD, No Murmur, Normal Peripheral 

Pulses, Irregularly Irregular


Gastrointestinal:  Normal Bowel Sounds, No Organomegaly, No Pulsatile Mass, Non 

Tender, Soft


Back:  Normal Inspection, No CVA Tenderness, No Vertebral Tenderness


Extremity:  Normal Capillary Refill, Normal Inspection, Normal Range of Motion, 

Non Tender, No Calf Tenderness, No Pedal Edema


Neurologic/Psychiatric:  Alert, Oriented x3, Normal Mood/Affect, CNs II-XII Norm

as Tested, Abnormal Gait, Motor Weakness (left sided lower extremity BKA)


Skin:  Normal Color, Warm/Dry


Lymphatic:  No Adenopathy





Results/Procedures


Lab


Laboratory Tests


7/5/22 05:07








Patient resulted labs reviewed.





FIM


Transfers


Therapy Code Descriptions/Definitions 





Functional [place] Measure:


0=Not Assessed/NA        4=Minimal Assistance


1=Total Assistance        5=Supervision or Setup


2=Maximal Assistance  6=Modified [place]


3=Moderate Assistance 7=Complete IndependenceSCALE: Activities may be completed 

with or without assistive devices.





6-Indepedent-patient completes the activity by him/herself with no assistance 

from a helper.


5-Set-up or Clean-up Assistance-helper sets up or cleans up; patient completes 

activity. [place] assists only prior to or  


    following the activity.


4-Supervision or Touching Assistance-helper provides verbal cues and/or 

touching/steadying and/or contact guard assistance as patient completes 

activity. Assistance may be provided   


    throughout the activity or intermittently.


3-Partial/Moderate Assistance-helper does LESS THAN HALF the effort. [place] 

lifts, holds or supports trunk or limbs, but provides less than half the effort.


2-Substantial/Maximal Assistance-helper does MORE THAN HALF the effort. [place] 

lifts or holds trunk or limbs and provides more than half the effort.


2-Zzgnvwbda-ptinam does ALL the effort. Patient does none of the effort to 

complete the activity. Or, the assistance of 2 or more helpers is required for 

the patient to complete the  


    activity.


If activity was not attempted, code reason:


7-Patient Refused.


9-Not Applicable-not attempted and the patient did not perform the activity 

before the current illness, exacerbation or injury.


10-Not Attempted due to Environmental Limitations-(lack of equipment, weather 

restraints, etc.).


88-Not Attempted due to Medical Conditions or Safety Concerns.


Roll Left to Right (QC):  4


Sit to Lying (QC):  4


Sit to Stand (QC):  3


Chair/Bed-to-Chair Xfer(QC):  3


Car Transfer (QC):  3





Gait Training


Does the Patient Walk?:  No and Walking Goal IS indicated


Walk 10 feet (QC):  88


Walk 50 ft with 2 Turns(QC):  88


Walk 150 ft (QC):  88


Walking 10ft/uneven surface-QC:  88


Gait Assistive Device:  Parallel Bars





Wheelchair Training


Does the Pt Use a Wheelchair?:  Yes


Distance:  150'x2


Wheel 50 ft with 2 turns (QC):  4


Wheel 150 ft (QC):  4


Type of Wheelchair:  Manual





Stair Training


1 Step (curb) (QC):  88


4 Steps (QC):  88


12 Steps (QC):  88





Balance


Picking up an Object (QC):  88





ADL-Treatment


Eating (QC):  5 (Assistance opening some containers per pt)


Oral Hygiene (QC):  5 (seated at the sink)


Shower/Bathe Self (QC):  5 (Covering wound dressings. Shower completed 100% 

seated)


Upper Body Dressing (QC):  3 (Min A overall. Pt required v/c's to recall 

technique taught last week. Assistance pulling shirt down in back.)


Lower Body Dressing (QC):  1 (Pt able to doff pants while seated, needed min A 

to thread legs through pants, and was able to hike pants to buttocks, assist x2 

to hike remainder of way in stand)


On/Off Footwear (QC):  3 (Mod A overall. Pt able to doff gripper sock, but 

required total A to don compression sock. Pt able to don sock and shoe with min 

A.)


Toileting Hygiene (QC):  1 (Assist x2 for doffing/donning cloths)


Toilet Transfer (QC):  3





Assessment/Plan


Assessment and Plan


Assess & Plan/Chief Complaint


Assessment:


LBKA 6/21/22


Severe PVD


PAF


OAC


HTN


HLP


DM


Hypothyroidism


Hypoglycemia holding metformin and glimepiride


Constipation





Plan:


Home meds


SSI


PT OT


Pain control


BM treatment





6/25/2022:


Hold diabetic meds due to hypoglycemia


Pain control


Bowel regimen





6/26/2022:


BM regimen


Monitor glucose





6/27/2022:


Monitor sugar


Hypoglycemia high risk





6/28/2022:


Monitor closely


Pain control 


Sugar monitoring





6/29/2022:


Consult Dr Kaiser


Restart Metformin





6/30/2022:


Appreciate Dr Kaiser


Add Amaryl





7/1/2022:


Improved sugars


Increase Gabapentin slowly





7/2/2022:


Pain is improved


Supportive care





7/3/2022:


Pain is improved 


Blood sugar improved





7/4/2022:


Blood sugars perfect





7/5/2022:


Monitor closely


Hold Gabapentin





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











DIANA HENRIQUEZ DO                 Jul 5, 2022 06:20

## 2022-07-05 NOTE — OCCUPATIONAL THER DAILY NOTE
OT Current Status-Daily Note


Subjective


Pt seated in w/c discussing R foot/heel discomfort d/t pain and swelling with PT

upon OT arrival, agreeable to tx. Pt says he is having vivid dreams since start 

of new medicine ~4 days ago, very groggy d/t lack of sleep, falling asleep 

during RBs in tx.





Mental Status/Objective


Patient Orientation:  Person, Place, Situation





ADL-Treatment


Therapy Code Descriptions/Definitions 





Functional [place] Measure:


0=Not Assessed/NA        4=Minimal Assistance


1=Total Assistance        5=Supervision or Setup


2=Maximal Assistance  6=Modified [place]


3=Moderate Assistance 7=Complete IndependenceSCALE: Activities may be completed 

with or without assistive devices.





6-Indepedent-patient completes the activity by him/herself with no assistance 

from a helper.


5-Set-up or Clean-up Assistance-helper sets up or cleans up; patient completes 

activity. [place] assists only prior to or  


    following the activity.


4-Supervision or Touching Assistance-helper provides verbal cues and/or 

touching/steadying and/or contact guard assistance as patient completes 

activity. Assistance may be provided   


    throughout the activity or intermittently.


3-Partial/Moderate Assistance-helper does LESS THAN HALF the effort. [place] 

lifts, holds or supports trunk or limbs, but provides less than half the effort.


2-Substantial/Maximal Assistance-helper does MORE THAN HALF the effort. [place] 

lifts or holds trunk or limbs and provides more than half the effort.


8-Jbngofsou-xttfkq does ALL the effort. Patient does none of the effort to 

complete the activity. Or, the assistance of 2 or more helpers is required for 

the patient to complete the  


    activity.


If activity was not attempted, code reason:


7-Patient Refused.


9-Not Applicable-not attempted and the patient did not perform the activity 

before the current illness, exacerbation or injury.


10-Not Attempted due to Environmental Limitations-(lack of equipment, weather 

restraints, etc.).


88-Not Attempted due to Medical Conditions or Safety Concerns.


Eating (QC):  5 (Per pt report, he needs assitance to open small packages d/t 

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





Other Treatment


Pt pushed to therapy gym to participate in BUE AAROM and functional activities. 

In order to increase BUE ROM and activity tolerance, He completed 4minutes of 

slow, controlled shoulder flexion on the pulleys. He then completed horizontal 

abduction and adduction 2x15 per UE while sitting parallel with the raised mat, 

sliding UE acroos mat. Pt required v/c's to take RBs PRN and to continue 

breathing through exercises. Both armrests were removed from w/c to address core

strength and stability while leaning forward for inclined dowel exercises (x15) 

and leaning side to side during functional activities. Pt retrieved bean bag 

from one side, crossed midline, and placed bean bag in basket on opposite side, 

13x per UE. He then retrieved graded clothespins from both sides and leaned 

forward to clip them onto the board, 8x per UE. Both of these functional 

activities addressed shoulder ROM, core strength and stability, and overall 

activity tolerance. Armrests returned to w/c. Pt pushed back to room and 

transferred to recliner, min-mod assistx2, and properly positioned to prevent 

pain and swelling in R heel. Post tx, pt left in recliner with call light in 

reach and all needs met.





Education


OT Patient Education:  Correct positioning, Energy conservation, Exercise 

program, Progress toward Goal/Update tx plan, Purpose of tx/functional 

activities, Rehab process, Safety issues, Transfer techniques, W/C management


Teaching Recipient:  Patient


Teaching Methods:  Demonstration, Discussion


Response to Teaching:  Verbalize Understanding, Return Demonstration





OT Short Term Goals


Short Term Goals


Time Frame:  Jul 8, 2022


Shower/bathe self:  3


Lower body dressing:  3





OT Long Term Goals


Long Term Goals


Time Frame:  Jul 22, 2022


Eating (QC):  6 (not met)


Oral Hygiene (QC):  6 (not met)


Toileting Hygiene (QC):  6 (not met)


Shower/Bathe Self (QC):  4 (met)


Upper Body Dressing (QC):  4 (not met)


Lower Body Dressing (QC):  3 (not met)


On/Off Footwear (QC):  3 (nt met)


Additional Goals:  1-Demonstrate ADL Tasks, 2-Verbalize Understanding, 3-

ImproveStrength/[place]


1=Demonstrate adherence to instructed precautions during ADL tasks.


2=Patient will verbalize/demonstrate understanding of assistive 

devices/modifications for ADL.


3=Patient will improve strength/tolerance for activity to enable patient to 

perform ADL's.





OT Education/Plan


Problem List/Assessment


Assessment:  Decreased Activ Tolerance, Decreased UE Strength, Impaired Funct 

Balance, Impaired I ADL's, Impaired Self-Care Skills, Restricted Funct UE ROM





Discharge Recommendations


Plan/Recommendations:  Continue POC





Treatment Plan/Plan of Care


Patient would benefit from OT for education, treatment and training to promote 

independence in ADL's, mobility, safety and/or upper extremity function for 

ADL's.


Plan of Care:  ADL Retraining, Functional Mobility, Group Exercise/Act as Ind, 

UE Funct Exercise/Act, UE Neuromus Re-Ed/Coord, W/C Management Training


Treatment Duration:  Jul 22, 2022


Frequency:  At least 5 of 7 days/Wk (IRF)


Estimated Hrs Per Day:  1.5 hours per day


Agreement:  Yes


Rehab Potential:  Fair





Time/GCodes


Start Time:  09:00


Stop Time:  10:00


Total Time Billed (hr/min):  60


Billed Treatment Time


1, Ex 2 (30'), FA 2 (30')











DHIRAJ EDWARDS OT           Jul 5, 2022 10:04

## 2022-07-06 ENCOUNTER — HOSPITAL ENCOUNTER (INPATIENT)
Dept: HOSPITAL 75 - 4TH | Age: 72
LOS: 2 days | Discharge: NURSING, REHAB OR LONG-TERM CARE | DRG: 475 | End: 2022-07-08
Attending: SURGERY | Admitting: SURGERY
Payer: MEDICARE

## 2022-07-06 VITALS — WEIGHT: 159.84 LBS | HEIGHT: 64.96 IN | BODY MASS INDEX: 26.63 KG/M2

## 2022-07-06 VITALS — DIASTOLIC BLOOD PRESSURE: 76 MMHG | SYSTOLIC BLOOD PRESSURE: 133 MMHG

## 2022-07-06 DIAGNOSIS — D64.9: ICD-10-CM

## 2022-07-06 DIAGNOSIS — T87.89: Primary | ICD-10-CM

## 2022-07-06 DIAGNOSIS — I96: ICD-10-CM

## 2022-07-06 DIAGNOSIS — D72.829: ICD-10-CM

## 2022-07-06 PROCEDURE — 85027 COMPLETE CBC AUTOMATED: CPT

## 2022-07-06 PROCEDURE — 82947 ASSAY GLUCOSE BLOOD QUANT: CPT

## 2022-07-06 PROCEDURE — 36415 COLL VENOUS BLD VENIPUNCTURE: CPT

## 2022-07-06 PROCEDURE — 80053 COMPREHEN METABOLIC PANEL: CPT

## 2022-07-06 PROCEDURE — 94760 N-INVAS EAR/PLS OXIMETRY 1: CPT

## 2022-07-06 PROCEDURE — 87081 CULTURE SCREEN ONLY: CPT

## 2022-07-06 PROCEDURE — 85007 BL SMEAR W/DIFF WBC COUNT: CPT

## 2022-07-06 RX ADMIN — DOCUSATE SODIUM AND SENNOSIDES SCH EA: 8.6; 5 TABLET, FILM COATED ORAL at 08:39

## 2022-07-06 RX ADMIN — INSULIN ASPART SCH UNIT: 100 INJECTION, SOLUTION INTRAVENOUS; SUBCUTANEOUS at 16:16

## 2022-07-06 RX ADMIN — COLLAGENASE SANTYL SCH GM: 250 OINTMENT TOPICAL at 10:26

## 2022-07-06 RX ADMIN — POLYETHYLENE GLYCOL (3350) SCH GM: 17 POWDER, FOR SOLUTION ORAL at 08:39

## 2022-07-06 RX ADMIN — GABAPENTIN SCH MG: 100 CAPSULE ORAL at 08:34

## 2022-07-06 RX ADMIN — INSULIN ASPART SCH UNIT: 100 INJECTION, SOLUTION INTRAVENOUS; SUBCUTANEOUS at 11:42

## 2022-07-06 RX ADMIN — Medication SCH MCG: at 08:34

## 2022-07-06 RX ADMIN — CLOPIDOGREL BISULFATE SCH MG: 75 TABLET, FILM COATED ORAL at 08:33

## 2022-07-06 RX ADMIN — LEVOTHYROXINE SODIUM SCH MCG: 100 TABLET ORAL at 06:36

## 2022-07-06 RX ADMIN — METFORMIN HYDROCHLORIDE SCH MG: 500 TABLET, FILM COATED ORAL at 17:08

## 2022-07-06 RX ADMIN — DOCUSATE SODIUM SCH MG: 100 CAPSULE ORAL at 08:39

## 2022-07-06 RX ADMIN — POTASSIUM CHLORIDE SCH MEQ: 1500 TABLET, EXTENDED RELEASE ORAL at 08:33

## 2022-07-06 RX ADMIN — GLIMEPIRIDE SCH MG: 1 TABLET ORAL at 06:35

## 2022-07-06 RX ADMIN — FUROSEMIDE SCH MG: 40 TABLET ORAL at 08:33

## 2022-07-06 RX ADMIN — AMIODARONE HYDROCHLORIDE SCH MG: 200 TABLET ORAL at 08:33

## 2022-07-06 RX ADMIN — PANTOPRAZOLE SODIUM SCH MG: 40 TABLET, DELAYED RELEASE ORAL at 08:33

## 2022-07-06 RX ADMIN — APIXABAN SCH MG: 5 TABLET, FILM COATED ORAL at 08:33

## 2022-07-06 RX ADMIN — INSULIN ASPART SCH UNIT: 100 INJECTION, SOLUTION INTRAVENOUS; SUBCUTANEOUS at 06:01

## 2022-07-06 RX ADMIN — METFORMIN HYDROCHLORIDE SCH MG: 500 TABLET, FILM COATED ORAL at 06:36

## 2022-07-06 NOTE — DISCHARGE SUMMARY
Diagnosis/Chief Complaint


Date of Admission


Jun 24, 2022 at 14:00


Date of Discharge





Discharge Date:  Jul 6, 2022


Discharge Diagnosis


Assessment:


LBKA 6/21/22


Severe PVD


PAF


OAC


HTN


HLP


DM


Hypothyroidism


Hypoglycemia holding metformin and glimepiride


Constipation





Discharge Summary


Discharge Physical Examination


Allergies:  


Coded Allergies:  


     gabapentin (Verified  Allergy, Unknown, 7/6/22)


   nightmares


     lisinopril (Verified  Allergy, Unknown, 6/24/22)


     niacin (Verified  Allergy, Unknown, 6/24/22)


     oxycodone (Verified  Allergy, Unknown, 6/24/22)


     rivaroxaban (Verified  Allergy, Unknown, 6/24/22)


Uncoded Allergies:  


     CI Pigment Blue 63 (Adverse Reaction, Severe, Itching, 6/24/22)


Vitals & I&Os





Vital Signs








  Date Time  Temp Pulse Resp B/P (MAP) Pulse Ox O2 Delivery O2 Flow Rate FiO2


 


7/6/22 17:47 37.7 89 20 133/76 91 Room Air  








General Appearance:  Alert, Oriented X3, Cooperative


Respiratory:  Clear to Auscultation


Cardiovascular:  Regular Rate





Hospital Course


Was the Problem List Reviewed?:  Yes


Pt had a lengthy hospital course for 13 days after he was admitted after left b

elow the knee amputation. Dr. Kaiser was consulted on the stump care. He 

ultimately evaluated that to not require immediate surgery but could ultimately 

require an AKA. Dr. Menon was consulted after he had a fall the night before 

discharge and tore his left rotator cuff that he had problems with before. No 

clavicle fracture. Appreciate Dr. Menon. He recommended PT. At this current time

pt had all medications reconciled and reviewed. I did lower the dose on 

Metformin and Amaryl. His blood sugars remained very good.


Labs (last 24 hrs)


Laboratory Tests


6/24/22 17:10: Glucometer 223H


6/24/22 20:26: Glucometer 160H


6/25/22 05:43: 


White Blood Count 9.9, Red Blood Count 4.38, Hemoglobin 10.8L, Hematocrit 36L, 

Mean Corpuscular Volume 83, Mean Corpuscular Hemoglobin 25, Mean Corpuscular 

Hemoglobin Concent 30L, Red Cell Distribution Width 19.4H, Platelet Count 228, 

Mean Platelet Volume 11.4, Immature Granulocyte % (Auto) 0, Neutrophils (%) 

(Auto) 78H, Lymphocytes (%) (Auto) 12, Monocytes (%) (Auto) 9, Eosinophils (%) 

(Auto) 1, Basophils (%) (Auto) 0, Neutrophils # (Auto) 7.7, Lymphocytes # (Auto)

1.2, Monocytes # (Auto) 0.9, Eosinophils # (Auto) 0.1, Basophils # (Auto) 0.0, 

Immature Granulocyte # (Auto) 0.0, Sodium Level 139, Potassium Level 3.7, 

Chloride Level 100, Carbon Dioxide Level 26, Anion Gap 13, Blood Urea Nitrogen 

19H, Creatinine 0.94, Estimat Glomerular Filtration Rate 87, BUN/Creatinine 

Ratio 20, Glucose Level 64L, Calcium Level 9.3, Corrected Calcium 9.9, Total 

Bilirubin 0.5, Aspartate Amino Transf (AST/SGOT) 26, Alanine Aminotransferase 

(ALT/SGPT) 13, Alkaline Phosphatase 56, Total Protein 7.3, Albumin 3.3


6/25/22 11:14: Glucometer 171H


6/25/22 17:08: Glucometer 58*L


6/25/22 20:27: Glucometer 61L


6/25/22 20:50: Glucometer 69L


6/25/22 22:20: Glucometer 96


6/26/22 05:10: Glucometer 65L


6/26/22 11:56: Glucometer 99


6/26/22 17:07: Glucometer 154H


6/26/22 20:47: Glucometer 164H


6/27/22 05:37: Glucometer 113H


6/27/22 11:36: Glucometer 165H


6/27/22 15:21: Glucometer 199H


6/27/22 20:09: Glucometer 184H


6/28/22 05:31: Glucometer 97


6/28/22 11:16: Glucometer 193H


6/28/22 15:41: Glucometer 224H


6/28/22 20:09: Glucometer 187H


6/29/22 05:40: Glucometer 147H


6/29/22 10:59: Glucometer 181H


6/29/22 15:17: Glucometer 180H


6/29/22 20:11: Glucometer 180H


6/30/22 05:34: Glucometer 141H


6/30/22 10:46: Glucometer 243H


6/30/22 15:53: Glucometer 171H


6/30/22 20:24: Glucometer 126H


7/1/22 06:04: Glucometer 101


7/1/22 11:21: Glucometer 141H


7/1/22 15:38: Glucometer 150H


7/1/22 20:27: Glucometer 140H


7/2/22 06:06: Glucometer 87


7/2/22 10:59: Glucometer 104


7/2/22 15:28: Glucometer 115H


7/2/22 20:39: Glucometer 123H


7/3/22 05:55: Glucometer 112H


7/3/22 11:08: Glucometer 126H


7/3/22 15:13: Glucometer 115H


7/3/22 20:02: Glucometer 131H


7/4/22 05:34: Glucometer 94


7/4/22 11:05: Glucometer 194H


7/4/22 15:37: Glucometer 125H


7/4/22 20:10: Glucometer 129H


7/5/22 05:07: 


White Blood Count 11.2H, Red Blood Count 4.19L, Hemoglobin 10.4L, Hematocrit 35L

, Mean Corpuscular Volume 83, Mean Corpuscular Hemoglobin 25, Mean Corpuscular 

Hemoglobin Concent 30L, Red Cell Distribution Width 19.1H, Platelet Count 327, 

Mean Platelet Volume 11.7, Immature Granulocyte % (Auto) 0, Neutrophils (%) 

(Auto) 75, Lymphocytes (%) (Auto) 13, Monocytes (%) (Auto) 10, Eosinophils (%) 

(Auto) 1, Basophils (%) (Auto) 0, Neutrophils # (Auto) 8.4H, Lymphocytes # 

(Auto) 1.4, Monocytes # (Auto) 1.1H, Eosinophils # (Auto) 0.2, Basophils # 

(Auto) 0.1, Immature Granulocyte # (Auto) 0.0, Sodium Level 134L, Potassium 

Level 3.7, Chloride Level 97L, Carbon Dioxide Level 23, Anion Gap 14, Blood Urea

Nitrogen 29H, Creatinine 0.92, Estimat Glomerular Filtration Rate 89, 

BUN/Creatinine Ratio 32, Glucose Level 100, Calcium Level 9.7, Corrected Calcium

10.0, Total Bilirubin 0.5, Aspartate Amino Transf (AST/SGOT) 21, Alanine 

Aminotransferase (ALT/SGPT) 17, Alkaline Phosphatase 59, Total Protein 7.3, 

Albumin 3.6


7/5/22 10:59: Glucometer 157H


7/5/22 15:27: Glucometer 171H


7/5/22 20:05: Glucometer 154H


7/6/22 05:55: Glucometer 149H


7/6/22 10:55: Glucometer 149H


7/6/22 15:22: Glucometer 135H





Pending Labs


Laboratory Tests


6/24/22 17:10: Glucometer 223


6/24/22 20:26: Glucometer 160


6/25/22 05:43: 


White Blood Count 9.9, Red Blood Count 4.38, Hemoglobin 10.8, Hematocrit 36, 

Mean Corpuscular Volume 83, Mean Corpuscular Hemoglobin 25, Mean Corpuscular 

Hemoglobin Concent 30, Red Cell Distribution Width 19.4, Platelet Count 228, 

Mean Platelet Volume 11.4, Immature Granulocyte % (Auto) 0, Neutrophils (%) 

(Auto) 78, Lymphocytes (%) (Auto) 12, Monocytes (%) (Auto) 9, Eosinophils (%) 

(Auto) 1, Basophils (%) (Auto) 0, Neutrophils # (Auto) 7.7, Lymphocytes # (Auto)

1.2, Monocytes # (Auto) 0.9, Eosinophils # (Auto) 0.1, Basophils # (Auto) 0.0, 

Immature Granulocyte # (Auto) 0.0, Sodium Level 139, Potassium Level 3.7, 

Chloride Level 100, Carbon Dioxide Level 26, Anion Gap 13, Blood Urea Nitrogen 

19, Creatinine 0.94, Estimat Glomerular Filtration Rate 87, BUN/Creatinine Ratio

20, Glucose Level 64, Calcium Level 9.3, Corrected Calcium 9.9, Total Bilirubin 

0.5, Aspartate Amino Transf (AST/SGOT) 26, Alanine Aminotransferase (ALT/SGPT) 

13, Alkaline Phosphatase 56, Total Protein 7.3, Albumin 3.3


6/25/22 11:14: Glucometer 171


6/25/22 17:08: Glucometer 58


6/25/22 20:27: Glucometer 61


6/25/22 20:50: Glucometer 69


6/25/22 22:20: Glucometer 96


6/26/22 05:10: Glucometer 65


6/26/22 11:56: Glucometer 99


6/26/22 17:07: Glucometer 154


6/26/22 20:47: Glucometer 164


6/27/22 05:37: Glucometer 113


6/27/22 11:36: Glucometer 165


6/27/22 15:21: Glucometer 199


6/27/22 20:09: Glucometer 184


6/28/22 05:31: Glucometer 97


6/28/22 11:16: Glucometer 193


6/28/22 15:41: Glucometer 224


6/28/22 20:09: Glucometer 187


6/29/22 05:40: Glucometer 147


6/29/22 10:59: Glucometer 181


6/29/22 15:17: Glucometer 180


6/29/22 20:11: Glucometer 180


6/30/22 05:34: Glucometer 141


6/30/22 10:46: Glucometer 243


6/30/22 15:53: Glucometer 171


6/30/22 20:24: Glucometer 126


7/1/22 06:04: Glucometer 101


7/1/22 11:21: Glucometer 141


7/1/22 15:38: Glucometer 150


7/1/22 20:27: Glucometer 140


7/2/22 06:06: Glucometer 87


7/2/22 10:59: Glucometer 104


7/2/22 15:28: Glucometer 115


7/2/22 20:39: Glucometer 123


7/3/22 05:55: Glucometer 112


7/3/22 11:08: Glucometer 126


7/3/22 15:13: Glucometer 115


7/3/22 20:02: Glucometer 131


7/4/22 05:34: Glucometer 94


7/4/22 11:05: Glucometer 194


7/4/22 15:37: Glucometer 125


7/4/22 20:10: Glucometer 129


7/5/22 05:07: 


White Blood Count 11.2, Red Blood Count 4.19, Hemoglobin 10.4, Hematocrit 35, 

Mean Corpuscular Volume 83, Mean Corpuscular Hemoglobin 25, Mean Corpuscular 

Hemoglobin Concent 30, Red Cell Distribution Width 19.1, Platelet Count 327, 

Mean Platelet Volume 11.7, Immature Granulocyte % (Auto) 0, Neutrophils (%) 

(Auto) 75, Lymphocytes (%) (Auto) 13, Monocytes (%) (Auto) 10, Eosinophils (%) 

(Auto) 1, Basophils (%) (Auto) 0, Neutrophils # (Auto) 8.4, Lymphocytes # (Auto)

1.4, Monocytes # (Auto) 1.1, Eosinophils # (Auto) 0.2, Basophils # (Auto) 0.1, 

Immature Granulocyte # (Auto) 0.0, Sodium Level 134, Potassium Level 3.7, 

Chloride Level 97, Carbon Dioxide Level 23, Anion Gap 14, Blood Urea Nitrogen 

29, Creatinine 0.92, Estimat Glomerular Filtration Rate 89, BUN/Creatinine Ratio

32, Glucose Level 100, Calcium Level 9.7, Corrected Calcium 10.0, Total 

Bilirubin 0.5, Aspartate Amino Transf (AST/SGOT) 21, Alanine Aminotransferase 

(ALT/SGPT) 17, Alkaline Phosphatase 59, Total Protein 7.3, Albumin 3.6


7/5/22 10:59: Glucometer 157


7/5/22 15:27: Glucometer 171


7/5/22 20:05: Glucometer 154


7/6/22 05:55: Glucometer 149


7/6/22 10:55: Glucometer 149


7/6/22 15:22: Glucometer 135








Discharge


Home Medications:





Active Scripts


Active


Amaryl (Glimepiride) 1 Mg Tab 0.5 Mg PO DAILY@0630


Metformin HCl 500 Mg Tablet 500 Mg PO BID@07,17


Hydrocodone-Acetamin  mg (Hydrocodone/Acetaminophen) 10 Mg-325 Mg Tablet 1

Each PO Q8H PRN


Reported


Docusate Sodium 100 Mg Capsule 100 Mg PO BID PRN


Simvastatin 20 Mg Tablet 20 Mg PO HS


Levothyroxine Sodium 100 Mcg Tablet 100 Mcg PO DAILY


Pantoprazole Sodium 40 Mg Tablet.dr 40 Mg PO DAILY


Plavix (Clopidogrel Bisulfate) 75 Mg Tablet 75 Mg PO 1200


Metoprolol Succinate 25 Mg Tab.er.24h 25 Mg PO DAILY


Eliquis (Apixaban) 5 Mg Tablet 5 Mg PO BID


Amiodarone HCl 200 Mg Tablet 200 Mg PO DAILY


Furosemide 80 Mg Tablet 80 Mg PO DAILY


Losartan Potassium 50 Mg Tablet 50 Mg PO DAILY


K-Tab ER (Potassium Chloride) 20 Meq Tablet.er 20 Meq PO TID


     LAST FILLED 08- #90/30 DAY SUPPLY


Vitamin D3 (Cholecalciferol (Vitamin D3)) 25 Mcg (1000 Unit) Capsule 25 Mcg PO 

DAILY





Instructions to patient/family


Please see electronic discharge instructions given to patient.





Diagnosis/Problems


Diagnosis/Problems





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











DIANA HENRIQUEZ DO                 Jul 6, 2022 10:56

## 2022-07-06 NOTE — DIAGNOSTIC IMAGING REPORT
PROCEDURE: 

CT head without contrast.



TECHNIQUE: 

Multiple contiguous axial images were obtained through the brain

without the use of intravenous contrast. Auto Exposure Controls

were utilized during the CT exam to meet ALARA standards for

radiation dose reduction. 



INDICATION:  

Fall with head injury.



COMPARISON: 

No prior studies are available for comparison.



FINDINGS:

The ventricles and sulci are appropriate for the patient's age.

No sulcal effacement or midline shift is identified. No acute

intra-axial or extra-axial hemorrhage is detected. There is an

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 The cisterns are

patent. The visualized paranasal sinuses are clear.



IMPRESSION: 

No acute intracranial process is detected.



Dictated by: 



  Dictated on workstation # [id number]

## 2022-07-06 NOTE — PM&R PROGRESS NOTE
Subjective


HPI/CC On Admission


Date Seen by Provider:  Jul 6, 2022


Time Seen by Provider:  09:00


Subjective/Events-last exam


7/6/2022:








7/5/2022:


Pt is doing really well


Left leg could very well have to be transitioned to an AKA


Wants to go home soon


No other concerns


Pain is still an issue


Gabapentin is giving him hallucinations so we'll stop that








7/4/2022:


Patient doing really well


Blood sugars are perfect


Pain is an issue








7/3/2022:


Patient doing really well


Blood sugars reviewed


Pain is improved








7/2/2022:


Pt doing pretty well


Is more comfortable sitting on the side of the bed leaned over on a pillow and 

his leg doesn't hurt as much


Blood sugars are much improved with the Amaryl 1mg and Metformin 500mg BID








7/1/2022:


Pt is doing a lot better


Loose bowel movement noted


Gabapentin 200 mg TID has given much improvement in pain


No other falls


Checked meds and labs








6/30/2022:


Pt is doing well


Dr. Kaiser thought the leg looked better today and didn't require surgery


Will take Melatonin tonight to help him sleep


Bowels moved yesterday


Sugars are elevated so will start Amaryl 1 mg to the Metformin 500 BID started 

yesterday








6/29/2022:


Pt is doing really well


Bowels moved two days ago


Dr. Parsons did treat his toenails


Dr. Duenas wants a general surgeon to debride it or the pt can go back over to 

Dr. Duenas at [facility] to have that done with his leg








6/28/2022:


Patient doing a lot better


Complaining of pain but declines any pain medication


Sugar is much improved after hypoglycemia


Bowels are moving now


Wife at bedside








6/27/2022:


Patient doing a lot better


Blood sugars 164, 113


Declines to take morphine for addiction potential


Wife really helps the patient








6/26/2022:


Patient doing pretty well


Wife at bedside


Blood sugar is 99 after being very low


Suppository produced a small bowel movement so we will initiate more laxatives 

or


Holding metoprolol due to low blood pressure


Holding losartan as of yesterday due to low blood pressure


Mag citrate is being used for constipation today











6/25/2022:


Patient doing really well


Blood sugars reviewed and holding metformin and oral hypoglycemic agent due to 

hypoglycemia


Pain is controlled


Laxatives given


Checked meds and labs





Review of Systems


General:  Fatigue, Malaise


Musculoskeletal:  leg pain





Objective


Exam


Vital Signs





Vital Signs








  Date Time  Temp Pulse Resp B/P (MAP) Pulse Ox O2 Delivery O2 Flow Rate FiO2


 


7/6/22 09:00      Room Air  


 


7/6/22 08:00 37.7 89 20 133/76 (95) 91   





Capillary Refill :


General Appearance:  No Apparent Distress, WD/WN, Chronically ill


HEENT:  PERRL/EOMI, Normal ENT Inspection, Pharynx Normal


Neck:  Full Range of Motion, Normal Inspection, Non Tender, Supple, Carotid 

Bruit


Respiratory:  Chest Non Tender, Lungs Clear, Normal Breath Sounds, No Accessory 

Muscle Use, No Respiratory Distress


Cardiovascular:  No Edema, No Gallop, No JVD, No Murmur, Normal Peripheral 

Pulses, Irregularly Irregular


Gastrointestinal:  Normal Bowel Sounds, No Organomegaly, No Pulsatile Mass, Non 

Tender, Soft


Back:  Normal Inspection, No CVA Tenderness, No Vertebral Tenderness


Extremity:  Normal Capillary Refill, Normal Inspection, Normal Range of Motion, 

Non Tender, No Calf Tenderness, No Pedal Edema


Neurologic/Psychiatric:  Alert, Oriented x3, Normal Mood/Affect, CNs II-XII Norm

as Tested, Abnormal Gait, Motor Weakness (left sided lower extremity BKA)


Skin:  Normal Color, Warm/Dry


Lymphatic:  No Adenopathy





Results/Procedures


Lab


Patient resulted labs reviewed.





FIM


Transfers


Therapy Code Descriptions/Definitions 





Functional [place] Measure:


0=Not Assessed/NA        4=Minimal Assistance


1=Total Assistance        5=Supervision or Setup


2=Maximal Assistance  6=Modified [place]


3=Moderate Assistance 7=Complete IndependenceSCALE: Activities may be completed 

with or without assistive devices.





6-Indepedent-patient completes the activity by him/herself with no assistance 

from a helper.


5-Set-up or Clean-up Assistance-helper sets up or cleans up; patient completes 

activity. [place] assists only prior to or  


    following the activity.


4-Supervision or Touching Assistance-helper provides verbal cues and/or 

touching/steadying and/or contact guard assistance as patient completes 

activity. Assistance may be provided   


    throughout the activity or intermittently.


3-Partial/Moderate Assistance-helper does LESS THAN HALF the effort. Goodman 

lifts, holds or supports trunk or limbs, but provides less than half the effort.


2-Substantial/Maximal Assistance-helper does MORE THAN HALF the effort. Goodman 

lifts or holds trunk or limbs and provides more than half the effort.


0-[id number]-olqkda does ALL the effort. Patient does none of the effort to comp

lete the activity. Or, the assistance of 2 or more helpers is required for the 

patient to complete the  


    activity.


If activity was not attempted, code reason:


7-Patient Refused.


9-Not Applicable-not attempted and the patient did not perform the activity 

before the current illness, exacerbation or injury.


10-Not Attempted due to Environmental Limitations-(lack of equipment, weather 

restraints, etc.).


88-Not Attempted due to Medical Conditions or Safety Concerns.


Roll Left to Right (QC):  6


Sit to Lying (QC):  6


Sit to Stand (QC):  3


Chair/Bed-to-Chair Xfer(QC):  2


Car Transfer (QC):  3





Gait Training


Does the Patient Walk?:  No and Walking Goal IS indicated


Walk 10 feet (QC):  88


Walk 50 ft with 2 Turns(QC):  88


Walk 150 ft (QC):  88


Walking 10ft/uneven surface-QC:  88


Gait Assistive Device:  Parallel Bars





Wheelchair Training


Does the Pt Use a Wheelchair?:  Yes


Distance:  150'x2


Wheel 50 ft with 2 turns (QC):  6


Wheel 150 ft (QC):  4


Type of Wheelchair:  Manual





Stair Training


1 Step (curb) (QC):  88


4 Steps (QC):  88


12 Steps (QC):  88





Balance


Picking up an Object (QC):  88





ADL-Treatment


Eating (QC):  5 (Per pt report, he needs assitance to open small packages d/t 

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


Oral Hygiene (QC):  5 (seated at the sink)


Shower/Bathe Self (QC):  5 (Covering wound dressings. Shower completed 100% 

seated)


Upper Body Dressing (QC):  3 (Min A overall. Pt required v/c's to recall 

technique taught last week. Assistance pulling shirt down in back.)


Lower Body Dressing (QC):  1 (Pt able to doff pants while seated, needed min A 

to thread legs through pants, and was able to hike pants to buttocks, assist x2 

to hike remainder of way in stand)


On/Off Footwear (QC):  3 (Mod A overall. Pt able to doff gripper sock, but 

required total A to don compression sock. Pt able to don sock and shoe with min 

A.)


Toileting Hygiene (QC):  1 (Assist x2 to hike pants up )


Toilet Transfer (QC):  3





Assessment/Plan


Assessment and Plan


Assess & Plan/Chief Complaint


Assessment:


CORTNEY 6/21/22


Severe PVD


PAF


OAC


HTN


HLP


DM


Hypothyroidism


Hypoglycemia holding metformin and glimepiride


Constipation





Plan:


Home meds


SSI


PT OT


Pain control


BM treatment





6/25/2022:


Hold diabetic meds due to hypoglycemia


Pain control


Bowel regimen





6/26/2022:


BM regimen


Monitor glucose





6/27/2022:


Monitor sugar


Hypoglycemia high risk





6/28/2022:


Monitor closely


Pain control 


Sugar monitoring





6/29/2022:


Consult Dr Kaiser


Restart Metformin





6/30/2022:


Appreciate Dr Kaiser


Add Amaryl





7/1/2022:


Improved sugars


Increase Gabapentin slowly





7/2/2022:


Pain is improved


Supportive care





7/3/2022:


Pain is improved 


Blood sugar improved





7/4/2022:


Blood sugars perfect





7/5/2022:


Monitor closely


Hold Gabapentin





7/6/2022:





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











DIANA HENRIQUEZ DO                 Jul 6, 2022 05:56

## 2022-07-06 NOTE — DIAGNOSTIC IMAGING REPORT
Left shoulder at 804h.



INDICATION: Shoulder pain



3 views were obtained.



There are no prior left shoulder examinations available for

comparison.



 The left clavicle exam performed in conjunction with this study

did show severe degenerative disease involving the

acromioclavicular joint as well as a soft tissue calcification

inferior to the distal clavicle. Those findings are again evident

on this study. The space between the acromion and the humeral

head is markedly narrowed and most likely the rotator cuff has

been completely torn. If further imaging is desired, then MRI

would be recommended.



There is only moderate degenerative disease of the glenohumeral

joint. There is no fracture identified. 



IMPRESSION:

1. There is no evidence for an acute bony abnormality.

2. There is severe degenerative disease involving the

acromioclavicular joint and most likely the rotator cuff has been

completely torn. Recommendations as above.



Dictated by: 



  Dictated on workstation # [id number]

## 2022-07-06 NOTE — D/C HH FACE TO FACE ORDER
D/C  Face to Face Orders


Reconcile Patient Problems


Problems Reviewed?:  Yes





Instructions for Patient


[facility], [phone]


Patient Instructions/FollowUp:  


PCP 1 week


Physician to follow Patient:  PCP


Discharge Diet for Home:  No Restrictions


Patient Problems:  


Left BKA





Patient Data-Allergies,Ht & Wt


Patient Allergies:  


Coded Allergies:  


     gabapentin (Verified  Allergy, Unknown, 7/6/22)


   nightmares


     lisinopril (Verified  Allergy, Unknown, 6/24/22)


     niacin (Verified  Allergy, Unknown, 6/24/22)


     oxycodone (Verified  Allergy, Unknown, 6/24/22)


     rivaroxaban (Verified  Allergy, Unknown, 6/24/22)


Uncoded Allergies:  


     CI Pigment Blue 63 (Adverse Reaction, Severe, Itching, 6/24/22)





Home Health Need/Face to Face


Date of Face to Face:  Jul 6, 2022


Clinical Findings:  Generalized weakness and fatigue, Instability, Muscle 

weakness, Unsteady gait


I have seen Pt face-to-face:  Yes


Discharged To:  Home


Diagnosis/Conditions:  


Left BKA


Patient is Homebound due to:  CognItive deficits, Amanuel fall risk due to i

nstabilty, Pain w/ambulation


Homebound Status


   Due to the above stated illness, injury or surgical procedure (medical 

condition or diagnosis) and associated clinical findings, the patient is 

homebound because of his/her inability to leave home except with aid of a 

supportive device and/or person AND leaving the home requires a considerable and

taxing effort or is medically contraindicated.


Pt req the following assistanc:  Wheelchair





Home Health Nursing Orders


Home Health Services Order:  Nursing Services, Occupational Ther-Evaluate & 

Treat, Physical Therapy-Evaluate & Treat, Wound Care-Eval/Treat


Certify Stmt


I certify that this patient is under my care and that I, a nurse practitioner or

a physician; a assistant working with me, had a face to face encounter that -

meets the physician face to face encounter requirements with this patient as 

dated.











DIANA HENRIQUEZ DO                 Jul 6, 2022 10:55

## 2022-07-06 NOTE — DIAGNOSTIC IMAGING REPORT
Left clavicle at 802h.



INDICATION: Clavicle and shoulder pain.



2 Views were obtained. 



There are no prior studies available for comparison.



There is no fracture, dislocation or acute bony abnormality

evident. However there is fairly severe degenerative disease

involving the acromioclavicular joint. There are also

calcifications in the soft tissues along the inferior aspect of

the distal clavicle. This may be secondary to calcific tendinitis

and I suspect that the rotator cuff has been torn.



The soft tissues are unremarkable. 



IMPRESSION:

1. There is no evidence for an acute bony abnormality.

2. There are degenerative changes involving the acromioclavicular

joint and there appears to have been injury to the rotator cuff.

A left shoulder exam is pending for further study.



Dictated by: 



  Dictated on workstation # [id number]

## 2022-07-06 NOTE — THERAPY TEAM DISCHARGE SUMMARY
Therapy Discharge Summary


Discharge Recommendations


Date of Discharge








Physical Therapy


Roll Left to Right (QC):  6


Sit to Lying (QC):  6


Lying to Sitting/Side of Bed(Q:  4


Sit to Stand (QC):  3


Chair/Bed-to-Chair Xfer(QC):  2


Toilet Transfer (QC):  1


Car Transfer (QC):  3


Does the Patient Walk:  No and Walking Goal IS indicated


Mode of Locomotion:  Both


Anticipated Mode of Locomotion:  Both


Walk 10 feet (QC):  88


Walk 50 ft with 2 Turns(QC):  88


Walk 150 ft (QC):  88


Walking 10ft on uneven surface:  88


Distance:  2'x3


Gait Assistive Device:  Parallel Bars


Does the Pt Use a Wheelchair:  Yes


Wheelchair Distance:  150'x2


Wheel 50 ft with 2 turns (QC):  6


Wheel 150 ft (QC):  4


Type of Wheelchair:  Manual


1 Step (curb) (QC):  88


4 Steps (QC):  88


12 Steps (QC):  88


Balance Sitting Static:  Fair


Balance Sitting Dynamic:  Fair


Balance-Standing Static:  Poor


Picking up an Object (QC):  88





Occupational Therapy


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 Prior to admit, he used a w/c for functional 

mobility and required assistance from spouse with showering, dressing, and 

footwear, IND with other ADLs. At [facility], pt scored IND in eating, setup with oral

care, Min A with showering, Mod A with UBD, Max A with LBD, and Total A with 

toileting. During his time at [facility], OT txs focused on increasing IND in ADLs, BUE

strength and endurance, functional mobility, and overall activity tolerance. Pt 

made some functional progress towards goals (met showering goal) but did not 

meet all goals due pt's fear of falling, and reports of RLE issues. OT 

recommends pt purchase hip kit with wide sock aide. He is being discharged to 

his home with spouse. D/c from OT at this time.


Decreased Activ Tolerance, Decreased UE Strength, Impaired Funct Balance, 

Impaired I ADL's, Impaired Self-Care Skills, Restricted Funct UE ROM


Eating (QC):  5 (Per pt report, he needs assitance to open small packages d/t 

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


Oral Hygiene (QC):  5 (seated at the sink)


Shower/Bathe Self (QC):  5 (Covering wound dressings. Shower completed 100% 

seated)


Upper Body Dressing (QC):  3 (Min A overall. Pt required v/c's to recall 

technique taught last week. Assistance pulling shirt down in back.)


Lower Body Dressing (QC):  1 (Pt able to doff pants while seated, needed min A 

to thread legs through pants, and was able to hike pants to buttocks, assist x2 

to hike remainder of way in stand)


On/Off Footwear (QC):  3 (Mod A overall. Pt able to doff gripper sock, but 

required total A to don compression sock. Pt able to don sock and shoe with min 

A.)


Toileting Hygiene (QC):  1 (Assist x2 to hike pants up )





PT Long Term Goals


Long Term Goals


PT Long Term Goals Time Frame:  Jul 15, 2022


Roll Left to Right (QC):  6


Sit to Lying (QC):  4 (CGA)


Lying-Sitting on Side/Bed(QC):  4 (CGA)


Sit to Stand (QC):  4 (CGA)


Chair/Bed-to-Chair Xfer(QC):  4 (CGA)


Car Transfer (QC):  4 (CGA)


Does the Patient Walk:  Yes


Walk 10 feet (QC):  4 (CGA)


Walk 10ft-Uneven Surface(QC):  4 (CGA)


Walk 50ft with 2 Turns (QC):  88


Walk 150 ft (QC):  88


Wheel 50 feet with 2 turns (QC:  6


1 Step (curb) (QC):  88


4 Steps (QC):  88


12 Steps (QC):  88


Picking up an Object (QC):  4 (CGA)





OT Long Term Goals


Long Term Goals


Time Frame:  Jul 22, 2022


Eating (QC):  6 (not met)


Oral Hygiene (QC):  6 (not met)


Shower/Bathe Self (QC):  4 (met)


Upper Body Dressing (QC):  4 (not met)


Lower Body Dressing (QC):  3 (not met)


On/Off Footwear (QC):  3 (nt met)


Toileting Hygiene (QC):  6 (not met)


Toilet/Commode Transfer (QC):  4 (CGA)


Additional Goals:  1-Demonstrate ADL Tasks, 2-Verbalize Understanding, 

3-ImproveStrength/[place]


1=Demonstrate adherence to instructed precautions during ADL tasks.


2=Patient will verbalize/demonstrate understanding of assistive 

devices/modifications for ADL.


3=Patient will improve strength/tolerance for activity to enable patient to 

perform ADL's.











DHIRAJ EDWARDS OT           Jul 6, 2022 13:18

## 2022-07-06 NOTE — THERAPY TEAM DISCHARGE SUMMARY
Therapy Discharge Summary


Discharge Recommendations


Date of Discharge








Physical Therapy


Patient came to rehab following a left BKA.  Upon evaluation patient performed 

rolling with SBA, supine <-> sit mod assist, sit <-> stand and transfers mod 

assist, car transfer mod assist, ambulate 2' in the parallel bars with mod 

assist, and could propel a manual ' with SBA.  Patient has been performing

bed mobility and transfer training, balance and endurance training, functional 

strengthening, gait training, and education.  Patient has made poor progress and

has only met his long term goals for rolling and sit -> supine.  Now, patient 

performs rolling with independence, sit to supine independent, supine to sit 

CGA, sit to stand min/mod assist, transfers max assist, car transfer mod assist,

and can propel a manual ' with SBA.  Patient is being discharged from this

facility today and will be discharged from PT at this time.


Roll Left to Right (QC):  6


Sit to Lying (QC):  6


Lying to Sitting/Side of Bed(Q:  4


Sit to Stand (QC):  3


Chair/Bed-to-Chair Xfer(QC):  2


Toilet Transfer (QC):  1


Car Transfer (QC):  3


Does the Patient Walk:  No and Walking Goal IS indicated


Mode of Locomotion:  Both


Anticipated Mode of Locomotion:  Both


Walk 10 feet (QC):  88


Walk 50 ft with 2 Turns(QC):  88


Walk 150 ft (QC):  88


Walking 10ft on uneven surface:  88


Distance:  2'x3


Gait Assistive Device:  Parallel Bars


Does the Pt Use a Wheelchair:  Yes


Wheelchair Distance:  150'x2


Wheel 50 ft with 2 turns (QC):  6


Wheel 150 ft (QC):  4


Type of Wheelchair:  Manual


1 Step (curb) (QC):  88


4 Steps (QC):  88


12 Steps (QC):  88


Balance Sitting Static:  Fair


Balance Sitting Dynamic:  Fair


Balance-Standing Static:  Poor


Picking up an Object (QC):  88





Occupational Therapy


Decreased Activ Tolerance, Decreased UE Strength, Impaired Funct Balance, 

Impaired I ADL's, Impaired Self-Care Skills, Restricted Funct UE ROM


Eating (QC):  5 (Per pt report, he needs assitance to open small packages d/t 

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


Oral Hygiene (QC):  5 (seated at the sink)


Shower/Bathe Self (QC):  5 (Covering wound dressings. Shower completed 100% 

seated)


Upper Body Dressing (QC):  3 (Min A overall. Pt required v/c's to recall 

technique taught last week. Assistance pulling shirt down in back.)


Lower Body Dressing (QC):  1 (Pt able to doff pants while seated, needed min A 

to thread legs through pants, and was able to hike pants to buttocks, assist x2 

to hike remainder of way in stand)


On/Off Footwear (QC):  3 (Mod A overall. Pt able to doff gripper sock, but 

required total A to don compression sock. Pt able to don sock and shoe with min 

A.)


Toileting Hygiene (QC):  1 (Assist x2 to hike pants up )





PT Long Term Goals


Long Term Goals


PT Long Term Goals Time Frame:  Jul 15, 2022


Roll Left to Right (QC):  6


Sit to Lying (QC):  4 (CGA)


Lying-Sitting on Side/Bed(QC):  4 (CGA)


Sit to Stand (QC):  4 (CGA)


Chair/Bed-to-Chair Xfer(QC):  4 (CGA)


Car Transfer (QC):  4 (CGA)


Does the Patient Walk:  Yes


Walk 10 feet (QC):  4 (CGA)


Walk 10ft-Uneven Surface(QC):  4 (CGA)


Walk 50ft with 2 Turns (QC):  88


Walk 150 ft (QC):  88


Wheel 50 feet with 2 turns (QC:  6


1 Step (curb) (QC):  88


4 Steps (QC):  88


12 Steps (QC):  88


Picking up an Object (QC):  4 (CGA)





OT Long Term Goals


Long Term Goals


Time Frame:  Jul 22, 2022


Eating (QC):  6 (not met)


Oral Hygiene (QC):  6 (not met)


Shower/Bathe Self (QC):  4 (met)


Upper Body Dressing (QC):  4 (not met)


Lower Body Dressing (QC):  3 (not met)


On/Off Footwear (QC):  3 (nt met)


Toileting Hygiene (QC):  6 (not met)


Toilet/Commode Transfer (QC):  4 (CGA)


Additional Goals:  1-Demonstrate ADL Tasks, 2-Verbalize Understanding, 3-

ImproveStrength/[place]


1=Demonstrate adherence to instructed precautions during ADL tasks.


2=Patient will verbalize/demonstrate understanding of assistive 

devices/modifications for ADL.


3=Patient will improve strength/tolerance for activity to enable patient to 

perform ADL's.











KIRAN RANGEL PT                 Jul 6, 2022 14:57

## 2022-07-07 VITALS — SYSTOLIC BLOOD PRESSURE: 120 MMHG | DIASTOLIC BLOOD PRESSURE: 51 MMHG

## 2022-07-07 VITALS — SYSTOLIC BLOOD PRESSURE: 127 MMHG | DIASTOLIC BLOOD PRESSURE: 49 MMHG

## 2022-07-07 VITALS — DIASTOLIC BLOOD PRESSURE: 55 MMHG | SYSTOLIC BLOOD PRESSURE: 133 MMHG

## 2022-07-07 VITALS — SYSTOLIC BLOOD PRESSURE: 130 MMHG | DIASTOLIC BLOOD PRESSURE: 61 MMHG

## 2022-07-07 VITALS — SYSTOLIC BLOOD PRESSURE: 138 MMHG | DIASTOLIC BLOOD PRESSURE: 56 MMHG

## 2022-07-07 VITALS — DIASTOLIC BLOOD PRESSURE: 46 MMHG | SYSTOLIC BLOOD PRESSURE: 119 MMHG

## 2022-07-07 VITALS — DIASTOLIC BLOOD PRESSURE: 68 MMHG | SYSTOLIC BLOOD PRESSURE: 157 MMHG

## 2022-07-07 VITALS — DIASTOLIC BLOOD PRESSURE: 59 MMHG | SYSTOLIC BLOOD PRESSURE: 126 MMHG

## 2022-07-07 VITALS — DIASTOLIC BLOOD PRESSURE: 65 MMHG | SYSTOLIC BLOOD PRESSURE: 141 MMHG

## 2022-07-07 PROCEDURE — 0Y6D0Z1 DETACHMENT AT LEFT UPPER LEG, HIGH, OPEN APPROACH: ICD-10-PCS | Performed by: SURGERY

## 2022-07-07 RX ADMIN — SODIUM CHLORIDE, SODIUM LACTATE, POTASSIUM CHLORIDE, AND CALCIUM CHLORIDE SCH MLS/HR: 600; 310; 30; 20 INJECTION, SOLUTION INTRAVENOUS at 16:24

## 2022-07-07 RX ADMIN — INSULIN ASPART SCH UNIT: 100 INJECTION, SOLUTION INTRAVENOUS; SUBCUTANEOUS at 21:03

## 2022-07-07 RX ADMIN — METFORMIN HYDROCHLORIDE SCH MG: 500 TABLET, FILM COATED ORAL at 17:59

## 2022-07-07 RX ADMIN — SODIUM CHLORIDE, SODIUM LACTATE, POTASSIUM CHLORIDE, AND CALCIUM CHLORIDE SCH MLS/HR: 600; 310; 30; 20 INJECTION, SOLUTION INTRAVENOUS at 15:05

## 2022-07-07 NOTE — PROGRESS NOTE-POST OPERATIVE
Post-Operative Progess Note


Surgeon (s)/Assistant (s)


Surgeon


GUMARO PENG DO


Assistant:  Herve





Pre-Operative Diagnosis


WET GANGRENE left BKA stump





Post-Operative Diagnosis





same





Procedure & Operative Findings


Date of Procedure


7/7/22


Procedure Performed/Findings


Left Above the Knee Amputation


Anesthesia Type


LMA





Estimated Blood Loss


Estimated blood loss (mL):  appx 20ml





Specimens/Packing


Specimens Removed


portion of left leg above knee and the stump below











GUMARO PENG DO                Jul 7, 2022 16:35

## 2022-07-08 ENCOUNTER — HOSPITAL ENCOUNTER (INPATIENT)
Dept: HOSPITAL 75 - IRF | Age: 72
LOS: 19 days | Discharge: HOME HEALTH | DRG: 559 | End: 2022-07-27
Attending: INTERNAL MEDICINE | Admitting: INTERNAL MEDICINE
Payer: MEDICARE

## 2022-07-08 VITALS — DIASTOLIC BLOOD PRESSURE: 67 MMHG | SYSTOLIC BLOOD PRESSURE: 129 MMHG

## 2022-07-08 VITALS — WEIGHT: 152.34 LBS | BODY MASS INDEX: 25.38 KG/M2 | HEIGHT: 65 IN

## 2022-07-08 VITALS — SYSTOLIC BLOOD PRESSURE: 149 MMHG | DIASTOLIC BLOOD PRESSURE: 64 MMHG

## 2022-07-08 VITALS — DIASTOLIC BLOOD PRESSURE: 63 MMHG | SYSTOLIC BLOOD PRESSURE: 116 MMHG

## 2022-07-08 VITALS — DIASTOLIC BLOOD PRESSURE: 52 MMHG | SYSTOLIC BLOOD PRESSURE: 107 MMHG
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
## 2022-07-08 DIAGNOSIS — I25.5: ICD-10-CM

## 2022-07-08 DIAGNOSIS — T87.44: ICD-10-CM

## 2022-07-08 DIAGNOSIS — D50.9: ICD-10-CM

## 2022-07-08 DIAGNOSIS — E11.40: ICD-10-CM

## 2022-07-08 DIAGNOSIS — B96.5: ICD-10-CM

## 2022-07-08 DIAGNOSIS — Z88.8: ICD-10-CM

## 2022-07-08 DIAGNOSIS — I11.0: ICD-10-CM

## 2022-07-08 DIAGNOSIS — R32: ICD-10-CM

## 2022-07-08 DIAGNOSIS — M19.91: ICD-10-CM

## 2022-07-08 DIAGNOSIS — Z89.612: ICD-10-CM

## 2022-07-08 DIAGNOSIS — E03.9: ICD-10-CM

## 2022-07-08 DIAGNOSIS — I73.9: ICD-10-CM

## 2022-07-08 DIAGNOSIS — I50.43: ICD-10-CM

## 2022-07-08 DIAGNOSIS — N40.0: ICD-10-CM

## 2022-07-08 DIAGNOSIS — Z95.1: ICD-10-CM

## 2022-07-08 DIAGNOSIS — Z88.5: ICD-10-CM

## 2022-07-08 DIAGNOSIS — F32.A: ICD-10-CM

## 2022-07-08 DIAGNOSIS — Z47.81: Primary | ICD-10-CM

## 2022-07-08 DIAGNOSIS — Z79.84: ICD-10-CM

## 2022-07-08 DIAGNOSIS — Z79.01: ICD-10-CM

## 2022-07-08 DIAGNOSIS — I25.2: ICD-10-CM

## 2022-07-08 DIAGNOSIS — S49.92XD: ICD-10-CM

## 2022-07-08 DIAGNOSIS — I25.10: ICD-10-CM

## 2022-07-08 DIAGNOSIS — Z87.891: ICD-10-CM

## 2022-07-08 DIAGNOSIS — Z95.820: ICD-10-CM

## 2022-07-08 DIAGNOSIS — K21.9: ICD-10-CM

## 2022-07-08 DIAGNOSIS — E78.00: ICD-10-CM

## 2022-07-08 DIAGNOSIS — I48.0: ICD-10-CM

## 2022-07-08 DIAGNOSIS — I95.9: ICD-10-CM

## 2022-07-08 DIAGNOSIS — K59.00: ICD-10-CM

## 2022-07-08 LAB
%HYPO/RBC NFR BLD AUTO: SLIGHT %
ALBUMIN SERPL-MCNC: 3.1 GM/DL (ref 3.2–4.5)
ALP SERPL-CCNC: 61 U/L (ref 40–136)
ALT SERPL-CCNC: 29 U/L (ref 0–55)
ANISOCYTOSIS BLD QL SMEAR: SLIGHT
BASOPHILS # BLD AUTO: 0 10^3/UL (ref 0–0.1)
BASOPHILS NFR BLD AUTO: 0 % (ref 0–10)
BILIRUB SERPL-MCNC: 0.7 MG/DL (ref 0.1–1)
BUN/CREAT SERPL: 18
CALCIUM SERPL-MCNC: 9.3 MG/DL (ref 8.5–10.1)
CHLORIDE SERPL-SCNC: 99 MMOL/L (ref 98–107)
CO2 SERPL-SCNC: 24 MMOL/L (ref 21–32)
CREAT SERPL-MCNC: 0.76 MG/DL (ref 0.6–1.3)
EOSINOPHIL # BLD AUTO: 0 10^3/UL (ref 0–0.3)
EOSINOPHIL NFR BLD AUTO: 0 % (ref 0–10)
GFR SERPLBLD BASED ON 1.73 SQ M-ARVRAT: 96 ML/MIN
GLUCOSE SERPL-MCNC: 123 MG/DL (ref 70–105)
HCT VFR BLD CALC: 33 % (ref 40–54)
HGB BLD-MCNC: 9.6 G/DL (ref 13.3–17.7)
LYMPHOCYTES # BLD AUTO: 0.9 10^3/UL (ref 1–4)
LYMPHOCYTES NFR BLD AUTO: 6 % (ref 12–44)
MANUAL DIFFERENTIAL PERFORMED BLD QL: YES
MCH RBC QN AUTO: 25 PG (ref 25–34)
MCHC RBC AUTO-ENTMCNC: 29 G/DL (ref 32–36)
MCV RBC AUTO: 86 FL (ref 80–99)
MICROCYTES BLD QL SMEAR: SLIGHT
MONOCYTES # BLD AUTO: 1.2 10^3/UL (ref 0–1)
MONOCYTES NFR BLD AUTO: 8 % (ref 0–12)
MONOCYTES NFR BLD: 5 %
NEUTROPHILS # BLD AUTO: 12.5 10^3/UL (ref 1.8–7.8)
NEUTROPHILS NFR BLD AUTO: 85 % (ref 42–75)
NEUTS BAND NFR BLD MANUAL: 86 %
PLATELET # BLD: 268 10^3/UL (ref 130–400)
PMV BLD AUTO: 11.3 FL (ref 9–12.2)
POTASSIUM SERPL-SCNC: 4 MMOL/L (ref 3.6–5)
PROT SERPL-MCNC: 6.8 GM/DL (ref 6.4–8.2)
SODIUM SERPL-SCNC: 137 MMOL/L (ref 135–145)
VARIANT LYMPHS NFR BLD MANUAL: 9 %
WBC # BLD AUTO: 14.6 10^3/UL (ref 4.3–11)

## 2022-07-08 PROCEDURE — 83605 ASSAY OF LACTIC ACID: CPT

## 2022-07-08 PROCEDURE — 94761 N-INVAS EAR/PLS OXIMETRY MLT: CPT

## 2022-07-08 PROCEDURE — 93306 TTE W/DOPPLER COMPLETE: CPT

## 2022-07-08 PROCEDURE — 86920 COMPATIBILITY TEST SPIN: CPT

## 2022-07-08 PROCEDURE — 85007 BL SMEAR W/DIFF WBC COUNT: CPT

## 2022-07-08 PROCEDURE — 93005 ELECTROCARDIOGRAM TRACING: CPT

## 2022-07-08 PROCEDURE — 87077 CULTURE AEROBIC IDENTIFY: CPT

## 2022-07-08 PROCEDURE — 36569 INSJ PICC 5 YR+ W/O IMAGING: CPT

## 2022-07-08 PROCEDURE — 85027 COMPLETE CBC AUTOMATED: CPT

## 2022-07-08 PROCEDURE — 83735 ASSAY OF MAGNESIUM: CPT

## 2022-07-08 PROCEDURE — 85025 COMPLETE CBC W/AUTO DIFF WBC: CPT

## 2022-07-08 PROCEDURE — 87186 SC STD MICRODIL/AGAR DIL: CPT

## 2022-07-08 PROCEDURE — 80053 COMPREHEN METABOLIC PANEL: CPT

## 2022-07-08 PROCEDURE — 82607 VITAMIN B-12: CPT

## 2022-07-08 PROCEDURE — 76937 US GUIDE VASCULAR ACCESS: CPT

## 2022-07-08 PROCEDURE — 82947 ASSAY GLUCOSE BLOOD QUANT: CPT

## 2022-07-08 PROCEDURE — 87205 SMEAR GRAM STAIN: CPT

## 2022-07-08 PROCEDURE — 87070 CULTURE OTHR SPECIMN AEROBIC: CPT

## 2022-07-08 PROCEDURE — 82805 BLOOD GASES W/O2 SATURATION: CPT

## 2022-07-08 PROCEDURE — 80202 ASSAY OF VANCOMYCIN: CPT

## 2022-07-08 PROCEDURE — 83540 ASSAY OF IRON: CPT

## 2022-07-08 PROCEDURE — 36415 COLL VENOUS BLD VENIPUNCTURE: CPT

## 2022-07-08 PROCEDURE — 86850 RBC ANTIBODY SCREEN: CPT

## 2022-07-08 PROCEDURE — 84145 PROCALCITONIN (PCT): CPT

## 2022-07-08 PROCEDURE — 83880 ASSAY OF NATRIURETIC PEPTIDE: CPT

## 2022-07-08 PROCEDURE — 86901 BLOOD TYPING SEROLOGIC RH(D): CPT

## 2022-07-08 PROCEDURE — 71045 X-RAY EXAM CHEST 1 VIEW: CPT

## 2022-07-08 PROCEDURE — 87040 BLOOD CULTURE FOR BACTERIA: CPT

## 2022-07-08 PROCEDURE — 81000 URINALYSIS NONAUTO W/SCOPE: CPT

## 2022-07-08 PROCEDURE — 86900 BLOOD TYPING SEROLOGIC ABO: CPT

## 2022-07-08 PROCEDURE — 94760 N-INVAS EAR/PLS OXIMETRY 1: CPT

## 2022-07-08 PROCEDURE — 80048 BASIC METABOLIC PNL TOTAL CA: CPT

## 2022-07-08 RX ADMIN — DOCUSATE SODIUM SCH MG: 100 CAPSULE ORAL at 20:46

## 2022-07-08 RX ADMIN — DOCUSATE SODIUM AND SENNOSIDES SCH EA: 8.6; 5 TABLET, FILM COATED ORAL at 20:47

## 2022-07-08 RX ADMIN — METFORMIN HYDROCHLORIDE SCH MG: 500 TABLET, FILM COATED ORAL at 17:26

## 2022-07-08 RX ADMIN — INSULIN ASPART SCH UNIT: 100 INJECTION, SOLUTION INTRAVENOUS; SUBCUTANEOUS at 05:41

## 2022-07-08 RX ADMIN — METFORMIN HYDROCHLORIDE SCH MG: 500 TABLET, FILM COATED ORAL at 05:59

## 2022-07-08 RX ADMIN — POLYETHYLENE GLYCOL (3350) SCH GM: 17 POWDER, FOR SOLUTION ORAL at 20:46

## 2022-07-08 RX ADMIN — ACETAMINOPHEN PRN MG: 325 TABLET ORAL at 20:38

## 2022-07-08 RX ADMIN — SODIUM CHLORIDE, SODIUM LACTATE, POTASSIUM CHLORIDE, AND CALCIUM CHLORIDE SCH MLS/HR: 600; 310; 30; 20 INJECTION, SOLUTION INTRAVENOUS at 04:51

## 2022-07-08 NOTE — PROGRESS NOTE - SURGERY
MAUREEN WALLACE S 7/8/22 0831:


Subjective


Date Seen by a Provider:  Jul 8, 2022


Time Seen by a Provider:  07:25


Subjective/Events-last exam


Mr. Rico is 1 day s/p L AKA. This morning he reports his pain is well 

controlled at a 3/10. He has no concerns after surgery and reports he is very 

happy to be treated at [facility]. He says he feels like "I could run a marathon 

if I had 2 legs."


Review of Systems


General:  No Chills, No Fatigue


HEENT:  No Head Aches, No Visual Changes


Pulmonary:  No Dyspnea, No Cough


Cardiovascular:  No: Chest Pain, Palpitations


Gastrointestinal:  No: Nausea, Vomiting, Abdominal Pain


Musculoskeletal:  leg pain


Neurological:  No: Weakness, Confusion





Objective


Exam





Vital Signs








  Date Time  Temp Pulse Resp B/P (MAP) Pulse Ox O2 Delivery O2 Flow Rate FiO2


 


7/8/22 08:11 36.8 99 18 149/64 (92) 91 Nasal Cannula 2.00 


 


7/8/22 03:53 36.8 90 16 116/63 (80) 98 Nasal Cannula 2.00 


 


7/7/22 23:57 36.9 91 16 157/68 (97) 95 Nasal Cannula 2.00 


 


7/7/22 20:18 36.5 81 20 126/59 (81) 97 Nasal Cannula 2.00 


 


7/7/22 20:18 36.5 81 20 126/59 (81) 97 Nasal Cannula 2.00 


 


7/7/22 20:00     97 Nasal Cannula 2.00 


 


7/7/22 18:00 36.6 86 18 141/65 (90) 100 Nasal Cannula 2.00 


 


7/7/22 18:00 36.6 86 18 141/65 (90) 100 Nasal Cannula 2.00 


 


7/7/22 17:45      Room Air  


 


7/7/22 17:35 36.6  12 130/61 (84) 96 Nasal Cannula 3.00 


 


7/7/22 17:30      OxyMask 3.00 


 


7/7/22 17:25   12 138/56 (83) 99 OxyMask 3.00 


 


7/7/22 17:15   14 133/55 (81) 100 OxyMask 5.00 


 


7/7/22 17:15      OxyMask 8 


 


7/7/22 17:05   18 127/49 (75) 100 OxyMask 8 


 


7/7/22 17:00      OxyMask 8 


 


7/7/22 16:55   20 120/51 (74) 100 OxyMask 8 


 


7/7/22 16:45 36.3  16 119/46 (70) 96 OxyMask 8 


 


7/7/22 16:45      OxyMask 8 














I & O 


 


 7/8/22





 07:00


 


Intake Total 1250 ml


 


Output Total 600 ml


 


Balance 650 ml





Capillary Refill :


General Appearance:  No Apparent Distress, WD/WN


HEENT:  PERRL/EOMI; No Scleral Icterus (L), No Scleral Icterus (R)


Neck:  Non Tender, Supple


Respiratory:  Chest Non Tender, Lungs Clear, Normal Breath Sounds, No Accessory 

Muscle Use, No Respiratory Distress


Cardiovascular:  Regular Rate, Rhythm, No Murmur, Normal Peripheral Pulses


Peripheral Pulses:  2+ Radial Pulses (R), 2+ Radial Pulses (L)


Gastrointestinal:  non tender, soft; No distended, No guarding, No rebound


Extremity:  Other (L AKA. No tenderness or ertyhema above dressing.)


Neurologic/Psychiatric:  Alert, Oriented x3, Normal Mood/Affect


Skin:  Normal Color, Warm/Dry





Results


Lab


Laboratory Tests


7/7/22 15:08: Glucometer 152H


7/7/22 21:02: Glucometer 123H


7/8/22 05:29: Glucometer 140H


7/8/22 07:09: 


White Blood Count 14.6H, Red Blood Count 3.86L, Hemoglobin 9.6L, Hematocrit 33L,

Mean Corpuscular Volume 86, Mean Corpuscular Hemoglobin 25, Mean Corpuscular 

Hemoglobin Concent 29L, Red Cell Distribution Width 18.8H, Platelet Count 268, 

Mean Platelet Volume 11.3, Immature Granulocyte % (Auto) 1, Neutrophils (%) 

(Auto) 85H, Lymphocytes (%) (Auto) 6L, Monocytes (%) (Auto) 8, Eosinophils (%) 

(Auto) 0, Basophils (%) (Auto) 0, Neutrophils # (Auto) 12.5H, Lymphocytes # 

(Auto) 0.9L, Monocytes # (Auto) 1.2H, Eosinophils # (Auto) 0.0, Basophils # 

(Auto) 0.0, Immature Granulocyte # (Auto) 0.1, Neutrophils % (Manual) 86, 

Lymphocytes % (Manual) 9, Monocytes % (Manual) 5, Hypochromasia SLIGHT, 

Anisocytosis SLIGHT, Microcytosis SLIGHT, Sodium Level 137, Potassium Level 4.0,

Chloride Level 99, Carbon Dioxide Level 24, Anion Gap 14, Blood Urea Nitrogen 

14, Creatinine 0.76, Estimat Glomerular Filtration Rate 96, BUN/Creatinine Ratio

18, Glucose Level 123H, Calcium Level 9.3, Corrected Calcium 10.0, Total 

Bilirubin 0.7, Aspartate Amino Transf (AST/SGOT) 54H, Alanine Aminotransferase 

(ALT/SGPT) 29, Alkaline Phosphatase 61, Total Protein 6.8, Albumin 3.1L





Assessment/Plan


1 day s/p L AKA


Leukocytosis


Anemia





Continue pain control as needed


Encourage PT/OT when tolerated


Encourage ICS


Encourage mobilization and OOB as soon as capable


Monitor labs





ANTOINE KAISER DO 7/8/22 1000:


Subjective


Time Seen by a Provider:  08:39


Subjective/Events-last exam


Pt seen and examined, states he is doing very well this morning.  I asked if he 

is ready to go to rehab and he stated yes.


Review of Systems


General:  No Chills


HEENT:  No Head Aches, No Visual Changes


Pulmonary:  No Dyspnea, No Cough


Cardiovascular:  No: Chest Pain, Palpitations


Gastrointestinal:  No: Nausea


Musculoskeletal:  leg pain





Objective


Exam


General Appearance:  No Apparent Distress, WD/WN


Respiratory:  Chest Non Tender, Lungs Clear, Normal Breath Sounds, No Accessory 

Muscle Use, No Respiratory Distress


Cardiovascular:  Regular Rate, Rhythm, No Murmur


Gastrointestinal:  non tender, soft


Extremity:  Other (L AKA. No tenderness or ertyhema above dressing.)





Assessment/Plan


Post-op day 1 - s/p L AKA


Leukocytosis


Anemia





Continue pain control as needed


Encourage PT/OT when tolerated


Encourage ICS


Encourage mobilization and OOB as soon as capable


Monitor labs





Will D/C so he can go down to rehab.





Supervisory-Addendum Brief


Verification & Attestation


Participated in pt care:  history, MDM, physical


Personally performed:  exam, history, MDM, supervision of care


Care discussed with:  Medical Student


Procedures:  n/a


Verification and Attestation of Medical Student E/M Service





A medical student performed and documented this service. I then reviewed and 

verified all information documented by the medical student and made 

modifications to such information, when appropriate. I personally performed a 

physical exam, medical decision making and then discussed any differences 

between the notes and made revisions as necessary to create one note.





Antoine Kaiser , 7/8/22 , 10:00











MAUREEN WALLACE                  Jul 8, 2022 08:31


ANTOINE KAISER DO                Jul 8, 2022 10:00

## 2022-07-08 NOTE — PHYSICAL THERAPY DAILY NOTE
PT Daily Note-Current


Subjective


Patient in recliner pre tx, agrees to PT, has no complaints of pain at rest.





Appearance


Patient in recliner post tx with nurse call, phone, tray, all needs met.





Mental Status


Patient Orientation:  Person, Place, Situation


Attachments:  Oxygen





Transfers


SCALE: Activities may be completed with or without assistive devices.





6-Indepedent-patient completes the activity by him/herself with no assistance 

from a helper.


5-Set-up or Clean-up Assistance-helper sets up or cleans up; patient completes 

activity. Vona assists only prior to or  


    following the activity.


4-Supervision or Touching Assistance-helper provides verbal cues and/or 

touching/steadying and/or contact guard assistance as patient completes 

activity. Assistance may be provided   


    throughout the activity or intermittently.


3-Partial/Moderate Assistance-helper does LESS THAN HALF the effort. Vona 

lifts, holds or supports trunk or limbs, but provides less than half the effort.


2-Substantial/Maximal Assistance-helper does MORE THAN HALF the effort. Vona 

lifts or holds trunk or limbs and provides more than half the effort.


1-Ottimvmrv-hpvvwl does ALL the effort. Patient does none of the effort to 

complete the activity. Or, the assistance of 2 or more helpers is required for 

the patient to complete the  


    activity.


If activity was not attempted, code reason:


7-Patient Refused.


9-Not Applicable-not attempted and the patient did not perform the activity 

before the current illness, exacerbation or injury.


10-Not Attempted due to Environmental Limitations-(lack of equipment, weather 

restraints, etc.).


88-Not Attempted due to Medical Conditions or Safety Concerns.





Exercises


Supine Ex:  Quad Set (RLE), Glut sets, Straight leg raise, Hip abd/add


Supine Reps:  20


Seated Therapy Exercises:  Ankle pumps, Long arc quads, Hip flexion


Seated Reps:  20





Treatments


LE ROM





Assessment


Current Status:  Fair Progress


Patient's LLE tends to elevate into the air at rest when sitting, encouraged 

patient to perform hip extension, press into his recliner seat in order to help 

this.





PT Short Term Goals


Short Term Goals


Time Frame:  Jul 15, 2022


Roll Left & Right:  4


Sit to lying:  3 (Kacie)


Lying to sitting on side of be:  3 (Kacie)


Sit to stand:  3 (modA)


Chair/bed-to-chair transfer:  3 (modA)





PT Long Term Goals


Long Term Goals


PT Long Term Goals Time Frame:  Jul 29, 2022


Roll Left & Right (QC):  6


Sit to Lying (QC):  4 (SBA)


Lying-Sitting on Side/Bed(QC):  4 (SBA)


Sit to Stand (QC):  3 (Kacie)


Chair/Bed-to-Chair Xfer(QC):  3 (Kacie)


Toilet Transfer (QC):  3 (Kacie)


Car Transfer (QC):  3 (Kacie)


Does the Patient Walk:  No and Walking Goal NOT indicated


Walk 10 feet (QC):  88


Walk 50ft with 2 Turns (QC):  88


Walk 150 ft (QC):  88


Walking 10ft on Uneven Surface:  88


1 Step (curb) (QC):  88


4 Steps (QC):  88


12 Steps (QC):  88


Picking up an Object (QC):  88


Wheel 50 feet with 2 turns (QC:  6


Wheel 150 feet:  6





PT Plan


Problem List


Problem List:  Activity Tolerance, Functional Strength, Safety, Balance, 

Transfer, Bed Mobility, ROM





Treatment/Plan


Treatment Plan:  Continue Plan of Care


Treatment Plan:  Bed Mobility, Education, Functional Activity [place], Functional 

Strength, Group Therapy, Safety, Therapeutic Exercise, Transfers


Treatment Duration:  Jul 29, 2022


Frequency:  At least 5 of 7 days/Wk (IRF)


Estimated Hrs Per Day:  1.5 hours per day


Patient and/or Family Agrees t:  Yes





Safety Risks/Education


Patient Education:  Correct Positioning, Safety Issues


Teaching Recipient:  Patient


Teaching Methods:  Demonstration, Discussion


Response to Teaching:  Reinforcement Needed





Time/GCodes


Time In:  1300


Time Out:  1315


Total Billed Treatment Time:  15


Total Billed Treatment


1 visit


EX 15'











KIRAN RANGEL PT                 Jul 8, 2022 13:18

## 2022-07-08 NOTE — OCCUPATIONAL THERAPY EVAL
OT Evaluation-General/PLF


Medical Diagnosis


Admission Date


2022 at 11:22


Medical Diagnosis:  s/p L AKA


Onset Date:  2022





Therapy Diagnosis


Therapy Diagnosis:  decreased ADL status and weakness





Precautions


Precautions/Isolations:  Fall Prevention, Standard Precautions





Referral


Physician:  Rosie Alegria Reason:  Evaluation/Treatment





Medical History


Pertinent Medical History:  DM, PVD


Additional Medical History


PVD, DM, afib, R 1st toe AMP


Current History


s/p L BKA on 22, admitted to ARU and discharged 22. Pt reports sliding 

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 

gangrene, underwent L AKA  and admitted to ARU 22 for skilled therapies.





Social History


Home:  Single Level


Current Living Status:  Spouse


Entry Into Home:  Ramp





ADL-Prior Level of Function


SCALE: Activities may be completed with or without assistive devices.





6-Indepedent-patient completes the activity by him/herself with no assistance 

from a helper.


5-Set-up or Clean-up Assistance-helper sets up or cleans up; patient completes 

activity. Cowley assists only prior to or  


    following the activity.


4-Supervision or Touching Assistance-helper provides verbal cues and/or 

touching/steadying and/or contact guard assistance as patient completes 

activity. Assistance may be provided   


    throughout the activity or intermittently.


3-Partial/Moderate Assistance-helper does LESS THAN HALF the effort. Cowley 

lifts, holds or supports trunk or limbs, but provides less than half the effort.


2-Substantial/Maximal Assistance-helper does MORE THAN HALF the effort. Cowley 

lifts or holds trunk or limbs and provides more than half the effort.


6-[id number] does ALL the effort. Patient does none of the effort to 

complete the activity. Or, the assistance of 2 or more helpers is required for 

the patient to complete the  


    activity.


If activity was not attempted, code reason:


7-Patient Refused.


9-Not Applicable-not attempted and the patient did not perform the activity 

before the current illness, exacerbation or injury.


10-Not Attempted due to Environmental Limitations-(lack of equipment, weather 

restraints, etc.).


88-Not Attempted due to Medical Conditions or Safety Concerns.


ADL PLOF Comments


Pt required assistance with ADLs prior to initial BKA on 6/21. His wife assists 

him with showering, UE/LE dressing, footwear and IADLs, He was able to toilet hi

mself, perform oral care and eat independently. Since he discharged from ARU on 

, he required total-moderate assistance with all ADLs (dressing, toileting, 

showering), and set up with eating and oral care.


Self Care:  Needed Some Help


Functional Cognition:  Independent


DME/Equipment:  Bath Chair, Grab Bars, Shower, Shower Hose Extender


DME/Equipment Comments


Walker, w/c.





OT Current Status


Subjective


Pt in bed upon OT arrival, agreeable to OT/PT cotreat for eval/tx.





Mental Status/Objective


Patient Orientation:  Person, Confused, Place, Situation


When asked if pt had scheduled apt with dr amaury AMBROSIO, pt was unable to recall 

the events that lead him to the surgery.


Attachments:  Oxygen (2L)





Current


Glasses/Contacts:  No


Hearing Aids:  No


Dentures/Partials:  No


Hand Dominance:  Right


Upper Extremity ROM


~60 degrees of shoulder flexion in RUE, ~10 degrees in LUE


WFL elbow flexion/extension. Limited supination LUE (pt able to get wrist in 

neutral position)


Upper Extremity Strength


grossly 3/5 in RUE and 2+/5 in LUE





ADL-Treatment


Eating (QC):  5 (Assistance opening containers)


Oral Hygiene (QC):  5 (set up at sink)


Shower/Bathe Self (QC):  1 (Assist x2 with sponge bath to help with bed mobility

and wiping)


Upper Body Dressing (QC):  3 (Mod A. Assist required overhead and slight 

assistance down trunk.)


Lower Body Dressing (QC):  1 (Assist x2)


On/Off Footwear (QC):  1


Toileting Hygiene (QC):  1 (Assist x2 for bowel movement. Setup with urinal)





Other Treatments


[id number]: OT evaluation complete. OT/PT cotreat due to skill of 2 clinicians r

equired which a rehab tech could not perform in order to coordinate UE/LEs, 

decrease fall risk, and due to pt's limitations in pain, mobility/transfers. OT 

focused on UE placement, cues for sequencing and safety,while PT focused on LE 

placement, gross overall movement, transfers/mobility. Pt donned underwear at 

bed level prior to using SB to transfer to w/c. Pt required min A with bed 

mobility during LBD task, Mod A supine<>sit, and Max A to transfer to w/c with 

SB. Pt performed functional mobility throughout the hallways with frequent RBs 

every few feet. He returned to room to complete toileting at w/c level with 

urinal before heading to therapy gym to participate in transfer preparatory 

exercises at the parallel bars (3x for ~20seconds). Pt required v/c's to extend 

R knee and stand up straight during activity. Pt wheeled back to room to 

complete oral care seated at the sink. Pt used FWW to SPT from w/c to recliner, 

Max A. Post tx, pt left in recliner with call light in reach and all needs met. 





[id number]: Pt donned pullover shirt, required VCs to recall dressing techniques 

from last time he was on rehab unit. In order to increase BUE strength and 

activity tolerance, pt completed x20 reps biceps curls RUE, 2lb weight. 2x10 

biceps curls LUE, no weight. x10 reps wrist flexion and extension 2lb wrist 

weight RUE, no weight LUE. Pt had difficulty maintaining neutral midline 

position in recliner, leaning towards L side. OT assisted pt with positioning 

with blanket on L Side to prevent leaning. Post tx, pt in recliner, call light 

in reach and all needs met.





Education


OT Patient Education:  Correct positioning, Energy conservation, Exercise 

program, Modified ADL techniques, Progress toward Goal/Update tx plan, Purpose 

of tx/functional activities, Reviewed precautions, Rehab process, Safety issues,

Transfer techniques, W/C management


Teaching Recipient:  Patient


Teaching Methods:  Discussion


Response to Teaching:  Verbalize Understanding





OT Short Term Goals


Short Term Goals


Time Frame:  2022


Toileting hygiene:  2


Shower/bathe self:  3


Lower body dressin


Putting on/taking off footwear:  3





OT Long Term Goals


Long Term Goals


Time Frame:  Aug 5, 2022


Eating (QC):  6


Oral Hygiene (QC):  6


Toileting Hygiene (QC):  3 (bowel movements on toilet)


Shower/Bathe Self (QC):  4


Upper Body Dressing (QC):  5


Lower Body Dressing (QC):  3


On/Off Footwear (QC):  4


Additional Goals:  1-Demonstrate ADL Tasks, 2-Verbalize Understanding, 3-

ImproveStrength/[place]


1=Demonstrate adherence to instructed precautions during ADL tasks.


2=Patient will verbalize/demonstrate understanding of assistive devices/modif

ications for ADL.


3=Patient will improve strength/tolerance for activity to enable patient to 

perform ADL's.





OT Education/Plan


Problem List/Assessment


Assessment:  Decreased Activ Tolerance, Decreased UE Strength, Impaired Bed 

Mobility, Impaired Coordination, Impaired Funct Balance, Impaired I ADL's, 

Impaired Self-Care Skills, Restricted Funct UE ROM





Discharge Recommendations


Plan/Recommendations:  Continue POC


Comment


Depends on pt progress





Treatment Plan/Plan of Care


Patient would benefit from OT for education, treatment and training to promote 

independence in ADL's, mobility, safety and/or upper extremity function for 

ADL's.


Plan of Care:  ADL Retraining, Caregiver Training, Functional Mobility, Group 

Exercise/Act as Ind, UE Funct Exercise/Act, W/C Management Training


Treatment Duration:  Aug 5, 2022


Frequency:  At least 5 of 7 days/Wk (IRF)


Estimated Hrs Per Day:  1.5 hours per day


Rehab Potential:  Guarded





Time/GCodes


Start Time:  11:00 ([id number])


Stop Time:  13:40 ([id number])


Total Time Billed (hr/min):  75


Billed Treatment Time


OT eval [id number], Cotreat x50


1, EVH (10'), ADL 2 (25'), FA 1 (25') 





[id number] 


1, EX











DHIRAJ EDWARDS OT           2022 11:55

## 2022-07-08 NOTE — PM&R POST ADMISSION ASSESSMENT
PM&R HP


Date of Visit:  Jul 8, 2022


Time of Visit:  11:30


History of Present Illness


CC: Debility from left AKA





HPI: This is a 71yoWM known to me from recent IRF admit following a left BKA on 

6/22/22 then DC from IRF on 7/6/22 but sustained a fall which resulted in him 

lying on the floor overnight due to wife could not get him up off the floor so 

he ultimately was seen by his surgeon at [facility] and wet gangrene was diagnosed 

and AKA was recommended so he chose his surgeon as Dr Kaiser who performed an 

AKA due to failure of the BKA. He reports he has improved pain since the 

amputation but he is having difficulty with the left shoulder pain which limits 

his ability to perform tasks. Labs remained stable. Accuchecks will be ordered.





Past Medical-Social-Family Hx


Past Med/Social Hx:  Reviewed Nursing Past Med/Soc Hx, Reviewed and Corrections 

made


Patient Social History


Marrital Status:  


Employed/Student:  retired


Alcohol Use:  Denies Use


Smoking Status:  Former Smoker


Former Smoker, Quit:  Jul 7, 1972


Type Used:  Cigarettes


2nd Hand Smoke Exposure:  No





Immunizations Up To Date


Tetanus Booster (TDap):  Unknown





Past Medical History


Surgeries:  Angioplasty, Orthopedic


Currently Using CPAP:  No


Currently Using BIPAP:  No


Cardiac:  Atrial Fibrillation, Chronic Edema/Swelling, Coronary Artery Disease, 

Heart Attack, High Cholesterol, Hypertension, Peripheral Vascular


Neurological:  Neuropathy


Genitourinary:  Benign Prostatic Hyperpl


Gastrointestinal:  Gastroesophageal Reflux


Musculoskeletal:  Amputee, Arthritis


Endocrine:  Hypothyroidsim, Diabetes, Non-Insulin dep


History of Blood Disorders:  No





Family History


No Pertinent Family Hx





PM&R Allergy/Meds/Data Review


Allergies


Coded Allergies:  


     gabapentin (Verified  Allergy, Unknown, 7/6/22)


   nightmares


     lisinopril (Verified  Allergy, Unknown, 6/24/22)


     niacin (Verified  Allergy, Unknown, 6/24/22)


     oxycodone (Verified  Allergy, Unknown, 6/24/22)


     rivaroxaban (Verified  Allergy, Unknown, 6/24/22)


Uncoded Allergies:  


     CI Pigment Blue 63 (Adverse Reaction, Severe, Itching, 6/24/22)





Home Medications


Scheduled


Amiodarone HCl (Amiodarone HCl), 200 MG PO DAILY, (Reported)


Furosemide (Furosemide), 80 MG PO DAILY, (Reported)


Glimepiride (Amaryl), 0.5 MG PO DAILY@0630


Levothyroxine Sodium (Euthyrox), 100 MCG PO DAILY, (Reported)


Losartan Potassium (Losartan Potassium), 50 MG PO DAILY, (Reported)


Metformin HCl (Metformin HCl), 500 MG PO BID@07,17


Metoprolol Succinate (Metoprolol Succinate), 25 MG PO DAILY, (Reported)


Pantoprazole Sodium (Pantoprazole Sodium), 40 MG PO DAILY, (Reported)


Potassium Chloride (K-Tab ER), 20 MEQ PO TID, (Reported)





Scheduled PRN


Docusate Sodium (Docusate Sodium), 100 MG PO BID PRN for CONSTIPATION-1ST LINE, 

(Reported)


Hydrocodone/Acetaminophen (Hydrocodone-Acetamin  mg), 1 EACH PO Q8H PRN 

for PAIN-MODERATE (5-7)





Discontinued Medications


Apixaban (Eliquis), 5 MG PO BID, (Reported)


Cholecalciferol (Vitamin D3) (Vitamin D3), 25 MCG PO DAILY, (Reported)


   Discontinued Reason: No Longer Taking


Clopidogrel Bisulfate (Plavix), 75 MG PO 1200, (Reported)


Glimepiride (Glimepiride), 4 MG PO BID, (Reported)


Levothyroxine Sodium (Levothyroxine Sodium), 100 MCG PO DAILY, (Reported)


   Discontinued Reason: No Longer Taking


Metformin HCl (Metformin HCl), 500 MG PO QID, (Reported)


Simvastatin (Simvastatin), 20 MG PO HS, (Reported)


   Discontinued Reason: No Longer Taking





Current Medications


Current Medications


Reviewed





Review of Systems


Constitutional:  see HPI, weakness


EENTM:  no symptoms reported


Respiratory:  no symptoms reported


Cardiovascular:  no symptoms reported


Gastrointestinal:  constipation


Genitourinary:  no symptoms reported


Musculoskeletal:  back pain, joint pain


Skin:  no symptoms reported


Psychiatric/Neurological:  Depressed


All Other Systems Reviewed


Negative Unless Noted:  Yes





Physical Exam


Physical Exam


Vital Signs


Capillary Refill :


Height, Weight, BMI


Height: '"


Weight: lbs. oz. kg; 26.62 BMI


Method:


General Appearance:  No Apparent Distress, WD/WN, Chronically ill


Eyes:  Bilateral Eye Normal Inspection, Bilateral Eye PERRL


HEENT:  PERRL/EOMI, Normal ENT Inspection, Pharynx Normal


Neck:  Full Range of Motion, Normal Inspection, Non Tender, Supple, Carotid 

Bruit


Respiratory:  Chest Non Tender, Lungs Clear, Normal Breath Sounds, No Accessory 

Muscle Use, No Respiratory Distress


Cardiovascular:  Regular Rate, Rhythm, No Edema, No Gallop, No JVD, No Murmur, 

Normal Peripheral Pulses


Gastrointestinal:  Normal Bowel Sounds, No Organomegaly, No Pulsatile Mass, Non 

Tender, Soft


Back:  Normal Inspection, No CVA Tenderness, No Vertebral Tenderness


Extremity:  Normal Capillary Refill, Normal Inspection, Normal Range of Motion 

(except left shoulder), Non Tender, No Calf Tenderness, No Pedal Edema


Neurologic/Psychiatric:  Alert, Oriented x3, No Motor/Sensory Deficits, Normal 

Mood/Affect, Motor Weakness (generalized john left arm), Other (AKA left)


Skin:  Normal Color, Warm/Dry


Lymphatic:  No Adenopathy





PM&R Medical Assessment & Plan


REHAB/MEDICAL ASSESSMENT AND PLAN:





REHAB IMPAIRMENT GROUP: 


Left AKA





ETIOLOGIC DIAGNOSIS: 


Left AKA





The comorbidities that impact the patients function and/or functional outcome 

by: fall risk, left shoulder limitation, recent fall, situational depression, DM





REHAB PLAN:


The patient is being admitted to our comprehensive inpatient rehabilitation 

facility and can tolerate the intensity of service consisting of at least:


      180 minutes of therapy a day, 5 out of 7 days a week 


    


Rehab treatment will consist of:   PT OT will focus on regaining enough function

to prevent falls and work on strengthening s/p left AKA and ultimately return 

back home





The patient/family has a good understanding of our discharge process and will 

benefit from an interdisciplinary inpatient rehabilitation program. The patient 

has potential to make improvement and is in need of at least two of the 

following multidisciplinary therapies including but not limited to physical, 

occupational, speech, and prosthetics and orthotics.  Additionally the patient 

will need services from respiratory, nutritional services, wound care, 

psychology, etc. (Customize this to each patient). Given the patients complex 

condition and risk of further medical complications, rehabilitation services 

cannot be safely or effectively provided at a lower level of care such as a 

skilled nursing facility.





BARRIERS TO DISCHARGE:


left AKA and left shoulder injury





ESTIMATED LOS:


10 days





DISPOSITION:


Home





RELEVANT CHANGES SINCE PREADMISSION SCREENING: 


I have compared the patients medical and functional status at the time of the 

preadmission screening and there are: 


      no changes





PROGNOSIS:


Fair





REHABILITATION GOALS:  


1.  PT OT will focus on regaining enough function to prevent falls and work on 

strengthening s/p left AKA and ultimately return back home








All the above goals were reviewed with the patient and he/she is in agreement.


By signing this document, I acknowledge that I have personally performed a full 

physical examination on this patient within 24 hours of admission to this 

inpatient rehabilitation facility and have determined the patient to be able to 

tolerate the above course of treatment at an intensive level for a reasonable 

period of time. I will be completing a detailed individualized Plan of Care for 

this patient by day #4 of the patients stay based upon the Preadmission Screen,

the Post-Admission Evaluation, and the therapy evaluations.


Admission Dx/Comorbidities:  


(1) S/P AKA (above knee amputation)


ICD Codes:  Z89.619 - Acquired absence of unspecified leg above knee


(2) Gangrene


ICD Codes:  I96 - Gangrene, not elsewhere classified





Assessment/Plan


Assessment and Plan


Assess & Plan/Chief Complaint


Assessment:


s/p Left AKA POD # 1 after failed LBKA 6/21/22


Fall 7/5/22 resulting in left shoulder injury causing further debility and l

imiting ADL/independence


Severe PVD


PAF


OAC


HTN


HLP


DM


Hypothyroidism


Previous hypoglycemia required holding metformin and glimepiride but now on 

lower dose


Constipation











DIANA HENRIQUEZ DO                 Jul 8, 2022 11:03

## 2022-07-08 NOTE — OPERATIVE REPORT
DATE OF SERVICE:  



PREOPERATIVE DIAGNOSIS:

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



POSTOPERATIVE DIAGNOSIS:

Wet gangrene of left below-the-knee amputation stump, pending pathology.



PROCEDURE:

Left above-the-knee amputation.



SURGEON:

Gumaro Kaiser DO.



FIRST ASSISTANT:

Jeison Edgar DO.



ANESTHESIA:

LMA.



SPECIMEN:

Portion of the leg from just above the knee to the stump.



BLOOD LOSS:

Less than 20 mL.



FLUIDS:

Per anesthesia.



POSTOPERATIVE CONDITION:

Stable.



INDICATION FOR PROCEDURE:

The patient is a 71-year-old male who unfortunately has very bad peripheral

vascular disease.  He was at outside institution.  They were attempting to

salvage the leg.  They were unable to and then he went for a below-the-knee

amputation.  Unfortunately, during his stay at the hospital, he stated that they

had a knee immobilizer on and then put a compression device from the stump or

protection.  He said it was too tight, kept telling them to take it off, they

did not and then he was sent to rehabilitation.  He had taken this device off in

rehabilitation, had some skin discoloration, looked like it was dying and the

stump initially looked bad at the staples and then got better, and then over the

07/04/2022 weekend it actually got worse, but he still decided to go home

because he wanted to see his surgeon.  Saw his surgeon today, the surgeon told

him it is wet gangrene and needed an above-the-knee amputation.



FINDINGS:

The patient had wet gangrene of the stump and above-the-knee amputation was

performed.  He had good blood flow.



PROCEDURE NOTE:

After informed consent was obtained, the patient was brought to the operating

room table, placed on the table in supine position.  He had been marked

previously.  Timeout done.  He was sterilely prepped and draped in normal

fashion.  He had a tourniquet placed on the left leg.  Tourniquet was turned on

and then started dissecting fishmouth type dissection, first elliot this on the

leg and then dissected with Bovie electrocautery, going just above the

demarcation from the gangrene saying to good healthy tissue cutting through the

skin down to subcutaneous tissue with Bovie electrocautery, going medially and

laterally, dissecting down through the muscle down to the femur.  I then using

the ostial elevator to lift the tissue off of the femur up about 4 cm and then

came across the femur with a bone saw, continued dissecting through the muscle

encountering the vasculature and clamping these with hemostats, continued down

through the muscles and down to the back of the skin, then able to take the leg

off, passed off the table, tied the large vascular bundle with an 0 Prolene. 

There was another small vasculature, which were controlled with Bovie

electrocautery and some with 0 Vicryl and then once this was done, we released

the tourniquet.  There, we did place some bone wax.  There were a couple of

areas of bleeding.  These were controlled with Bovie electrocautery.  Then, we

closed the incision over the bone and then used the muscle to protect it.  Using

3-0 Prolene suture, 10 to 12 vertical mattress sutures were used and then in

between there used staples, good closure.  All the tissue looked healthy.  There

had been some bleeding, so this was a good sign.  A Xeroform gauze was placed as

well as then Kerlix and finally Coban and then Ace wrap.  The patient tolerated

the procedure.  Dr. Edgar assisted this case helping to make incisions, close

incisions, identify anatomy and hold anatomy out of the way.  The patient was

then transferred to recovery room in stable condition.  Sponge, instrument and

needle were correct at the end of the case.





Job ID: [id number]

DocumentID: [id number]

Dictated Date:  07/07/2022 20:05:30

Transcription Date: 07/08/2022 06:03:24

Dictated By: GUMARO KAISER DO

[email or web address]

## 2022-07-08 NOTE — DISCHARGE INST-SURGICAL
Discharge Inst-Surgical


Depart Medication/Instructions


New, Converted or Re-Newed RX:  Other (restart home meds)


Patient Instructions


Follow up Appt:


Make appointment for 1 week. [phone]





Instructions:


May shower in 24 hours, no tub bath or soaking.


Use incentive spirometer at home as directed.


No Smoking





Skin/Wound Care:


May remove bandages in am.  You need to leave the staples and sutures in place, 

they will be removed in 10-14 days. 





Symptoms to Report:


Appetite Changes, Extremity Discoloration, Numbness/Tingling, Swelling 

Increased, Bleeding Excessive, Eyesight Changes, Pain Increased, Urine Color 

Change, Constipation(Persistent), Fever over 101 degree F, Pain/Pressure in 

chest, Urinating Difficulty, Cough Up/Vomit Blood, Heart Beat Irreg/Pounding, 

Pain/Pressure in jaw, Cramps in feet or legs, Lightheadedness, Pain/Pressure in 

shoulder, Diarrhea(Persistent), Memory Changes Suddenly, Questions/Concerns, 

Weight gain consecutive days, Dizziness/Fainting, Nausea/Vomiting, Shortness of 

Breath, Weight gain over 2 pounds








If questions or concerns contact your physician 


Or seek help at emergency department.





Activity


Activity as Tolerated:  Yes


Activity Instructions:  Avoid Stress to Incision





Diet


Discharge Diet:  No Restrictions


Diet After 24 Hours:  Clear Liquid if Nauseous


If Any Problems/Questions/Issu:  Contact Your Physician, Go to Emergency Room





Skin/Wound Care


Infection Signs and Symptoms:  Increased Redness, Foul Odor of Wound, Increased 

Drainage, Skin Itchy or Has a Rash, Increased Swelling, Temperature Above 101  F


Wound Care Comment:  


gently wrap leg to avoid to much pressure


Bathing Instructions:  Rip Downey ERIC B DO                Jul 8, 2022 10:02

## 2022-07-08 NOTE — ANESTHESIA-GENERAL POST-OP
General


Patient Condition


Mental Status/LOC:  Same as Preop


Cardiovascular:  Satisfactory


Nausea/Vomiting:  Absent


Respiratory:  Satisfactory


Pain:  Controlled


Complications:  Absent





Post Op Complications


Complications


None





Follow Up Care/Instructions


Patient Instructions


None needed.





Anesthesia/Patient Condition


Patient Condition


Patient is doing well, no complaints, stable vital signs, no apparent adverse 

anesthesia problems.   


No complications reported per nursing.











PRAVIN HAYES CRNA           Jul 8, 2022 10:43

## 2022-07-08 NOTE — PHYSICAL THERAPY EVALUATION
PT Evaluation-General


Medical Diagnosis


Admission Date


Jul 8, 2022 at 11:22


Medical Diagnosis:  left AKA


Onset Date:  Jul 7, 2022





Therapy Diagnosis


Therapy Diagnosis:  impaired mobility





Precautions


Precautions/Isolations:  Fall Prevention, Standard Precautions





Referral


Physician:  Noy Velez DO


Reason for Referral:  Evaluation/Treatment





Medical History


Pertinent Medical History:  DM, PVD


Reviewed History:  Yes





Social History


Home:  Single Level


Current Living Status:  Spouse


Entry Into Home:  Ramp (recently built), Stairs With Railing


PT Steps Into Home:  4





Prior


Prior Level of Function


SCALE: Activities may be completed with or without assistive devices.





6-Indepedent-patient completes the activity by him/herself with no assistance 

from a helper.


5-Set-up or Clean-up Assistance-helper sets up or cleans up; patient completes 

activity. [place] assists only prior to or  


    following the activity.


4-Supervision or Touching Assistance-helper provides verbal cues and/or 

touching/steadying and/or contact guard assistance as patient completes 

activity. Assistance may be provided   


    throughout the activity or intermittently.


3-Partial/Moderate Assistance-helper does LESS THAN HALF the effort. [place] 

lifts, holds or supports trunk or limbs, but provides less than half the effort.


2-Substantial/Maximal Assistance-helper does MORE THAN HALF the effort. [place] 

lifts or holds trunk or limbs and provides more than half the effort.


7-Rgxlesxxa-pmbxav does ALL the effort. Patient does none of the effort to 

complete the activity. Or, the assistance of 2 or more helpers is required for 

the patient to complete the  


    activity.


If activity was not attempted, code reason:


7-Patient Refused.


9-Not Applicable-not attempted and the patient did not perform the activity 

before the current illness, exacerbation or injury.


10-Not Attempted due to Environmental Limitations-(lack of equipment, weather 

restraints, etc.).


88-Not Attempted due to Medical Conditions or Safety Concerns.


Bed Mobility:  6


Transfers (B,C,W/C):  3


Gait:  3


Stairs:  3


Wheelchair Mobility:  6


Indoor Mobility (Ambulation):  Needed Some Help


Stairs:  Needed Some Help


Prior Devices Use:  Manual wheelchair, Walker





PT Evaluation-Current


Subjective


Patient in bed pre tx, agrees to PT, has unrated pain in left hip.  Will be co-

treating with OT for part of tx due to poor patient mobility, severe debility, 

severe pain with activity, coordinate UE and LE with activity, safety and reduce

risk of falls.





Pt/Family Goals


to be independent at home.





Objective


Patient Orientation:  Person, Place, Situation





ROM/Strength


ROM Lower Extremities


WNL


Strength Lower Extremities


RLE (hip flexion 3+/5, knee flexion 3+/5, knee extension 4-/5, dorsiflexion 

3/5), RLE hip not tested





Sensory


Vision:  Functional


Hearing:  Functional


Sensation Right Lower Extremit:  Impaired


Sensation Left Lower Extremity:  Impaired





Transfers


Roll Left & Right (QC):  3


Sit to Lying (QC):  3


Lying to Sitting/Side of Bed(Q:  3


Sit to Stand (QC):  2


Chair/Bed-to-Chair Xfer(QC):  2


Toilet Transfer (QC):  2


Car Transfer (QC):  2


Patient performs rolling with min assist, supine <-> sit mod assist, sit <-> 

stand max assist, transfer with sliding board max assist, car transfer max 

assist.  Patient can also perform a stand pivot transfer with max assist.  

Patient needs cues for hand placement and positioning.  In he parallel bars 

patient stood x3 for about 20 seconds each time, also performed LLE hip 

extension and abd x10 with residual limb.





Gait


Does the Patient Walk?:  No and Walking Goal NOT indicated


Walk 10 feet (QC):  88


Walk 50 ft with 2 Turns(QC):  88


Walk 150 ft (QC):  88


Walking 10ft/uneven surface-QC:  88





Wheelchair Training


Does the Pt Use a Wheelchair?:  Yes


Distance:  100'x2


Wheel 50 ft with 2 turns (QC):  4


Wheel 150 ft (QC):  88


Type of Wheelchair:  Manual


Patient can propel a manual ' with SBA, he is very slow, only goes a few f

eet before needing a rest break.  Patient has bad shoulders bilaterally, would 

benefit from a power WC or scooter.





Stairs


1 Step (curb) (QC):  88


4 Steps (QC):  88


12 Steps (QC):  88





Balance


Sitting Static:  Fair


Sitting Dynamic:  Fair


Standing Static:  Poor


 Standing Dynamic:  Poor


Picking up an Object (QC):  88





Treatment


Patient was soiled in the bed at the beginning of tx, had to roll from side to 

side for cleaning and to get brief and underwear on.  Patient also performed 

ADL's in restroom at the end of tx.  PT performed bed mobility and transfers, WC

mobility, rolling for cleaning and dressing, positioning and safety during 

ADL's, OT performed cleaning and dressing, ADL's, UE positioning and safety 

during activity.





Assessment/Needs


Patient in recliner post tx with nurse call, phone, tray, all needs met.  

Patient has impaired mobility, strength, endurance.  He has a lot of pain with 

activity, needs significant assist with transfers.


Rehab Potential:  Guarded





PT Short Term Goals


Short Term Goals


Time Frame:  Jul 15, 2022


Roll Left & Right:  4


Sit to lying:  3 (Kacie)


Lying to sitting on side of be:  3 (Kacie)


Sit to stand:  3 (modA)


Chair/bed-to-chair transfer:  3 (modA)





PT Long Term Goals


Long Term Goals


PT Long Term Goals Time Frame:  Jul 29, 2022


Roll Left & Right (QC):  6


Sit to Lying (QC):  4 (SBA)


Lying-Sitting on Side/Bed(QC):  4 (SBA)


Sit to Stand (QC):  3 (Kacie)


Chair/Bed-to-Chair Xfer(QC):  3 (Kacie)


Toilet Transfer (QC):  3 (Kacie)


Car Transfer (QC):  3 (Kacie)


Does the Patient Walk:  No and Walking Goal NOT indicated


Walk 10 feet (QC):  88


Walk 50ft with 2 Turns (QC):  88


Walk 150 ft (QC):  88


Walking 10ft on Uneven Surface:  88


1 Step (curb) (QC):  88


4 Steps (QC):  88


12 Steps (QC):  88


Picking up an Object (QC):  88


Wheel 50 feet with 2 turns (QC:  6


Wheel 150 feet:  6





PT Plan


Problem List


Problem List:  Activity Tolerance, Functional Strength, Safety, Balance, 

Transfer, Bed Mobility, ROM





Treatment/Plan


Treatment Plan:  Continue Plan of Care


Treatment Plan:  Bed Mobility, Education, Functional Activity [place], Functional 

Strength, Group Therapy, Safety, Therapeutic Exercise, Transfers


Treatment Duration:  Jul 29, 2022


Frequency:  At least 5 of 7 days/Wk (IRF)


Estimated Hrs Per Day:  1.5 hours per day


Patient and/or Family Agrees t:  Yes





Safety Risks/Education


Patient Education:  Transfer Techniques, Correct Positioning, W/C Management, 

Safety Issues


Teaching Recipient:  Patient


Teaching Methods:  Demonstration, Discussion


Response to Teaching:  Reinforcement Needed





Discharge Recommendations


Plan


Patient will perform bed mobility and transfer training, balance and endurance 

training, functional strengthening, and education, to improve functional 

mobility and independence at home.


Therapy Discharge Recommendati:  Scheduled Assistance, Home & Family, Post Acute

PT





Time/GCodes


Time In:  1050


Time Out:  1200


Total Billed Treatment Time:  60


Total Billed Treatment


1 visit


EVM 10'


FA 50'





PT eval from [id number], OT eval from [id number], co-treat from [id number]











KIRAN RANGEL PT                 Jul 8, 2022 11:54

## 2022-07-08 NOTE — ST COGNITIVE LINGUISTIC EVAL
Speech Evaluation-General


Medical Diagnosis


s/p L AKA


Onset Date:  Jul 7, 2022





Therapy Diagnosis


Therapy Diagnosis:  Mild Neurocognitive Impairment





Precautions


Precautions:  Fall


Precautions/Isolations:  Fall Prevention, Standard Precautions





Referral


Referring Physician:  Dr. Velez


Reason for Referral:  Evaluation/Treatment





Medical History


Pertinent Medical History:  DM, PVD


Current History


The patient is a 71 year-old male s/p L BKA on 6/21/22, admitted to ARU and 

discharged 7/6/22. Pt reports sliding off of couch, causing him to return to the

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 

ARU 7/8/22 for skilled therapies.


Reviewed History:  Yes





Social History


Current Living Status:  Spouse





Speech PLF-Current Status


Prior Level of Function





The patient stated, "I am 70, I know I have some memory changes but that's


just going to happen." The patient denied additional challenges with


speech, language, or cognition.





Subjective


The patient was seated upright in his recliner, awake and alert upon entrance to

his room by the clinician. The patient greeted the clinician appropriately and 

was agreeable to participation in the cognitive linguistic assessment.





Language Eval: Auditory


Comprehends Simple Yes/No Ques:  Functional


Indent/Objects Multiple Fields:  Functional


Ident/Pics in Multiple Fields:  Functional


Follows 1-Step Commands:  Functional


Follows General Conversations:  Functional





Language Eval: Verbal Language


Completes Spontaneous Greeting:  Functional


Produces Auto, Serial Info:  Functional


Imitates Simple Words/Phrases:  Functional


Word Finding:  Functional


Requests Basic Needs:  Functional


States Basic Personal Info:  Functional





Language Evaluation: Reading


Follows Simple Written Direct:  Functional





Language Evaluation: Writing


Writes to Simple Dictation:  Functional





Cognitive


Patient Orientation


The patient was independently oriented to self, location, month, day of week, 

date, and year.





Objective Cognitive Domain


Attention:  Mild


Memory:  Mild


Problem Solving:  Mild


Executive Functions:  Mild


Composite Severity Rating:  Mild


Clock Drawing Severity Rating:  WNL





Objective


Formal/Standardized Tests


[employer] Mental Status Examination (UMS)


Results


The patient demonstrated a result of +24/30 on the SLUMS correlating to a mild 

neurocognitive impairment.


Oral Motor/Speech Production


The patient does not display dysarthria or apraxia of speech at this time. The 

patient remains 100% intelligible in known and unknown contexts.


Impression


The patient displayed deficits in the areas of delayed recall and simple problem

solving. The clinician discussed the patient's results with the patient 

immediately following. The patient was quick to respond that he was not 

interested in participating in skilled speech pathology treatment. The clinician

attempted to discuss with the patient how his treatment would be individualized 

and speech pathology could aid in reinforcing skills and routines introduced by 

physical therapy and occupational therapy to improve safety upon discharge. 

Regardless of continued encouragement and rationale, the patient politely ref

used. The speech pathology asked the patient to please notify staff if he 

changes his position in the near future.





Speech Patient Assess


Expression of Ideas/Wants:  Expression (4)


Understanding Verbal Content:  Understands (4)


Brief Interview-Mental Status:  Yes


Repetition of Three Words:  Three (3)


Temporal Orientation: Year:  Correct (3)


Temporal Orientation: Month:  Accurate within 5 days(2)


Temporal Orientation: Day:  Correct (1)


Recall : Wear to say "Sock":  Yes, no cue required (2)


Recall : Color:  Yes, no cue required (2)


Recall : Bed:  Yes,after cueing (1)


Memory/Recall Ability:  Current season, Location of own room, Staff names and 

faces, That he or she is in a hsp/hsp unit





Speech-Plan


Treatment Plan


Speech Therapy Treatment Plan:  Discontinue ST (Per patient request.)


Frequency:  1 time per week


Estimated Hrs Per Day:  .5 hour per day


Rehab Potential:  Guarded





Safety Risks/Education


Teaching Recipient:  Patient


Teaching Methods:  Discussion


Response to Teaching:  Verbalize Understanding, Reinforcement Needed


Education Topics Provided:  


Results of SLUMS, Recommendations for Skilled Speech Pathology, Education


regarding Skilled Speech Pathology





Time


Speech Therapy Time In:  12:00


Speech Therapy Time Out:  12:30


Total Billed Time:  30


Billed Treatment Time


1, MARY FINCH ELIZ[address]ETH ST                Jul 8, 2022 13:13

## 2022-07-09 VITALS — DIASTOLIC BLOOD PRESSURE: 45 MMHG | SYSTOLIC BLOOD PRESSURE: 101 MMHG

## 2022-07-09 VITALS — DIASTOLIC BLOOD PRESSURE: 67 MMHG | SYSTOLIC BLOOD PRESSURE: 109 MMHG

## 2022-07-09 LAB
ALBUMIN SERPL-MCNC: 3.1 GM/DL (ref 3.2–4.5)
ALP SERPL-CCNC: 85 U/L (ref 40–136)
ALT SERPL-CCNC: 31 U/L (ref 0–55)
BASOPHILS # BLD AUTO: 0 10^3/UL (ref 0–0.1)
BASOPHILS NFR BLD AUTO: 0 % (ref 0–10)
BILIRUB SERPL-MCNC: 0.9 MG/DL (ref 0.1–1)
BUN/CREAT SERPL: 19
CALCIUM SERPL-MCNC: 9.1 MG/DL (ref 8.5–10.1)
CHLORIDE SERPL-SCNC: 97 MMOL/L (ref 98–107)
CO2 SERPL-SCNC: 24 MMOL/L (ref 21–32)
CREAT SERPL-MCNC: 0.8 MG/DL (ref 0.6–1.3)
EOSINOPHIL # BLD AUTO: 0 10^3/UL (ref 0–0.3)
EOSINOPHIL NFR BLD AUTO: 0 % (ref 0–10)
GFR SERPLBLD BASED ON 1.73 SQ M-ARVRAT: 95 ML/MIN
GLUCOSE SERPL-MCNC: 84 MG/DL (ref 70–105)
HCT VFR BLD CALC: 32 % (ref 40–54)
HGB BLD-MCNC: 9.4 G/DL (ref 13.3–17.7)
LYMPHOCYTES # BLD AUTO: 0.8 10^3/UL (ref 1–4)
LYMPHOCYTES NFR BLD AUTO: 5 % (ref 12–44)
MANUAL DIFFERENTIAL PERFORMED BLD QL: NO
MCH RBC QN AUTO: 25 PG (ref 25–34)
MCHC RBC AUTO-ENTMCNC: 29 G/DL (ref 32–36)
MCV RBC AUTO: 85 FL (ref 80–99)
MONOCYTES # BLD AUTO: 1.4 10^3/UL (ref 0–1)
MONOCYTES NFR BLD AUTO: 9 % (ref 0–12)
NEUTROPHILS # BLD AUTO: 13.2 10^3/UL (ref 1.8–7.8)
NEUTROPHILS NFR BLD AUTO: 85 % (ref 42–75)
PLATELET # BLD: 274 10^3/UL (ref 130–400)
PMV BLD AUTO: 12.1 FL (ref 9–12.2)
POTASSIUM SERPL-SCNC: 3.5 MMOL/L (ref 3.6–5)
PROT SERPL-MCNC: 6.9 GM/DL (ref 6.4–8.2)
SODIUM SERPL-SCNC: 135 MMOL/L (ref 135–145)
WBC # BLD AUTO: 15.6 10^3/UL (ref 4.3–11)

## 2022-07-09 RX ADMIN — LOSARTAN POTASSIUM SCH MG: 50 TABLET, FILM COATED ORAL at 09:19

## 2022-07-09 RX ADMIN — DOCUSATE SODIUM SCH MG: 100 CAPSULE ORAL at 09:19

## 2022-07-09 RX ADMIN — INSULIN ASPART SCH UNIT: 100 INJECTION, SOLUTION INTRAVENOUS; SUBCUTANEOUS at 11:04

## 2022-07-09 RX ADMIN — PANTOPRAZOLE SODIUM SCH MG: 40 TABLET, DELAYED RELEASE ORAL at 09:19

## 2022-07-09 RX ADMIN — INSULIN ASPART SCH UNIT: 100 INJECTION, SOLUTION INTRAVENOUS; SUBCUTANEOUS at 20:25

## 2022-07-09 RX ADMIN — POTASSIUM CHLORIDE SCH MEQ: 750 TABLET, FILM COATED, EXTENDED RELEASE ORAL at 20:24

## 2022-07-09 RX ADMIN — AMOXICILLIN AND CLAVULANATE POTASSIUM SCH MG: 500; 125 TABLET, FILM COATED ORAL at 17:06

## 2022-07-09 RX ADMIN — DOCUSATE SODIUM SCH MG: 100 CAPSULE ORAL at 20:24

## 2022-07-09 RX ADMIN — METFORMIN HYDROCHLORIDE SCH MG: 500 TABLET, FILM COATED ORAL at 06:53

## 2022-07-09 RX ADMIN — ACETAMINOPHEN PRN MG: 325 TABLET ORAL at 05:39

## 2022-07-09 RX ADMIN — POLYETHYLENE GLYCOL (3350) SCH GM: 17 POWDER, FOR SOLUTION ORAL at 09:20

## 2022-07-09 RX ADMIN — AMIODARONE HYDROCHLORIDE SCH MG: 200 TABLET ORAL at 09:22

## 2022-07-09 RX ADMIN — INSULIN ASPART SCH UNIT: 100 INJECTION, SOLUTION INTRAVENOUS; SUBCUTANEOUS at 16:01

## 2022-07-09 RX ADMIN — DOCUSATE SODIUM AND SENNOSIDES SCH EA: 8.6; 5 TABLET, FILM COATED ORAL at 09:20

## 2022-07-09 RX ADMIN — POLYETHYLENE GLYCOL (3350) SCH GM: 17 POWDER, FOR SOLUTION ORAL at 20:25

## 2022-07-09 RX ADMIN — DOCUSATE SODIUM AND SENNOSIDES SCH EA: 8.6; 5 TABLET, FILM COATED ORAL at 20:24

## 2022-07-09 RX ADMIN — FUROSEMIDE SCH MG: 40 TABLET ORAL at 09:20

## 2022-07-09 RX ADMIN — LEVOTHYROXINE SODIUM SCH MCG: 100 TABLET ORAL at 05:37

## 2022-07-09 RX ADMIN — METFORMIN HYDROCHLORIDE SCH MG: 500 TABLET, FILM COATED ORAL at 17:06

## 2022-07-09 RX ADMIN — GLIMEPIRIDE SCH MG: 1 TABLET ORAL at 05:37

## 2022-07-09 NOTE — PROGRESS NOTE
Subjective


Date Seen by a Provider:  Jul 9, 2022


Time Seen by a Provider:  11:30


Subjective/Events-last exam


Patient seen with Dr. Saez.  Patient reports doing well.  Denies any left lower 

extremity pain, but does report some left shoulder discomfort.  Tolerating diet.





Objective


Exam





Vital Signs








  Date Time  Temp Pulse Resp B/P (MAP) Pulse Ox O2 Delivery O2 Flow Rate FiO2


 


7/9/22 09:40     96 Nasal Cannula 2.00 


 


7/9/22 08:00 36.9 84 20 101/45 (63) 96 Nasal Cannula 1.50 


 


7/9/22 05:39 38.2       


 


7/8/22 21:53 36.8       


 


7/8/22 21:08 38.2       


 


7/8/22 20:45     93 Nasal Cannula 2.00 


 


7/8/22 20:38 38.5       


 


7/8/22 20:30 38.5 101 18 107/52 (70) 92 Nasal Cannula 2.00 


 


7/8/22 14:05     97 Nasal Cannula 2.00 





Capillary Refill :


General Appearance:  No Apparent Distress, WD/WN


Neck:  Normal Inspection, Supple


Respiratory:  No Accessory Muscle Use, No Respiratory Distress


Gastrointestinal:  normal bowel sounds, non tender, soft


Extremity:  Other (left AKA with dressing in place C/D/I)


Neurologic/Psychiatric:  Alert, Oriented x3





Results


Lab


Laboratory Tests


7/8/22 16:37: Glucometer 151H


7/8/22 20:27: Glucometer 127H


7/9/22 05:26: 


White Blood Count 15.6H, Red Blood Count 3.79L, Hemoglobin 9.4L, Hematocrit 32L,

Mean Corpuscular Volume 85, Mean Corpuscular Hemoglobin 25, Mean Corpuscular 

Hemoglobin Concent 29L, Red Cell Distribution Width 18.7H, Platelet Count 274, 

Mean Platelet Volume 12.1, Immature Granulocyte % (Auto) 1, Neutrophils (%) 

(Auto) 85H, Lymphocytes (%) (Auto) 5L, Monocytes (%) (Auto) 9, Eosinophils (%) 

(Auto) 0, Basophils (%) (Auto) 0, Neutrophils # (Auto) 13.2H, Lymphocytes # 

(Auto) 0.8L, Monocytes # (Auto) 1.4H, Eosinophils # (Auto) 0.0, Basophils # 

(Auto) 0.0, Immature Granulocyte # (Auto) 0.1, Sodium Level 135, Potassium Level

3.5L, Chloride Level 97L, Carbon Dioxide Level 24, Anion Gap 14, Blood Urea 

Nitrogen 15, Creatinine 0.80, Estimat Glomerular Filtration Rate 95, 

BUN/Creatinine Ratio 19, Glucose Level 84, Calcium Level 9.1, Corrected Calcium 

9.8, Total Bilirubin 0.9, Aspartate Amino Transf (AST/SGOT) 55H, Alanine 

Aminotransferase (ALT/SGPT) 31, Alkaline Phosphatase 85, Total Protein 6.9, 

Albumin 3.1L


7/9/22 10:58: Glucometer 113H





Assessment/Plan


Assessment/Plan


Assess & Plan/Chief Complaint


A 71 year old male with wet gangrene who is S/P AKA POD #2


VSS


WBC 15.6 - will start oral abx


Continue pain meds PRN


Continue inpat rehab











SAIRA EARL           Jul 9, 2022 11:39

## 2022-07-09 NOTE — PHYSICAL THERAPY DAILY NOTE
PT Daily Note-Current


Subjective


Pt in bed upon arrival and agrees to PT. Pt reports he's in a lot of pain today 

and requests to not get out of bed. Pt wife present.





Pain





   Numeric Pain Scale:  7





Mental Status


Patient Orientation:  Person, Place, Time, Situation





Transfers


SCALE: Activities may be completed with or without assistive devices.





6-Indepedent-patient completes the activity by him/herself with no assistance 

from a helper.


5-Set-up or Clean-up Assistance-helper sets up or cleans up; patient completes 

activity. [place] assists only prior to or  


    following the activity.


4-Supervision or Touching Assistance-helper provides verbal cues and/or 

touching/steadying and/or contact guard assistance as patient completes 

activity. Assistance may be provided   


    throughout the activity or intermittently.


3-Partial/Moderate Assistance-helper does LESS THAN HALF the effort. [place] 

lifts, holds or supports trunk or limbs, but provides less than half the effort.


2-Substantial/Maximal Assistance-helper does MORE THAN HALF the effort. [place] 

lifts or holds trunk or limbs and provides more than half the effort.


1-Jqduodoln-kvenrq does ALL the effort. Patient does none of the effort to 

complete the activity. Or, the assistance of 2 or more helpers is required for 

the patient to complete the  


    activity.


If activity was not attempted, code reason:


7-Patient Refused.


9-Not Applicable-not attempted and the patient did not perform the activity 

before the current illness, exacerbation or injury.


10-Not Attempted due to Environmental Limitations-(lack of equipment, weather 

restraints, etc.).


88-Not Attempted due to Medical Conditions or Safety Concerns.


Roll Left & Right (QC):  3





Exercises


Supine Ex:  Ankle pumps, Quad Set, Rolling, Glut sets, Heel Slides, Knee to 

chest, Straight leg raise, Hip abd/add


Supine Reps:  20





Treatments


Call light and all needs met as PT departs.





Assessment


Current Status:  Fair Progress


Pt pain increased when trying to roll to (L) side d/t shoulder pain. Pt fatigued

this date.





PT Short Term Goals


Short Term Goals


Time Frame:  Jul 15, 2022


Roll Left & Right:  4


Sit to lying:  3 (Kacie)


Lying to sitting on side of be:  3 (Kacie)


Sit to stand:  3 (modA)


Chair/bed-to-chair transfer:  3 (modA)





PT Long Term Goals


Long Term Goals


PT Long Term Goals Time Frame:  Jul 29, 2022


Roll Left & Right (QC):  6


Sit to Lying (QC):  4 (SBA)


Lying-Sitting on Side/Bed(QC):  4 (SBA)


Sit to Stand (QC):  3 (Kacie)


Chair/Bed-to-Chair Xfer(QC):  3 (Kacie)


Toilet Transfer (QC):  3 (Kacie)


Car Transfer (QC):  3 (Kacie)


Does the Patient Walk:  No and Walking Goal NOT indicated


Walk 10 feet (QC):  88


Walk 50ft with 2 Turns (QC):  88


Walk 150 ft (QC):  88


Walking 10ft on Uneven Surface:  88


1 Step (curb) (QC):  88


4 Steps (QC):  88


12 Steps (QC):  88


Picking up an Object (QC):  88


Wheel 50 feet with 2 turns (QC:  6


Wheel 150 feet:  6





PT Plan


Problem List


Problem List:  Activity Tolerance, Functional Strength





Treatment/Plan


Treatment Plan:  Continue Plan of Care


Treatment Plan:  Bed Mobility, Education, Functional Activity [place], Functional 

Strength, Group Therapy, Safety, Therapeutic Exercise, Transfers


Treatment Duration:  Jul 29, 2022


Frequency:  At least 5 of 7 days/Wk (IRF)


Estimated Hrs Per Day:  1.5 hours per day


Patient and/or Family Agrees t:  Yes





Safety Risks/Education


Patient Education:  Correct Positioning


Teaching Recipient:  Patient


Teaching Methods:  Discussion


Response to Teaching:  Return Demonstration





Time/GCodes


Time In:  850


Time Out:  905


Total Billed Treatment Time:  15


Total Billed Treatment


1, Ex











FRANCESCO MALDONADO PTA                 Jul 9, 2022 10:25

## 2022-07-10 VITALS — SYSTOLIC BLOOD PRESSURE: 102 MMHG | DIASTOLIC BLOOD PRESSURE: 62 MMHG

## 2022-07-10 VITALS — DIASTOLIC BLOOD PRESSURE: 73 MMHG | SYSTOLIC BLOOD PRESSURE: 133 MMHG

## 2022-07-10 RX ADMIN — ACETAMINOPHEN PRN MG: 325 TABLET ORAL at 21:18

## 2022-07-10 RX ADMIN — METFORMIN HYDROCHLORIDE SCH MG: 500 TABLET, FILM COATED ORAL at 07:23

## 2022-07-10 RX ADMIN — Medication SCH EACH: at 12:59

## 2022-07-10 RX ADMIN — FUROSEMIDE SCH MG: 40 TABLET ORAL at 08:07

## 2022-07-10 RX ADMIN — INSULIN ASPART SCH UNIT: 100 INJECTION, SOLUTION INTRAVENOUS; SUBCUTANEOUS at 06:42

## 2022-07-10 RX ADMIN — DOCUSATE SODIUM SCH MG: 100 CAPSULE ORAL at 21:56

## 2022-07-10 RX ADMIN — DOCUSATE SODIUM AND SENNOSIDES SCH EA: 8.6; 5 TABLET, FILM COATED ORAL at 08:06

## 2022-07-10 RX ADMIN — Medication SCH EACH: at 18:23

## 2022-07-10 RX ADMIN — INSULIN ASPART SCH UNIT: 100 INJECTION, SOLUTION INTRAVENOUS; SUBCUTANEOUS at 21:56

## 2022-07-10 RX ADMIN — INSULIN ASPART SCH UNIT: 100 INJECTION, SOLUTION INTRAVENOUS; SUBCUTANEOUS at 17:04

## 2022-07-10 RX ADMIN — POTASSIUM CHLORIDE SCH MEQ: 750 TABLET, FILM COATED, EXTENDED RELEASE ORAL at 08:07

## 2022-07-10 RX ADMIN — DOCUSATE SODIUM SCH MG: 100 CAPSULE ORAL at 08:05

## 2022-07-10 RX ADMIN — AMOXICILLIN AND CLAVULANATE POTASSIUM SCH MG: 500; 125 TABLET, FILM COATED ORAL at 08:07

## 2022-07-10 RX ADMIN — DOCUSATE SODIUM AND SENNOSIDES SCH EA: 8.6; 5 TABLET, FILM COATED ORAL at 21:56

## 2022-07-10 RX ADMIN — AMIODARONE HYDROCHLORIDE SCH MG: 200 TABLET ORAL at 08:07

## 2022-07-10 RX ADMIN — INSULIN ASPART SCH UNIT: 100 INJECTION, SOLUTION INTRAVENOUS; SUBCUTANEOUS at 11:42

## 2022-07-10 RX ADMIN — AMOXICILLIN AND CLAVULANATE POTASSIUM SCH MG: 500; 125 TABLET, FILM COATED ORAL at 18:23

## 2022-07-10 RX ADMIN — POTASSIUM CHLORIDE SCH MEQ: 750 TABLET, FILM COATED, EXTENDED RELEASE ORAL at 21:18

## 2022-07-10 RX ADMIN — POLYETHYLENE GLYCOL (3350) SCH GM: 17 POWDER, FOR SOLUTION ORAL at 21:56

## 2022-07-10 RX ADMIN — GLIMEPIRIDE SCH MG: 1 TABLET ORAL at 07:23

## 2022-07-10 RX ADMIN — PANTOPRAZOLE SODIUM SCH MG: 40 TABLET, DELAYED RELEASE ORAL at 08:07

## 2022-07-10 RX ADMIN — ACETAMINOPHEN PRN MG: 325 TABLET ORAL at 06:48

## 2022-07-10 RX ADMIN — LOSARTAN POTASSIUM SCH MG: 50 TABLET, FILM COATED ORAL at 08:07

## 2022-07-10 RX ADMIN — POLYETHYLENE GLYCOL (3350) SCH GM: 17 POWDER, FOR SOLUTION ORAL at 08:06

## 2022-07-10 RX ADMIN — LEVOTHYROXINE SODIUM SCH MCG: 100 TABLET ORAL at 06:42

## 2022-07-10 NOTE — PM&R PROGRESS NOTE
Subjective


HPI/CC On Admission


Date Seen by Provider:  Jul 10, 2022


Time Seen by Provider:  16:00


Subjective/Events-last exam


7/10/2022:


Patient settling in well


Sugars lower so will hold Metformin


No pain except hip and left shoulder


Glucometer assessed


Wife at bedside


No issues otherwise





Review of Systems


General:  Fatigue, Malaise


Musculoskeletal:  leg pain





Objective


Exam


Vital Signs





Vital Signs








  Date Time  Temp Pulse Resp B/P (MAP) Pulse Ox O2 Delivery O2 Flow Rate FiO2


 


7/10/22 20:27 38.2 90 16 133/73 (93) 96 Nasal Cannula 1.00 





Capillary Refill :


General Appearance:  No Apparent Distress, WD/WN


HEENT:  PERRL/EOMI, Normal ENT Inspection, Pharynx Normal


Neck:  Normal Inspection, Supple


Respiratory:  No Accessory Muscle Use, No Respiratory Distress


Cardiovascular:  Regular Rate, Rhythm, No Edema, No Gallop, No JVD, No Murmur, 

Normal Peripheral Pulses


Gastrointestinal:  Normal Bowel Sounds, No Organomegaly, No Pulsatile Mass, Non 

Tender, Soft


Back:  Normal Inspection, No CVA Tenderness, No Vertebral Tenderness


Extremity:  Other (left AKA with dressing in place C/D/I)


Neurologic/Psychiatric:  Alert, Oriented x3


Skin:  Normal Color, Warm/Dry


Lymphatic:  No Adenopathy





Results/Procedures


Lab


Patient resulted labs reviewed.





FIM


Transfers


Therapy Code Descriptions/Definitions 





Functional [place] Measure:


0=Not Assessed/NA        4=Minimal Assistance


1=Total Assistance        5=Supervision or Setup


2=Maximal Assistance  6=Modified [place]


3=Moderate Assistance 7=Complete IndependenceSCALE: Activities may be completed 

with or without assistive devices.





6-Indepedent-patient completes the activity by him/herself with no assistance 

from a helper.


5-Set-up or Clean-up Assistance-helper sets up or cleans up; patient completes 

activity. [place] assists only prior to or  


    following the activity.


4-Supervision or Touching Assistance-helper provides verbal cues and/or 

touching/steadying and/or contact guard assistance as patient completes 

activity. Assistance may be provided   


    throughout the activity or intermittently.


3-Partial/Moderate Assistance-helper does LESS THAN HALF the effort. [place] 

lifts, holds or supports trunk or limbs, but provides less than half the effort.


2-Substantial/Maximal Assistance-helper does MORE THAN HALF the effort. [place] 

lifts or holds trunk or limbs and provides more than half the effort.


7-Kcwbgimat-xhjdba does ALL the effort. Patient does none of the effort to 

complete the activity. Or, the assistance of 2 or more helpers is required for 

the patient to complete the  


    activity.


If activity was not attempted, code reason:


7-Patient Refused.


9-Not Applicable-not attempted and the patient did not perform the activity 

before the current illness, exacerbation or injury.


10-Not Attempted due to Environmental Limitations-(lack of equipment, weather 

restraints, etc.).


88-Not Attempted due to Medical Conditions or Safety Concerns.


Roll Left to Right (QC):  3


Sit to Lying (QC):  3


Sit to Stand (QC):  2


Chair/Bed-to-Chair Xfer(QC):  2


Car Transfer (QC):  2





Gait Training


Does the Patient Walk?:  No and Walking Goal NOT indicated


Walk 10 feet (QC):  88


Walk 50 ft with 2 Turns(QC):  88


Walk 150 ft (QC):  88


Walking 10ft/uneven surface-QC:  88





Wheelchair Training


Does the Pt Use a Wheelchair?:  Yes


Distance:  100'x2


Wheel 50 ft with 2 turns (QC):  4


Wheel 150 ft (QC):  88


Type of Wheelchair:  Manual





Stair Training


1 Step (curb) (QC):  88


4 Steps (QC):  88


12 Steps (QC):  88





Balance


Picking up an Object (QC):  88





ADL-Treatment


Eating (QC):  5 (Assistance opening containers)


Oral Hygiene (QC):  5 (set up at sink)


Shower/Bathe Self (QC):  1 (Assist x2 with sponge bath to help with bed mobility

and wiping)


Upper Body Dressing (QC):  3 (Mod A. Assist required overhead and slight 

assistance down trunk.)


Lower Body Dressing (QC):  1 (Assist x2)


On/Off Footwear (QC):  1


Toileting Hygiene (QC):  1 (Assist x2 for bowel movement. Setup with urinal)





Assessment/Plan


Assessment and Plan


Assess & Plan/Chief Complaint


Assessment:


s/p Left AKA POD # 3 after failed LBKA 6/21/22


Fall 7/5/22 resulting in left shoulder injury causing further debility and 

limiting ADL/independence


Severe PVD


PAF


OAC


HTN


HLP


DM


Hypothyroidism


Previous hypoglycemia required holding metformin and glimepiride but now on 

lower dose


Constipation





Plan:


PT OT


w/c mobility


Home meds





7/10/2022:


Pain control


Monitor sugar


Hold Metformin





(1) S/P AKA (above knee amputation)


(2) Gangrene











DIANA HENRIQUEZ DO                Jul 10, 2022 08:28

## 2022-07-10 NOTE — INDIVIDUALIZED PLAN OF CARE
Individualized Plan of Care


Rehab Nursing IPOC Order


Admission Date


Jul 8, 2022 at 11:22


Current Orders





Orders


Admission Order(Inpt,Obs,Sdc) (7/8/22 10:59)


Vital Signs: Per Unit Policy ( 08,16,00 (7/8/22 10:59)


Mario Hose 09,21 (7/8/22 10:59)


Sequential Compression Device (7/8/22 10:59)


-Inpt Rehab Con (7/8/22 10:59)


Rehab Nursing Orders-Ipoc (7/8/22 10:59)


Physical Therapy Rehab Orders (7/8/22 10:59)


Occupational Therapy Rehab Ord (7/8/22 10:59)


Speech Therapy Rehab Orders (7/8/22 10:59)


Cbc With Automated Diff (7/9/22 06:00)


Comprehensive Metabolic Panel (7/9/22 06:00)


Precautions (Aru) (7/8/22 10:59)


Weekly Weight WEEK (7/8/22 10:59)


Rehab-Intensity Of Therapy (7/8/22 10:59)


Initiate Admission Nursing Pro .admission (7/8/22 10:59)


Alprazolam Tablet (Xanax Tablet) (7/8/22 11:00)


Calcium Carbonate Chew Tablet (Antacid C (7/8/22 11:00)


Diphenhydramine Tablet (Benadryl Tablet) (7/8/22 11:00)


Docusate Sodium Capsule (Colace Capsule) (7/8/22 21:00)


Docusate Sodium Capsule (Colace Capsule) (7/8/22 11:00)


Bisacodyl Suppository (Dulcolax Supposit (7/8/22 11:00)


Lactulose Oral Solution (Enulose Oral So (7/8/22 11:00)


Na Phos/Na Biphos Enema (Fleet Enema Angelito (7/8/22 11:00)


Guaifenesin/Codeine Syrup (Robitussin Ac (7/8/22 11:00)


Loperamide Tablet (Imodium Tablet) (7/8/22 11:00)


Melatonin  Tablet (Melatonin  Tablet) (7/8/22 11:00)


Polyethylene Glycol Powder Pkt (Miralax (7/8/22 21:00)


Ondansetron  Oral Dissolve Tab (Zofran (7/8/22 11:00)


Senna S Tablet (Senokot S Tablet) (7/8/22 21:00)


Acetaminophen Tablet/Caplet (Tylenol  T (7/8/22 11:00)


Code/Resuscitation (7/8/22 10:59)


Initiate Admission Nursing Pro .admission (7/8/22 10:59)


Admission Arrival Bed Request (7/8/22 11:14)


Amiodarone Tablet (Cordarone Tablet) (7/9/22 09:00)


Docusate Sodium Capsule (Colace Capsule) (7/8/22 11:30)


Glimepiride Tablet (Amaryl Tablet) (7/9/22 06:30)


Hydrocodone/Apap 10/325 Tablet (Lortab 1 (7/8/22 11:30)


Levothyroxine Tablet (Synthroid Tablet) (7/9/22 06:30)


Losartan Tablet (Cozaar Tablet) (7/9/22 09:00)


Metformin Tablet (Glucophage Tablet) (7/8/22 17:00)


Metoprolol Succinate (Xl) Tab (Toprol Xl (7/9/22 09:00)


Pantoprazole Tablet (Protonix Tablet) (7/9/22 09:00)


(Nf) Furosemide (7/9/22 09:00)


Potassium Chloride (Tablet) (Klor Con Ta (7/9/22 07:00)


Furosemide  Tablet (Lasix  Tablet) (7/9/22 09:00)


Cho 75g/M 1snack ( Jamari) (7/8/22 Lunch)


Patient Visit (7/8/22 )


Speech Sound Lang Comp (7/8/22 )


Treat. Speech/Lang/Voice (7/8/22 )


Patient Visit (7/7/22 )


Pt Eval Moderate Complexity (7/7/22 )


Functional Activities, Ea 15 (7/7/22 )


Patient Visit (7/7/22 )


Exercise Therap, Ea 15 Min (7/7/22 )


Dressing Order (Intervention) DAILY (7/9/22 13:31)


Accucheck Achs ACHS (7/9/22 06:32)


Insulin Aspart (Novolog) (Novolog (Charg (7/9/22 11:00)


Potassium Chloride (Tablet) (Klor Con Ta (7/9/22 21:00)


Patient Visit (7/9/22 )


Exercise Therap, Ea 15 Min (7/9/22 )


Amoxicillin/Clavulanate Tablet (Augmenti (7/9/22 18:00)


Lactobacillus Acidophilus Cap (Acidophil (7/10/22 13:00)


Cho 75g/M 1snack ( Jamari) (7/10/22 Dinner)


Cbc With Automated Diff (7/11/22 06:00)


Comprehensive Metabolic Panel (7/11/22 06:00)





Rehab Nursing Orders:  Ongoing Assess. of Function Status, Bladder Management, 

Bladder Scan, Bladder Training, Bowel Management, Bowel Training, Disease 

Management & Educaiton, DVT Prophylaxis, Fall Prevention, 

Fluid/Electrolyte/Nutrition Mgmt, Infection Prevention, Medication Management & 

Education, Management of Risks & Complications, Management of Skin Intergrity, N

utrition Management, Pain Management, Patient/Family Support, Safety Management,

Weight Bearing Precaution, Wound Management





Intensity of Therapy to be met


Patient to be seen:  Min.3h per day/5 of 7d





PT IPOC


Problem List:  Activity Tolerance, Functional Strength


Treatment Plan:  Continue Plan of Care


Bed Mobility, Education, Functional Activity [place], Functional Strength, Group 

Therapy, Safety, Therapeutic Exercise, Transfers


Treatment Duration:  Jul 29, 2022


Frequency:  At least 5 of 7 days/Wk (IRF)


Estimated Hrs Per Day:  1.5 hours per day





OT IPOC


Problems:  Decreased Activ Tolerance, Decreased UE Strength, Impaired Bed 

Mobility, Impaired Coordination, Impaired Funct Balance, Impaired I ADL's, 

Impaired Self-Care Skills, Restricted Funct UE ROM


OT Treatment, Training and Edu:  Yes


Plan of Care:  ADL Retraining, Caregiver Training, Functional Mobility, Group 

Exercise/Act as Ind, UE Funct Exercise/Act, W/C Management Training


Treatment Duration:  Aug 5, 2022


Frequency:  At least 5 of 7 days/Wk (IRF)


Estimated Hrs Per Day:  1.5 hours per day





ST IPOC


Speech Therapy Treatment Plan:  Discontinue ST (Per patient request.)


Treatment Duration:  Jul 8, 2022


Frequency:  1 time per week


Estimated Hrs Per Day:  .5 hour per day





/Case Mgmt


/Case Managemen:  Discharge Planning





Dietitian/Nutritionist


Dietitian/Nutritionist to monitor nutritional status and make changes and/or 

recommendations as needed and work with speech pathology on dietary upgrades as 

the occur.





Physician IPOC


Medical Issues being managed closely and that require the 24 hour availability 

of a physician:





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

will require close monitoring to prevent infection and sepsis


Medical Issues:  Bowel/Bladder Function, DVT Prophylaxis, Falls Precautions, 

Fluid/Electrolyte/Nutrition Balance, Infection Protection, Pain Management, 

Weight Bearing Precautions, Wound Care


Brief Synthesis of Preadmission Screen, Post-Admission Evaluation, and Therapy 

Evaluations:





PT OT will focus on regaining function with use of wheelchair and other 

assistive devices in order to increase independence in order to return home with

wife


Medical Prognosis:  Good


Anticipated Length of Stay:  10 days











DIANA HENRIQUEZ DO                Jul 10, 2022 08:29

## 2022-07-11 VITALS — DIASTOLIC BLOOD PRESSURE: 76 MMHG | SYSTOLIC BLOOD PRESSURE: 152 MMHG

## 2022-07-11 VITALS — SYSTOLIC BLOOD PRESSURE: 103 MMHG | DIASTOLIC BLOOD PRESSURE: 72 MMHG

## 2022-07-11 LAB
ALBUMIN SERPL-MCNC: 2.7 GM/DL (ref 3.2–4.5)
ALP SERPL-CCNC: 93 U/L (ref 40–136)
ALT SERPL-CCNC: 50 U/L (ref 0–55)
AMORPH SED URNS QL MICRO: (no result) /LPF
APTT PPP: YELLOW S
BACTERIA #/AREA URNS HPF: (no result) /HPF
BASOPHILS # BLD AUTO: 0.1 10^3/UL (ref 0–0.1)
BASOPHILS NFR BLD AUTO: 0 % (ref 0–10)
BILIRUB SERPL-MCNC: 0.6 MG/DL (ref 0.1–1)
BILIRUB UR QL STRIP: NEGATIVE
BUN/CREAT SERPL: 31
CALCIUM SERPL-MCNC: 8.6 MG/DL (ref 8.5–10.1)
CHLORIDE SERPL-SCNC: 96 MMOL/L (ref 98–107)
CO2 SERPL-SCNC: 23 MMOL/L (ref 21–32)
CREAT SERPL-MCNC: 1.1 MG/DL (ref 0.6–1.3)
EOSINOPHIL # BLD AUTO: 0.1 10^3/UL (ref 0–0.3)
EOSINOPHIL NFR BLD AUTO: 1 % (ref 0–10)
FIBRINOGEN PPP-MCNC: CLEAR MG/DL
GFR SERPLBLD BASED ON 1.73 SQ M-ARVRAT: 72 ML/MIN
GLUCOSE SERPL-MCNC: 88 MG/DL (ref 70–105)
GLUCOSE UR STRIP-MCNC: NEGATIVE MG/DL
HCT VFR BLD CALC: 30 % (ref 40–54)
HGB BLD-MCNC: 8.9 G/DL (ref 13.3–17.7)
KETONES UR QL STRIP: NEGATIVE
LEUKOCYTE ESTERASE UR QL STRIP: NEGATIVE
LYMPHOCYTES # BLD AUTO: 1.1 10^3/UL (ref 1–4)
LYMPHOCYTES NFR BLD AUTO: 5 % (ref 12–44)
MANUAL DIFFERENTIAL PERFORMED BLD QL: NO
MCH RBC QN AUTO: 25 PG (ref 25–34)
MCHC RBC AUTO-ENTMCNC: 29 G/DL (ref 32–36)
MCV RBC AUTO: 85 FL (ref 80–99)
MONOCYTES # BLD AUTO: 1.5 10^3/UL (ref 0–1)
MONOCYTES NFR BLD AUTO: 8 % (ref 0–12)
NEUTROPHILS # BLD AUTO: 15.9 10^3/UL (ref 1.8–7.8)
NEUTROPHILS NFR BLD AUTO: 81 % (ref 42–75)
NITRITE UR QL STRIP: NEGATIVE
PH UR STRIP: 5.5 [PH] (ref 5–9)
PLATELET # BLD: 237 10^3/UL (ref 130–400)
PMV BLD AUTO: 12.3 FL (ref 9–12.2)
POTASSIUM SERPL-SCNC: 4 MMOL/L (ref 3.6–5)
PROT SERPL-MCNC: 6.2 GM/DL (ref 6.4–8.2)
PROT UR QL STRIP: NEGATIVE
RBC #/AREA URNS HPF: (no result) /HPF
SODIUM SERPL-SCNC: 131 MMOL/L (ref 135–145)
SP GR UR STRIP: 1.01 (ref 1.02–1.02)
SQUAMOUS #/AREA URNS HPF: (no result) /HPF
WBC # BLD AUTO: 19.6 10^3/UL (ref 4.3–11)
WBC #/AREA URNS HPF: (no result) /HPF

## 2022-07-11 RX ADMIN — DOCUSATE SODIUM SCH MG: 100 CAPSULE ORAL at 11:29

## 2022-07-11 RX ADMIN — POLYETHYLENE GLYCOL (3350) SCH GM: 17 POWDER, FOR SOLUTION ORAL at 09:00

## 2022-07-11 RX ADMIN — INSULIN ASPART SCH UNIT: 100 INJECTION, SOLUTION INTRAVENOUS; SUBCUTANEOUS at 06:40

## 2022-07-11 RX ADMIN — DOCUSATE SODIUM AND SENNOSIDES SCH EA: 8.6; 5 TABLET, FILM COATED ORAL at 09:00

## 2022-07-11 RX ADMIN — SODIUM CHLORIDE SCH MLS/HR: 900 INJECTION, SOLUTION INTRAVENOUS at 12:30

## 2022-07-11 RX ADMIN — INSULIN ASPART SCH UNIT: 100 INJECTION, SOLUTION INTRAVENOUS; SUBCUTANEOUS at 21:15

## 2022-07-11 RX ADMIN — INSULIN ASPART SCH UNIT: 100 INJECTION, SOLUTION INTRAVENOUS; SUBCUTANEOUS at 16:30

## 2022-07-11 RX ADMIN — AMOXICILLIN AND CLAVULANATE POTASSIUM SCH MG: 500; 125 TABLET, FILM COATED ORAL at 07:45

## 2022-07-11 RX ADMIN — LOSARTAN POTASSIUM SCH MG: 50 TABLET, FILM COATED ORAL at 07:45

## 2022-07-11 RX ADMIN — Medication SCH EACH: at 13:08

## 2022-07-11 RX ADMIN — INSULIN ASPART SCH UNIT: 100 INJECTION, SOLUTION INTRAVENOUS; SUBCUTANEOUS at 11:00

## 2022-07-11 RX ADMIN — PANTOPRAZOLE SODIUM SCH MG: 40 TABLET, DELAYED RELEASE ORAL at 07:45

## 2022-07-11 RX ADMIN — FUROSEMIDE SCH MG: 40 TABLET ORAL at 07:46

## 2022-07-11 RX ADMIN — POLYETHYLENE GLYCOL (3350) SCH GM: 17 POWDER, FOR SOLUTION ORAL at 19:48

## 2022-07-11 RX ADMIN — DOCUSATE SODIUM AND SENNOSIDES SCH EA: 8.6; 5 TABLET, FILM COATED ORAL at 19:48

## 2022-07-11 RX ADMIN — POTASSIUM CHLORIDE SCH MEQ: 750 TABLET, FILM COATED, EXTENDED RELEASE ORAL at 07:46

## 2022-07-11 RX ADMIN — SODIUM CHLORIDE SCH MLS/HR: 900 INJECTION INTRAVENOUS at 18:36

## 2022-07-11 RX ADMIN — Medication SCH EACH: at 07:46

## 2022-07-11 RX ADMIN — POTASSIUM CHLORIDE SCH MEQ: 750 TABLET, FILM COATED, EXTENDED RELEASE ORAL at 21:11

## 2022-07-11 RX ADMIN — ACETAMINOPHEN PRN MG: 325 TABLET ORAL at 21:11

## 2022-07-11 RX ADMIN — LEVOTHYROXINE SODIUM SCH MCG: 100 TABLET ORAL at 06:44

## 2022-07-11 RX ADMIN — AMIODARONE HYDROCHLORIDE SCH MG: 200 TABLET ORAL at 07:45

## 2022-07-11 RX ADMIN — DOCUSATE SODIUM SCH MG: 100 CAPSULE ORAL at 19:46

## 2022-07-11 RX ADMIN — Medication SCH EACH: at 18:36

## 2022-07-11 NOTE — OCCUPATIONAL THER DAILY NOTE
OT Current Status-Daily Note


Subjective


Pt stated that he is doing better today and agreeable to OT/PT cotreat. Family 

member in room during therapy tx.





Mental Status/Objective


Patient Orientation:  Normal For Age





ADL-Treatment


Therapy Code Descriptions/Definitions 





Functional [place] Measure:


0=Not Assessed/NA        4=Minimal Assistance


1=Total Assistance        5=Supervision or Setup


2=Maximal Assistance  6=Modified [place]


3=Moderate Assistance 7=Complete IndependenceSCALE: Activities may be completed 

with or without assistive devices.





6-Indepedent-patient completes the activity by him/herself with no assistance 

from a helper.


5-Set-up or Clean-up Assistance-helper sets up or cleans up; patient completes 

activity. [place] assists only prior to or  


    following the activity.


4-Supervision or Touching Assistance-helper provides verbal cues and/or 

touching/steadying and/or contact guard assistance as patient completes 

activity. Assistance may be provided   


    throughout the activity or intermittently.


3-Partial/Moderate Assistance-helper does LESS THAN HALF the effort. [place] 

lifts, holds or supports trunk or limbs, but provides less than half the effort.


2-Substantial/Maximal Assistance-helper does MORE THAN HALF the effort. [place] 

lifts or holds trunk or limbs and provides more than half the effort.


8-Yadpfjqff-scauyc does ALL the effort. Patient does none of the effort to 

complete the activity. Or, the assistance of 2 or more helpers is required for 

the patient to complete the  


    activity.


If activity was not attempted, code reason:


7-Patient Refused.


9-Not Applicable-not attempted and the patient did not perform the activity 

before the current illness, exacerbation or injury.


10-Not Attempted due to Environmental Limitations-(lack of equipment, weather 

restraints, etc.).


88-Not Attempted due to Medical Conditions or Safety Concerns.





Other Treatment


OT/PT cotreat ([id number]): PT/OT cotreat required due to the skill required from

therapist that a rehab tech does not possess. PT focusing on LB strengthening, 

LE placement during transfer and standing balance. OT focusing on ADLs, UB 

strengthening, and  UE placement during transfers. Pt attempted to standing to 

transfer to shower chair 5x, requiring Max cues to stand and 2x assist with FWW.

With each stand, pt stated that he would like to pull on walker to stand. Thera

pist stated that when pulling on FWW, the FWW will fall back on pt. Pt 

verabalized understanding. Pt is unable to stand fully at FWW at this time, but 

pt is willing to continue to try. Pt states concern with moving L UE to FWW 

during stand and possibly resulting in fall. Therapist stated possible ideas to 

help with this concern and will attempt at next session.


OT tx: Pt completed oral hygiene and face wshing while sitting in recliner. 

Therapist completed PROM on LUE to increase ROM and decrease pain. Throughout 

PROM tx, pt stated that his pain level was at a 3 at full ROM. Pt then completed

yellow theraband exercises on RUE to increase strength and activity tolerance, 

completing elbow flexion/extension, shoulder horizontal abduction/adduction 10 

reps each. Pt in recliner with call light in reach and all needs met.





Education


OT Patient Education:  Transfer techniques


Teaching Recipient:  Patient, Family


Teaching Methods:  Discussion


Response to Teaching:  Verbalize Understanding





OT Short Term Goals


Short Term Goals


Time Frame:  2022


Toileting hygiene:  2


Shower/bathe self:  3


Lower body dressin


Putting on/taking off footwear:  3





OT Long Term Goals


Long Term Goals


Time Frame:  Aug 5, 2022


Eating (QC):  6


Oral Hygiene (QC):  6


Toileting Hygiene (QC):  3 (bowel movements on toilet)


Shower/Bathe Self (QC):  4


Upper Body Dressing (QC):  5


Lower Body Dressing (QC):  3


On/Off Footwear (QC):  4


Additional Goals:  1-Demonstrate ADL Tasks, 2-Verbalize Understanding, 3-

ImproveStrength/[place]


1=Demonstrate adherence to instructed precautions during ADL tasks.


2=Patient will verbalize/demonstrate understanding of assistive 

devices/modifications for ADL.


3=Patient will improve strength/tolerance for activity to enable patient to 

perform ADL's.





OT Education/Plan


Problem List/Assessment


Assessment:  Decreased Activ Tolerance, Decreased UE Strength, Impaired Funct 

Balance, Impaired I ADL's, Impaired Self-Care Skills





Discharge Recommendations


Plan/Recommendations:  Continue POC





Treatment Plan/Plan of Care


Patient would benefit from OT for education, treatment and training to promote 

independence in ADL's, mobility, safety and/or upper extremity function for 

ADL's.


Plan of Care:  ADL Retraining, Caregiver Training, Functional Mobility, Group 

Exercise/Act as Ind, UE Funct Exercise/Act, W/C Management Training


Treatment Duration:  Aug 5, 2022


Frequency:  At least 5 of 7 days/Wk (IRF)


Estimated Hrs Per Day:  1.5 hours per day


Rehab Potential:  Guarded





Time/GCodes


Start Time:  09:00


Stop Time:  10:30


Total Time Billed (hr/min):  90


Billed Treatment Time


1 visit, 3 EX (40min), 2 FA (30min), 1 ADL (20min)











Tanya Ingram              2022 11:56

## 2022-07-11 NOTE — PM&R PROGRESS NOTE
Subjective


HPI/CC On Admission


Date Seen by Provider:  Jul 11, 2022


Time Seen by Provider:  11:00


Subjective/Events-last exam


7/11/2022:


Pt is doing a lot better


Sleeps all the time


Fever and elevated white count with procalcitonin elevation prompting blood and 

urine cultures, chest x-ray, and empiric IV antibiotics


PICC will be placed








7/10/2022:


Patient settling in well


Sugars lower so will hold Metformin


No pain except hip and left shoulder


Glucometer assessed


Wife at bedside


No issues otherwise





Objective


Exam


Vital Signs





Vital Signs








  Date Time  Temp Pulse Resp B/P (MAP) Pulse Ox O2 Delivery O2 Flow Rate FiO2


 


7/11/22 20:10      Nasal Cannula 1.50 


 


7/11/22 09:00     97   


 


7/11/22 07:31 37.1 91 20 152/76 (101)    





Capillary Refill :


General Appearance:  No Apparent Distress, WD/WN


HEENT:  PERRL/EOMI, Normal ENT Inspection, Pharynx Normal


Neck:  Normal Inspection, Supple


Respiratory:  No Accessory Muscle Use, No Respiratory Distress


Cardiovascular:  Regular Rate, Rhythm, No Edema, No Gallop, No JVD, No Murmur, 

Normal Peripheral Pulses


Gastrointestinal:  Normal Bowel Sounds, No Organomegaly, No Pulsatile Mass, Non 

Tender, Soft


Back:  Normal Inspection, No CVA Tenderness, No Vertebral Tenderness


Extremity:  Other (left AKA with dressing in place C/D/I)


Neurologic/Psychiatric:  Alert, Oriented x3


Skin:  Normal Color, Warm/Dry


Lymphatic:  No Adenopathy





Results/Procedures


Lab


Laboratory Tests


7/11/22 05:28








Patient resulted labs reviewed.





FIM


Transfers


Therapy Code Descriptions/Definitions 





Functional [place] Measure:


0=Not Assessed/NA        4=Minimal Assistance


1=Total Assistance        5=Supervision or Setup


2=Maximal Assistance  6=Modified [place]


3=Moderate Assistance 7=Complete IndependenceSCALE: Activities may be completed 

with or without assistive devices.





6-Indepedent-patient completes the activity by him/herself with no assistance 

from a helper.


5-Set-up or Clean-up Assistance-helper sets up or cleans up; patient completes 

activity. [place] assists only prior to or  


    following the activity.


4-Supervision or Touching Assistance-helper provides verbal cues and/or 

touching/steadying and/or contact guard assistance as patient completes 

activity. Assistance may be provided   


    throughout the activity or intermittently.


3-Partial/Moderate Assistance-helper does LESS THAN HALF the effort. [place] 

lifts, holds or supports trunk or limbs, but provides less than half the effort.


2-Substantial/Maximal Assistance-helper does MORE THAN HALF the effort. [place] 

lifts or holds trunk or limbs and provides more than half the effort.


8-Mfjlkwkwq-irakyy does ALL the effort. Patient does none of the effort to comp

lete the activity. Or, the assistance of 2 or more helpers is required for the 

patient to complete the  


    activity.


If activity was not attempted, code reason:


7-Patient Refused.


9-Not Applicable-not attempted and the patient did not perform the activity 

before the current illness, exacerbation or injury.


10-Not Attempted due to Environmental Limitations-(lack of equipment, weather 

restraints, etc.).


88-Not Attempted due to Medical Conditions or Safety Concerns.


Roll Left to Right (QC):  3


Sit to Lying (QC):  3


Sit to Stand (QC):  2


Chair/Bed-to-Chair Xfer(QC):  2


Car Transfer (QC):  2





Gait Training


Does the Patient Walk?:  No and Walking Goal NOT indicated


Walk 10 feet (QC):  88


Walk 50 ft with 2 Turns(QC):  88


Walk 150 ft (QC):  88


Walking 10ft/uneven surface-QC:  88





Wheelchair Training


Does the Pt Use a Wheelchair?:  Yes


Distance:  100'x2


Wheel 50 ft with 2 turns (QC):  4


Wheel 150 ft (QC):  88


Type of Wheelchair:  Manual





Stair Training


1 Step (curb) (QC):  88


4 Steps (QC):  88


12 Steps (QC):  88





Balance


Picking up an Object (QC):  88





ADL-Treatment


Eating (QC):  5 (Assistance opening containers)


Oral Hygiene (QC):  5 (set up at sink)


Shower/Bathe Self (QC):  1 (Assist x2 with sponge bath to help with bed mobility

and wiping)


Upper Body Dressing (QC):  3 (Mod A. Assist required overhead and slight 

assistance down trunk.)


Lower Body Dressing (QC):  1 (Assist x2)


On/Off Footwear (QC):  1


Toileting Hygiene (QC):  1 (Assist x2 for bowel movement. Setup with urinal)





Assessment/Plan


Assessment and Plan


Assess & Plan/Chief Complaint


Assessment:


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


Fall 7/5/22 resulting in left shoulder injury causing further debility and 

limiting ADL/independence


Severe PVD


PAF


OAC


HTN


HLP


DM


Hypothyroidism


Previous hypoglycemia required holding metformin and glimepiride but now on 

lower dose


Constipation resolved


Poor venous access requiring PICC 7/11/22





Plan:


PT OT


w/c mobility


Home meds





7/10/2022:


Pain control


Monitor sugar


Hold Metformin





7/22/22:


Empiric broad spectrum abx


Hold Metformin and OAC


PICC line





(1) S/P AKA (above knee amputation)


(2) Gangrene











DIANA HENRIQUEZ DO                Jul 11, 2022 06:38

## 2022-07-11 NOTE — DIAGNOSTIC IMAGING REPORT
INDICATION: 

Fever.



TIME OF EXAM: 

9:25 AM.



COMPARISON: 

No prior studies are available for comparison.



FINDINGS:

The heart is enlarged. There are changes of median sternotomy and

CABG. The right lung is clear. There appears to be some

atelectasis in the left base. No effusion or pneumothorax is

detected.



IMPRESSION: 

Cardiomegaly and status post CABG with left basilar subsegmental

atelectasis.



Dictated by: 



  Dictated on workstation # [id number]

## 2022-07-11 NOTE — PHYSICAL THERAPY DAILY NOTE
PT Daily Note-Current


Subjective


Pt sitting in recliner visiting w/Sp upon arrival.  Pt agrees to PT/OT co-treat 

to work on transfers and ADLs.





Pain





   Location:  Left


   Location Body Site:  Shoulder


   Pain Description:  Ache





Mental Status


Patient Orientation:  Person, Place, Situation


Attachments:  Oxygen (1.5L)





Transfers


SCALE: Activities may be completed with or without assistive devices.





6-Indepedent-patient completes the activity by him/herself with no assistance 

from a helper.


5-Set-up or Clean-up Assistance-helper sets up or cleans up; patient completes 

activity. [place] assists only prior to or  


    following the activity.


4-Supervision or Touching Assistance-helper provides verbal cues and/or 

touching/steadying and/or contact guard assistance as patient completes 

activity. Assistance may be provided   


    throughout the activity or intermittently.


3-Partial/Moderate Assistance-helper does LESS THAN HALF the effort. [place] 

lifts, holds or supports trunk or limbs, but provides less than half the effort.


2-Substantial/Maximal Assistance-helper does MORE THAN HALF the effort. [place] 

lifts or holds trunk or limbs and provides more than half the effort.


9-Pwggsfipf-lwpnvm does ALL the effort. Patient does none of the effort to 

complete the activity. Or, the assistance of 2 or more helpers is required for 

the patient to complete the  


    activity.


If activity was not attempted, code reason:


7-Patient Refused.


9-Not Applicable-not attempted and the patient did not perform the activity 

before the current illness, exacerbation or injury.


10-Not Attempted due to Environmental Limitations-(lack of equipment, weather 

restraints, etc.).


88-Not Attempted due to Medical Conditions or Safety Concerns.


Sit to Stand (QC):  1





Weight Bearing


Full Weight Bearing





Exercises


Seated Therapy Exercises:  Sit to stand


Seated Reps:  5





Treatments


Need for two clinicians that otherwise could not be completed by a Rehab tech 

due to need for coordination of sequncing UE & LE for transfers, decreased 

activity tolerance and fear of falling, also seeing retropulsive due to this 

fact.


Pt is seen by Dr Velez to start tx.  Pt is given instruction on proper tech. 

for standing.  Radiology arrives for stat xray before PT & OT continue work on 

standing.  Pt attempts standing from recliner x5 with RB between attempts.  Pt 

was to TF from recliner to standing to shower chair but pt demonstrates both 

fear of falling and weakness and slightly retropulsive.  Pt needs a lot of reass

urance and explanation of sequencing.  Pt remains in recliner after PT departs &

OT continues to work on UE strengthening.





Assessment


Current Status:  Fair Progress


Weakness & fear of falling limit transfers, requiring more assistance than 

should require.





PT Short Term Goals


Short Term Goals


Time Frame:  Jul 15, 2022


Roll Left & Right:  4


Sit to lying:  3 (Kacie)


Lying to sitting on side of be:  3 (Kacie)


Sit to stand:  3 (modA)


Chair/bed-to-chair transfer:  3 (modA)





PT Long Term Goals


Long Term Goals


PT Long Term Goals Time Frame:  Jul 29, 2022


Roll Left & Right (QC):  6


Sit to Lying (QC):  4 (SBA)


Lying-Sitting on Side/Bed(QC):  4 (SBA)


Sit to Stand (QC):  3 (Kacie)


Chair/Bed-to-Chair Xfer(QC):  3 (Kacie)


Toilet Transfer (QC):  3 (Kacie)


Car Transfer (QC):  3 (Kacie)


Does the Patient Walk:  No and Walking Goal NOT indicated


Walk 10 feet (QC):  88


Walk 50ft with 2 Turns (QC):  88


Walk 150 ft (QC):  88


Walking 10ft on Uneven Surface:  88


1 Step (curb) (QC):  88


4 Steps (QC):  88


12 Steps (QC):  88


Picking up an Object (QC):  88


Wheel 50 feet with 2 turns (QC:  6


Wheel 150 feet:  6





PT Plan


Problem List


Problem List:  Activity Tolerance, Functional Strength, Safety, Balance, 

Transfer





Treatment/Plan


Treatment Plan:  Continue Plan of Care


Treatment Plan:  Bed Mobility, Education, Functional Activity [place], Functional 

Strength, Group Therapy, Safety, Therapeutic Exercise, Transfers


Treatment Duration:  Jul 29, 2022


Frequency:  At least 5 of 7 days/Wk (IRF)


Estimated Hrs Per Day:  1.5 hours per day


Patient and/or Family Agrees t:  Yes





Safety Risks/Education


Patient Education:  Transfer Techniques, Correct Positioning, Safety Issues


Teaching Recipient:  Patient, Significant Other


Teaching Methods:  Demonstration, Discussion


Response to Teaching:  Verbalize Understanding, Return Demonstration





Time/GCodes


Time In:  900


Time Out:  1000


Total Billed Treatment Time:  60


Total Billed Treatment


1, FA x4 (60m)  Co-treat for 60m











SERGO AGUILERA PTA              Jul 11, 2022 11:26

## 2022-07-11 NOTE — DIAGNOSTIC IMAGING REPORT
Indication: PICC line placement



Frontal chest obtained at 1145 a.m. compared to same day at 9:25

a.m.



There is cardiomegaly and poststernotomy change. There are

chronic appearing increased residual markings. There is no 

pneumothorax or gross pleural fluid. New right-sided PICC line

tip overlies low SVC.



IMPRESSION:

New right-sided PICC line tip overlies low SVC. Otherwise no

change compared to earlier today.



Dictated by: 



  Dictated on workstation # [id number]

## 2022-07-11 NOTE — PHYSICAL THERAPY DAILY NOTE
PT Daily Note-Current


Subjective


Pt laying Supine in bed upon arrival.  Pt agrees to PT.





Mental Status


Patient Orientation:  Person, Confused, Place





Transfers


SCALE: Activities may be completed with or without assistive devices.





6-Indepedent-patient completes the activity by him/herself with no assistance 

from a helper.


5-Set-up or Clean-up Assistance-helper sets up or cleans up; patient completes 

activity. [place] assists only prior to or  


    following the activity.


4-Supervision or Touching Assistance-helper provides verbal cues and/or 

touching/steadying and/or contact guard assistance as patient completes 

activity. Assistance may be provided   


    throughout the activity or intermittently.


3-Partial/Moderate Assistance-helper does LESS THAN HALF the effort. [place] 

lifts, holds or supports trunk or limbs, but provides less than half the effort.


2-Substantial/Maximal Assistance-helper does MORE THAN HALF the effort. [place] 

lifts or holds trunk or limbs and provides more than half the effort.


4-Nrevjiejj-yhlcyq does ALL the effort. Patient does none of the effort to 

complete the activity. Or, the assistance of 2 or more helpers is required for 

the patient to complete the  


    activity.


If activity was not attempted, code reason:


7-Patient Refused.


9-Not Applicable-not attempted and the patient did not perform the activity 

before the current illness, exacerbation or injury.


10-Not Attempted due to Environmental Limitations-(lack of equipment, weather 

restraints, etc.).


88-Not Attempted due to Medical Conditions or Safety Concerns.


Roll Left & Right (QC):  5





Weight Bearing


Full Weight Bearing





Exercises


Supine Ex:  Ankle pumps, Quad Set, Glut sets, Heel Slides, Straight leg raise, 

Hip abd/add


Supine Reps:  15





Treatments


Pt completes Supine EX in bed as well as pt educ. on need for continued movemen

t/Therapy for continued strength to improve transfers.  Pt repositioned in bed 

to comfort.  Pt resting in bed at end of tx with all needs met,call light in 

hand.





Assessment


Current Status:  Fair Progress


Pt needs encouragement to push self for progress for improved independence of 

task.





PT Short Term Goals


Short Term Goals


Time Frame:  Jul 15, 2022


Roll Left & Right:  4


Sit to lying:  3 (Kacie)


Lying to sitting on side of be:  3 (Kacie)


Sit to stand:  3 (modA)


Chair/bed-to-chair transfer:  3 (modA)





PT Long Term Goals


Long Term Goals


PT Long Term Goals Time Frame:  Jul 29, 2022


Roll Left & Right (QC):  6


Sit to Lying (QC):  4 (SBA)


Lying-Sitting on Side/Bed(QC):  4 (SBA)


Sit to Stand (QC):  3 (Kacie)


Chair/Bed-to-Chair Xfer(QC):  3 (Kacie)


Toilet Transfer (QC):  3 (Kacie)


Car Transfer (QC):  3 (Kacie)


Does the Patient Walk:  No and Walking Goal NOT indicated


Walk 10 feet (QC):  88


Walk 50ft with 2 Turns (QC):  88


Walk 150 ft (QC):  88


Walking 10ft on Uneven Surface:  88


1 Step (curb) (QC):  88


4 Steps (QC):  88


12 Steps (QC):  88


Picking up an Object (QC):  88


Wheel 50 feet with 2 turns (QC:  6


Wheel 150 feet:  6





PT Plan


Problem List


Problem List:  Activity Tolerance, Functional Strength





Treatment/Plan


Treatment Plan:  Continue Plan of Care


Treatment Plan:  Bed Mobility, Education, Functional Activity [place], Functional 

Strength, Group Therapy, Safety, Therapeutic Exercise, Transfers


Treatment Duration:  Jul 29, 2022


Frequency:  At least 5 of 7 days/Wk (IRF)


Estimated Hrs Per Day:  1.5 hours per day


Patient and/or Family Agrees t:  Yes





Safety Risks/Education


Patient Education:  Correct Positioning


Teaching Recipient:  Patient


Teaching Methods:  Discussion


Response to Teaching:  Verbalize Understanding





Time/GCodes


Time In:  1400


Time Out:  1430


Total Billed Treatment Time:  30


Total Billed Treatment


1, FA (10m) & EX (20m)











SERGO AGUILERA PTA              Jul 11, 2022 14:30

## 2022-07-12 VITALS — DIASTOLIC BLOOD PRESSURE: 58 MMHG | SYSTOLIC BLOOD PRESSURE: 94 MMHG

## 2022-07-12 VITALS — DIASTOLIC BLOOD PRESSURE: 48 MMHG | SYSTOLIC BLOOD PRESSURE: 109 MMHG

## 2022-07-12 LAB
ALBUMIN SERPL-MCNC: 2.3 GM/DL (ref 3.2–4.5)
ALP SERPL-CCNC: 70 U/L (ref 40–136)
ALT SERPL-CCNC: 56 U/L (ref 0–55)
BASOPHILS # BLD AUTO: 0 10^3/UL (ref 0–0.1)
BASOPHILS NFR BLD AUTO: 0 % (ref 0–10)
BILIRUB SERPL-MCNC: 0.5 MG/DL (ref 0.1–1)
BUN/CREAT SERPL: 37
CALCIUM SERPL-MCNC: 8.2 MG/DL (ref 8.5–10.1)
CHLORIDE SERPL-SCNC: 98 MMOL/L (ref 98–107)
CO2 SERPL-SCNC: 22 MMOL/L (ref 21–32)
CREAT SERPL-MCNC: 1.02 MG/DL (ref 0.6–1.3)
EOSINOPHIL # BLD AUTO: 0.1 10^3/UL (ref 0–0.3)
EOSINOPHIL NFR BLD AUTO: 1 % (ref 0–10)
GFR SERPLBLD BASED ON 1.73 SQ M-ARVRAT: 79 ML/MIN
GLUCOSE SERPL-MCNC: 115 MG/DL (ref 70–105)
HCT VFR BLD CALC: 25 % (ref 40–54)
HGB BLD-MCNC: 7.4 G/DL (ref 13.3–17.7)
LYMPHOCYTES # BLD AUTO: 0.6 10^3/UL (ref 1–4)
LYMPHOCYTES NFR BLD AUTO: 4 % (ref 12–44)
MANUAL DIFFERENTIAL PERFORMED BLD QL: NO
MCH RBC QN AUTO: 25 PG (ref 25–34)
MCHC RBC AUTO-ENTMCNC: 30 G/DL (ref 32–36)
MCV RBC AUTO: 82 FL (ref 80–99)
MONOCYTES # BLD AUTO: 0.9 10^3/UL (ref 0–1)
MONOCYTES NFR BLD AUTO: 6 % (ref 0–12)
NEUTROPHILS # BLD AUTO: 13.7 10^3/UL (ref 1.8–7.8)
NEUTROPHILS NFR BLD AUTO: 88 % (ref 42–75)
PLATELET # BLD: 227 10^3/UL (ref 130–400)
PMV BLD AUTO: 11.8 FL (ref 9–12.2)
POTASSIUM SERPL-SCNC: 4.3 MMOL/L (ref 3.6–5)
PROT SERPL-MCNC: 5.1 GM/DL (ref 6.4–8.2)
SODIUM SERPL-SCNC: 133 MMOL/L (ref 135–145)
WBC # BLD AUTO: 15.6 10^3/UL (ref 4.3–11)

## 2022-07-12 RX ADMIN — POLYETHYLENE GLYCOL (3350) SCH GM: 17 POWDER, FOR SOLUTION ORAL at 09:15

## 2022-07-12 RX ADMIN — FUROSEMIDE SCH MG: 40 TABLET ORAL at 09:11

## 2022-07-12 RX ADMIN — DOCUSATE SODIUM SCH MG: 100 CAPSULE ORAL at 19:36

## 2022-07-12 RX ADMIN — AMIODARONE HYDROCHLORIDE SCH MG: 200 TABLET ORAL at 12:59

## 2022-07-12 RX ADMIN — SODIUM CHLORIDE SCH MLS/HR: 900 INJECTION INTRAVENOUS at 13:00

## 2022-07-12 RX ADMIN — DOCUSATE SODIUM AND SENNOSIDES SCH EA: 8.6; 5 TABLET, FILM COATED ORAL at 19:36

## 2022-07-12 RX ADMIN — DOCUSATE SODIUM AND SENNOSIDES SCH EA: 8.6; 5 TABLET, FILM COATED ORAL at 09:15

## 2022-07-12 RX ADMIN — Medication PRN ML: at 09:14

## 2022-07-12 RX ADMIN — LOSARTAN POTASSIUM SCH MG: 50 TABLET, FILM COATED ORAL at 09:00

## 2022-07-12 RX ADMIN — Medication SCH EACH: at 09:11

## 2022-07-12 RX ADMIN — Medication SCH EACH: at 17:13

## 2022-07-12 RX ADMIN — INSULIN ASPART SCH UNIT: 100 INJECTION, SOLUTION INTRAVENOUS; SUBCUTANEOUS at 17:12

## 2022-07-12 RX ADMIN — SODIUM CHLORIDE SCH MLS/HR: 900 INJECTION INTRAVENOUS at 03:31

## 2022-07-12 RX ADMIN — Medication SCH EACH: at 14:47

## 2022-07-12 RX ADMIN — DOCUSATE SODIUM SCH MG: 100 CAPSULE ORAL at 09:14

## 2022-07-12 RX ADMIN — INSULIN ASPART SCH UNIT: 100 INJECTION, SOLUTION INTRAVENOUS; SUBCUTANEOUS at 11:00

## 2022-07-12 RX ADMIN — POLYETHYLENE GLYCOL (3350) SCH GM: 17 POWDER, FOR SOLUTION ORAL at 19:36

## 2022-07-12 RX ADMIN — SODIUM CHLORIDE SCH MLS/HR: 900 INJECTION, SOLUTION INTRAVENOUS at 23:51

## 2022-07-12 RX ADMIN — LEVOTHYROXINE SODIUM SCH MCG: 100 TABLET ORAL at 06:48

## 2022-07-12 RX ADMIN — PANTOPRAZOLE SODIUM SCH MG: 40 TABLET, DELAYED RELEASE ORAL at 09:11

## 2022-07-12 RX ADMIN — POTASSIUM CHLORIDE SCH MEQ: 750 TABLET, FILM COATED, EXTENDED RELEASE ORAL at 09:11

## 2022-07-12 RX ADMIN — SODIUM CHLORIDE SCH MLS/HR: 900 INJECTION, SOLUTION INTRAVENOUS at 04:32

## 2022-07-12 RX ADMIN — APIXABAN SCH MG: 5 TABLET, FILM COATED ORAL at 20:22

## 2022-07-12 RX ADMIN — INSULIN ASPART SCH UNIT: 100 INJECTION, SOLUTION INTRAVENOUS; SUBCUTANEOUS at 05:58

## 2022-07-12 RX ADMIN — INSULIN ASPART SCH UNIT: 100 INJECTION, SOLUTION INTRAVENOUS; SUBCUTANEOUS at 20:30

## 2022-07-12 RX ADMIN — POTASSIUM CHLORIDE SCH MEQ: 750 TABLET, FILM COATED, EXTENDED RELEASE ORAL at 20:23

## 2022-07-12 RX ADMIN — SODIUM CHLORIDE SCH MLS/HR: 900 INJECTION, SOLUTION INTRAVENOUS at 10:24

## 2022-07-12 RX ADMIN — SODIUM CHLORIDE SCH MLS/HR: 900 INJECTION INTRAVENOUS at 18:34

## 2022-07-12 NOTE — PHYSICAL THERAPY DAILY NOTE
PT Daily Note-Current


Subjective


Pt laying Supine in bed with Sp present upon arrival.  Pt agrees to PT/OT co-

treat.





Pain





   Location:  Incisional, Left


   Pain Description:  Ache


   Comment:  Reports but doesn't rate





Mental Status


Patient Orientation:  Person, Place


Attachments:  Oxygen, IV





Transfers


SCALE: Activities may be completed with or without assistive devices.





6-Indepedent-patient completes the activity by him/herself with no assistance 

from a helper.


5-Set-up or Clean-up Assistance-helper sets up or cleans up; patient completes 

activity. [place] assists only prior to or  


    following the activity.


4-Supervision or Touching Assistance-helper provides verbal cues and/or 

touching/steadying and/or contact guard assistance as patient completes 

activity. Assistance may be provided   


    throughout the activity or intermittently.


3-Partial/Moderate Assistance-helper does LESS THAN HALF the effort. [place] 

lifts, holds or supports trunk or limbs, but provides less than half the effort.


2-Substantial/Maximal Assistance-helper does MORE THAN HALF the effort. [place] 

lifts or holds trunk or limbs and provides more than half the effort.


7-Ijaxdnkil-pmhyde does ALL the effort. Patient does none of the effort to 

complete the activity. Or, the assistance of 2 or more helpers is required for 

the patient to complete the  


    activity.


If activity was not attempted, code reason:


7-Patient Refused.


9-Not Applicable-not attempted and the patient did not perform the activity 

before the current illness, exacerbation or injury.


10-Not Attempted due to Environmental Limitations-(lack of equipment, weather 

restraints, etc.).


88-Not Attempted due to Medical Conditions or Safety Concerns.


Lying to Sitting/Side of Bed(Q:  1


Sit to Stand (QC):  1





Weight Bearing


Full Weight Bearing





Treatments


OT/PT cotreat (3435-5634): PT/OT cotreat required due to the skill required from

therapist that a rehab tech does not possess. PT focusing on LB strengthening, 

LE placement during transfer and standing balance. OT focusing on ADLs, UB 

strengthening, and  UE placement during transfers. Pt transferred from supine to

EOB with assist 2x to pull self out of supine position. Pt then completed bed 

bath with assist x2 dep with assistance to cleanse back, RUE, bottom, and LE. 

During bed bath, pt attempted to stand 3x to cleanse bottom with max cues for UE

placement and to sequence through standing process.Pt required ModA for UB 

dressing with verbal and tactile cues for UE placement and for sequencing 

through task. Pt required assist x2 Dep for LB dressing for balance while 

standing and Max cues to stand from EOB for UE placement and to push through LE.

Pt completed oral hygiene EOB with SBA with set up.





Assessment


Current Status:  Fair Progress


Fear of falling leads to increased VC for sequencing and increased encouragement

that pt won't fall.





PT Short Term Goals


Short Term Goals


Time Frame:  Jul 15, 2022


Roll Left & Right:  4


Sit to lying:  3 (Kacie)


Lying to sitting on side of be:  3 (Kacie)


Sit to stand:  3 (modA)


Chair/bed-to-chair transfer:  3 (modA)





PT Long Term Goals


Long Term Goals


PT Long Term Goals Time Frame:  Jul 29, 2022


Roll Left & Right (QC):  6


Sit to Lying (QC):  4 (SBA)


Lying-Sitting on Side/Bed(QC):  4 (SBA)


Sit to Stand (QC):  3 (Kacie)


Chair/Bed-to-Chair Xfer(QC):  3 (Kacie)


Toilet Transfer (QC):  3 (Kacie)


Car Transfer (QC):  3 (Kacie)


Does the Patient Walk:  No and Walking Goal NOT indicated


Walk 10 feet (QC):  88


Walk 50ft with 2 Turns (QC):  88


Walk 150 ft (QC):  88


Walking 10ft on Uneven Surface:  88


1 Step (curb) (QC):  88


4 Steps (QC):  88


12 Steps (QC):  88


Picking up an Object (QC):  88


Wheel 50 feet with 2 turns (QC:  6


Wheel 150 feet:  6





PT Plan


Problem List


Problem List:  Activity Tolerance, Functional Strength, Balance, Transfer





Treatment/Plan


Treatment Plan:  Continue Plan of Care


Treatment Plan:  Bed Mobility, Education, Functional Activity [place], Functional 

Strength, Group Therapy, Safety, Therapeutic Exercise, Transfers


Treatment Duration:  Jul 29, 2022


Frequency:  At least 5 of 7 days/Wk (IRF)


Estimated Hrs Per Day:  1.5 hours per day


Patient and/or Family Agrees t:  Yes





Safety Risks/Education


Patient Education:  Transfer Techniques, Correct Positioning


Teaching Recipient:  Patient


Teaching Methods:  Discussion


Response to Teaching:  Verbalize Understanding





Time/GCodes


Time In:  800


Time Out:  900


Total Billed Treatment Time:  60


Total Billed Treatment


Co-treat for 60m   1, FA x2 (30m) & EX x2 (30m)











SERGO AGUILERA PTA              Jul 12, 2022 10:36

## 2022-07-12 NOTE — PHYSICAL THERAPY DAILY NOTE
PT Daily Note-Current


Subjective


Pt laying Supine in bed upon arrival.  Pt given meds by Nurse to start tx.  NP 

for Cardiologist checks on pt as well.  Pt agrees to EX.





Pain





   Numeric Pain Scale:  4


   Location:  Left


   Location Body Site:  Hip


   Pain Description:  Ache





Mental Status


Patient Orientation:  Person, Place





Transfers


SCALE: Activities may be completed with or without assistive devices.





6-Indepedent-patient completes the activity by him/herself with no assistance 

from a helper.


5-Set-up or Clean-up Assistance-helper sets up or cleans up; patient completes 

activity. [place] assists only prior to or  


    following the activity.


4-Supervision or Touching Assistance-helper provides verbal cues and/or 

touching/steadying and/or contact guard assistance as patient completes 

activity. Assistance may be provided   


    throughout the activity or intermittently.


3-Partial/Moderate Assistance-helper does LESS THAN HALF the effort. [place] 

lifts, holds or supports trunk or limbs, but provides less than half the effort.


2-Substantial/Maximal Assistance-helper does MORE THAN HALF the effort. [place] 

lifts or holds trunk or limbs and provides more than half the effort.


9-Puxxnamgq-vjmxhn does ALL the effort. Patient does none of the effort to 

complete the activity. Or, the assistance of 2 or more helpers is required for 

the patient to complete the  


    activity.


If activity was not attempted, code reason:


7-Patient Refused.


9-Not Applicable-not attempted and the patient did not perform the activity 

before the current illness, exacerbation or injury.


10-Not Attempted due to Environmental Limitations-(lack of equipment, weather 

restraints, etc.).


88-Not Attempted due to Medical Conditions or Safety Concerns.





Weight Bearing


Full Weight Bearing





Exercises


Supine Ex:  Ankle pumps, Quad Set, Glut sets, Heel Slides, Short Arc Quads, 

Straight leg raise, Hip abd/add


Supine Reps:  15


Seated Therapy Exercises:  Ankle pumps, Long arc quads, Hip flexion


Seated Reps:  15





Treatments


PTA issues & reviews written HEP for Supine & Seated EX w/both pt & sp.  Pt 

repositioned to comfort, all needs met & call light in hand.





Assessment


Current Status:  Good Progress


Confusion during review of HEP at times.  PTA redirects as needed.





PT Short Term Goals


Short Term Goals


Time Frame:  Jul 15, 2022


Roll Left & Right:  4


Sit to lying:  3 (Kacie)


Lying to sitting on side of be:  3 (Kacie)


Sit to stand:  3 (modA)


Chair/bed-to-chair transfer:  3 (modA)





PT Long Term Goals


Long Term Goals


PT Long Term Goals Time Frame:  Jul 29, 2022


Roll Left & Right (QC):  6


Sit to Lying (QC):  4 (SBA)


Lying-Sitting on Side/Bed(QC):  4 (SBA)


Sit to Stand (QC):  3 (Kacie)


Chair/Bed-to-Chair Xfer(QC):  3 (Kacie)


Toilet Transfer (QC):  3 (Kacie)


Car Transfer (QC):  3 (Kacie)


Does the Patient Walk:  No and Walking Goal NOT indicated


Walk 10 feet (QC):  88


Walk 50ft with 2 Turns (QC):  88


Walk 150 ft (QC):  88


Walking 10ft on Uneven Surface:  88


1 Step (curb) (QC):  88


4 Steps (QC):  88


12 Steps (QC):  88


Picking up an Object (QC):  88


Wheel 50 feet with 2 turns (QC:  6


Wheel 150 feet:  6





PT Plan


Problem List


Problem List:  Activity Tolerance, Functional Strength





Treatment/Plan


Treatment Plan:  Continue Plan of Care


Treatment Plan:  Bed Mobility, Education, Functional Activity [place], Functional 

Strength, Group Therapy, Safety, Therapeutic Exercise, Transfers


Treatment Duration:  Jul 29, 2022


Frequency:  At least 5 of 7 days/Wk (IRF)


Estimated Hrs Per Day:  1.5 hours per day


Patient and/or Family Agrees t:  Yes





Safety Risks/Education


Patient Education:  Issued Written HEP


Teaching Recipient:  Patient, Significant Other


Teaching Methods:  Demonstration, Discussion


Response to Teaching:  Verbalize Understanding





Time/GCodes


Time In:  1430


Time Out:  1400


Total Billed Treatment Time:  30


Total Billed Treatment


1, EX x2 (30m)











SERGO AGUILERA PTA              Jul 12, 2022 15:36

## 2022-07-12 NOTE — OCCUPATIONAL THER DAILY NOTE
OT Current Status-Daily Note


Subjective


Pt supine in bed at beginning of tx, agreeable to OT/PT cotreat.





Mental Status/Objective


Attachments:  IV, Oxygen





ADL-Treatment


Therapy Code Descriptions/Definitions 





Functional [place] Measure:


0=Not Assessed/NA        4=Minimal Assistance


1=Total Assistance        5=Supervision or Setup


2=Maximal Assistance  6=Modified [place]


3=Moderate Assistance 7=Complete IndependenceSCALE: Activities may be completed 

with or without assistive devices.





6-Indepedent-patient completes the activity by him/herself with no assistance 

from a helper.


5-Set-up or Clean-up Assistance-helper sets up or cleans up; patient completes 

activity. [place] assists only prior to or  


    following the activity.


4-Supervision or Touching Assistance-helper provides verbal cues and/or 

touching/steadying and/or contact guard assistance as patient completes 

activity. Assistance may be provided   


    throughout the activity or intermittently.


3-Partial/Moderate Assistance-helper does LESS THAN HALF the effort. [place] 

lifts, holds or supports trunk or limbs, but provides less than half the effort.


2-Substantial/Maximal Assistance-helper does MORE THAN HALF the effort. [place] 

lifts or holds trunk or limbs and provides more than half the effort.


5-Hxnvwkwoi-enhnti does ALL the effort. Patient does none of the effort to 

complete the activity. Or, the assistance of 2 or more helpers is required for 

the patient to complete the  


    activity.


If activity was not attempted, code reason:


7-Patient Refused.


9-Not Applicable-not attempted and the patient did not perform the activity 

before the current illness, exacerbation or injury.


10-Not Attempted due to Environmental Limitations-(lack of equipment, weather 

restraints, etc.).


88-Not Attempted due to Medical Conditions or Safety Concerns.





Other Treatment


OT/PT cotreat ([id number]): PT/OT cotreat required due to the skill required from

therapist that a rehab tech does not possess. PT focusing on LB strengthening, 

LE placement during transfer and standing balance. OT focusing on ADLs, UB 

strengthening, and  UE placement during transfers. Pt transferred from supine to

EOB with assist 2x to pull self out of supine position. Pt then completed bed 

bath with assist x2 dep with assistance to cleanse back, RUE, bottom, and LE. 

During bed bath, pt attempted to stand 3x to cleanse bottom with max cues for UE

placement and to sequence through standing process.Pt required ModA for UB 

dressing with verbal and tactile cues for UE placement and for squencing through

task. Pt required assist x2 Dep for LB dressing for balance while standing and 

Max cues to stand from EOB for UE placement and to push through LE. Pt completed

oral hygiene EOB with SBA with set up. Pt transferred from EOB to supine with 

ModA for cueing through task and assistance with UB placement.


OT tx ([id number]): Pt completed RUE strengthening with yellow theraband to 

increase R strength and activity tolerance. Therapist provided PROM on LUE to 

increase ROM and decrease pain. Pt supine in bed with call light and phone in 

reach with all needs met.





Education


OT Patient Education:  Transfer techniques


Teaching Recipient:  Patient, Family


Teaching Methods:  Discussion


Response to Teaching:  Verbalize Understanding





OT Short Term Goals


Short Term Goals


Time Frame:  2022


Toileting hygiene:  2


Shower/bathe self:  3


Lower body dressin


Putting on/taking off footwear:  3





OT Long Term Goals


Long Term Goals


Time Frame:  Aug 5, 2022


Eating (QC):  6


Oral Hygiene (QC):  6


Toileting Hygiene (QC):  3 (bowel movements on toilet)


Shower/Bathe Self (QC):  4


Upper Body Dressing (QC):  5


Lower Body Dressing (QC):  3


On/Off Footwear (QC):  4


Additional Goals:  1-Demonstrate ADL Tasks, 2-Verbalize Understanding, 3-

ImproveStrength/[place]


1=Demonstrate adherence to instructed precautions during ADL tasks.


2=Patient will verbalize/demonstrate understanding of assistive 

devices/modifications for ADL.


3=Patient will improve strength/tolerance for activity to enable patient to 

perform ADL's.





OT Education/Plan


Problem List/Assessment


Assessment:  Decreased Activ Tolerance, Decreased UE Strength, Impaired Bed 

Mobility, Impaired Funct Balance, Impaired I ADL's, Impaired Self-Care Skills





Discharge Recommendations


Plan/Recommendations:  Continue POC





Treatment Plan/Plan of Care


Patient would benefit from OT for education, treatment and training to promote 

independence in ADL's, mobility, safety and/or upper extremity function for 

ADL's.


Plan of Care:  ADL Retraining, Caregiver Training, Functional Mobility, Group 

Exercise/Act as Ind, UE Funct Exercise/Act, W/C Management Training


Treatment Duration:  Aug 5, 2022


Frequency:  At least 5 of 7 days/Wk (IRF)


Estimated Hrs Per Day:  1.5 hours per day


Rehab Potential:  Guarded





Time/GCodes


Start Time:  08:00


Stop Time:  09:30


Total Time Billed (hr/min):  90


Billed Treatment Time


1 visit, 3 ADL (50min), FA (20min), EX (20min)











Tanya Ingram              2022 10:10

## 2022-07-12 NOTE — CONSULTATION-CARDIOLOGY
HPI-Cardiology


Cardiology Consultation:


Date of Consultation


7/12/22


Time Seen by a Provider:  18:00


Date of Admission





Attending Physician


Mike Beltran MD


Admitting Physician


Admitting Physician:


Noy Velez DO 








Attending Physician:


Noy Velez DO


Consulting Physician


KAREEM SHELTON MD, MA, FACP, FACC, [facility], CCDS





Physician requesting consult: Dr Velez





HPI:


Chief Complaint:


Hypotension


Mr. Rico is a 71 yr old male who has been seen in [facility].  We have been 

consulted by Dr. Velez d/t episode of hypotension, asymptomatic, this morning. 

In late June 2022 he underwent L BKA at [facility].  He was admitted to 

IRU here following that surgery.  He then developed a gangrenous wound to the L 

BKA stump.  He subsequently underwent R AKA by Dr. Kaiser on 7-7-22.  His 

primary cardiologist is Dr. Hickey at [facility].  He denies any c/o CP,

SOB or palpitations.  His spouse is at the bedside.





Review of Systems-Cardiology


Review of Systems


Constitutional:  No chills, No fever, No malaise


Eyes:  No vision change


Ears/Nose/Throat:  No epistaxis, No recent hearing loss


Respiratory:  As described under HPI


Cardiovascular:  As described under HPI


Gastrointestinal:  No diarrhea, No nausea, No vomiting


Genitourinary:  No dysuria


Musculoskeletal:  other (left shoulder pain)


Skin:  other (abrasion to right leg; dressing to left leg stump)


Psychiatric/Neurological:  No anxiety, No depression, No seizure, No focal 

weakness, No syncope


Hematologic:  No bleeding abnormalities





All Other Systems Reviewed


Negative Unless Noted:  Yes





PMH-Social-Family Hx


Patient Social History


Marrital Status:  


Employed/Student:  retired


Smoking Status:  Former Smoker


2nd Hand Smoke Exposure:  No


Alcohol Use?:  No


Pt feels they are or have been:  No





Immunizations Up To Date


Tetanus Booster (TDap):  Unknown





Past Medical History


PMH


As described under Assessment.





Family Medical History


Family Medical History:  


He does not report any family h/o CAD.





Allergies and Home Medications


Allergies


Coded Allergies:  


     gabapentin (Verified  Allergy, Unknown, 7/6/22)


   nightmares


     lisinopril (Verified  Allergy, Unknown, 6/24/22)


     niacin (Verified  Allergy, Unknown, 6/24/22)


     oxycodone (Verified  Allergy, Unknown, 6/24/22)


     rivaroxaban (Verified  Allergy, Unknown, 6/24/22)


Uncoded Allergies:  


     CI Pigment Blue 63 (Adverse Reaction, Severe, Itching, 6/24/22)





Patient Home Medication List


Home Medication List Reviewed:  Yes


Amiodarone HCl (Amiodarone HCl) 200 Mg Tablet, 200 MG PO DAILY, (Reported)


   Entered as Reported by: ANDRÉS SUMMERS on 6/24/22 1244


   Last Action: Continued


Docusate Sodium (Docusate Sodium) 100 Mg Capsule, 100 MG PO BID PRN for 

CONSTIPATION-1ST LINE, (Reported)


   Entered as Reported by: ANDRÉS SUMMERS on 7/1/22 1242


   Last Action: Continued


Furosemide (Furosemide) 80 Mg Tablet, 80 MG PO DAILY, (Reported)


   Entered as Reported by: ANDRÉS SUMMERS on 6/24/22 1244


   Last Action: Converted


Glimepiride (Amaryl) 1 Mg Tab, 0.5 MG PO DAILY@0630


   Prescribed by: NOY VELEZ on 7/6/22 1050


   Last Action: Continued


Hydrocodone/Acetaminophen (Hydrocodone-Acetamin  mg) 10 Mg-325 Mg Tablet, 

1 EACH PO Q8H PRN for PAIN-MODERATE (5-7)


   Prescribed by: NOY VELEZ on 7/6/22 1051


   Last Action: Continued


Levothyroxine Sodium (Euthyrox) 100 Mcg Tablet, 100 MCG PO DAILY, (Reported)


   Entered as Reported by: BENNETT PEREZ on 7/7/22 1452


   Last Action: Continued


Losartan Potassium (Losartan Potassium) 50 Mg Tablet, 50 MG PO DAILY, (Reported)


   Entered as Reported by: ANDRÉS SUMMERS on 6/24/22 1244


   Last Action: Continued


Metformin HCl (Metformin HCl) 500 Mg Tablet, 500 MG PO BID@07,17


   Prescribed by: NOY VELEZ on 7/6/22 1050


   Last Action: Continued


Metoprolol Succinate (Metoprolol Succinate) 25 Mg Tab.er.24h, 25 MG PO DAILY, 

(Reported)


   Entered as Reported by: ANDRÉS SUMMERS on 6/24/22 1244


   Last Action: Continued


Pantoprazole Sodium (Pantoprazole Sodium) 40 Mg Tablet.dr, 40 MG PO DAILY, 

(Reported)


   Entered as Reported by: ANDRÉS SUMMERS on 6/24/22 1244


   Last Action: Continued


Potassium Chloride (K-Tab ER) 20 Meq Tablet.er, 20 MEQ PO TID, (Reported)


   Entered as Reported by: ANDRÉS SUMMERS on 6/24/22 1244


   Last Action: Held


Discontinued Medications


Apixaban (Eliquis) 5 Mg Tablet, 5 MG PO BID, (Reported)


   Entered as Reported by: ANDRÉS SUMMERS on 6/24/22 1244


Cholecalciferol (Vitamin D3) (Vitamin D3) 25 Mcg (1000 Unit) Capsule, 25 MCG PO 

DAILY, (Reported)


   Discontinued Reason: No Longer Taking


   Entered as Reported by: ANDRÉS SUMMERS on 6/24/22 1244


Clopidogrel Bisulfate (Plavix) 75 Mg Tablet, 75 MG PO 1200, (Reported)


   Entered as Reported by: ANDRÉS SUMMERS on 6/24/22 1244


Glimepiride (Glimepiride) 4 Mg Tablet, 4 MG PO BID, (Reported)


   Entered as Reported by: ANDRÉS SUMMERS on 6/24/22 1244


Levothyroxine Sodium (Levothyroxine Sodium) 100 Mcg Tablet, 100 MCG PO DAILY, 

(Reported)


   Discontinued Reason: No Longer Taking


   Entered as Reported by: ANDRÉS SUMMERS on 6/24/22 1244


Metformin HCl (Metformin HCl) 500 Mg Tablet, 500 MG PO QID, (Reported)


   Entered as Reported by: ANDRÉS SUMMERS on 6/24/22 1244


Simvastatin (Simvastatin) 20 Mg Tablet, 20 MG PO HS, (Reported)


   Discontinued Reason: No Longer Taking


   Entered as Reported by: ANDRÉS SUMMERS on 7/1/22 1241





Physical Exam-Cardiology


Physical Exam


Vital Signs/I&O











 7/12/22 7/12/22 7/12/22





 08:00 09:00 09:19


 


Temp 36.5  


 


Pulse 75  


 


Resp 16  


 


B/P (MAP) 94/58 (70)  


 


Pulse Ox 93 93 


 


O2 Delivery Nasal Cannula Nasal Cannula Nasal Cannula


 


O2 Flow Rate 1.50 1.50 1.50














 7/12/22





 00:00


 


Intake Total 715 ml


 


Balance 715 ml





Capillary Refill :


Constitutional:  AAO x 3, well-developed, well-nourished


HEENT:  PERRL, hearing is well preserved, oral hygience is good


Neck:  No carotid bruit; carotid pulses are 2 + bilaterally


Respiratory:  No accessory muscle use, No respiratory distress; chest expansion 

is symmetric, chest is bilaterally symmetric, lungs clear to auscultation


Cardiovascular:  regular rate-rhythm; No JVD; S1 and S2


Gastrointestinal:  No tender; soft, round, audible bowel sounds


Extremities:  other (RLE without swelling; dressing to R AKA stump which is D/I,

not removed)


Neurologic/Psychiatric:  grossly intact (moves all extremities)


Skin:  No rash on exposed areas, No ulcerations on exposed areas; other 

(abrasion to right leg)





Data Review


Labs


Laboratory Tests


7/11/22 21:08: Glucometer 108


7/12/22 05:55: Glucometer 116H


7/12/22 06:55: 


White Blood Count 15.6H, Red Blood Count 3.01L, Hemoglobin 7.4L, Hematocrit 25L,

Mean Corpuscular Volume 82, Mean Corpuscular Hemoglobin 25, Mean Corpuscular 

Hemoglobin Concent 30L, Red Cell Distribution Width 18.4H, Platelet Count 227, 

Mean Platelet Volume 11.8, Immature Granulocyte % (Auto) 1, Neutrophils (%) 

(Auto) 88H, Lymphocytes (%) (Auto) 4L, Monocytes (%) (Auto) 6, Eosinophils (%) 

(Auto) 1, Basophils (%) (Auto) 0, Neutrophils # (Auto) 13.7H, Lymphocytes # (Aut

o) 0.6L, Monocytes # (Auto) 0.9, Eosinophils # (Auto) 0.1, Basophils # (Auto) 

0.0, Immature Granulocyte # (Auto) 0.2H, Sodium Level 133L, Potassium Level 4.3,

Chloride Level 98, Carbon Dioxide Level 22, Anion Gap 13, Blood Urea Nitrogen 

38H, Creatinine 1.02, Estimat Glomerular Filtration Rate 79, BUN/Creatinine 

Ratio 37, Glucose Level 115H, Calcium Level 8.2L, Corrected Calcium 9.6, Total 

Bilirubin 0.5, Aspartate Amino Transf (AST/SGOT) 90H, Alanine Aminotransferase 

(ALT/SGPT) 56H, Alkaline Phosphatase 70, Total Protein 5.1L, Albumin 2.3L, 

Procalcitonin 0.78H


7/12/22 11:04: Glucometer 142H


7/12/22 16:31: Glucometer 165H





Microbiology


7/11/22 Blood Culture - Preliminary, Resulted


          No growth








A/P-Cardiology


Assessment/Admission Diagnosis





Transient hypotension


- asymptomatic





S/P L BKA in late June 2022 at [facility] - subsequent wet gangrene 

necessitating L AKA by Dr. Kaiser on 7-7-22.





Leukocytosis


- management per medical services





PVD


- reports PTCA bilat by Dr. Rogers at [facility] in 2021


- reports stent x 2 to the L leg 6-17-22 (prior to BKA) by Dr. Hickey at 

[facility]


- reports PTCA to R leg 6-19-22 by Dr. Hickey





CAD


- H/O CABG x 5 vessel in 2010 by Dr. Jacob





Chronic systolic CHF


- maintained on diuretics 





ICM


- last echo at [facility] reported LVEF 40% (per nursing staff, date unknown)


- maintained on Losartan





Carotid dz


- H/O L CEA in 2011 at [facility]


- H/O chronic occlusion of the R carotid per pt report





PAF


- maintained on Amiodarone and Toprol


- Previously on OAC with Eliquis (currently not taking)


- H/O cardioversions in the past, last one >2 yrs ago





HTN





HLD





DM 2





Anemia


- undetermined etiology


- management by medical services





Discussion and Recomendations





Transient hypotension


- possible d/t vol depletion vs sepsis, asymptomatic


- reduce antihypertensive regimen and diuretics


EKG today


H/O PAF


- continue Amiodarone


- advise OAC for stroke prophylaxis - however d/t dropping H/H which is being 

managed by Dr. Velez we will leave this to her discretion


CAD and carotid dz


- continue ASA, low dose


ICM


- request recent echo from [facility] - if not done in the last 6 months, request


- reduce Losartan


H/O systolic CHF


- clinically compensated


- reduce diuretics


Leukocytosis


- management per medical services (IV abx)


Monitor lab closely


Replace electrolytes as indicated


Request records from [facility]


We would like to thank Dr. Velez for this consult


Further recs will be based on his hospital course











KAREEM SHELTON MD FACP FAC CCDS   Jul 12, 2022 18:30

## 2022-07-12 NOTE — PM&R PROGRESS NOTE
Subjective


HPI/CC On Admission


Date Seen by Provider:  Jul 12, 2022


Time Seen by Provider:  11:00


Subjective/Events-last exam


7/12/2022:


Pt is doing well 


Mild hypotension not due to sepsis so will give 500 ccs bolus


IV fluids 60 an hour


Decrease procalcitonin


Zosyn and Vancomycin maintained








7/11/2022:


Pt is doing a lot better


Sleeps all the time


Fever and elevated white count with procalcitonin elevation prompting blood and 

urine cultures, chest x-ray, and empiric IV antibiotics


PICC will be placed








7/10/2022:


Patient settling in well


Sugars lower so will hold Metformin


No pain except hip and left shoulder


Glucometer assessed


Wife at bedside


No issues otherwise





Review of Systems


General:  Fatigue, Malaise


Neurological:  Weakness





Objective


Exam


Vital Signs





Vital Signs








  Date Time  Temp Pulse Resp B/P (MAP) Pulse Ox O2 Delivery O2 Flow Rate FiO2


 


7/12/22 20:00      Nasal Cannula 1.50 


 


7/12/22 20:00 36.8 80 20 109/48 (68) 98   





Capillary Refill :


General Appearance:  No Apparent Distress, WD/WN


HEENT:  PERRL/EOMI, Normal ENT Inspection, Pharynx Normal


Neck:  Normal Inspection, Supple


Respiratory:  No Accessory Muscle Use, No Respiratory Distress


Cardiovascular:  Regular Rate, Rhythm, No Edema, No Gallop, No JVD, No Murmur, 

Normal Peripheral Pulses


Gastrointestinal:  Normal Bowel Sounds, No Organomegaly, No Pulsatile Mass, Non 

Tender, Soft


Back:  Normal Inspection, No CVA Tenderness, No Vertebral Tenderness


Extremity:  Other (left AKA with dressing in place C/D/I)


Neurologic/Psychiatric:  Alert, Oriented x3


Skin:  Normal Color, Warm/Dry


Lymphatic:  No Adenopathy





Results/Procedures


Lab


Laboratory Tests


7/12/22 06:55








Patient resulted labs reviewed.





FIM


Transfers


Therapy Code Descriptions/Definitions 





Functional [place] Measure:


0=Not Assessed/NA        4=Minimal Assistance


1=Total Assistance        5=Supervision or Setup


2=Maximal Assistance  6=Modified [place]


3=Moderate Assistance 7=Complete IndependenceSCALE: Activities may be completed 

with or without assistive devices.





6-Indepedent-patient completes the activity by him/herself with no assistance 

from a helper.


5-Set-up or Clean-up Assistance-helper sets up or cleans up; patient completes 

activity. [place] assists only prior to or  


    following the activity.


4-Supervision or Touching Assistance-helper provides verbal cues and/or 

touching/steadying and/or contact guard assistance as patient completes activ

ity. Assistance may be provided   


    throughout the activity or intermittently.


3-Partial/Moderate Assistance-helper does LESS THAN HALF the effort. [place] 

lifts, holds or supports trunk or limbs, but provides less than half the effort.


2-Substantial/Maximal Assistance-helper does MORE THAN HALF the effort. [place] 

lifts or holds trunk or limbs and provides more than half the effort.


5-Ekovvuqvm-vdgroe does ALL the effort. Patient does none of the effort to 

complete the activity. Or, the assistance of 2 or more helpers is required for 

the patient to complete the  


    activity.


If activity was not attempted, code reason:


7-Patient Refused.


9-Not Applicable-not attempted and the patient did not perform the activity 

before the current illness, exacerbation or injury.


10-Not Attempted due to Environmental Limitations-(lack of equipment, weather 

restraints, etc.).


88-Not Attempted due to Medical Conditions or Safety Concerns.


Roll Left to Right (QC):  5


Sit to Lying (QC):  3


Sit to Stand (QC):  1


Chair/Bed-to-Chair Xfer(QC):  2


Car Transfer (QC):  2





Gait Training


Does the Patient Walk?:  No and Walking Goal NOT indicated


Walk 10 feet (QC):  88


Walk 50 ft with 2 Turns(QC):  88


Walk 150 ft (QC):  88


Walking 10ft/uneven surface-QC:  88





Wheelchair Training


Does the Pt Use a Wheelchair?:  Yes


Distance:  100'x2


Wheel 50 ft with 2 turns (QC):  4


Wheel 150 ft (QC):  88


Type of Wheelchair:  Manual





Stair Training


1 Step (curb) (QC):  88


4 Steps (QC):  88


12 Steps (QC):  88





Balance


Picking up an Object (QC):  88





ADL-Treatment


Eating (QC):  5 (Assistance opening containers)


Oral Hygiene (QC):  5 (set up at sink)


Shower/Bathe Self (QC):  1 (Assist x2 with sponge bath to help with bed mobility

and wiping)


Upper Body Dressing (QC):  3 (Mod A. Assist required overhead and slight 

assistance down trunk.)


Lower Body Dressing (QC):  1 (Assist x2)


On/Off Footwear (QC):  1


Toileting Hygiene (QC):  1 (Assist x2 for bowel movement. Setup with urinal)





Assessment/Plan


Assessment and Plan


Assess & Plan/Chief Complaint


Assessment:


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


Fall 7/5/22 resulting in left shoulder injury causing further debility and 

limiting ADL/independence


Severe PVD


PAF


OAC


HTN


HLP


DM


Hypothyroidism


Previous hypoglycemia required holding metformin and glimepiride but now on 

lower dose


Constipation resolved


Poor venous access requiring PICC 7/11/22





Plan:


PT OT


w/c mobility


Home meds





7/10/2022:


Pain control


Monitor sugar


Hold Metformin





7/11/22:


Empiric broad spectrum abx


Hold Metformin and OAC


PICC line





7/12/2022:


IV abx


Monitor closely





(1) S/P AKA (above knee amputation)


(2) Gangrene











DIANA HENRIQUEZ DO                Jul 12, 2022 06:23

## 2022-07-12 NOTE — CONSULTATION-CARDIOLOGY
HPI-Cardiology


Cardiology Consultation:


Date of Consultation


22


Time Seen by a Provider:  14:30


Date of Admission


22


Attending Physician


Mike Beltran MD


Admitting Physician


Admitting Physician:


Noy Henriquez DO 








Attending Physician:


Noy Henriquez DO


Consulting Physician


Russell Alejandra MD





Primary Cardiologist:  Dr. Hickey ([facility])





HPI:


Chief Complaint:


Hypotension


Mr. Rico is a 71 yr old male who has been seen in 233.  We have been 

consulted by Dr. Henriquez d/t episode of hypotension, asymptomatic, this morning. 

In late 2022 he underwent L BKA at [facility].  He was admitted to 

IRU here following that surgery.  He then developed a gangrenous wound to the L 

BKA stump.  He subsequently underwent R AKA by Dr. Kaiser on 22.  His 

primary cardiologist is Dr. Hickey at [facility].  He denies any c/o CP,

SOB or palpitations.  His spouse is at the bedside.





Review of Systems-Cardiology


Review of Systems


Constitutional:  No chills, No fever, No malaise


Eyes:  No vision change


Ears/Nose/Throat:  No epistaxis, No recent hearing loss


Respiratory:  As described under HPI


Cardiovascular:  As described under HPI


Gastrointestinal:  No diarrhea, No nausea, No vomiting


Genitourinary:  No dysuria


Musculoskeletal:  other (left shoulder pain)


Skin:  other (abrasion to right leg; dressing to left leg stump)


Psychiatric/Neurological:  No anxiety, No depression, No seizure, No focal 

weakness, No syncope


Hematologic:  No bleeding abnormalities





All Other Systems Reviewed


Negative Unless Noted:  Yes





PMH-Social-Family Hx


Patient Social History


Marrital Status:  


Employed/Student:  retired


Smoking Status:  Former Smoker


2nd Hand Smoke Exposure:  No


Alcohol Use?:  No


Pt feels they are or have been:  No





Immunizations Up To Date


Tetanus Booster (TDap):  Unknown





Past Medical History


PMH


As described under Assessment.





Family Medical History


Family Medical History:  


He does not report any family h/o CAD.





Allergies and Home Medications


Allergies


Coded Allergies:  


     gabapentin (Verified  Allergy, Unknown, 22)


   nightmares


     lisinopril (Verified  Allergy, Unknown, 22)


     niacin (Verified  Allergy, Unknown, 22)


     oxycodone (Verified  Allergy, Unknown, 22)


     rivaroxaban (Verified  Allergy, Unknown, 22)


Uncoded Allergies:  


     CI Pigment Blue 63 (Adverse Reaction, Severe, Itching, 22)





Patient Home Medication List


Amiodarone HCl (Amiodarone HCl) 200 Mg Tablet, 200 MG PO DAILY, (Reported)


   Entered as Reported by: ANDRÉS SUMMERS on 22 1244


   Last Action: Continued


Docusate Sodium (Docusate Sodium) 100 Mg Capsule, 100 MG PO BID PRN for 

CONSTIPATION-1ST LINE, (Reported)


   Entered as Reported by: ANDRÉS SUMMERS on 22 124


   Last Action: Continued


Furosemide (Furosemide) 80 Mg Tablet, 80 MG PO DAILY, (Reported)


   Entered as Reported by: ANDRÉS SUMMERS on 22 124


   Last Action: Converted


Glimepiride (Amaryl) 1 Mg Tab, 0.5 MG PO DAILY@0630


   Prescribed by: NOY HENRIQUEZ on 22 1050


   Last Action: Continued


Hydrocodone/Acetaminophen (Hydrocodone-Acetamin  mg) 10 Mg-325 Mg Tablet, 

1 EACH PO Q8H PRN for PAIN-MODERATE (5-7)


   Prescribed by: NOY HENRIQUEZ on 22 1051


   Last Action: Continued


Levothyroxine Sodium (Euthyrox) 100 Mcg Tablet, 100 MCG PO DAILY, (Reported)


   Entered as Reported by: BENNETT PEREZ on 22 1452


   Last Action: Continued


Losartan Potassium (Losartan Potassium) 50 Mg Tablet, 50 MG PO DAILY, (Reported)


   Entered as Reported by: ANDRÉS SUMMERS on 22 124


   Last Action: Continued


Metformin HCl (Metformin HCl) 500 Mg Tablet, 500 MG PO BID@07,17


   Prescribed by: NOY HENRIQUEZ on 22 1050


   Last Action: Continued


Metoprolol Succinate (Metoprolol Succinate) 25 Mg Tab.er.24h, 25 MG PO DAILY, (

Reported)


   Entered as Reported by: ANDRÉS SUMMERS on 22 124


   Last Action: Continued


Pantoprazole Sodium (Pantoprazole Sodium) 40 Mg Tablet.dr, 40 MG PO DAILY, 

(Reported)


   Entered as Reported by: ANDRÉS SUMMERS on 22 1244


   Last Action: Continued


Potassium Chloride (K-Tab ER) 20 Meq Tablet.er, 20 MEQ PO TID, (Reported)


   Entered as Reported by: ANDRÉS SUMMERS on 22 1244


   Last Action: Held


Discontinued Medications


Apixaban (Eliquis) 5 Mg Tablet, 5 MG PO BID, (Reported)


   Entered as Reported by: ANDRÉS SUMMERS on 22 1244


Cholecalciferol (Vitamin D3) (Vitamin D3) 25 Mcg (1000 Unit) Capsule, 25 MCG PO 

DAILY, (Reported)


   Discontinued Reason: No Longer Taking


   Entered as Reported by: ANDRÉS SUMMERS on 22 1244


Clopidogrel Bisulfate (Plavix) 75 Mg Tablet, 75 MG PO 1200, (Reported)


   Entered as Reported by: ANDRÉS SUMMERS on 22 124


Glimepiride (Glimepiride) 4 Mg Tablet, 4 MG PO BID, (Reported)


   Entered as Reported by: ANDRÉS SUMMERS on 22 124


Levothyroxine Sodium (Levothyroxine Sodium) 100 Mcg Tablet, 100 MCG PO DAILY, 

(Reported)


   Discontinued Reason: No Longer Taking


   Entered as Reported by: ANDRÉS SUMMERS on 22 1244


Metformin HCl (Metformin HCl) 500 Mg Tablet, 500 MG PO QID, (Reported)


   Entered as Reported by: ANDRÉS SUMMERS on 22 1244


Simvastatin (Simvastatin) 20 Mg Tablet, 20 MG PO HS, (Reported)


   Discontinued Reason: No Longer Taking


   Entered as Reported by: ANDRÉS SUMMERS on 22 1241





Physical Exam-Cardiology


Physical Exam


Vital Signs/I&O











 22





 07:35 09:00


 


Temp 36.2 


 


Pulse 85 


 


Resp 20 


 


B/P (MAP) 144/62 (89) 


 


Pulse Ox 97 93


 


O2 Delivery Nasal Cannula Nasal Cannula


 


O2 Flow Rate 1.50 1.50














 22





 23:59


 


Intake Total 1100 ml


 


Balance 1100 ml





Capillary Refill :


Constitutional:  AAO x 3, well-developed, well-nourished


HEENT:  PERRL, hearing is well preserved, oral hygience is good


Neck:  No carotid bruit; carotid pulses are 2 + bilaterally


Respiratory:  No accessory muscle use, No respiratory distress; chest expansion 

is symmetric, chest is bilaterally symmetric, lungs clear to auscultation


Cardiovascular:  regular rate-rhythm; No JVD; S1 and S2


Gastrointestinal:  No tender; soft, round, audible bowel sounds


Extremities:  other (RLE without swelling; dressing to R AKA stump which is D/I,

not removed)


Neurologic/Psychiatric:  grossly intact (moves all extremities)


Skin:  No rash on exposed areas, No ulcerations on exposed areas; other 

(abrasion to right leg)





Data Review


Labs


Laboratory Tests


22 16:31: Glucometer 165H


22 20:22: Glucometer 125H


22 05:39: Glucometer 116H


22 06:40: 


White Blood Count 17.0H, Red Blood Count 3.01L, Hemoglobin 7.3L, Hematocrit 25L,

Mean Corpuscular Volume 82, Mean Corpuscular Hemoglobin 24L, Mean Corpuscular 

Hemoglobin Concent 29L, Red Cell Distribution Width 18.3H, Platelet Count 256, 

Mean Platelet Volume 12.1, Immature Granulocyte % (Auto) 1, Neutrophils (%) 

(Auto) 89H, Lymphocytes (%) (Auto) 3L, Monocytes (%) (Auto) 5, Eosinophils (%) 

(Auto) 1, Basophils (%) (Auto) 0, Neutrophils # (Auto) 15.1H, Lymphocytes # 

(Auto) 0.6L, Monocytes # (Auto) 0.9, Eosinophils # (Auto) 0.2, Basophils # 

(Auto) 0.1, Immature Granulocyte # (Auto) 0.2H, Sodium Level 132L, Potassium 

Level 4.0, Chloride Level 102, Carbon Dioxide Level 22, Anion Gap 8, Blood Urea 

Nitrogen 26H, Creatinine 0.77, Estimat Glomerular Filtration Rate 96, 

BUN/Creatinine Ratio 34, Glucose Level 117H, Calcium Level 8.2L, Corrected 

Calcium 9.5, Total Bilirubin 0.5, Aspartate Amino Transf (AST/SGOT) 65H, Alanine

Aminotransferase (ALT/SGPT) 51, Alkaline Phosphatase 87, Total Protein 5.5L, 

Albumin 2.4L


22 09:00: Vancomycin Level Trough 9.1L


22 10:45: Glucometer 180H





Microbiology


22 Blood Culture - Preliminary, Resulted


          No growth








Radiology


NAME:   HECTOR RICO JAY


[facility] REC#:   [id number]


ACCOUNT#:   [id number]


PT STATUS:   ADM IN


:   1950


PHYSICIAN:   NOY HENRIQUEZ DO


ADMIT DATE:   22/[facility]


***Signed***


Date of Exam:22





CHEST 1 VIEW, AP/PA ONLY








INDICATION: 


Fever.





TIME OF EXAM: 


9:25 AM.





COMPARISON: 


No prior studies are available for comparison.





FINDINGS:


The heart is enlarged. There are changes of median sternotomy and


CABG. The right lung is clear. There appears to be some


atelectasis in the left base. No effusion or pneumothorax is


detected.





IMPRESSION: 


Cardiomegaly and status post CABG with left basilar subsegmental


atelectasis.





Dictated by: 





  Dictated on workstation # [id number]








Dict:   22 0942


Trans:   22 1555


 [id number]





Interpreted by:     JOSE DAVILA MD


Electronically signed by: JOSE DAVILA MD 22 1555





A/P-Cardiology


Assessment/Admission Diagnosis


Transient hypotension


- asymptomatic





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 

necessitating L AKA by Dr. Kaiser on 22.





Leukocytosis


- management per medical services





PVD


- reports PTCA bilat by Dr. Rogers at [facility] in 


- reports stent x 2 to the L leg 22 (prior to BKA) by Dr. Hickey at 

[facility]


- reports PTCA to R leg 22 by Dr. Hickey





CAD


- H/O CABG x 5 vessel in  by Dr. Jacob





Chronic systolic CHF


- maintained on diuretics 





ICM


- last echo at [facility] reported LVEF 40% (per nursing staff, date unknown)


- maintained on Losartan





Carotid dz


- H/O L CEA in  at [facility]


- H/O chronic occlusion of the R carotid per pt report





PAF


- maintained on Amiodarone and Toprol


- Previously on OAC with Eliquis (currently not taking)


- H/O cardioversions in the past, last one >2 yrs ago





HTN





HLD





DM 2





Anemia


- undetermined etiology


- management by medical services





Discussion and Recomendations


Transient hypotension


- possible d/t vol depletion vs sepsis, asymptomatic


- reduce antihypertensive regimen and diuretics


EKG today


H/O PAF


- continue Amiodarone


- advise OAC for stroke prophylaxis - however d/t dropping H/H which is being 

managed by Dr. Henriquez we will leave this us to her discretion


CAD and carotid dz


- continue ASA, low dose


ICM


- request recent echo from [facility] - if not done in the last 6 months, request


- reduce Losartan


H/O systolic CHF


- clinically compensated


- reduce diuretics


Leukocytosis


- management per medical services (IV abx)


Monitor lab closely


Replace electrolytes as indicated


Request records from [facility]


We would like to thank Dr. Henriquez for this consult


Further recs will be based on his hospital course











KAMILLE WILHELM           2022 14:56

## 2022-07-13 VITALS — SYSTOLIC BLOOD PRESSURE: 124 MMHG | DIASTOLIC BLOOD PRESSURE: 59 MMHG

## 2022-07-13 VITALS — SYSTOLIC BLOOD PRESSURE: 113 MMHG | DIASTOLIC BLOOD PRESSURE: 55 MMHG

## 2022-07-13 VITALS — DIASTOLIC BLOOD PRESSURE: 58 MMHG | SYSTOLIC BLOOD PRESSURE: 125 MMHG

## 2022-07-13 VITALS — SYSTOLIC BLOOD PRESSURE: 144 MMHG | DIASTOLIC BLOOD PRESSURE: 62 MMHG

## 2022-07-13 VITALS — DIASTOLIC BLOOD PRESSURE: 53 MMHG | SYSTOLIC BLOOD PRESSURE: 113 MMHG

## 2022-07-13 LAB
ALBUMIN SERPL-MCNC: 2.4 GM/DL (ref 3.2–4.5)
ALP SERPL-CCNC: 87 U/L (ref 40–136)
ALT SERPL-CCNC: 51 U/L (ref 0–55)
BASOPHILS # BLD AUTO: 0.1 10^3/UL (ref 0–0.1)
BASOPHILS NFR BLD AUTO: 0 % (ref 0–10)
BILIRUB SERPL-MCNC: 0.5 MG/DL (ref 0.1–1)
BUN/CREAT SERPL: 34
CALCIUM SERPL-MCNC: 8.2 MG/DL (ref 8.5–10.1)
CHLORIDE SERPL-SCNC: 102 MMOL/L (ref 98–107)
CO2 SERPL-SCNC: 22 MMOL/L (ref 21–32)
CREAT SERPL-MCNC: 0.77 MG/DL (ref 0.6–1.3)
EOSINOPHIL # BLD AUTO: 0.2 10^3/UL (ref 0–0.3)
EOSINOPHIL NFR BLD AUTO: 1 % (ref 0–10)
GFR SERPLBLD BASED ON 1.73 SQ M-ARVRAT: 96 ML/MIN
GLUCOSE SERPL-MCNC: 117 MG/DL (ref 70–105)
HCT VFR BLD CALC: 25 % (ref 40–54)
HGB BLD-MCNC: 7.3 G/DL (ref 13.3–17.7)
LYMPHOCYTES # BLD AUTO: 0.6 10^3/UL (ref 1–4)
LYMPHOCYTES NFR BLD AUTO: 3 % (ref 12–44)
MANUAL DIFFERENTIAL PERFORMED BLD QL: NO
MCH RBC QN AUTO: 24 PG (ref 25–34)
MCHC RBC AUTO-ENTMCNC: 29 G/DL (ref 32–36)
MCV RBC AUTO: 82 FL (ref 80–99)
MONOCYTES # BLD AUTO: 0.9 10^3/UL (ref 0–1)
MONOCYTES NFR BLD AUTO: 5 % (ref 0–12)
NEUTROPHILS # BLD AUTO: 15.1 10^3/UL (ref 1.8–7.8)
NEUTROPHILS NFR BLD AUTO: 89 % (ref 42–75)
PLATELET # BLD: 256 10^3/UL (ref 130–400)
PMV BLD AUTO: 12.1 FL (ref 9–12.2)
POTASSIUM SERPL-SCNC: 4 MMOL/L (ref 3.6–5)
PROT SERPL-MCNC: 5.5 GM/DL (ref 6.4–8.2)
SODIUM SERPL-SCNC: 132 MMOL/L (ref 135–145)
WBC # BLD AUTO: 17 10^3/UL (ref 4.3–11)

## 2022-07-13 RX ADMIN — DOCUSATE SODIUM SCH MG: 100 CAPSULE ORAL at 08:18

## 2022-07-13 RX ADMIN — SODIUM CHLORIDE SCH MLS/HR: 900 INJECTION, SOLUTION INTRAVENOUS at 10:15

## 2022-07-13 RX ADMIN — INSULIN ASPART SCH UNIT: 100 INJECTION, SOLUTION INTRAVENOUS; SUBCUTANEOUS at 12:14

## 2022-07-13 RX ADMIN — DOCUSATE SODIUM AND SENNOSIDES SCH EA: 8.6; 5 TABLET, FILM COATED ORAL at 20:32

## 2022-07-13 RX ADMIN — POTASSIUM CHLORIDE SCH MEQ: 750 TABLET, FILM COATED, EXTENDED RELEASE ORAL at 20:33

## 2022-07-13 RX ADMIN — AMIODARONE HYDROCHLORIDE SCH MG: 200 TABLET ORAL at 08:17

## 2022-07-13 RX ADMIN — SODIUM CHLORIDE SCH MLS/HR: 900 INJECTION INTRAVENOUS at 11:11

## 2022-07-13 RX ADMIN — SODIUM CHLORIDE SCH MLS/HR: 900 INJECTION INTRAVENOUS at 03:14

## 2022-07-13 RX ADMIN — APIXABAN SCH MG: 5 TABLET, FILM COATED ORAL at 20:33

## 2022-07-13 RX ADMIN — APIXABAN SCH MG: 5 TABLET, FILM COATED ORAL at 08:17

## 2022-07-13 RX ADMIN — PANTOPRAZOLE SODIUM SCH MG: 40 TABLET, DELAYED RELEASE ORAL at 08:17

## 2022-07-13 RX ADMIN — INSULIN ASPART SCH UNIT: 100 INJECTION, SOLUTION INTRAVENOUS; SUBCUTANEOUS at 05:42

## 2022-07-13 RX ADMIN — DOCUSATE SODIUM SCH MG: 100 CAPSULE ORAL at 20:33

## 2022-07-13 RX ADMIN — POTASSIUM CHLORIDE SCH MEQ: 750 TABLET, FILM COATED, EXTENDED RELEASE ORAL at 08:17

## 2022-07-13 RX ADMIN — INSULIN ASPART SCH UNIT: 100 INJECTION, SOLUTION INTRAVENOUS; SUBCUTANEOUS at 17:48

## 2022-07-13 RX ADMIN — DOCUSATE SODIUM AND SENNOSIDES SCH EA: 8.6; 5 TABLET, FILM COATED ORAL at 08:17

## 2022-07-13 RX ADMIN — POLYETHYLENE GLYCOL (3350) SCH GM: 17 POWDER, FOR SOLUTION ORAL at 19:19

## 2022-07-13 RX ADMIN — FUROSEMIDE SCH MG: 40 TABLET ORAL at 08:17

## 2022-07-13 RX ADMIN — LEVOTHYROXINE SODIUM SCH MCG: 100 TABLET ORAL at 06:30

## 2022-07-13 RX ADMIN — Medication SCH EACH: at 08:18

## 2022-07-13 RX ADMIN — POLYETHYLENE GLYCOL (3350) SCH GM: 17 POWDER, FOR SOLUTION ORAL at 08:21

## 2022-07-13 RX ADMIN — SODIUM CHLORIDE SCH MLS/HR: 900 INJECTION INTRAVENOUS at 18:40

## 2022-07-13 RX ADMIN — LOSARTAN POTASSIUM SCH MG: 25 TABLET, FILM COATED ORAL at 08:18

## 2022-07-13 RX ADMIN — Medication SCH EACH: at 13:53

## 2022-07-13 RX ADMIN — INSULIN ASPART SCH UNIT: 100 INJECTION, SOLUTION INTRAVENOUS; SUBCUTANEOUS at 20:37

## 2022-07-13 RX ADMIN — Medication SCH EACH: at 18:04

## 2022-07-13 RX ADMIN — SODIUM CHLORIDE SCH MLS/HR: 900 INJECTION, SOLUTION INTRAVENOUS at 14:54

## 2022-07-13 NOTE — PHYSICAL THERAPY DAILY NOTE
PT Daily Note-Current


Subjective


Pt laying Supine in bed with Sp present upon arrival.  Pt agrees to PT.





Pain





   Numeric Pain Scale:  4


   Location:  Left


   Location Body Site:  Hip


   Pain Description:  Ache





Mental Status


Patient Orientation:  Person, Place


Attachments:  Oxygen, IV





Transfers


SCALE: Activities may be completed with or without assistive devices.





6-Indepedent-patient completes the activity by him/herself with no assistance 

from a helper.


5-Set-up or Clean-up Assistance-helper sets up or cleans up; patient completes 

activity. [place] assists only prior to or  


    following the activity.


4-Supervision or Touching Assistance-helper provides verbal cues and/or 

touching/steadying and/or contact guard assistance as patient completes 

activity. Assistance may be provided   


    throughout the activity or intermittently.


3-Partial/Moderate Assistance-helper does LESS THAN HALF the effort. [place] 

lifts, holds or supports trunk or limbs, but provides less than half the effort.


2-Substantial/Maximal Assistance-helper does MORE THAN HALF the effort. [place] 

lifts or holds trunk or limbs and provides more than half the effort.


5-Zskgjcxae-bsjohg does ALL the effort. Patient does none of the effort to 

complete the activity. Or, the assistance of 2 or more helpers is required for 

the patient to complete the  


    activity.


If activity was not attempted, code reason:


7-Patient Refused.


9-Not Applicable-not attempted and the patient did not perform the activity 

before the current illness, exacerbation or injury.


10-Not Attempted due to Environmental Limitations-(lack of equipment, weather 

restraints, etc.).


88-Not Attempted due to Medical Conditions or Safety Concerns.


Lying to Sitting/Side of Bed(Q:  3


Sit to Stand (QC):  3





Weight Bearing


Full Weight Bearing





Exercises


Seated Therapy Exercises:  Sit to stand


Seated Reps:  5





Treatments


OT/PT co-treat (5989-9057): PT/OT co-treat required due to the skill required 

from therapist that a rehab tech does not possess. PT focusing on LB 

strengthening, LE placement during transfer and standing balance. OT focusing on

ADLs, UB strengthening, and  UE placement during transfers. Pt supine in bed at 

beginning of tx and transferred to EOB with Max A for cueing for UE placement 

and assistance to help pull self from supine position. Pt then completed 5x 

stand from EOB with 2x assist for cueing and balance while standing. For the 

first 3 stands, pt started at higher bed setting to decrease LE strength 

required to push self up from bed and with each stand, the bed decreased in 

height. Pt attempted to perform stand pivot transfer from EOB to recliner, but 

was unable to pivot RLE. Pt requested to stop trying to transfer to recliner for

the day, but is willing to try again tomorrow.





Assessment


Current Status:  Good Progress


Pt demonstrates increased strength and decreased fear of falling/trust in 

therapists.





PT Short Term Goals


Short Term Goals


Time Frame:  Jul 15, 2022


Roll Left & Right:  4


Sit to lying:  3 (Kacie)


Lying to sitting on side of be:  3 (Kacie)


Sit to stand:  3 (modA)


Chair/bed-to-chair transfer:  3 (modA)





PT Long Term Goals


Long Term Goals


PT Long Term Goals Time Frame:  Jul 29, 2022


Roll Left & Right (QC):  6


Sit to Lying (QC):  4 (SBA)


Lying-Sitting on Side/Bed(QC):  4 (SBA)


Sit to Stand (QC):  3 (Kacie)


Chair/Bed-to-Chair Xfer(QC):  3 (Kacie)


Toilet Transfer (QC):  3 (Kacie)


Car Transfer (QC):  3 (Kacie)


Does the Patient Walk:  No and Walking Goal NOT indicated


Walk 10 feet (QC):  88


Walk 50ft with 2 Turns (QC):  88


Walk 150 ft (QC):  88


Walking 10ft on Uneven Surface:  88


1 Step (curb) (QC):  88


4 Steps (QC):  88


12 Steps (QC):  88


Picking up an Object (QC):  88


Wheel 50 feet with 2 turns (QC:  6


Wheel 150 feet:  6





PT Plan


Problem List


Problem List:  Activity Tolerance, Functional Strength, Transfer





Treatment/Plan


Treatment Plan:  Continue Plan of Care


Treatment Plan:  Bed Mobility, Education, Functional Activity [place], Functional 

Strength, Group Therapy, Safety, Therapeutic Exercise, Transfers


Treatment Duration:  Jul 29, 2022


Frequency:  At least 5 of 7 days/Wk (IRF)


Estimated Hrs Per Day:  1.5 hours per day


Patient and/or Family Agrees t:  Yes





Safety Risks/Education


Patient Education:  Transfer Techniques, Correct Positioning, Safety Issues


Teaching Recipient:  Patient, Significant Other


Teaching Methods:  Discussion


Response to Teaching:  Verbalize Understanding





Time/GCodes


Time In:  800


Time Out:  900


Total Billed Treatment Time:  60


Total Billed Treatment


1, FA x4 (60m)  Co-treat w/OT for 60m











SERGO AGUILERA PTA              Jul 13, 2022 09:01

## 2022-07-13 NOTE — OCCUPATIONAL THER DAILY NOTE
OT Current Status-Daily Note


Subjective


Pt supine in bed with family member in room. Pt agreeable to OT/PT cotreat.





Pain





   Numeric Pain Scale:  5-Moderate Pain


   Location:  Left


   Location Body Site:  Hip





Mental Status/Objective


Patient Orientation:  Normal For Age





ADL-Treatment


Therapy Code Descriptions/Definitions 





Functional [place] Measure:


0=Not Assessed/NA        4=Minimal Assistance


1=Total Assistance        5=Supervision or Setup


2=Maximal Assistance  6=Modified [place]


3=Moderate Assistance 7=Complete IndependenceSCALE: Activities may be completed 

with or without assistive devices.





6-Indepedent-patient completes the activity by him/herself with no assistance 

from a helper.


5-Set-up or Clean-up Assistance-helper sets up or cleans up; patient completes 

activity. [place] assists only prior to or  


    following the activity.


4-Supervision or Touching Assistance-helper provides verbal cues and/or 

touching/steadying and/or contact guard assistance as patient completes 

activity. Assistance may be provided   


    throughout the activity or intermittently.


3-Partial/Moderate Assistance-helper does LESS THAN HALF the effort. [place] 

lifts, holds or supports trunk or limbs, but provides less than half the effort.


2-Substantial/Maximal Assistance-helper does MORE THAN HALF the effort. [place] 

lifts or holds trunk or limbs and provides more than half the effort.


8-Iddgmbfvh-ytdbuo does ALL the effort. Patient does none of the effort to 

complete the activity. Or, the assistance of 2 or more helpers is required for 

the patient to complete the  


    activity.


If activity was not attempted, code reason:


7-Patient Refused.


9-Not Applicable-not attempted and the patient did not perform the activity 

before the current illness, exacerbation or injury.


10-Not Attempted due to Environmental Limitations-(lack of equipment, weather 

restraints, etc.).


88-Not Attempted due to Medical Conditions or Safety Concerns.





Other Treatment


OT/PT cotreat ([id number]): PT/OT cotreat required due to the skill required from

therapist that a rehab tech does not possess. PT focusing on LB strengthening, 

LE placement during transfer and standing balance. OT focusing on ADLs, UB 

strengthening, and  UE placement during transfers. Pt supine in bed at beginning

of tx and transferred to EOB with MaxA for cueing for UE placement and 

assistance to help pull self from supine position. Pt then completed 5x stand 

from EOB with 2x assist for cueing and balance while standing. For the firt 3 

stands, pt started at higher bed setting to decrease LE strength required to 

push self up from bed and with each stand, the bed decreased in height. Pt 

attempted to perform stand pivot transfer from EOB to recliner, but was unable 

to pivot RLE. Pt requested to stop trying to transfer to recliner for the day, 

but is willing to try again tomorrow. 


OT tx ([id number]): Pt completed oral hygiene at bed side with set up assist and 

groomed hair. Pt also completed face washing at EOB. Pt then completed BUE 

strengthening exercises with yellow theraband on RUE and no weight on LUE, to 

increase BUE strength and activity tolerance. Pt then transferred from EOB to 

supine with SBA for cueing. Pt supine in bed with call light in reach and all 

needs met.





Education


OT Patient Education:  Transfer techniques


Teaching Recipient:  Patient, Family


Teaching Methods:  Demonstration, Discussion


Response to Teaching:  Verbalize Understanding





OT Short Term Goals


Short Term Goals


Time Frame:  2022


Toileting hygiene:  2


Shower/bathe self:  3


Lower body dressin


Putting on/taking off footwear:  3





OT Long Term Goals


Long Term Goals


Time Frame:  Aug 5, 2022


Eating (QC):  6


Oral Hygiene (QC):  6


Toileting Hygiene (QC):  3 (bowel movements on toilet)


Shower/Bathe Self (QC):  4


Upper Body Dressing (QC):  5


Lower Body Dressing (QC):  3


On/Off Footwear (QC):  4


Additional Goals:  1-Demonstrate ADL Tasks, 2-Verbalize Understanding, 3-

ImproveStrength/[place]


1=Demonstrate adherence to instructed precautions during ADL tasks.


2=Patient will verbalize/demonstrate understanding of assistive 

devices/modifications for ADL.


3=Patient will improve strength/tolerance for activity to enable patient to 

perform ADL's.





OT Education/Plan


Problem List/Assessment


Assessment:  Decreased Activ Tolerance, Decreased UE Strength, Impaired Bed 

Mobility, Impaired Coordination, Impaired Funct Balance, Impaired I ADL's, 

Impaired Self-Care Skills





Discharge Recommendations


Plan/Recommendations:  Continue POC





Treatment Plan/Plan of Care


Patient would benefit from OT for education, treatment and training to promote 

independence in ADL's, mobility, safety and/or upper extremity function for 

ADL's.


Plan of Care:  ADL Retraining, Caregiver Training, Functional Mobility, Group 

Exercise/Act as Ind, UE Funct Exercise/Act, W/C Management Training


Treatment Duration:  Aug 5, 2022


Frequency:  At least 5 of 7 days/Wk (IRF)


Estimated Hrs Per Day:  1.5 hours per day


Rehab Potential:  Guarded





Time/GCodes


Start Time:  08:00


Stop Time:  09:30


Total Time Billed (hr/min):  90


Billed Treatment Time


1 visit, FA 4 (55min), EX 2 (35min)











Tanya Ingram              2022 09:30

## 2022-07-13 NOTE — PM&R PROGRESS NOTE
Subjective


HPI/CC On Admission


Date Seen by Provider:  Jul 13, 2022


Time Seen by Provider:  08:30


Subjective/Events-last exam


7/13/2022:


Pt had some incontinence today


Dressing was saturated


Decrease BP medication for Dr. Alejandra


A little bit confused


Will give one unit of blood today


Maintain on broad spectrum antibiotics


White count still elevated at 17








7/12/2022:


Pt is doing well 


Mild hypotension not due to sepsis so will give 500 ccs bolus


IV fluids 60 an hour


Decrease procalcitonin


Zosyn and Vancomycin maintained








7/11/2022:


Pt is doing a lot better


Sleeps all the time


Fever and elevated white count with procalcitonin elevation prompting blood and 

urine cultures, chest x-ray, and empiric IV antibiotics


PICC will be placed








7/10/2022:


Patient settling in well


Sugars lower so will hold Metformin


No pain except hip and left shoulder


Glucometer assessed


Wife at bedside


No issues otherwise





Review of Systems


General:  Fatigue, Malaise


Musculoskeletal:  arm pain, leg pain





Objective


Exam


Vital Signs





Vital Signs








  Date Time  Temp Pulse Resp B/P (MAP) Pulse Ox O2 Delivery O2 Flow Rate FiO2


 


7/13/22 20:20      Nasal Cannula 1.50 


 


7/13/22 20:00 37.5 78 18 125/58 (80) 96   





Capillary Refill :


General Appearance:  No Apparent Distress, WD/WN


HEENT:  PERRL/EOMI, Normal ENT Inspection, Pharynx Normal


Neck:  Normal Inspection, Supple


Respiratory:  No Accessory Muscle Use, No Respiratory Distress


Cardiovascular:  Regular Rate, Rhythm, No Edema, No Gallop, No JVD, No Murmur, N

ormal Peripheral Pulses


Gastrointestinal:  Normal Bowel Sounds, No Organomegaly, No Pulsatile Mass, Non 

Tender, Soft


Back:  Normal Inspection, No CVA Tenderness, No Vertebral Tenderness


Extremity:  Other (left AKA with dressing in place C/D/I)


Neurologic/Psychiatric:  Alert, Oriented x3


Skin:  Normal Color, Warm/Dry


Lymphatic:  No Adenopathy





Results/Procedures


Lab


Laboratory Tests


7/13/22 06:40








Patient resulted labs reviewed.





FIM


Transfers


Therapy Code Descriptions/Definitions 





Functional [place] Measure:


0=Not Assessed/NA        4=Minimal Assistance


1=Total Assistance        5=Supervision or Setup


2=Maximal Assistance  6=Modified [place]


3=Moderate Assistance 7=Complete IndependenceSCALE: Activities may be completed 

with or without assistive devices.





6-Indepedent-patient completes the activity by him/herself with no assistance 

from a helper.


5-Set-up or Clean-up Assistance-helper sets up or cleans up; patient completes 

activity. [place] assists only prior to or  


    following the activity.


4-Supervision or Touching Assistance-helper provides verbal cues and/or 

touching/steadying and/or contact guard assistance as patient completes 

activity. Assistance may be provided   


    throughout the activity or intermittently.


3-Partial/Moderate Assistance-helper does LESS THAN HALF the effort. [place] 

lifts, holds or supports trunk or limbs, but provides less than half the effort.


2-Substantial/Maximal Assistance-helper does MORE THAN HALF the effort. [place] 

lifts or holds trunk or limbs and provides more than half the effort.


2-Fgsyxhvry-hihpnj does ALL the effort. Patient does none of the effort to 

complete the activity. Or, the assistance of 2 or more helpers is required for 

the patient to complete the  


    activity.


If activity was not attempted, code reason:


7-Patient Refused.


9-Not Applicable-not attempted and the patient did not perform the activity 

before the current illness, exacerbation or injury.


10-Not Attempted due to Environmental Limitations-(lack of equipment, weather 

restraints, etc.).


88-Not Attempted due to Medical Conditions or Safety Concerns.


Roll Left to Right (QC):  5


Sit to Lying (QC):  3


Sit to Stand (QC):  1


Chair/Bed-to-Chair Xfer(QC):  2


Car Transfer (QC):  2





Gait Training


Does the Patient Walk?:  No and Walking Goal NOT indicated


Walk 10 feet (QC):  88


Walk 50 ft with 2 Turns(QC):  88


Walk 150 ft (QC):  88


Walking 10ft/uneven surface-QC:  88





Wheelchair Training


Does the Pt Use a Wheelchair?:  Yes


Distance:  100'x2


Wheel 50 ft with 2 turns (QC):  4


Wheel 150 ft (QC):  88


Type of Wheelchair:  Manual





Stair Training


1 Step (curb) (QC):  88


4 Steps (QC):  88


12 Steps (QC):  88





Balance


Picking up an Object (QC):  88





ADL-Treatment


Eating (QC):  5 (Assistance opening containers)


Oral Hygiene (QC):  5 (set up at sink)


Shower/Bathe Self (QC):  1 (Assist x2 with sponge bath to help with bed mobility

and wiping)


Upper Body Dressing (QC):  3 (Mod A. Assist required overhead and slight 

assistance down trunk.)


Lower Body Dressing (QC):  1 (Assist x2)


On/Off Footwear (QC):  1


Toileting Hygiene (QC):  1 (Assist x2 for bowel movement. Setup with urinal)





Assessment/Plan


Assessment and Plan


Assess & Plan/Chief Complaint


Assessment:


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


Fall 7/5/22 resulting in left shoulder injury causing further debility and 

limiting ADL/independence


Severe PVD


PAF


OAC


HTN


HLP


DM


Hypothyroidism


Previous hypoglycemia required holding metformin and glimepiride but now on 

lower dose


Constipation resolved


Poor venous access requiring PICC 7/11/22


Anemia post op requiring transfusion 7/13/22


Plan:


PT OT


w/c mobility


Home meds





7/10/2022:


Pain control


Monitor sugar


Hold Metformin





7/11/22:


Empiric broad spectrum abx


Hold Metformin and OAC


PICC line





7/12/2022:


IV abx


Monitor closely





7/13/2022:


Transfuse


Broad spectrum abx





(1) S/P AKA (above knee amputation)


(2) Gangrene











DIANA HENRIQUEZ DO                Jul 13, 2022 08:26

## 2022-07-13 NOTE — PHYSICAL THERAPY DAILY NOTE
PT Daily Note-Current


Subjective


Pt laying Supine in bed upon arrival with Sp present.  Pt agrees to PT and asks 

to sit EOB.





Pain





   Location:  Left


   Location Body Site:  Hip


   Pain Description:  Ache


   Comment:  Reports but doesn't rate





Mental Status


Patient Orientation:  Person, Confused, Place


Attachments:  Oxygen, IV





Transfers


SCALE: Activities may be completed with or without assistive devices.





6-Indepedent-patient completes the activity by him/herself with no assistance 

from a helper.


5-Set-up or Clean-up Assistance-helper sets up or cleans up; patient completes 

activity. [place] assists only prior to or  


    following the activity.


4-Supervision or Touching Assistance-helper provides verbal cues and/or 

touching/steadying and/or contact guard assistance as patient completes a

ctivity. Assistance may be provided   


    throughout the activity or intermittently.


3-Partial/Moderate Assistance-helper does LESS THAN HALF the effort. [place] 

lifts, holds or supports trunk or limbs, but provides less than half the effort.


2-Substantial/Maximal Assistance-helper does MORE THAN HALF the effort. [place] 

lifts or holds trunk or limbs and provides more than half the effort.


4-Jurftpyoy-fqjgnz does ALL the effort. Patient does none of the effort to 

complete the activity. Or, the assistance of 2 or more helpers is required for 

the patient to complete the  


    activity.


If activity was not attempted, code reason:


7-Patient Refused.


9-Not Applicable-not attempted and the patient did not perform the activity 

before the current illness, exacerbation or injury.


10-Not Attempted due to Environmental Limitations-(lack of equipment, weather 

restraints, etc.).


88-Not Attempted due to Medical Conditions or Safety Concerns.


Lying to Sitting/Side of Bed(Q:  3





Weight Bearing


Full Weight Bearing





Exercises


Supine Ex:  Rolling, Scooting





Treatments


Pt works on log rolling and proper technique to transfer from Supine to EOB as 

pt requests.  After TC & VC given to pt & instruction given to Sp, pt resting 

EOB w/shoe on, all needs met & call light in hand.  Nurse is aware that pt is 

EOB for safety.





Assessment


Current Status:  Fair Progress


Pt is more fatigued in afternoon session and needs occasional encouragement for 

safety.





PT Short Term Goals


Short Term Goals


Time Frame:  Jul 15, 2022


Roll Left & Right:  4


Sit to lying:  3 (Kacie)


Lying to sitting on side of be:  3 (Kacie)


Sit to stand:  3 (modA)


Chair/bed-to-chair transfer:  3 (modA)





PT Long Term Goals


Long Term Goals


PT Long Term Goals Time Frame:  Jul 29, 2022


Roll Left & Right (QC):  6


Sit to Lying (QC):  4 (SBA)


Lying-Sitting on Side/Bed(QC):  4 (SBA)


Sit to Stand (QC):  3 (Kacie)


Chair/Bed-to-Chair Xfer(QC):  3 (Kacie)


Toilet Transfer (QC):  3 (Kacie)


Car Transfer (QC):  3 (Kacie)


Does the Patient Walk:  No and Walking Goal NOT indicated


Walk 10 feet (QC):  88


Walk 50ft with 2 Turns (QC):  88


Walk 150 ft (QC):  88


Walking 10ft on Uneven Surface:  88


1 Step (curb) (QC):  88


4 Steps (QC):  88


12 Steps (QC):  88


Picking up an Object (QC):  88


Wheel 50 feet with 2 turns (QC:  6


Wheel 150 feet:  6





PT Plan


Problem List


Problem List:  Activity Tolerance, Functional Strength





Treatment/Plan


Treatment Plan:  Continue Plan of Care


Treatment Plan:  Bed Mobility, Education, Functional Activity [place], Functional 

Strength, Group Therapy, Safety, Therapeutic Exercise, Transfers


Treatment Duration:  Jul 29, 2022


Frequency:  At least 5 of 7 days/Wk (IRF)


Estimated Hrs Per Day:  1.5 hours per day


Patient and/or Family Agrees t:  Yes





Safety Risks/Education


Patient Education:  Transfer Techniques, Correct Positioning, Safety Issues


Teaching Recipient:  Patient, Significant Other


Teaching Methods:  Discussion


Response to Teaching:  Verbalize Understanding





Time/GCodes


Time In:  1400


Time Out:  1430


Total Billed Treatment Time:  30


Total Billed Treatment


1, EX (20m) & FA (10m)











SERGO AGUILERA PTA              Jul 13, 2022 15:50

## 2022-07-14 VITALS — DIASTOLIC BLOOD PRESSURE: 60 MMHG | SYSTOLIC BLOOD PRESSURE: 135 MMHG

## 2022-07-14 VITALS — DIASTOLIC BLOOD PRESSURE: 76 MMHG | SYSTOLIC BLOOD PRESSURE: 132 MMHG

## 2022-07-14 LAB
%HYPO/RBC NFR BLD AUTO: (no result) %
ALBUMIN SERPL-MCNC: 2.3 GM/DL (ref 3.2–4.5)
ALP SERPL-CCNC: 94 U/L (ref 40–136)
ALT SERPL-CCNC: 49 U/L (ref 0–55)
ANISOCYTOSIS BLD QL SMEAR: (no result)
BASOPHILS # BLD AUTO: 0.1 10^3/UL (ref 0–0.1)
BASOPHILS NFR BLD AUTO: 0 % (ref 0–10)
BILIRUB SERPL-MCNC: 0.5 MG/DL (ref 0.1–1)
BUN/CREAT SERPL: 26
CALCIUM SERPL-MCNC: 8.3 MG/DL (ref 8.5–10.1)
CHLORIDE SERPL-SCNC: 105 MMOL/L (ref 98–107)
CO2 SERPL-SCNC: 21 MMOL/L (ref 21–32)
CREAT SERPL-MCNC: 0.7 MG/DL (ref 0.6–1.3)
EOSINOPHIL # BLD AUTO: 0.3 10^3/UL (ref 0–0.3)
EOSINOPHIL NFR BLD AUTO: 2 % (ref 0–10)
EOSINOPHIL NFR BLD MANUAL: 2 %
GFR SERPLBLD BASED ON 1.73 SQ M-ARVRAT: 99 ML/MIN
GLUCOSE SERPL-MCNC: 127 MG/DL (ref 70–105)
HCT VFR BLD CALC: 28 % (ref 40–54)
HGB BLD-MCNC: 8.5 G/DL (ref 13.3–17.7)
LYMPHOCYTES # BLD AUTO: 0.7 10^3/UL (ref 1–4)
LYMPHOCYTES NFR BLD AUTO: 5 % (ref 12–44)
MANUAL DIFFERENTIAL PERFORMED BLD QL: YES
MCH RBC QN AUTO: 25 PG (ref 25–34)
MCHC RBC AUTO-ENTMCNC: 30 G/DL (ref 32–36)
MCV RBC AUTO: 82 FL (ref 80–99)
MONOCYTES # BLD AUTO: 0.8 10^3/UL (ref 0–1)
MONOCYTES NFR BLD AUTO: 5 % (ref 0–12)
MONOCYTES NFR BLD: 4 %
NEUTROPHILS # BLD AUTO: 14 10^3/UL (ref 1.8–7.8)
NEUTROPHILS NFR BLD AUTO: 86 % (ref 42–75)
NEUTS BAND NFR BLD MANUAL: 90 %
NEUTS BAND NFR BLD: 1 %
PLATELET # BLD: 259 10^3/UL (ref 130–400)
PMV BLD AUTO: 11.2 FL (ref 9–12.2)
POTASSIUM SERPL-SCNC: 3.7 MMOL/L (ref 3.6–5)
PROT SERPL-MCNC: 5.6 GM/DL (ref 6.4–8.2)
SODIUM SERPL-SCNC: 138 MMOL/L (ref 135–145)
VARIANT LYMPHS NFR BLD MANUAL: 3 %
WBC # BLD AUTO: 16.2 10^3/UL (ref 4.3–11)

## 2022-07-14 RX ADMIN — Medication SCH EACH: at 12:44

## 2022-07-14 RX ADMIN — POLYETHYLENE GLYCOL (3350) SCH GM: 17 POWDER, FOR SOLUTION ORAL at 08:28

## 2022-07-14 RX ADMIN — POLYETHYLENE GLYCOL (3350) SCH GM: 17 POWDER, FOR SOLUTION ORAL at 12:05

## 2022-07-14 RX ADMIN — SODIUM CHLORIDE SCH MLS/HR: 900 INJECTION INTRAVENOUS at 18:31

## 2022-07-14 RX ADMIN — POTASSIUM CHLORIDE SCH MEQ: 750 TABLET, FILM COATED, EXTENDED RELEASE ORAL at 08:08

## 2022-07-14 RX ADMIN — Medication SCH EACH: at 17:58

## 2022-07-14 RX ADMIN — LEVOTHYROXINE SODIUM SCH MCG: 100 TABLET ORAL at 06:43

## 2022-07-14 RX ADMIN — SODIUM CHLORIDE SCH MLS/HR: 900 INJECTION INTRAVENOUS at 11:48

## 2022-07-14 RX ADMIN — DOCUSATE SODIUM AND SENNOSIDES SCH EA: 8.6; 5 TABLET, FILM COATED ORAL at 08:27

## 2022-07-14 RX ADMIN — POLYETHYLENE GLYCOL (3350) SCH GM: 17 POWDER, FOR SOLUTION ORAL at 20:35

## 2022-07-14 RX ADMIN — INSULIN ASPART SCH UNIT: 100 INJECTION, SOLUTION INTRAVENOUS; SUBCUTANEOUS at 05:27

## 2022-07-14 RX ADMIN — VANCOMYCIN HYDROCHLORIDE SCH MLS/HR: 500 INJECTION, POWDER, LYOPHILIZED, FOR SOLUTION INTRAVENOUS at 09:49

## 2022-07-14 RX ADMIN — LOSARTAN POTASSIUM SCH MG: 25 TABLET, FILM COATED ORAL at 08:07

## 2022-07-14 RX ADMIN — DOCUSATE SODIUM SCH MG: 100 CAPSULE ORAL at 20:34

## 2022-07-14 RX ADMIN — SODIUM CHLORIDE SCH MLS/HR: 900 INJECTION INTRAVENOUS at 03:22

## 2022-07-14 RX ADMIN — PANTOPRAZOLE SODIUM SCH MG: 40 TABLET, DELAYED RELEASE ORAL at 08:07

## 2022-07-14 RX ADMIN — DOCUSATE SODIUM AND SENNOSIDES SCH EA: 8.6; 5 TABLET, FILM COATED ORAL at 20:35

## 2022-07-14 RX ADMIN — POTASSIUM CHLORIDE SCH MEQ: 750 TABLET, FILM COATED, EXTENDED RELEASE ORAL at 20:52

## 2022-07-14 RX ADMIN — DOCUSATE SODIUM SCH MG: 100 CAPSULE ORAL at 08:27

## 2022-07-14 RX ADMIN — INSULIN ASPART SCH UNIT: 100 INJECTION, SOLUTION INTRAVENOUS; SUBCUTANEOUS at 16:24

## 2022-07-14 RX ADMIN — Medication SCH EACH: at 08:08

## 2022-07-14 RX ADMIN — AMIODARONE HYDROCHLORIDE SCH MG: 200 TABLET ORAL at 08:09

## 2022-07-14 RX ADMIN — APIXABAN SCH MG: 5 TABLET, FILM COATED ORAL at 20:52

## 2022-07-14 RX ADMIN — FUROSEMIDE SCH MG: 40 TABLET ORAL at 08:07

## 2022-07-14 RX ADMIN — INSULIN ASPART SCH UNIT: 100 INJECTION, SOLUTION INTRAVENOUS; SUBCUTANEOUS at 21:15

## 2022-07-14 RX ADMIN — INSULIN ASPART SCH UNIT: 100 INJECTION, SOLUTION INTRAVENOUS; SUBCUTANEOUS at 11:48

## 2022-07-14 RX ADMIN — APIXABAN SCH MG: 5 TABLET, FILM COATED ORAL at 08:08

## 2022-07-14 NOTE — OCCUPATIONAL THER DAILY NOTE
OT Current Status-Daily Note


Subjective


Pt supine in bed at beginning of tx, and agreeable to OT/PT cotreat.





ADL-Treatment


Therapy Code Descriptions/Definitions 





Functional [place] Measure:


0=Not Assessed/NA        4=Minimal Assistance


1=Total Assistance        5=Supervision or Setup


2=Maximal Assistance  6=Modified [place]


3=Moderate Assistance 7=Complete IndependenceSCALE: Activities may be completed 

with or without assistive devices.





6-Indepedent-patient completes the activity by him/herself with no assistance 

from a helper.


5-Set-up or Clean-up Assistance-helper sets up or cleans up; patient completes 

activity. [place] assists only prior to or  


    following the activity.


4-Supervision or Touching Assistance-helper provides verbal cues and/or 

touching/steadying and/or contact guard assistance as patient completes 

activity. Assistance may be provided   


    throughout the activity or intermittently.


3-Partial/Moderate Assistance-helper does LESS THAN HALF the effort. [place] 

lifts, holds or supports trunk or limbs, but provides less than half the effort.


2-Substantial/Maximal Assistance-helper does MORE THAN HALF the effort. [place] 

lifts or holds trunk or limbs and provides more than half the effort.


2-Stfymwnll-qevqbi does ALL the effort. Patient does none of the effort to 

complete the activity. Or, the assistance of 2 or more helpers is required for 

the patient to complete the  


    activity.


If activity was not attempted, code reason:


7-Patient Refused.


9-Not Applicable-not attempted and the patient did not perform the activity 

before the current illness, exacerbation or injury.


10-Not Attempted due to Environmental Limitations-(lack of equipment, weather 

restraints, etc.).


88-Not Attempted due to Medical Conditions or Safety Concerns.





Other Treatment


OT/PT cotreat ([id number]): PT/OT cotreat required due to the skill required from

therapist that a rehab tech does not possess. PT focusing on LB strengthening, 

LE placement during transfer and standing balance. OT focusing on ADLs, UB 

strengthening, and  UE placement during transfers. Pt completed toileting on bed

pan and declined transferring to comode at this time due to comode being 

uncomfortable. Pt transferred from supine to EOB with ModA for cues and 

assistance to pull self out of supine position to complete bed bath with assist 

x2 for standing balance while cleansing bottom. Pt donned shirt with MaxA due to

limited mobility in LUE. Pt donned pants with assist x2 for balance while 

pulling pants over bottom. Pt transferred from EOB to w/c on L side with MaxA 

for cueing and balance while standing. Pt propelled self to therapy gym in w/c 

to complete pulley exercise for 10min to decrease pain and increase ROM in BUE. 

Pt then completed 3 stands at parallel bars while performing leg exercises to 

increase dynamic standing balance. Pt then attempted to complete transfer x2 to 

mat from w/c on the R side, but pt exhibited weakness in RLE. Pt performed 3 

sets of 10 manual leg presses in w/c. Pt propelled self back to room and 

transferred to EOB with assist x2. Pt supine in bed with call light and phone in

reach, family member in room, and all needs met.





OT Short Term Goals


Short Term Goals


Time Frame:  2022


Toileting hygiene:  2


Shower/bathe self:  3


Lower body dressin


Putting on/taking off footwear:  3





OT Long Term Goals


Long Term Goals


Time Frame:  Aug 5, 2022


Eating (QC):  6


Oral Hygiene (QC):  6


Toileting Hygiene (QC):  3 (bowel movements on toilet)


Shower/Bathe Self (QC):  4


Upper Body Dressing (QC):  5


Lower Body Dressing (QC):  3


On/Off Footwear (QC):  4


Additional Goals:  1-Demonstrate ADL Tasks, 2-Verbalize Understanding, 3-

ImproveStrength/[place]


1=Demonstrate adherence to instructed precautions during ADL tasks.


2=Patient will verbalize/demonstrate understanding of assistive 

devices/modifications for ADL.


3=Patient will improve strength/tolerance for activity to enable patient to 

perform ADL's.





OT Education/Plan


Problem List/Assessment


Assessment:  Decreased Activ Tolerance, Decreased UE Strength, Impaired Bed 

Mobility, Impaired Funct Balance, Impaired I ADL's, Impaired Self-Care Skills





Discharge Recommendations


Plan/Recommendations:  Continue POC





Treatment Plan/Plan of Care


Patient would benefit from OT for education, treatment and training to promote 

independence in ADL's, mobility, safety and/or upper extremity function for 

ADL's.


Plan of Care:  ADL Retraining, Caregiver Training, Functional Mobility, Group 

Exercise/Act as Ind, UE Funct Exercise/Act, W/C Management Training


Treatment Duration:  Aug 5, 2022


Frequency:  At least 5 of 7 days/Wk (IRF)


Estimated Hrs Per Day:  1.5 hours per day


Rehab Potential:  Guarded





Time/GCodes


Start Time:  08:00


Stop Time:  09:30


Total Time Billed (hr/min):  90


Billed Treatment Time


1 visit, ADL 3 (40min), FA 2 (30min), EX (20min)











Tanya Ingram              2022 09:34

## 2022-07-14 NOTE — PHYSICAL THERAPY DAILY NOTE
PT Daily Note-Current


Subjective


Patient in bed pre tx, agrees to PT, has 4/10 pain in left hip and leg, nurse 

gives meds.  Will be co-treating with OT due to poor patient mobility, strength,

endurance, severe debility, coordinate UE and LE with activity, safety and 

reduce risk of falls.





Appearance


Patient in bed post tx with nurse call, phone, tray, all needs met.





Mental Status


Patient Orientation:  Person, Place, Situation


Attachments:  Oxygen, IV





Transfers


SCALE: Activities may be completed with or without assistive devices.





6-Indepedent-patient completes the activity by him/herself with no assistance 

from a helper.


5-Set-up or Clean-up Assistance-helper sets up or cleans up; patient completes 

activity. [place] assists only prior to or  


    following the activity.


4-Supervision or Touching Assistance-helper provides verbal cues and/or 

touching/steadying and/or contact guard assistance as patient completes 

activity. Assistance may be provided   


    throughout the activity or intermittently.


3-Partial/Moderate Assistance-helper does LESS THAN HALF the effort. [place] 

lifts, holds or supports trunk or limbs, but provides less than half the effort.


2-Substantial/Maximal Assistance-helper does MORE THAN HALF the effort. [place] 

lifts or holds trunk or limbs and provides more than half the effort.


5-Smzcxujru-yystzr does ALL the effort. Patient does none of the effort to com

plete the activity. Or, the assistance of 2 or more helpers is required for the 

patient to complete the  


    activity.


If activity was not attempted, code reason:


7-Patient Refused.


9-Not Applicable-not attempted and the patient did not perform the activity 

before the current illness, exacerbation or injury.


10-Not Attempted due to Environmental Limitations-(lack of equipment, weather 

restraints, etc.).


88-Not Attempted due to Medical Conditions or Safety Concerns.


Roll Left & Right (QC):  3


Sit to Lying (QC):  4


Lying to Sitting/Side of Bed(Q:  3


Sit to Stand (QC):  2


Chair/Bed-to-Chair Xfer(QC):  2


Patient has to have a BM at the beginning of tx, he states he would rather use a

bed pan this time because the commode cuts into his legs and hurts.  Patient 

needs assist to get his shorts off and then roll to the side to place the 

bedpan.  When done he needs assist to roll and clean and then min assist to sit 

to the side of the bed.  Patient then bathes and partially dresses.  Patient 

then stands with max assist and therapist can finish dressing and he transfers 

to WC.





Weight Bearing


Full Weight Bearing





Wheelchair Training


Does the Pt Use a Wheelchair?:  Yes


Wheel 50 ft with 2 turns (QC):  4


Wheel 150 ft (QC):  4


Type of Wheelchair:  Manual


150', 100', very slow, needs many rest breaks





Exercises


Standing:  3 way Ex=Flex, Abd, Ext (with residual limb while standing in 

parallel bars), Sit to Stand (x3)


Standing Reps:  15


Patient also performs wall pulleys for shoulder ROM, manually resisted leg press

3 sets of 10, attempted to practice more stand pivot transfers but patient could

not pivot on his right foot at all





Treatments


PT performs bed mobility and transfers, WC mobility, LE strengthening, standing 

and positioning during dressing and bathing, OT performed toileting, dressing, 

bathing, UE positioning and safety during activity.





Assessment


Current Status:  Poor Progress


Patient's right leg is not improving in strength, he may need to use the sliding

board for transfers.





PT Short Term Goals


Short Term Goals


Time Frame:  Jul 15, 2022


Roll Left & Right:  4


Sit to lying:  3 (Kacie)


Lying to sitting on side of be:  3 (Kacie)


Sit to stand:  3 (modA)


Chair/bed-to-chair transfer:  3 (modA)





PT Long Term Goals


Long Term Goals


PT Long Term Goals Time Frame:  Jul 29, 2022


Roll Left & Right (QC):  6


Sit to Lying (QC):  4 (SBA)


Lying-Sitting on Side/Bed(QC):  4 (SBA)


Sit to Stand (QC):  3 (Kacie)


Chair/Bed-to-Chair Xfer(QC):  3 (Kacie)


Toilet Transfer (QC):  3 (Kacie)


Car Transfer (QC):  3 (Kacie)


Does the Patient Walk:  No and Walking Goal NOT indicated


Walk 10 feet (QC):  88


Walk 50ft with 2 Turns (QC):  88


Walk 150 ft (QC):  88


Walking 10ft on Uneven Surface:  88


1 Step (curb) (QC):  88


4 Steps (QC):  88


12 Steps (QC):  88


Picking up an Object (QC):  88


Wheel 50 feet with 2 turns (QC:  6


Wheel 150 feet:  6





PT Plan


Problem List


Problem List:  Activity Tolerance, Functional Strength, Safety, Balance, 

Transfer, Bed Mobility, ROM





Treatment/Plan


Treatment Plan:  Continue Plan of Care


Treatment Plan:  Bed Mobility, Education, Functional Activity [place], Functional 

Strength, Group Therapy, Safety, Therapeutic Exercise, Transfers


Treatment Duration:  Jul 29, 2022


Frequency:  At least 5 of 7 days/Wk (IRF)


Estimated Hrs Per Day:  1.5 hours per day


Patient and/or Family Agrees t:  Yes





Safety Risks/Education


Patient Education:  Transfer Techniques, Correct Positioning, W/C Management, 

Safety Issues


Teaching Recipient:  Patient


Teaching Methods:  Demonstration, Discussion


Response to Teaching:  Reinforcement Needed





Time/GCodes


Time In:  0800


Time Out:  0930


Total Billed Treatment Time:  90


Total Billed Treatment


1 visit


EX 30'


FA 60'











KIRAN RANGEL PT                Jul 14, 2022 09:35

## 2022-07-14 NOTE — PM&R PROGRESS NOTE
Subjective


HPI/CC On Admission


Date Seen by Provider:  Jul 14, 2022


Time Seen by Provider:  09:30


Subjective/Events-last exam


7/14/2022:


Hemoglobin is 8.5 after one unit of blood yesterday


White count is 16,000


Bowels moved a little bit today, will get aggressive with that


Hep locking IV fluid











7/13/2022:


Pt had some incontinence today


Dressing was saturated


Decrease BP medication for Dr. Alejandra


A little bit confused


Will give one unit of blood today


Maintain on broad spectrum antibiotics


White count still elevated at 17








7/12/2022:


Pt is doing well 


Mild hypotension not due to sepsis so will give 500 ccs bolus


IV fluids 60 an hour


Decrease procalcitonin


Zosyn and Vancomycin maintained








7/11/2022:


Pt is doing a lot better


Sleeps all the time


Fever and elevated white count with procalcitonin elevation prompting blood and 

urine cultures, chest x-ray, and empiric IV antibiotics


PICC will be placed








7/10/2022:


Patient settling in well


Sugars lower so will hold Metformin


No pain except hip and left shoulder


Glucometer assessed


Wife at bedside


No issues otherwise





Review of Systems


General:  Fatigue, Malaise


Gastrointestinal:  Constipation


Musculoskeletal:  arm pain, leg pain





Objective


Exam


Vital Signs





Vital Signs








  Date Time  Temp Pulse Resp B/P (MAP) Pulse Ox O2 Delivery O2 Flow Rate FiO2


 


7/14/22 20:56 36.5 75 18 135/60 (85) 97 Nasal Cannula 1.50 





Capillary Refill :


General Appearance:  No Apparent Distress, WD/WN


HEENT:  PERRL/EOMI, Normal ENT Inspection, Pharynx Normal


Neck:  Normal Inspection, Supple


Respiratory:  No Accessory Muscle Use, No Respiratory Distress


Cardiovascular:  Regular Rate, Rhythm, No Edema, No Gallop, No JVD, No Murmur, 

Normal Peripheral Pulses


Gastrointestinal:  Normal Bowel Sounds, No Organomegaly, No Pulsatile Mass, Non 

Tender, Soft


Back:  Normal Inspection, No CVA Tenderness, No Vertebral Tenderness


Extremity:  Other (left AKA with dressing in place C/D/I)


Neurologic/Psychiatric:  Alert, Oriented x3


Skin:  Normal Color, Warm/Dry


Lymphatic:  No Adenopathy





Results/Procedures


Lab


Laboratory Tests


7/14/22 06:50








Patient resulted labs reviewed.





FIM


Transfers


Therapy Code Descriptions/Definitions 





Functional [place] Measure:


0=Not Assessed/NA        4=Minimal Assistance


1=Total Assistance        5=Supervision or Setup


2=Maximal Assistance  6=Modified [place]


3=Moderate Assistance 7=Complete IndependenceSCALE: Activities may be completed 

with or without assistive devices.





6-Indepedent-patient completes the activity by him/herself with no assistance 

from a helper.


5-Set-up or Clean-up Assistance-helper sets up or cleans up; patient completes 

activity. [place] assists only prior to or  


    following the activity.


4-Supervision or Touching Assistance-helper provides verbal cues and/or 

touching/steadying and/or contact guard assistance as patient completes 

activity. Assistance may be provided   


    throughout the activity or intermittently.


3-Partial/Moderate Assistance-helper does LESS THAN HALF the effort. [place] 

lifts, holds or supports trunk or limbs, but provides less than half the effort.


2-Substantial/Maximal Assistance-helper does MORE THAN HALF the effort. [place] 

lifts or holds trunk or limbs and provides more than half the effort.


5-Qqjfxssuc-wjvkiv does ALL the effort. Patient does none of the effort to 

complete the activity. Or, the assistance of 2 or more helpers is required for 

the patient to complete the  


    activity.


If activity was not attempted, code reason:


7-Patient Refused.


9-Not Applicable-not attempted and the patient did not perform the activity 

before the current illness, exacerbation or injury.


10-Not Attempted due to Environmental Limitations-(lack of equipment, weather 

restraints, etc.).


88-Not Attempted due to Medical Conditions or Safety Concerns.


Roll Left to Right (QC):  3


Sit to Lying (QC):  4


Sit to Stand (QC):  2


Chair/Bed-to-Chair Xfer(QC):  2


Car Transfer (QC):  2





Gait Training


Does the Patient Walk?:  No and Walking Goal NOT indicated


Walk 10 feet (QC):  88


Walk 50 ft with 2 Turns(QC):  88


Walk 150 ft (QC):  88


Walking 10ft/uneven surface-QC:  88





Wheelchair Training


Does the Pt Use a Wheelchair?:  Yes


Distance:  100'x2


Wheel 50 ft with 2 turns (QC):  4


Wheel 150 ft (QC):  4


Type of Wheelchair:  Manual





Stair Training


1 Step (curb) (QC):  88


4 Steps (QC):  88


12 Steps (QC):  88





Balance


Picking up an Object (QC):  88





ADL-Treatment


Eating (QC):  5 (Assistance opening containers)


Oral Hygiene (QC):  5 (set up at sink)


Shower/Bathe Self (QC):  1 (Assist x2 with sponge bath to help with bed mobility

and wiping)


Upper Body Dressing (QC):  3 (Mod A. Assist required overhead and slight 

assistance down trunk.)


Lower Body Dressing (QC):  1 (Assist x2)


On/Off Footwear (QC):  1


Toileting Hygiene (QC):  1 (Assist x2 for bowel movement. Setup with urinal)





Assessment/Plan


Assessment and Plan


Assess & Plan/Chief Complaint


Assessment:


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


Fall 7/5/22 resulting in left shoulder injury causing further debility and 

limiting ADL/independence


Severe PVD


PAF


OAC


HTN


HLP


DM


Hypothyroidism


Previous hypoglycemia required holding metformin and glimepiride but now on 

lower dose


Constipation resolved


Poor venous access requiring PICC 7/11/22


Anemia post op requiring transfusion 7/13/22


Plan:


PT OT


w/c mobility


Home meds





7/10/2022:


Pain control


Monitor sugar


Hold Metformin





7/11/22:


Empiric broad spectrum abx


Hold Metformin and OAC


PICC line





7/12/2022:


IV abx


Monitor closely





7/13/2022:


Transfuse


Broad spectrum abx





7/14/2022:


Monitor hgb


Broad spectrum abx





(1) S/P AKA (above knee amputation)


(2) Gangrene











DIANA HENRIQUEZ DO                Jul 14, 2022 10:06

## 2022-07-15 VITALS — SYSTOLIC BLOOD PRESSURE: 143 MMHG | DIASTOLIC BLOOD PRESSURE: 60 MMHG

## 2022-07-15 VITALS — DIASTOLIC BLOOD PRESSURE: 68 MMHG | SYSTOLIC BLOOD PRESSURE: 151 MMHG

## 2022-07-15 LAB
ALBUMIN SERPL-MCNC: 2.4 GM/DL (ref 3.2–4.5)
ALP SERPL-CCNC: 94 U/L (ref 40–136)
ALT SERPL-CCNC: 44 U/L (ref 0–55)
BASOPHILS # BLD AUTO: 0.1 10^3/UL (ref 0–0.1)
BASOPHILS NFR BLD AUTO: 0 % (ref 0–10)
BILIRUB SERPL-MCNC: 0.4 MG/DL (ref 0.1–1)
BUN/CREAT SERPL: 21
CALCIUM SERPL-MCNC: 8.4 MG/DL (ref 8.5–10.1)
CHLORIDE SERPL-SCNC: 106 MMOL/L (ref 98–107)
CO2 SERPL-SCNC: 22 MMOL/L (ref 21–32)
CREAT SERPL-MCNC: 0.68 MG/DL (ref 0.6–1.3)
EOSINOPHIL # BLD AUTO: 0.2 10^3/UL (ref 0–0.3)
EOSINOPHIL NFR BLD AUTO: 2 % (ref 0–10)
GFR SERPLBLD BASED ON 1.73 SQ M-ARVRAT: 99 ML/MIN
GLUCOSE SERPL-MCNC: 163 MG/DL (ref 70–105)
HCT VFR BLD CALC: 30 % (ref 40–54)
HGB BLD-MCNC: 8.9 G/DL (ref 13.3–17.7)
LYMPHOCYTES # BLD AUTO: 0.8 10^3/UL (ref 1–4)
LYMPHOCYTES NFR BLD AUTO: 6 % (ref 12–44)
MANUAL DIFFERENTIAL PERFORMED BLD QL: NO
MCH RBC QN AUTO: 24 PG (ref 25–34)
MCHC RBC AUTO-ENTMCNC: 30 G/DL (ref 32–36)
MCV RBC AUTO: 82 FL (ref 80–99)
MONOCYTES # BLD AUTO: 0.6 10^3/UL (ref 0–1)
MONOCYTES NFR BLD AUTO: 4 % (ref 0–12)
NEUTROPHILS # BLD AUTO: 12.7 10^3/UL (ref 1.8–7.8)
NEUTROPHILS NFR BLD AUTO: 86 % (ref 42–75)
PLATELET # BLD: 304 10^3/UL (ref 130–400)
PMV BLD AUTO: 11.2 FL (ref 9–12.2)
POTASSIUM SERPL-SCNC: 3.6 MMOL/L (ref 3.6–5)
PROT SERPL-MCNC: 5.7 GM/DL (ref 6.4–8.2)
SODIUM SERPL-SCNC: 140 MMOL/L (ref 135–145)
WBC # BLD AUTO: 14.8 10^3/UL (ref 4.3–11)

## 2022-07-15 RX ADMIN — Medication SCH EACH: at 12:17

## 2022-07-15 RX ADMIN — APIXABAN SCH MG: 5 TABLET, FILM COATED ORAL at 20:35

## 2022-07-15 RX ADMIN — SODIUM CHLORIDE SCH MLS/HR: 900 INJECTION INTRAVENOUS at 12:17

## 2022-07-15 RX ADMIN — VANCOMYCIN HYDROCHLORIDE SCH MLS/HR: 500 INJECTION, POWDER, LYOPHILIZED, FOR SOLUTION INTRAVENOUS at 10:13

## 2022-07-15 RX ADMIN — DOCUSATE SODIUM AND SENNOSIDES SCH EA: 8.6; 5 TABLET, FILM COATED ORAL at 07:59

## 2022-07-15 RX ADMIN — Medication SCH EACH: at 07:51

## 2022-07-15 RX ADMIN — FUROSEMIDE SCH MG: 40 TABLET ORAL at 07:54

## 2022-07-15 RX ADMIN — INSULIN ASPART SCH UNIT: 100 INJECTION, SOLUTION INTRAVENOUS; SUBCUTANEOUS at 05:51

## 2022-07-15 RX ADMIN — APIXABAN SCH MG: 5 TABLET, FILM COATED ORAL at 07:54

## 2022-07-15 RX ADMIN — DOCUSATE SODIUM SCH MG: 100 CAPSULE ORAL at 07:58

## 2022-07-15 RX ADMIN — POLYETHYLENE GLYCOL (3350) SCH GM: 17 POWDER, FOR SOLUTION ORAL at 20:36

## 2022-07-15 RX ADMIN — INSULIN ASPART SCH UNIT: 100 INJECTION, SOLUTION INTRAVENOUS; SUBCUTANEOUS at 20:35

## 2022-07-15 RX ADMIN — POTASSIUM CHLORIDE SCH MEQ: 750 TABLET, FILM COATED, EXTENDED RELEASE ORAL at 20:35

## 2022-07-15 RX ADMIN — AMIODARONE HYDROCHLORIDE SCH MG: 200 TABLET ORAL at 07:53

## 2022-07-15 RX ADMIN — DOCUSATE SODIUM SCH MG: 100 CAPSULE ORAL at 20:36

## 2022-07-15 RX ADMIN — INSULIN ASPART SCH UNIT: 100 INJECTION, SOLUTION INTRAVENOUS; SUBCUTANEOUS at 17:14

## 2022-07-15 RX ADMIN — SODIUM CHLORIDE SCH MLS/HR: 900 INJECTION INTRAVENOUS at 18:52

## 2022-07-15 RX ADMIN — POTASSIUM CHLORIDE SCH MEQ: 750 TABLET, FILM COATED, EXTENDED RELEASE ORAL at 07:53

## 2022-07-15 RX ADMIN — LEVOTHYROXINE SODIUM SCH MCG: 100 TABLET ORAL at 05:47

## 2022-07-15 RX ADMIN — DOCUSATE SODIUM AND SENNOSIDES SCH EA: 8.6; 5 TABLET, FILM COATED ORAL at 20:35

## 2022-07-15 RX ADMIN — Medication SCH EACH: at 17:13

## 2022-07-15 RX ADMIN — SODIUM CHLORIDE SCH MLS/HR: 900 INJECTION INTRAVENOUS at 02:56

## 2022-07-15 RX ADMIN — PANTOPRAZOLE SODIUM SCH MG: 40 TABLET, DELAYED RELEASE ORAL at 07:53

## 2022-07-15 RX ADMIN — POLYETHYLENE GLYCOL (3350) SCH GM: 17 POWDER, FOR SOLUTION ORAL at 07:59

## 2022-07-15 RX ADMIN — INSULIN ASPART SCH UNIT: 100 INJECTION, SOLUTION INTRAVENOUS; SUBCUTANEOUS at 11:43

## 2022-07-15 RX ADMIN — LOSARTAN POTASSIUM SCH MG: 25 TABLET, FILM COATED ORAL at 07:54

## 2022-07-15 NOTE — PHYSICAL THERAPY DAILY NOTE
PT Daily Note-Current


Subjective


Patient in bed pre tx, agrees to PT, has 6/10 pain in left residual limb.





Appearance


Patient in recliner post tx with nurse call ,phone, tray, all needs met.





Mental Status


Patient Orientation:  Person, Place, Situation





Transfers


SCALE: Activities may be completed with or without assistive devices.





6-Indepedent-patient completes the activity by him/herself with no assistance 

from a helper.


5-Set-up or Clean-up Assistance-helper sets up or cleans up; patient completes 

activity. [place] assists only prior to or  


    following the activity.


4-Supervision or Touching Assistance-helper provides verbal cues and/or 

touching/steadying and/or contact guard assistance as patient completes 

activity. Assistance may be provided   


    throughout the activity or intermittently.


3-Partial/Moderate Assistance-helper does LESS THAN HALF the effort. [place] 

lifts, holds or supports trunk or limbs, but provides less than half the effort.


2-Substantial/Maximal Assistance-helper does MORE THAN HALF the effort. [place] 

lifts or holds trunk or limbs and provides more than half the effort.


2-[place] does ALL the effort. Patient does none of the effort to 

complete the activity. Or, the assistance of 2 or more helpers is required for 

the patient to complete the  


    activity.


If activity was not attempted, code reason:


7-Patient Refused.


9-Not Applicable-not attempted and the patient did not perform the activity 

before the current illness, exacerbation or injury.


10-Not Attempted due to Environmental Limitations-(lack of equipment, weather 

restraints, etc.).


88-Not Attempted due to Medical Conditions or Safety Concerns.


Roll Left & Right (QC):  3


Lying to Sitting/Side of Bed(Q:  3


Sit to Stand (QC):  3


Chair/Bed-to-Chair Xfer(QC):  3


Patient min assist for supine to sit, performed a sliding board transfer with 

max assist to the , when coming back to his room he stood with mod assist and 

used a rolling walker to transfer to recliner with mod assist.





Weight Bearing


Full Weight Bearing





Wheelchair Training


Does the Pt Use a Wheelchair?:  Yes


Wheel 50 ft with 2 turns (QC):  4


Wheel 150 ft (QC):  4


Type of Wheelchair:  Manual


SBA, very slow, needs several rest breaks





Exercises


manually resisted leg press 3 sets of 10, stood x3 in the parallel bars for 

about a minute each time, performed 4 sets of 5 of mini-squats in the parallel 

bars, wanted to loosen up shoulders with wall pulleys (x20), patient's bandage 

on his residual limb came off during tx, it was very saturated, nurse came in 

and changed bandage.





Treatments


PT performed bed mobility and transfers, standing, LE strengthening, OT 

performed UE ROM, some ADL's, and UE positioning and safety during activity.





Assessment


Current Status:  Poor Progress


continued severe weakness in RLE





PT Short Term Goals


Short Term Goals


Time Frame:  Jul 15, 2022


Roll Left & Right:  4


Sit to lying:  3 (Kacie)


Lying to sitting on side of be:  3 (Kacie)


Sit to stand:  3 (modA)


Chair/bed-to-chair transfer:  3 (modA)





PT Long Term Goals


Long Term Goals


PT Long Term Goals Time Frame:  Jul 29, 2022


Roll Left & Right (QC):  6


Sit to Lying (QC):  4 (SBA)


Lying-Sitting on Side/Bed(QC):  4 (SBA)


Sit to Stand (QC):  3 (Kacie)


Chair/Bed-to-Chair Xfer(QC):  3 (Kacie)


Toilet Transfer (QC):  3 (Kacie)


Car Transfer (QC):  3 (Kacie)


Does the Patient Walk:  No and Walking Goal NOT indicated


Walk 10 feet (QC):  88


Walk 50ft with 2 Turns (QC):  88


Walk 150 ft (QC):  88


Walking 10ft on Uneven Surface:  88


1 Step (curb) (QC):  88


4 Steps (QC):  88


12 Steps (QC):  88


Picking up an Object (QC):  88


Wheel 50 feet with 2 turns (QC:  6


Wheel 150 feet:  6





PT Plan


Problem List


Problem List:  Activity Tolerance, Functional Strength, Safety, Balance, 

Transfer, Bed Mobility, ROM





Treatment/Plan


Treatment Plan:  Continue Plan of Care


Treatment Plan:  Bed Mobility, Education, Functional Activity [place], Functional 

Strength, Group Therapy, Safety, Therapeutic Exercise, Transfers


Treatment Duration:  Jul 29, 2022


Frequency:  At least 5 of 7 days/Wk (IRF)


Estimated Hrs Per Day:  1.5 hours per day


Patient and/or Family Agrees t:  Yes





Safety Risks/Education


Patient Education:  Transfer Techniques, Correct Positioning, W/C Management, 

Safety Issues


Teaching Recipient:  Patient


Teaching Methods:  Demonstration, Discussion


Response to Teaching:  Reinforcement Needed





Time/GCodes


Time In:  0900


Time Out:  1030


Total Billed Treatment Time:  90


Total Billed Treatment


1 visit


EX 45'


FA 45'





co-treated from [id number]











KIRAN RANGEL PT                Jul 15, 2022 10:22

## 2022-07-15 NOTE — OCCUPATIONAL THER DAILY NOTE
OT Current Status-Daily Note


Subjective


Pt supine in bed, agreed to OT/PT cotreat.





Mental Status/Objective


Patient Orientation:  Normal For Age


Attachments:  Oxygen (2L)





ADL-Treatment


Therapy Code Descriptions/Definitions 





Functional [place] Measure:


0=Not Assessed/NA        4=Minimal Assistance


1=Total Assistance        5=Supervision or Setup


2=Maximal Assistance  6=Modified [place]


3=Moderate Assistance 7=Complete IndependenceSCALE: Activities may be completed 

with or without assistive devices.





6-Indepedent-patient completes the activity by him/herself with no assistance 

from a helper.


5-Set-up or Clean-up Assistance-helper sets up or cleans up; patient completes 

activity. [place] assists only prior to or  


    following the activity.


4-Supervision or Touching Assistance-helper provides verbal cues and/or 

touching/steadying and/or contact guard assistance as patient completes 

activity. Assistance may be provided   


    throughout the activity or intermittently.


3-Partial/Moderate Assistance-helper does LESS THAN HALF the effort. [place] 

lifts, holds or supports trunk or limbs, but provides less than half the effort.


2-Substantial/Maximal Assistance-helper does MORE THAN HALF the effort. [place] 

lifts or holds trunk or limbs and provides more than half the effort.


6-Pxmxjbzkh-eotqlg does ALL the effort. Patient does none of the effort to 

complete the activity. Or, the assistance of 2 or more helpers is required for 

the patient to complete the  


    activity.


If activity was not attempted, code reason:


7-Patient Refused.


9-Not Applicable-not attempted and the patient did not perform the activity 

before the current illness, exacerbation or injury.


10-Not Attempted due to Environmental Limitations-(lack of equipment, weather 

restraints, etc.).


88-Not Attempted due to Medical Conditions or Safety Concerns.





Other Treatment


OT/PT cotreat ([id number]): PT/OT cotreat required due to the skill required from

therapist that a rehab tech does not possess. PT focusing on LB strengthening, 

LE placement during transfer and standing balance. OT focusing on ADLs, UB 

strengthening, and  UE placement during transfers. Pt began tx supine in bed and

transferred to EOB with [place] to perform oral hygiene with set up assist. Pt then

performed sliding board transfer with MaxA for cueing and assist to move bottom 

over board. Pt propelled self in w/c to therapy gym, taking Min rest breaks. in 

therapy gym, pt completed 2min of pulleys to increase ROM and decrease pain in 

BUE. Pt then performed 3 stands at parallel bars while completing 5 squats each 

stand. Pt then completed manual leg press 3 rounds 10 sets each. Pt propelled 

self back to room in w/c, taking min rest breaks. Pt transferred to recliner on 

L side with assist x2 for balance while standing and to ensure correct 

positioning. Pt in recliner with call light and phone in reach, all needs met.





Education


OT Patient Education:  Correct positioning, Transfer techniques


Teaching Recipient:  Patient


Teaching Methods:  Discussion


Response to Teaching:  Verbalize Understanding





OT Short Term Goals


Short Term Goals


Time Frame:  2022


Toileting hygiene:  2


Shower/bathe self:  3


Lower body dressin


Putting on/taking off footwear:  3





OT Long Term Goals


Long Term Goals


Time Frame:  Aug 5, 2022


Eating (QC):  6


Oral Hygiene (QC):  6


Toileting Hygiene (QC):  3 (bowel movements on toilet)


Shower/Bathe Self (QC):  4


Upper Body Dressing (QC):  5


Lower Body Dressing (QC):  3


On/Off Footwear (QC):  4


Additional Goals:  1-Demonstrate ADL Tasks, 2-Verbalize Understanding, 

3-ImproveStrength/[place]


1=Demonstrate adherence to instructed precautions during ADL tasks.


2=Patient will verbalize/demonstrate understanding of assistive 

devices/modifications for ADL.


3=Patient will improve strength/tolerance for activity to enable patient to 

perform ADL's.





OT Education/Plan


Problem List/Assessment


Assessment:  Decreased Activ Tolerance, Decreased Safety Aware, Impaired Bed 

Mobility, Impaired Coordination, Impaired Funct Balance, Impaired I ADL's, 

Impaired Self-Care Skills





Discharge Recommendations


Plan/Recommendations:  Continue POC





Treatment Plan/Plan of Care


Patient would benefit from OT for education, treatment and training to promote 

independence in ADL's, mobility, safety and/or upper extremity function for 

ADL's.


Plan of Care:  ADL Retraining, Caregiver Training, Functional Mobility, Group 

Exercise/Act as Ind, UE Funct Exercise/Act, W/C Management Training


Treatment Duration:  Aug 5, 2022


Frequency:  At least 5 of 7 days/Wk (IRF)


Estimated Hrs Per Day:  1.5 hours per day


Rehab Potential:  Guarded





Time/GCodes


Start Time:  09:00


Stop Time:  10:30


Total Time Billed (hr/min):  90


Billed Treatment Time


1 visit, FA 3 (50min), EX 2 (30min), ADL (10min)











Tanya Ingram              Jul 15, 2022 10:24

## 2022-07-15 NOTE — PM&R PROGRESS NOTE
Subjective


HPI/CC On Admission


Date Seen by Provider:  Jul 15, 2022


Time Seen by Provider:  09:00


Subjective/Events-last exam


7/15/2022:


Pt is doing well


White blood cell count is 14


Hemoglobin is 8.9


Stump looks really good


Overall doing much better








7/14/2022:


Hemoglobin is 8.5 after one unit of blood yesterday


White count is 16,000


Bowels moved a little bit today, will get aggressive with that


Hep locking IV fluid











7/13/2022:


Pt had some incontinence today


Dressing was saturated


Decrease BP medication for Dr. Alejandra


A little bit confused


Will give one unit of blood today


Maintain on broad spectrum antibiotics


White count still elevated at 17








7/12/2022:


Pt is doing well 


Mild hypotension not due to sepsis so will give 500 ccs bolus


IV fluids 60 an hour


Decrease procalcitonin


Zosyn and Vancomycin maintained








7/11/2022:


Pt is doing a lot better


Sleeps all the time


Fever and elevated white count with procalcitonin elevation prompting blood and 

urine cultures, chest x-ray, and empiric IV antibiotics


PICC will be placed








7/10/2022:


Patient settling in well


Sugars lower so will hold Metformin


No pain except hip and left shoulder


Glucometer assessed


Wife at bedside


No issues otherwise





Review of Systems


General:  Fatigue, Malaise





Objective


Exam


Vital Signs





Vital Signs








  Date Time  Temp Pulse Resp B/P (MAP) Pulse Ox O2 Delivery O2 Flow Rate FiO2


 


7/15/22 20:42      Nasal Cannula 1.50 


 


7/15/22 20:29 36.2 78 20 143/60 (87) 97   





Capillary Refill :


General Appearance:  No Apparent Distress, WD/WN


HEENT:  PERRL/EOMI, Normal ENT Inspection, Pharynx Normal


Neck:  Normal Inspection, Supple


Respiratory:  No Accessory Muscle Use, No Respiratory Distress


Cardiovascular:  Regular Rate, Rhythm, No Edema, No Gallop, No JVD, No Murmur, 

Normal Peripheral Pulses


Gastrointestinal:  Normal Bowel Sounds, No Organomegaly, No Pulsatile Mass, Non 

Tender, Soft


Back:  Normal Inspection, No CVA Tenderness, No Vertebral Tenderness


Extremity:  Other (left AKA with dressing in place C/D/I)


Neurologic/Psychiatric:  Alert, Oriented x3


Skin:  Normal Color, Warm/Dry


Lymphatic:  No Adenopathy





Results/Procedures


Lab


Laboratory Tests


7/15/22 07:45








Patient resulted labs reviewed.





FIM


Transfers


Therapy Code Descriptions/Definitions 





Functional [place] Measure:


0=Not Assessed/NA        4=Minimal Assistance


1=Total Assistance        5=Supervision or Setup


2=Maximal Assistance  6=Modified [place]


3=Moderate Assistance 7=Complete IndependenceSCALE: Activities may be completed 

with or without assistive devices.





6-Indepedent-patient completes the activity by him/herself with no assistance 

from a helper.


5-Set-up or Clean-up Assistance-helper sets up or cleans up; patient completes 

activity. [place] assists only prior to or  


    following the activity.


4-Supervision or Touching Assistance-helper provides verbal cues and/or 

touching/steadying and/or contact guard assistance as patient completes 

activity. Assistance may be provided   


    throughout the activity or intermittently.


3-Partial/Moderate Assistance-helper does LESS THAN HALF the effort. [place] 

lifts, holds or supports trunk or limbs, but provides less than half the effort.


2-Substantial/Maximal Assistance-helper does MORE THAN HALF the effort. [place] 

lifts or holds trunk or limbs and provides more than half the effort.


5-[place] does ALL the effort. Patient does none of the effort to 

complete the activity. Or, the assistance of 2 or more helpers is required for 

the patient to complete the  


    activity.


If activity was not attempted, code reason:


7-Patient Refused.


9-Not Applicable-not attempted and the patient did not perform the activity 

before the current illness, exacerbation or injury.


10-Not Attempted due to Environmental Limitations-(lack of equipment, weather 

restraints, etc.).


88-Not Attempted due to Medical Conditions or Safety Concerns.


Roll Left to Right (QC):  3


Sit to Lying (QC):  4


Sit to Stand (QC):  2


Chair/Bed-to-Chair Xfer(QC):  2


Car Transfer (QC):  2





Gait Training


Does the Patient Walk?:  No and Walking Goal NOT indicated


Walk 10 feet (QC):  88


Walk 50 ft with 2 Turns(QC):  88


Walk 150 ft (QC):  88


Walking 10ft/uneven surface-QC:  88





Wheelchair Training


Does the Pt Use a Wheelchair?:  Yes


Distance:  100'x2


Wheel 50 ft with 2 turns (QC):  4


Wheel 150 ft (QC):  4


Type of Wheelchair:  Manual





Stair Training


1 Step (curb) (QC):  88


4 Steps (QC):  88


12 Steps (QC):  88





Balance


Picking up an Object (QC):  88





ADL-Treatment


Eating (QC):  5 (Assistance opening containers)


Oral Hygiene (QC):  5 (set up at sink)


Shower/Bathe Self (QC):  1 (Assist x2 with sponge bath to help with bed mobility

and wiping)


Upper Body Dressing (QC):  3 (Mod A. Assist required overhead and slight 

assistance down trunk.)


Lower Body Dressing (QC):  1 (Assist x2)


On/Off Footwear (QC):  1


Toileting Hygiene (QC):  1 (Assist x2 for bowel movement. Setup with urinal)





Assessment/Plan


Assessment and Plan


Assess & Plan/Chief Complaint


Assessment:


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


Fall 7/5/22 resulting in left shoulder injury causing further debility and 

limiting ADL/independence


Severe PVD


PAF


OAC


HTN


HLP


DM


Hypothyroidism


Previous hypoglycemia required holding metformin and glimepiride but now on 

lower dose


Constipation resolved


Poor venous access requiring PICC 7/11/22


Anemia post op requiring transfusion 7/13/22


Plan:


PT OT


w/c mobility


Home meds





7/10/2022:


Pain control


Monitor sugar


Hold Metformin





7/11/22:


Empiric broad spectrum abx


Hold Metformin and OAC


PICC line





7/12/2022:


IV abx


Monitor closely





7/13/2022:


Transfuse


Broad spectrum abx





7/14/2022:


Monitor hgb


Broad spectrum abx





7/15/2022:


IV abx


Monitor hgb





(1) S/P AKA (above knee amputation)


(2) Gangrene











DIANA HENRIQUEZ DO                Jul 15, 2022 06:59

## 2022-07-16 VITALS — SYSTOLIC BLOOD PRESSURE: 148 MMHG | DIASTOLIC BLOOD PRESSURE: 67 MMHG

## 2022-07-16 VITALS — DIASTOLIC BLOOD PRESSURE: 83 MMHG | SYSTOLIC BLOOD PRESSURE: 144 MMHG

## 2022-07-16 RX ADMIN — POLYETHYLENE GLYCOL (3350) SCH GM: 17 POWDER, FOR SOLUTION ORAL at 09:29

## 2022-07-16 RX ADMIN — DOCUSATE SODIUM SCH MG: 100 CAPSULE ORAL at 20:40

## 2022-07-16 RX ADMIN — SODIUM CHLORIDE SCH MLS/HR: 900 INJECTION, SOLUTION INTRAVENOUS at 11:48

## 2022-07-16 RX ADMIN — SODIUM CHLORIDE SCH MLS/HR: 900 INJECTION INTRAVENOUS at 18:54

## 2022-07-16 RX ADMIN — INSULIN ASPART SCH UNIT: 100 INJECTION, SOLUTION INTRAVENOUS; SUBCUTANEOUS at 17:42

## 2022-07-16 RX ADMIN — SODIUM CHLORIDE SCH MLS/HR: 900 INJECTION, SOLUTION INTRAVENOUS at 23:02

## 2022-07-16 RX ADMIN — SODIUM CHLORIDE SCH MLS/HR: 900 INJECTION INTRAVENOUS at 13:07

## 2022-07-16 RX ADMIN — DOCUSATE SODIUM AND SENNOSIDES SCH EA: 8.6; 5 TABLET, FILM COATED ORAL at 09:21

## 2022-07-16 RX ADMIN — POTASSIUM CHLORIDE SCH MEQ: 750 TABLET, FILM COATED, EXTENDED RELEASE ORAL at 09:20

## 2022-07-16 RX ADMIN — PANTOPRAZOLE SODIUM SCH MG: 40 TABLET, DELAYED RELEASE ORAL at 09:20

## 2022-07-16 RX ADMIN — INSULIN ASPART SCH UNIT: 100 INJECTION, SOLUTION INTRAVENOUS; SUBCUTANEOUS at 06:20

## 2022-07-16 RX ADMIN — Medication PRN ML: at 09:22

## 2022-07-16 RX ADMIN — DOCUSATE SODIUM AND SENNOSIDES SCH EA: 8.6; 5 TABLET, FILM COATED ORAL at 20:41

## 2022-07-16 RX ADMIN — Medication SCH EACH: at 13:11

## 2022-07-16 RX ADMIN — APIXABAN SCH MG: 5 TABLET, FILM COATED ORAL at 20:41

## 2022-07-16 RX ADMIN — Medication SCH EACH: at 09:32

## 2022-07-16 RX ADMIN — AMIODARONE HYDROCHLORIDE SCH MG: 200 TABLET ORAL at 09:20

## 2022-07-16 RX ADMIN — FUROSEMIDE SCH MG: 40 TABLET ORAL at 09:20

## 2022-07-16 RX ADMIN — LOSARTAN POTASSIUM SCH MG: 25 TABLET, FILM COATED ORAL at 09:21

## 2022-07-16 RX ADMIN — SODIUM CHLORIDE SCH MLS/HR: 900 INJECTION INTRAVENOUS at 02:57

## 2022-07-16 RX ADMIN — DOCUSATE SODIUM SCH MG: 100 CAPSULE ORAL at 09:20

## 2022-07-16 RX ADMIN — INSULIN ASPART SCH UNIT: 100 INJECTION, SOLUTION INTRAVENOUS; SUBCUTANEOUS at 11:48

## 2022-07-16 RX ADMIN — INSULIN ASPART SCH UNIT: 100 INJECTION, SOLUTION INTRAVENOUS; SUBCUTANEOUS at 20:41

## 2022-07-16 RX ADMIN — APIXABAN SCH MG: 5 TABLET, FILM COATED ORAL at 09:21

## 2022-07-16 RX ADMIN — Medication SCH EACH: at 17:31

## 2022-07-16 RX ADMIN — POLYETHYLENE GLYCOL (3350) SCH GM: 17 POWDER, FOR SOLUTION ORAL at 20:32

## 2022-07-16 RX ADMIN — POTASSIUM CHLORIDE SCH MEQ: 750 TABLET, FILM COATED, EXTENDED RELEASE ORAL at 20:41

## 2022-07-16 RX ADMIN — LEVOTHYROXINE SODIUM SCH MCG: 100 TABLET ORAL at 06:20

## 2022-07-16 NOTE — PHYSICAL THERAPY DAILY NOTE
PT Daily Note-Current


Subjective


Pt in bed upon arrival and reports his (L) hip is hurting today but he just got 

a pain pill which is helping.





Mental Status


Patient Orientation:  Person, Place, Time, Situation





Transfers


SCALE: Activities may be completed with or without assistive devices.





6-Indepedent-patient completes the activity by him/herself with no assistance 

from a helper.


5-Set-up or Clean-up Assistance-helper sets up or cleans up; patient completes 

activity. [place] assists only prior to or  


    following the activity.


4-Supervision or Touching Assistance-helper provides verbal cues and/or 

touching/steadying and/or contact guard assistance as patient completes 

activity. Assistance may be provided   


    throughout the activity or intermittently.


3-Partial/Moderate Assistance-helper does LESS THAN HALF the effort. [place] 

lifts, holds or supports trunk or limbs, but provides less than half the effort.


2-Substantial/Maximal Assistance-helper does MORE THAN HALF the effort. [place] 

lifts or holds trunk or limbs and provides more than half the effort.


0-Qsopqjeca-mkcpfb does ALL the effort. Patient does none of the effort to 

complete the activity. Or, the assistance of 2 or more helpers is required for 

the patient to complete the  


    activity.


If activity was not attempted, code reason:


7-Patient Refused.


9-Not Applicable-not attempted and the patient did not perform the activity 

before the current illness, exacerbation or injury.


10-Not Attempted due to Environmental Limitations-(lack of equipment, weather 

restraints, etc.).


88-Not Attempted due to Medical Conditions or Safety Concerns.





Weight Bearing


Full Weight Bearing





Exercises


Supine Ex:  Ankle pumps, Quad Set, Rolling, Glut sets, Heel Slides, Short Arc 

Quads, Straight leg raise, Hip abd/add


Supine Reps:  20





Treatments


Pt in bed upon departure and all needs met and call light nearby as PT departs





Assessment


Current Status:  Fair Progress


Pt required Kacie for rolling. Pt used urinal for BR w/ assist from his wife. Pt 

required cues in order to perform exs correctly.





PT Short Term Goals


Short Term Goals


Time Frame:  Jul 15, 2022


Roll Left & Right:  4


Sit to lying:  3 (Kacie)


Lying to sitting on side of be:  3 (Kacie)


Sit to stand:  3 (modA)


Chair/bed-to-chair transfer:  3 (modA)





PT Long Term Goals


Long Term Goals


PT Long Term Goals Time Frame:  Jul 29, 2022


Roll Left & Right (QC):  6


Sit to Lying (QC):  4 (SBA)


Lying-Sitting on Side/Bed(QC):  4 (SBA)


Sit to Stand (QC):  3 (Kacie)


Chair/Bed-to-Chair Xfer(QC):  3 (Kacie)


Toilet Transfer (QC):  3 (Kacie)


Car Transfer (QC):  3 (Kacie)


Does the Patient Walk:  No and Walking Goal NOT indicated


Walk 10 feet (QC):  88


Walk 50ft with 2 Turns (QC):  88


Walk 150 ft (QC):  88


Walking 10ft on Uneven Surface:  88


1 Step (curb) (QC):  88


4 Steps (QC):  88


12 Steps (QC):  88


Picking up an Object (QC):  88


Wheel 50 feet with 2 turns (QC:  6


Wheel 150 feet:  6





PT Plan


Problem List


Problem List:  Activity Tolerance, Functional Strength





Treatment/Plan


Treatment Plan:  Continue Plan of Care


Treatment Plan:  Bed Mobility, Education, Functional Activity [place], Functional 

Strength, Group Therapy, Safety, Therapeutic Exercise, Transfers


Treatment Duration:  Jul 29, 2022


Frequency:  At least 5 of 7 days/Wk (IRF)


Estimated Hrs Per Day:  1.5 hours per day


Patient and/or Family Agrees t:  Yes





Safety Risks/Education


Patient Education:  Correct Positioning


Teaching Recipient:  Patient


Teaching Methods:  Discussion


Response to Teaching:  Return Demonstration





Time/GCodes


Time In:  0950


Time Out:  1005


Total Billed Treatment Time:  15


Total Billed Treatment


1, EX











FRANCESCO MALDONADO PTA                Jul 16, 2022 11:53

## 2022-07-17 VITALS — SYSTOLIC BLOOD PRESSURE: 125 MMHG | DIASTOLIC BLOOD PRESSURE: 75 MMHG

## 2022-07-17 VITALS — SYSTOLIC BLOOD PRESSURE: 196 MMHG | DIASTOLIC BLOOD PRESSURE: 82 MMHG

## 2022-07-17 VITALS — DIASTOLIC BLOOD PRESSURE: 67 MMHG | SYSTOLIC BLOOD PRESSURE: 151 MMHG

## 2022-07-17 RX ADMIN — FUROSEMIDE SCH MG: 40 TABLET ORAL at 07:54

## 2022-07-17 RX ADMIN — APIXABAN SCH MG: 5 TABLET, FILM COATED ORAL at 20:43

## 2022-07-17 RX ADMIN — APIXABAN SCH MG: 5 TABLET, FILM COATED ORAL at 07:53

## 2022-07-17 RX ADMIN — SODIUM CHLORIDE SCH MLS/HR: 900 INJECTION INTRAVENOUS at 12:12

## 2022-07-17 RX ADMIN — INSULIN ASPART SCH UNIT: 100 INJECTION, SOLUTION INTRAVENOUS; SUBCUTANEOUS at 15:57

## 2022-07-17 RX ADMIN — INSULIN ASPART SCH UNIT: 100 INJECTION, SOLUTION INTRAVENOUS; SUBCUTANEOUS at 20:43

## 2022-07-17 RX ADMIN — SODIUM CHLORIDE SCH MLS/HR: 900 INJECTION, SOLUTION INTRAVENOUS at 10:54

## 2022-07-17 RX ADMIN — Medication SCH EACH: at 18:19

## 2022-07-17 RX ADMIN — SODIUM CHLORIDE SCH MLS/HR: 900 INJECTION INTRAVENOUS at 03:44

## 2022-07-17 RX ADMIN — PANTOPRAZOLE SODIUM SCH MG: 40 TABLET, DELAYED RELEASE ORAL at 07:53

## 2022-07-17 RX ADMIN — DOCUSATE SODIUM SCH MG: 100 CAPSULE ORAL at 07:54

## 2022-07-17 RX ADMIN — POTASSIUM CHLORIDE SCH MEQ: 750 TABLET, FILM COATED, EXTENDED RELEASE ORAL at 07:54

## 2022-07-17 RX ADMIN — AMIODARONE HYDROCHLORIDE SCH MG: 200 TABLET ORAL at 07:54

## 2022-07-17 RX ADMIN — POLYETHYLENE GLYCOL (3350) SCH GM: 17 POWDER, FOR SOLUTION ORAL at 07:55

## 2022-07-17 RX ADMIN — DOCUSATE SODIUM AND SENNOSIDES SCH EA: 8.6; 5 TABLET, FILM COATED ORAL at 21:00

## 2022-07-17 RX ADMIN — INSULIN ASPART SCH UNIT: 100 INJECTION, SOLUTION INTRAVENOUS; SUBCUTANEOUS at 06:37

## 2022-07-17 RX ADMIN — DOCUSATE SODIUM AND SENNOSIDES SCH EA: 8.6; 5 TABLET, FILM COATED ORAL at 07:54

## 2022-07-17 RX ADMIN — POLYETHYLENE GLYCOL (3350) SCH GM: 17 POWDER, FOR SOLUTION ORAL at 21:00

## 2022-07-17 RX ADMIN — SODIUM CHLORIDE SCH MLS/HR: 900 INJECTION INTRAVENOUS at 18:47

## 2022-07-17 RX ADMIN — LOSARTAN POTASSIUM SCH MG: 25 TABLET, FILM COATED ORAL at 07:53

## 2022-07-17 RX ADMIN — LEVOTHYROXINE SODIUM SCH MCG: 100 TABLET ORAL at 06:37

## 2022-07-17 RX ADMIN — Medication SCH EACH: at 12:12

## 2022-07-17 RX ADMIN — INSULIN ASPART SCH UNIT: 100 INJECTION, SOLUTION INTRAVENOUS; SUBCUTANEOUS at 12:11

## 2022-07-17 RX ADMIN — POTASSIUM CHLORIDE SCH MEQ: 750 TABLET, FILM COATED, EXTENDED RELEASE ORAL at 20:43

## 2022-07-17 RX ADMIN — Medication SCH EACH: at 07:53

## 2022-07-17 RX ADMIN — DOCUSATE SODIUM SCH MG: 100 CAPSULE ORAL at 21:00

## 2022-07-17 NOTE — PM&R PROGRESS NOTE
Subjective


HPI/CC On Admission


Date Seen by Provider:  Jul 17, 2022


Time Seen by Provider:  13:00


Subjective/Events-last exam


7/17/2022:


Patient doing well


No pain reported


Slept well last night


No falls








7/16/2022:


Late entry note inadvertently missed note


Doing well


IV abx maintained


BM+


Wife at bedside


Melatonin gave him bad dreams








7/15/2022:


Pt is doing well


White blood cell count is 14


Hemoglobin is 8.9


Stump looks really good


Overall doing much better








7/14/2022:


Hemoglobin is 8.5 after one unit of blood yesterday


White count is 16,000


Bowels moved a little bit today, will get aggressive with that


Hep locking IV fluid











7/13/2022:


Pt had some incontinence today


Dressing was saturated


Decrease BP medication for Dr. Alejandra


A little bit confused


Will give one unit of blood today


Maintain on broad spectrum antibiotics


White count still elevated at 17








7/12/2022:


Pt is doing well 


Mild hypotension not due to sepsis so will give 500 ccs bolus


IV fluids 60 an hour


Decrease procalcitonin


Zosyn and Vancomycin maintained








7/11/2022:


Pt is doing a lot better


Sleeps all the time


Fever and elevated white count with procalcitonin elevation prompting blood and 

urine cultures, chest x-ray, and empiric IV antibiotics


PICC will be placed








7/10/2022:


Patient settling in well


Sugars lower so will hold Metformin


No pain except hip and left shoulder


Glucometer assessed


Wife at bedside


No issues otherwise





Review of Systems


General:  Fatigue, Malaise





Objective


Exam


Vital Signs





Vital Signs








  Date Time  Temp Pulse Resp B/P (MAP) Pulse Ox O2 Delivery O2 Flow Rate FiO2


 


7/17/22 08:13  84 20 125/75 (92) 94 Nasal Cannula 1.50 


 


7/17/22 07:30 36.0       





Capillary Refill :


General Appearance:  No Apparent Distress, WD/WN


HEENT:  PERRL/EOMI, Normal ENT Inspection, Pharynx Normal


Neck:  Normal Inspection, Supple


Respiratory:  No Accessory Muscle Use, No Respiratory Distress


Cardiovascular:  Regular Rate, Rhythm, No Edema, No Gallop, No JVD, No Murmur, 

Normal Peripheral Pulses


Gastrointestinal:  Normal Bowel Sounds, No Organomegaly, No Pulsatile Mass, Non 

Tender, Soft


Back:  Normal Inspection, No CVA Tenderness, No Vertebral Tenderness


Extremity:  Other (left AKA with dressing in place C/D/I)


Neurologic/Psychiatric:  Alert, Oriented x3


Skin:  Normal Color, Warm/Dry


Lymphatic:  No Adenopathy





Results/Procedures


Lab


Patient resulted labs reviewed.





FIM


Transfers


Therapy Code Descriptions/Definitions 





Functional [place] Measure:


0=Not Assessed/NA        4=Minimal Assistance


1=Total Assistance        5=Supervision or Setup


2=Maximal Assistance  6=Modified [place]


3=Moderate Assistance 7=Complete IndependenceSCALE: Activities may be completed 

with or without assistive devices.





6-Indepedent-patient completes the activity by him/herself with no assistance 

from a helper.


5-Set-up or Clean-up Assistance-helper sets up or cleans up; patient completes 

activity. [place] assists only prior to or  


    following the activity.


4-Supervision or Touching Assistance-helper provides verbal cues and/or lin

connie/steadying and/or contact guard assistance as patient completes activity. 

Assistance may be provided   


    throughout the activity or intermittently.


3-Partial/Moderate Assistance-helper does LESS THAN HALF the effort. [place] 

lifts, holds or supports trunk or limbs, but provides less than half the effort.


2-Substantial/Maximal Assistance-helper does MORE THAN HALF the effort. [place] 

lifts or holds trunk or limbs and provides more than half the effort.


8-[place]-lhvxcq does ALL the effort. Patient does none of the effort to 

complete the activity. Or, the assistance of 2 or more helpers is required for 

the patient to complete the  


    activity.


If activity was not attempted, code reason:


7-Patient Refused.


9-Not Applicable-not attempted and the patient did not perform the activity 

before the current illness, exacerbation or injury.


10-Not Attempted due to Environmental Limitations-(lack of equipment, weather 

restraints, etc.).


88-Not Attempted due to Medical Conditions or Safety Concerns.


Roll Left to Right (QC):  3


Sit to Lying (QC):  4


Sit to Stand (QC):  3


Chair/Bed-to-Chair Xfer(QC):  3


Car Transfer (QC):  2





Gait Training


Does the Patient Walk?:  No and Walking Goal NOT indicated


Walk 10 feet (QC):  88


Walk 50 ft with 2 Turns(QC):  88


Walk 150 ft (QC):  88


Walking 10ft/uneven surface-QC:  88





Wheelchair Training


Does the Pt Use a Wheelchair?:  Yes


Distance:  100'x2


Wheel 50 ft with 2 turns (QC):  4


Wheel 150 ft (QC):  4


Type of Wheelchair:  Manual





Stair Training


1 Step (curb) (QC):  88


4 Steps (QC):  88


12 Steps (QC):  88





Balance


Picking up an Object (QC):  88





ADL-Treatment


Eating (QC):  5 (Assistance opening containers)


Oral Hygiene (QC):  5 (set up at sink)


Shower/Bathe Self (QC):  1 (Assist x2 with sponge bath to help with bed mobility

and wiping)


Upper Body Dressing (QC):  3 (Mod A. Assist required overhead and slight 

assistance down trunk.)


Lower Body Dressing (QC):  1 (Assist x2)


On/Off Footwear (QC):  1


Toileting Hygiene (QC):  1 (Assist x2 for bowel movement. Setup with urinal)





Assessment/Plan


Assessment and Plan


Assess & Plan/Chief Complaint


Assessment:


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


Fall 7/5/22 resulting in left shoulder injury causing further debility and 

limiting ADL/independence


Severe PVD


PAF


OAC


HTN


HLP


DM


Hypothyroidism


Previous hypoglycemia required holding metformin and glimepiride but now on 

lower dose


Constipation resolved


Poor venous access requiring PICC 7/11/22


Anemia post op requiring transfusion 7/13/22


Plan:


PT OT


w/c mobility


Home meds





7/10/2022:


Pain control


Monitor sugar


Hold Metformin





7/11/22:


Empiric broad spectrum abx


Hold Metformin and OAC


PICC line





7/12/2022:


IV abx


Monitor closely





7/13/2022:


Transfuse


Broad spectrum abx





7/14/2022:


Monitor hgb


Broad spectrum abx





7/15/2022:


IV abx


Monitor hgb





7/16/2022:


DC Melatonin


IV abx





7/17/2022:


Monitor closely


Check labs in am





(1) S/P AKA (above knee amputation)


(2) Gangrene











DIANA HENRIQUEZ DO                Jul 17, 2022 06:59

## 2022-07-17 NOTE — PM&R PROGRESS NOTE
Subjective


HPI/CC On Admission


Date Seen by Provider:  Jul 17, 2022


Time Seen by Provider:  13:00


Subjective/Events-last exam


7/16/2022:


Late entry note inadvertently missed note


Doing well


IV abx maintained


BM+


Wife at bedside


Melatonin gave him bad dreams








7/15/2022:


Pt is doing well


White blood cell count is 14


Hemoglobin is 8.9


Stump looks really good


Overall doing much better








7/14/2022:


Hemoglobin is 8.5 after one unit of blood yesterday


White count is 16,000


Bowels moved a little bit today, will get aggressive with that


Hep locking IV fluid











7/13/2022:


Pt had some incontinence today


Dressing was saturated


Decrease BP medication for Dr. Alejandra


A little bit confused


Will give one unit of blood today


Maintain on broad spectrum antibiotics


White count still elevated at 17








7/12/2022:


Pt is doing well 


Mild hypotension not due to sepsis so will give 500 ccs bolus


IV fluids 60 an hour


Decrease procalcitonin


Zosyn and Vancomycin maintained








7/11/2022:


Pt is doing a lot better


Sleeps all the time


Fever and elevated white count with procalcitonin elevation prompting blood and 

urine cultures, chest x-ray, and empiric IV antibiotics


PICC will be placed








7/10/2022:


Patient settling in well


Sugars lower so will hold Metformin


No pain except hip and left shoulder


Glucometer assessed


Wife at bedside


No issues otherwise





Review of Systems


General:  Fatigue, Malaise





Objective


Exam


Vital Signs





Vital Signs








  Date Time  Temp Pulse Resp B/P (MAP) Pulse Ox O2 Delivery O2 Flow Rate FiO2


 


7/16/22 20:54      Nasal Cannula 1.50 


 


7/16/22 20:11 36.8 85 20 144/83 (103) 97   





Capillary Refill :


General Appearance:  No Apparent Distress, WD/WN


HEENT:  PERRL/EOMI, Normal ENT Inspection, Pharynx Normal


Neck:  Normal Inspection, Supple


Respiratory:  No Accessory Muscle Use, No Respiratory Distress


Cardiovascular:  Regular Rate, Rhythm, No Edema, No Gallop, No JVD, No Murmur, N

ormal Peripheral Pulses


Gastrointestinal:  Normal Bowel Sounds, No Organomegaly, No Pulsatile Mass, Non 

Tender, Soft


Back:  Normal Inspection, No CVA Tenderness, No Vertebral Tenderness


Extremity:  Other (left AKA with dressing in place C/D/I)


Neurologic/Psychiatric:  Alert, Oriented x3


Skin:  Normal Color, Warm/Dry


Lymphatic:  No Adenopathy





Results/Procedures


Lab


Patient resulted labs reviewed.





FIM


Transfers


Therapy Code Descriptions/Definitions 





Functional [place] Measure:


0=Not Assessed/NA        4=Minimal Assistance


1=Total Assistance        5=Supervision or Setup


2=Maximal Assistance  6=Modified [place]


3=Moderate Assistance 7=Complete IndependenceSCALE: Activities may be completed 

with or without assistive devices.





6-Indepedent-patient completes the activity by him/herself with no assistance 

from a helper.


5-Set-up or Clean-up Assistance-helper sets up or cleans up; patient completes 

activity. [place] assists only prior to or  


    following the activity.


4-Supervision or Touching Assistance-helper provides verbal cues and/or 

touching/steadying and/or contact guard assistance as patient completes 

activity. Assistance may be provided   


    throughout the activity or intermittently.


3-Partial/Moderate Assistance-helper does LESS THAN HALF the effort. [place] 

lifts, holds or supports trunk or limbs, but provides less than half the effort.


2-Substantial/Maximal Assistance-helper does MORE THAN HALF the effort. [place] 

lifts or holds trunk or limbs and provides more than half the effort.


5-Jtgefzoow-emgjae does ALL the effort. Patient does none of the effort to 

complete the activity. Or, the assistance of 2 or more helpers is required for 

the patient to complete the  


    activity.


If activity was not attempted, code reason:


7-Patient Refused.


9-Not Applicable-not attempted and the patient did not perform the activity 

before the current illness, exacerbation or injury.


10-Not Attempted due to Environmental Limitations-(lack of equipment, weather 

restraints, etc.).


88-Not Attempted due to Medical Conditions or Safety Concerns.


Roll Left to Right (QC):  3


Sit to Lying (QC):  4


Sit to Stand (QC):  3


Chair/Bed-to-Chair Xfer(QC):  3


Car Transfer (QC):  2





Gait Training


Does the Patient Walk?:  No and Walking Goal NOT indicated


Walk 10 feet (QC):  88


Walk 50 ft with 2 Turns(QC):  88


Walk 150 ft (QC):  88


Walking 10ft/uneven surface-QC:  88





Wheelchair Training


Does the Pt Use a Wheelchair?:  Yes


Distance:  100'x2


Wheel 50 ft with 2 turns (QC):  4


Wheel 150 ft (QC):  4


Type of Wheelchair:  Manual





Stair Training


1 Step (curb) (QC):  88


4 Steps (QC):  88


12 Steps (QC):  88





Balance


Picking up an Object (QC):  88





ADL-Treatment


Eating (QC):  5 (Assistance opening containers)


Oral Hygiene (QC):  5 (set up at sink)


Shower/Bathe Self (QC):  1 (Assist x2 with sponge bath to help with bed mobility

and wiping)


Upper Body Dressing (QC):  3 (Mod A. Assist required overhead and slight 

assistance down trunk.)


Lower Body Dressing (QC):  1 (Assist x2)


On/Off Footwear (QC):  1


Toileting Hygiene (QC):  1 (Assist x2 for bowel movement. Setup with urinal)





Assessment/Plan


Assessment and Plan


Assess & Plan/Chief Complaint


Assessment:


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


Fall 7/5/22 resulting in left shoulder injury causing further debility and 

limiting ADL/independence


Severe PVD


PAF


OAC


HTN


HLP


DM


Hypothyroidism


Previous hypoglycemia required holding metformin and glimepiride but now on lo

wer dose


Constipation resolved


Poor venous access requiring PICC 7/11/22


Anemia post op requiring transfusion 7/13/22


Plan:


PT OT


w/c mobility


Home meds





7/10/2022:


Pain control


Monitor sugar


Hold Metformin





7/11/22:


Empiric broad spectrum abx


Hold Metformin and OAC


PICC line





7/12/2022:


IV abx


Monitor closely





7/13/2022:


Transfuse


Broad spectrum abx





7/14/2022:


Monitor hgb


Broad spectrum abx





7/15/2022:


IV abx


Monitor hgb





7/16/2022:


DC Melatonin


IV abx





(1) S/P AKA (above knee amputation)


(2) Gangrene











DIANA HENRIQUEZ DO                Jul 17, 2022 06:58

## 2022-07-18 VITALS — DIASTOLIC BLOOD PRESSURE: 78 MMHG | SYSTOLIC BLOOD PRESSURE: 172 MMHG

## 2022-07-18 VITALS — DIASTOLIC BLOOD PRESSURE: 79 MMHG | SYSTOLIC BLOOD PRESSURE: 146 MMHG

## 2022-07-18 LAB
ALBUMIN SERPL-MCNC: 2.9 GM/DL (ref 3.2–4.5)
ALP SERPL-CCNC: 97 U/L (ref 40–136)
ALT SERPL-CCNC: 31 U/L (ref 0–55)
ARTERIAL PATENCY WRIST A: (no result)
BASE EXCESS STD BLDA CALC-SCNC: 6.6 MMOL/L (ref -2.5–2.5)
BASOPHILS # BLD AUTO: 0.1 10^3/UL (ref 0–0.1)
BASOPHILS NFR BLD AUTO: 0 % (ref 0–10)
BDY SITE: (no result)
BILIRUB SERPL-MCNC: 0.4 MG/DL (ref 0.1–1)
BODY TEMPERATURE: 37
BUN/CREAT SERPL: 13
CALCIUM SERPL-MCNC: 9.2 MG/DL (ref 8.5–10.1)
CHLORIDE SERPL-SCNC: 101 MMOL/L (ref 98–107)
CO2 BLDA CALC-SCNC: 33 MMOL/L (ref 21–31)
CO2 SERPL-SCNC: 26 MMOL/L (ref 21–32)
CREAT SERPL-MCNC: 0.67 MG/DL (ref 0.6–1.3)
EOSINOPHIL # BLD AUTO: 0.1 10^3/UL (ref 0–0.3)
EOSINOPHIL NFR BLD AUTO: 0 % (ref 0–10)
GFR SERPLBLD BASED ON 1.73 SQ M-ARVRAT: 100 ML/MIN
GLUCOSE SERPL-MCNC: 241 MG/DL (ref 70–105)
HCT VFR BLD CALC: 35 % (ref 40–54)
HGB BLD-MCNC: 10.2 G/DL (ref 13.3–17.7)
INHALED O2 FLOW RATE: 3 L/MIN
LYMPHOCYTES # BLD AUTO: 0.8 10^3/UL (ref 1–4)
LYMPHOCYTES NFR BLD AUTO: 4 % (ref 12–44)
MANUAL DIFFERENTIAL PERFORMED BLD QL: NO
MCH RBC QN AUTO: 24 PG (ref 25–34)
MCHC RBC AUTO-ENTMCNC: 29 G/DL (ref 32–36)
MCV RBC AUTO: 84 FL (ref 80–99)
MONOCYTES # BLD AUTO: 0.6 10^3/UL (ref 0–1)
MONOCYTES NFR BLD AUTO: 3 % (ref 0–12)
NEUTROPHILS # BLD AUTO: 18.2 10^3/UL (ref 1.8–7.8)
NEUTROPHILS NFR BLD AUTO: 90 % (ref 42–75)
PCO2 BLDA: 52 MMHG (ref 35–45)
PH BLDA: 7.4 [PH] (ref 7.37–7.43)
PLATELET # BLD: 364 10^3/UL (ref 130–400)
PMV BLD AUTO: 10.6 FL (ref 9–12.2)
PO2 BLDA: 79 MMHG (ref 79–93)
POTASSIUM SERPL-SCNC: 3.9 MMOL/L (ref 3.6–5)
PROT SERPL-MCNC: 6.6 GM/DL (ref 6.4–8.2)
SAO2 % BLDA FROM PO2: 96 % (ref 94–100)
SODIUM SERPL-SCNC: 139 MMOL/L (ref 135–145)
VENTILATION MODE VENT: NO
WBC # BLD AUTO: 20.1 10^3/UL (ref 4.3–11)

## 2022-07-18 RX ADMIN — SODIUM CHLORIDE SCH MLS/HR: 900 INJECTION INTRAVENOUS at 03:22

## 2022-07-18 RX ADMIN — LEVOTHYROXINE SODIUM SCH MCG: 100 TABLET ORAL at 06:08

## 2022-07-18 RX ADMIN — APIXABAN SCH MG: 5 TABLET, FILM COATED ORAL at 22:02

## 2022-07-18 RX ADMIN — APIXABAN SCH MG: 5 TABLET, FILM COATED ORAL at 09:09

## 2022-07-18 RX ADMIN — POTASSIUM CHLORIDE SCH MEQ: 750 TABLET, FILM COATED, EXTENDED RELEASE ORAL at 09:08

## 2022-07-18 RX ADMIN — Medication SCH EACH: at 09:09

## 2022-07-18 RX ADMIN — DOCUSATE SODIUM AND SENNOSIDES SCH EA: 8.6; 5 TABLET, FILM COATED ORAL at 22:04

## 2022-07-18 RX ADMIN — INSULIN ASPART SCH UNIT: 100 INJECTION, SOLUTION INTRAVENOUS; SUBCUTANEOUS at 17:10

## 2022-07-18 RX ADMIN — POTASSIUM CHLORIDE SCH MEQ: 750 TABLET, FILM COATED, EXTENDED RELEASE ORAL at 22:02

## 2022-07-18 RX ADMIN — DOCUSATE SODIUM SCH MG: 100 CAPSULE ORAL at 09:00

## 2022-07-18 RX ADMIN — Medication SCH EACH: at 17:10

## 2022-07-18 RX ADMIN — AMIODARONE HYDROCHLORIDE SCH MG: 200 TABLET ORAL at 09:09

## 2022-07-18 RX ADMIN — FUROSEMIDE SCH MG: 40 TABLET ORAL at 09:09

## 2022-07-18 RX ADMIN — Medication SCH EACH: at 15:01

## 2022-07-18 RX ADMIN — INSULIN ASPART SCH UNIT: 100 INJECTION, SOLUTION INTRAVENOUS; SUBCUTANEOUS at 22:05

## 2022-07-18 RX ADMIN — POLYETHYLENE GLYCOL (3350) SCH GM: 17 POWDER, FOR SOLUTION ORAL at 22:04

## 2022-07-18 RX ADMIN — DOCUSATE SODIUM AND SENNOSIDES SCH EA: 8.6; 5 TABLET, FILM COATED ORAL at 09:00

## 2022-07-18 RX ADMIN — PANTOPRAZOLE SODIUM SCH MG: 40 TABLET, DELAYED RELEASE ORAL at 09:09

## 2022-07-18 RX ADMIN — INSULIN ASPART SCH UNIT: 100 INJECTION, SOLUTION INTRAVENOUS; SUBCUTANEOUS at 12:41

## 2022-07-18 RX ADMIN — POLYETHYLENE GLYCOL (3350) SCH GM: 17 POWDER, FOR SOLUTION ORAL at 09:00

## 2022-07-18 RX ADMIN — INSULIN ASPART SCH UNIT: 100 INJECTION, SOLUTION INTRAVENOUS; SUBCUTANEOUS at 06:08

## 2022-07-18 RX ADMIN — LOSARTAN POTASSIUM SCH MG: 25 TABLET, FILM COATED ORAL at 09:08

## 2022-07-18 RX ADMIN — DOCUSATE SODIUM SCH MG: 100 CAPSULE ORAL at 22:04

## 2022-07-18 NOTE — OCC THERAPY PROGRESS NOTE
Therapy Progress Note


Pt on hold per nrsg due to decline in medical status.  Will continue to monitor 

pt and will see pt when nrsg reports that pt is stable to participate in skilled

therapy.











MICHAEL ALVAREZ               Jul 18, 2022 11:58

## 2022-07-18 NOTE — PM&R PROGRESS NOTE
Subjective


HPI/CC On Admission


Date Seen by Provider:  Jul 18, 2022


Time Seen by Provider:  09:00


Subjective/Events-last exam


7/18/2022:


Patient having difficulty with shortness of breath and tachypnea


Oxygen placed due to hypoxia


Septic work-up normal but it appears he has congestive heart failure and volume 

overload


Cardiology consulted after he gave him Lasix 40 mg IV


Supportive care will continue








7/17/2022:


Patient doing well


No pain reported


Slept well last night


No falls








7/16/2022:


Late entry note inadvertently missed note


Doing well


IV abx maintained


BM+


Wife at bedside


Melatonin gave him bad dreams








7/15/2022:


Pt is doing well


White blood cell count is 14


Hemoglobin is 8.9


Stump looks really good


Overall doing much better








7/14/2022:


Hemoglobin is 8.5 after one unit of blood yesterday


White count is 16,000


Bowels moved a little bit today, will get aggressive with that


Hep locking IV fluid











7/13/2022:


Pt had some incontinence today


Dressing was saturated


Decrease BP medication for Dr. Alejandra


A little bit confused


Will give one unit of blood today


Maintain on broad spectrum antibiotics


White count still elevated at 17








7/12/2022:


Pt is doing well 


Mild hypotension not due to sepsis so will give 500 ccs bolus


IV fluids 60 an hour


Decrease procalcitonin


Zosyn and Vancomycin maintained








7/11/2022:


Pt is doing a lot better


Sleeps all the time


Fever and elevated white count with procalcitonin elevation prompting blood and 

urine cultures, chest x-ray, and empiric IV antibiotics


PICC will be placed








7/10/2022:


Patient settling in well


Sugars lower so will hold Metformin


No pain except hip and left shoulder


Glucometer assessed


Wife at bedside


No issues otherwise





Review of Systems


General:  Fatigue, Malaise





Focused Exam


Lactate Level


7/18/22 08:55: Lactic Acid Level 1.28








Objective


Exam


Vital Signs





Vital Signs








  Date Time  Temp Pulse Resp B/P (MAP) Pulse Ox O2 Delivery O2 Flow Rate FiO2


 


7/19/22 07:26 36.5 89 18 162/72 (102) 93 Nasal Cannula 1.00 





Capillary Refill :


General Appearance:  No Apparent Distress, WD/WN


HEENT:  PERRL/EOMI, Normal ENT Inspection, Pharynx Normal


Neck:  Normal Inspection, Supple


Respiratory:  No Accessory Muscle Use, No Respiratory Distress


Cardiovascular:  Regular Rate, Rhythm, No Edema, No Gallop, No JVD, No Murmur, 

Normal Peripheral Pulses


Gastrointestinal:  Normal Bowel Sounds, No Organomegaly, No Pulsatile Mass, Non 

Tender, Soft


Back:  Normal Inspection, No CVA Tenderness, No Vertebral Tenderness


Extremity:  Other (left AKA with dressing in place C/D/I)


Neurologic/Psychiatric:  Alert, Oriented x3


Skin:  Normal Color, Warm/Dry


Lymphatic:  No Adenopathy





Results/Procedures


Lab


Laboratory Tests


7/19/22 05:28








Patient resulted labs reviewed.





FIM


Transfers


Therapy Code Descriptions/Definitions 





Functional [place] Measure:


0=Not Assessed/NA        4=Minimal Assistance


1=Total Assistance        5=Supervision or Setup


2=Maximal Assistance  6=Modified [place]


3=Moderate Assistance 7=Complete IndependenceSCALE: Activities may be completed 

with or without assistive devices.





6-Indepedent-patient completes the activity by him/herself with no assistance 

from a helper.


5-Set-up or Clean-up Assistance-helper sets up or cleans up; patient completes 

activity. [place] assists only prior to or  


    following the activity.


4-Supervision or Touching Assistance-helper provides verbal cues and/or 

touching/steadying and/or contact guard assistance as patient completes 

activity. Assistance may be provided   


    throughout the activity or intermittently.


3-Partial/Moderate Assistance-helper does LESS THAN HALF the effort. [place] 

lifts, holds or supports trunk or limbs, but provides less than half the effort.


2-Substantial/Maximal Assistance-helper does MORE THAN HALF the effort. [place] 

lifts or holds trunk or limbs and provides more than half the effort.


8-Zzlneaezx-bbnual does ALL the effort. Patient does none of the effort to 

complete the activity. Or, the assistance of 2 or more helpers is required for 

the patient to complete the  


    activity.


If activity was not attempted, code reason:


7-Patient Refused.


9-Not Applicable-not attempted and the patient did not perform the activity 

before the current illness, exacerbation or injury.


10-Not Attempted due to Environmental Limitations-(lack of equipment, weather 

restraints, etc.).


88-Not Attempted due to Medical Conditions or Safety Concerns.


Roll Left to Right (QC):  3


Sit to Lying (QC):  4


Sit to Stand (QC):  3


Chair/Bed-to-Chair Xfer(QC):  3


Car Transfer (QC):  2





Gait Training


Does the Patient Walk?:  No and Walking Goal NOT indicated


Walk 10 feet (QC):  88


Walk 50 ft with 2 Turns(QC):  88


Walk 150 ft (QC):  88


Walking 10ft/uneven surface-QC:  88





Wheelchair Training


Does the Pt Use a Wheelchair?:  Yes


Distance:  100'x2


Wheel 50 ft with 2 turns (QC):  4


Wheel 150 ft (QC):  4


Type of Wheelchair:  Manual





Stair Training


1 Step (curb) (QC):  88


4 Steps (QC):  88


12 Steps (QC):  88





Balance


Picking up an Object (QC):  88





ADL-Treatment


Eating (QC):  5 (Assistance opening containers)


Oral Hygiene (QC):  5 (set up at sink)


Shower/Bathe Self (QC):  1 (Assist x2 with sponge bath to help with bed mobility

and wiping)


Upper Body Dressing (QC):  3 (Mod A. Assist required overhead and slight 

assistance down trunk.)


Lower Body Dressing (QC):  1 (Assist x2)


On/Off Footwear (QC):  1


Toileting Hygiene (QC):  1 (Assist x2 for bowel movement. Setup with urinal)





Assessment/Plan


Assessment and Plan


Assess & Plan/Chief Complaint


Assessment:


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


Fall 7/5/22 resulting in left shoulder injury causing further debility and 

limiting ADL/independence


Severe PVD


PAF


OAC


HTN


HLP


DM


Hypothyroidism


Previous hypoglycemia required holding metformin and glimepiride but now on 

lower dose


Constipation resolved


Poor venous access requiring PICC 7/11/22


Anemia post op requiring transfusion 7/13/22


Volume overload CHF requiring IV Lasix 7/18/2022








Plan:


PT OT


w/c mobility


Home meds





7/10/2022:


Pain control


Monitor sugar


Hold Metformin





7/11/22:


Empiric broad spectrum abx


Hold Metformin and OAC


PICC line





7/12/2022:


IV abx


Monitor closely





7/13/2022:


Transfuse


Broad spectrum abx





7/14/2022:


Monitor hgb


Broad spectrum abx





7/15/2022:


IV abx


Monitor hgb





7/16/2022:


DC Melatonin


IV abx





7/17/2022:


Monitor closely


Check labs in am





7/18/2022:


IV Lasix


Oxygen





(1) S/P AKA (above knee amputation)


(2) Gangrene











DIANA HENRIQUEZ DO                Jul 18, 2022 06:44

## 2022-07-18 NOTE — OCCUPATIONAL THER DAILY NOTE
OT Current Status-Daily Note


Subjective


Pt alert, sitting on EOB.  Pt agrees to therapy.  Nrsg okayed MENG to work with 

pt.  OT/PT co-treat [id number], skills of 2 clinicians required to decrease fall 

risk, monitor medical status and increase activity tolerance.  PT focusing on 

transfers, w/c mobility and strengthening while OT focusing on B UE stren

gthening, ADLs and functional mobility.





Mental Status/Objective


Patient Orientation:  Person, Place, Time, Situation


Attachments:  IV





ADL-Treatment


Pt independent with eating.  Pt able to go from supine to EOB using bed rails 

and HOB elevated by self.


Therapy Code Descriptions/Definitions 





Functional [place] Measure:


0=Not Assessed/NA        4=Minimal Assistance


1=Total Assistance        5=Supervision or Setup


2=Maximal Assistance  6=Modified [place]


3=Moderate Assistance 7=Complete IndependenceSCALE: Activities may be completed 

with or without assistive devices.





6-Indepedent-patient completes the activity by him/herself with no assistance 

from a helper.


5-Set-up or Clean-up Assistance-helper sets up or cleans up; patient completes 

activity. [place] assists only prior to or  


    following the activity.


4-Supervision or Touching Assistance-helper provides verbal cues and/or 

touching/steadying and/or contact guard assistance as patient completes 

activity. Assistance may be provided   


    throughout the activity or intermittently.


3-Partial/Moderate Assistance-helper does LESS THAN HALF the effort. [place] 

lifts, holds or supports trunk or limbs, but provides less than half the effort.


2-Substantial/Maximal Assistance-helper does MORE THAN HALF the effort. [place] 

lifts or holds trunk or limbs and provides more than half the effort.


8-Bfgkyvsiv-tflymf does ALL the effort. Patient does none of the effort to 

complete the activity. Or, the assistance of 2 or more helpers is required for 

the patient to complete the  


    activity.


If activity was not attempted, code reason:


7-Patient Refused.


9-Not Applicable-not attempted and the patient did not perform the activity 

before the current illness, exacerbation or injury.


10-Not Attempted due to Environmental Limitations-(lack of equipment, weather 

restraints, etc.).


88-Not Attempted due to Medical Conditions or Safety Concerns.


Eating (QC):  6





Other Treatment


Pt given theraband HEP and dowel lien with 3# wt for use in room.  Dowel lien 

exercises:bicep curls-15 reps, tricep-15 reps, modified chest press-10 reps.  Pt

fatigued quickly and required lengthy recovery breaks between exercises.  PT 

completed B LE exercises.  See PT notes for transfer progress.  Pt then 

transferred into w/c using FWW with assist x2 for safety.  Pt able to complete 

w/c mobility independently though required multiple recovery breaks.  SPT to 

therapy mat to work on sitting balance, supine <--> EOB, stretching L hip.  Pt 

then propelled back to room and completed SPT assist x2 to recliner.  After 

therapy, pt sitting in recliner with call light/phone in reach.  All needs met 

in room.





OT Short Term Goals


Short Term Goals


Time Frame:  2022


Toileting hygiene:  2


Shower/bathe self:  3


Lower body dressin


Putting on/taking off footwear:  3





OT Long Term Goals


Long Term Goals


Time Frame:  Aug 5, 2022


Eating (QC):  6


Oral Hygiene (QC):  6


Toileting Hygiene (QC):  3 (bowel movements on toilet)


Shower/Bathe Self (QC):  4


Upper Body Dressing (QC):  5


Lower Body Dressing (QC):  3


On/Off Footwear (QC):  4


Additional Goals:  1-Demonstrate ADL Tasks, 2-Verbalize Understanding, 

3-ImproveStrength/[place]


1=Demonstrate adherence to instructed precautions during ADL tasks.


2=Patient will verbalize/demonstrate understanding of assistive 

devices/modifications for ADL.


3=Patient will improve strength/tolerance for activity to enable patient to 

perform ADL's.





OT Education/Plan


Problem List/Assessment


Assessment:  Decreased Activ Tolerance, Decreased UE Strength, Impaired Bed 

Mobility, Impaired Coordination, Impaired Funct Balance, Impaired Self-Care 

Skills, Restricted Funct UE ROM





Discharge Recommendations


Plan/Recommendations:  Continue POC





Treatment Plan/Plan of Care


Patient would benefit from OT for education, treatment and training to promote 

independence in ADL's, mobility, safety and/or upper extremity function for 

ADL's.


Plan of Care:  ADL Retraining, Caregiver Training, Functional Mobility, Group 

Exercise/Act as Ind, UE Funct Exercise/Act, W/C Management Training


Treatment Duration:  Aug 5, 2022


Frequency:  At least 5 of 7 days/Wk (IRF)


Estimated Hrs Per Day:  1.5 hours per day


Rehab Potential:  Guarded





Time/GCodes


Start Time:  12:45


Stop Time:  14:15


Total Time Billed (hr/min):  90


Billed Treatment Time


1 visit-ADL 2 (35 min)  EX 2 (35 min)  FA 1 (20 min)  co-treat with PT [id number]

[id number], individual [id number]











MICHAEL ALVAREZ               2022 14:37

## 2022-07-18 NOTE — PHYSICAL THERAPY DAILY NOTE
PT Daily Note-Current


Subjective


Patient in bed pre tx, agrees to PT, has unrated pain in left residual limb.  

Will be co-treating with OT due to poor patient mobility, strength, endurance, 

severe debility, coordinate UE and LE with activity, safety and reduce risk of 

falls.





Appearance


Patient in recliner post tx with nurse call, phone, tray, all needs met.





Mental Status


Patient Orientation:  Person, Place, Situation





Transfers


SCALE: Activities may be completed with or without assistive devices.





6-Indepedent-patient completes the activity by him/herself with no assistance 

from a helper.


5-Set-up or Clean-up Assistance-helper sets up or cleans up; patient completes 

activity. [place] assists only prior to or  


    following the activity.


4-Supervision or Touching Assistance-helper provides verbal cues and/or 

touching/steadying and/or contact guard assistance as patient completes 

activity. Assistance may be provided   


    throughout the activity or intermittently.


3-Partial/Moderate Assistance-helper does LESS THAN HALF the effort. [place] 

lifts, holds or supports trunk or limbs, but provides less than half the effort.


2-Substantial/Maximal Assistance-helper does MORE THAN HALF the effort. [place] 

lifts or holds trunk or limbs and provides more than half the effort.


1-Dltqotily-vqrbrg does ALL the effort. Patient does none of the effort to 

complete the activity. Or, the assistance of 2 or more helpers is required for 

the patient to complete the  


    activity.


If activity was not attempted, code reason:


7-Patient Refused.


9-Not Applicable-not attempted and the patient did not perform the activity 

before the current illness, exacerbation or injury.


10-Not Attempted due to Environmental Limitations-(lack of equipment, weather 

restraints, etc.).


88-Not Attempted due to Medical Conditions or Safety Concerns.


Roll Left & Right (QC):  4


Sit to Lying (QC):  4


Lying to Sitting/Side of Bed(Q:  3


Sit to Stand (QC):  2


Chair/Bed-to-Chair Xfer(QC):  2


Stand pivot transfer to  from bed with max assist using walker.  After the 

first stand pivot transfer patient could not stand using the walker and transfer

and had to perform stand pivot transfers with therapist in front with max assi

st.





Weight Bearing


Full Weight Bearing





Wheelchair Training


Does the Pt Use a Wheelchair?:  Yes


Wheel 50 ft with 2 turns (QC):  4


Wheel 150 ft (QC):  4


Type of Wheelchair:  Manual


300', 120', SBA, very slow, needs many rest breaks





Exercises


Seated Therapy Exercises:  Ankle pumps (RLE), Long arc quads (RLE), Hip flexion,

Hip abd/add


Seated Reps:  20


also performed prone hip stretch for 5 min, his bandage fell off and therapists 

reapply it with new bandaging.





Treatments


PT performed bed mobility and transfers, stretching, WC mobility, LE 

strengthening, OT performed UE exercise, UE positioning and safety during 

activity.





Assessment


Current Status:  Poor Progress


weaker RLE with standing and transfers





PT Short Term Goals


Short Term Goals


Time Frame:  Jul 15, 2022


Roll Left & Right:  4


Sit to lying:  3 (Kacie)


Lying to sitting on side of be:  3 (Kacie)


Sit to stand:  3 (modA)


Chair/bed-to-chair transfer:  3 (modA)





PT Long Term Goals


Long Term Goals


PT Long Term Goals Time Frame:  Jul 29, 2022


Roll Left & Right (QC):  6


Sit to Lying (QC):  4 (SBA)


Lying-Sitting on Side/Bed(QC):  4 (SBA)


Sit to Stand (QC):  3 (Kacie)


Chair/Bed-to-Chair Xfer(QC):  3 (Kacie)


Toilet Transfer (QC):  3 (Kacie)


Car Transfer (QC):  3 (Kacie)


Does the Patient Walk:  No and Walking Goal NOT indicated


Walk 10 feet (QC):  88


Walk 50ft with 2 Turns (QC):  88


Walk 150 ft (QC):  88


Walking 10ft on Uneven Surface:  88


1 Step (curb) (QC):  88


4 Steps (QC):  88


12 Steps (QC):  88


Picking up an Object (QC):  88


Wheel 50 feet with 2 turns (QC:  6


Wheel 150 feet:  6





PT Plan


Problem List


Problem List:  Activity Tolerance, Functional Strength, Safety, Balance, Gait, 

Transfer, Bed Mobility, ROM





Treatment/Plan


Treatment Plan:  Continue Plan of Care


Treatment Plan:  Bed Mobility, Education, Functional Activity [place], Functional 

Strength, Group Therapy, Safety, Therapeutic Exercise, Transfers


Treatment Duration:  Jul 29, 2022


Frequency:  At least 5 of 7 days/Wk (IRF)


Estimated Hrs Per Day:  1.5 hours per day


Patient and/or Family Agrees t:  Yes





Safety Risks/Education


Patient Education:  Transfer Techniques, Correct Positioning, W/C Management, 

Safety Issues


Teaching Recipient:  Patient


Teaching Methods:  Demonstration, Discussion


Response to Teaching:  Reinforcement Needed





Time/GCodes


Time In:  1300


Time Out:  1430


Total Billed Treatment Time:  90


Total Billed Treatment


1 visit


EX 30'


FA 60'





co-treated with OT from [id number]











KIRAN RANGEL PT                Jul 18, 2022 14:22

## 2022-07-18 NOTE — DIAGNOSTIC IMAGING REPORT
INDICATION: Hypoxia, increasing oxygen demands



COMPARISON: 07/11/2022 



TECHNIQUE: Single radiograph of the chest dated 07/18/2022. 



FINDINGS: Postsurgical changes of a CABG are again identified.

Right-sided PICC line is again seen, appearing stable. The

cardiac silhouette is enlarged. Central pulmonary vascular

congestion is present. Developing extensive mixed interstitial

and airspace opacities throughout the bilateral lungs with

associated small pleural effusions. No pneumothorax. No acute

osseous abnormality.



IMPRESSION: Developing extensive bilateral pulmonary opacities,

favored to relate to edema given cardiomegaly and increasing

pulmonary vascular congestion. Superimposed infiltrate not

excluded. Recommend continued radiographic followup.



Dictated by: 



  Dictated on workstation # [id number]

## 2022-07-19 VITALS — DIASTOLIC BLOOD PRESSURE: 81 MMHG | SYSTOLIC BLOOD PRESSURE: 151 MMHG

## 2022-07-19 VITALS — SYSTOLIC BLOOD PRESSURE: 162 MMHG | DIASTOLIC BLOOD PRESSURE: 72 MMHG

## 2022-07-19 LAB
BUN/CREAT SERPL: 13
CALCIUM SERPL-MCNC: 9.1 MG/DL (ref 8.5–10.1)
CHLORIDE SERPL-SCNC: 101 MMOL/L (ref 98–107)
CO2 SERPL-SCNC: 29 MMOL/L (ref 21–32)
CREAT SERPL-MCNC: 0.67 MG/DL (ref 0.6–1.3)
GFR SERPLBLD BASED ON 1.73 SQ M-ARVRAT: 100 ML/MIN
GLUCOSE SERPL-MCNC: 188 MG/DL (ref 70–105)
HCT VFR BLD CALC: 32 % (ref 40–54)
HGB BLD-MCNC: 9.5 G/DL (ref 13.3–17.7)
MAGNESIUM SERPL-MCNC: 1.6 MG/DL (ref 1.6–2.4)
MCH RBC QN AUTO: 24 PG (ref 25–34)
MCHC RBC AUTO-ENTMCNC: 29 G/DL (ref 32–36)
MCV RBC AUTO: 83 FL (ref 80–99)
PLATELET # BLD: 348 10^3/UL (ref 130–400)
PMV BLD AUTO: 10.4 FL (ref 9–12.2)
POTASSIUM SERPL-SCNC: 3.6 MMOL/L (ref 3.6–5)
SODIUM SERPL-SCNC: 143 MMOL/L (ref 135–145)
WBC # BLD AUTO: 12.9 10^3/UL (ref 4.3–11)

## 2022-07-19 RX ADMIN — Medication SCH EACH: at 17:10

## 2022-07-19 RX ADMIN — POTASSIUM CHLORIDE SCH MEQ: 750 TABLET, FILM COATED, EXTENDED RELEASE ORAL at 20:48

## 2022-07-19 RX ADMIN — Medication SCH EACH: at 07:50

## 2022-07-19 RX ADMIN — LEVOTHYROXINE SODIUM SCH MCG: 100 TABLET ORAL at 06:40

## 2022-07-19 RX ADMIN — POTASSIUM CHLORIDE SCH MEQ: 750 TABLET, FILM COATED, EXTENDED RELEASE ORAL at 07:50

## 2022-07-19 RX ADMIN — DOCUSATE SODIUM SCH MG: 100 CAPSULE ORAL at 21:14

## 2022-07-19 RX ADMIN — POLYETHYLENE GLYCOL (3350) SCH GM: 17 POWDER, FOR SOLUTION ORAL at 07:51

## 2022-07-19 RX ADMIN — DOCUSATE SODIUM AND SENNOSIDES SCH EA: 8.6; 5 TABLET, FILM COATED ORAL at 07:52

## 2022-07-19 RX ADMIN — INSULIN ASPART SCH UNIT: 100 INJECTION, SOLUTION INTRAVENOUS; SUBCUTANEOUS at 06:40

## 2022-07-19 RX ADMIN — FUROSEMIDE SCH MG: 40 TABLET ORAL at 07:49

## 2022-07-19 RX ADMIN — INSULIN ASPART SCH UNIT: 100 INJECTION, SOLUTION INTRAVENOUS; SUBCUTANEOUS at 20:49

## 2022-07-19 RX ADMIN — POLYETHYLENE GLYCOL (3350) SCH GM: 17 POWDER, FOR SOLUTION ORAL at 21:14

## 2022-07-19 RX ADMIN — Medication SCH EACH: at 13:42

## 2022-07-19 RX ADMIN — INSULIN ASPART SCH UNIT: 100 INJECTION, SOLUTION INTRAVENOUS; SUBCUTANEOUS at 12:24

## 2022-07-19 RX ADMIN — AMIODARONE HYDROCHLORIDE SCH MG: 200 TABLET ORAL at 07:50

## 2022-07-19 RX ADMIN — GLIMEPIRIDE SCH MG: 1 TABLET ORAL at 06:40

## 2022-07-19 RX ADMIN — APIXABAN SCH MG: 5 TABLET, FILM COATED ORAL at 20:48

## 2022-07-19 RX ADMIN — APIXABAN SCH MG: 5 TABLET, FILM COATED ORAL at 07:49

## 2022-07-19 RX ADMIN — LOSARTAN POTASSIUM SCH MG: 25 TABLET, FILM COATED ORAL at 07:49

## 2022-07-19 RX ADMIN — DOCUSATE SODIUM AND SENNOSIDES SCH EA: 8.6; 5 TABLET, FILM COATED ORAL at 21:14

## 2022-07-19 RX ADMIN — DOCUSATE SODIUM SCH MG: 100 CAPSULE ORAL at 07:51

## 2022-07-19 RX ADMIN — PANTOPRAZOLE SODIUM SCH MG: 40 TABLET, DELAYED RELEASE ORAL at 07:50

## 2022-07-19 RX ADMIN — INSULIN ASPART SCH UNIT: 100 INJECTION, SOLUTION INTRAVENOUS; SUBCUTANEOUS at 17:09

## 2022-07-19 NOTE — PM&R PROGRESS NOTE
Subjective


HPI/CC On Admission


Date Seen by Provider:  Jul 19, 2022


Time Seen by Provider:  09:00


Subjective/Events-last exam


7/19/2022:


Doing well today


Volume overload resolved


Labs reviewed


Sugars noted


Pain controlled








7/18/2022:


Patient having difficulty with shortness of breath and tachypnea


Oxygen placed due to hypoxia


Septic work-up normal but it appears he has congestive heart failure and volume 

overload


Cardiology consulted after he gave him Lasix 40 mg IV


Supportive care will continue








7/17/2022:


Patient doing well


No pain reported


Slept well last night


No falls








7/16/2022:


Late entry note inadvertently missed note


Doing well


IV abx maintained


BM+


Wife at bedside


Melatonin gave him bad dreams








7/15/2022:


Pt is doing well


White blood cell count is 14


Hemoglobin is 8.9


Stump looks really good


Overall doing much better








7/14/2022:


Hemoglobin is 8.5 after one unit of blood yesterday


White count is 16,000


Bowels moved a little bit today, will get aggressive with that


Hep locking IV fluid











7/13/2022:


Pt had some incontinence today


Dressing was saturated


Decrease BP medication for Dr. Alejandra


A little bit confused


Will give one unit of blood today


Maintain on broad spectrum antibiotics


White count still elevated at 17








7/12/2022:


Pt is doing well 


Mild hypotension not due to sepsis so will give 500 ccs bolus


IV fluids 60 an hour


Decrease procalcitonin


Zosyn and Vancomycin maintained








7/11/2022:


Pt is doing a lot better


Sleeps all the time


Fever and elevated white count with procalcitonin elevation prompting blood and 

urine cultures, chest x-ray, and empiric IV antibiotics


PICC will be placed








7/10/2022:


Patient settling in well


Sugars lower so will hold Metformin


No pain except hip and left shoulder


Glucometer assessed


Wife at bedside


No issues otherwise





Review of Systems


General:  Fatigue, Malaise


Musculoskeletal:  leg pain





Focused Exam


Lactate Level


7/18/22 08:55: Lactic Acid Level 1.28








Objective


Exam


Vital Signs





Vital Signs








  Date Time  Temp Pulse Resp B/P (MAP) Pulse Ox O2 Delivery O2 Flow Rate FiO2


 


7/19/22 21:38     96 Nasal Cannula 1.50 


 


7/19/22 19:43 37.1 90 20 151/81 (104)    





Capillary Refill :


General Appearance:  No Apparent Distress, WD/WN


HEENT:  PERRL/EOMI, Normal ENT Inspection, Pharynx Normal


Neck:  Normal Inspection, Supple


Respiratory:  No Accessory Muscle Use, No Respiratory Distress


Cardiovascular:  Regular Rate, Rhythm, No Edema, No Gallop, No JVD, No Murmur, 

Normal Peripheral Pulses


Gastrointestinal:  Normal Bowel Sounds, No Organomegaly, No Pulsatile Mass, Non 

Tender, Soft


Back:  Normal Inspection, No CVA Tenderness, No Vertebral Tenderness


Extremity:  Other (left AKA with dressing in place C/D/I)


Neurologic/Psychiatric:  Alert, Oriented x3


Skin:  Normal Color, Warm/Dry


Lymphatic:  No Adenopathy





Results/Procedures


Lab


Patient resulted labs reviewed.





FIM


Transfers


Therapy Code Descriptions/Definitions 





Functional [place] Measure:


0=Not Assessed/NA        4=Minimal Assistance


1=Total Assistance        5=Supervision or Setup


2=Maximal Assistance  6=Modified [place]


3=Moderate Assistance 7=Complete IndependenceSCALE: Activities may be completed 

with or without assistive devices.





6-Indepedent-patient completes the activity by him/herself with no assistance 

from a helper.


5-Set-up or Clean-up Assistance-helper sets up or cleans up; patient completes 

activity. [place] assists only prior to or  


    following the activity.


4-Supervision or Touching Assistance-helper provides verbal cues and/or 

touching/steadying and/or contact guard assistance as patient completes 

activity. Assistance may be provided   


    throughout the activity or intermittently.


3-Partial/Moderate Assistance-helper does LESS THAN HALF the effort. [place] 

lifts, holds or supports trunk or limbs, but provides less than half the effort.


2-Substantial/Maximal Assistance-helper does MORE THAN HALF the effort. [place] 

lifts or holds trunk or limbs and provides more than half the effort.


1-Jdhtrqgbe-wptpoh does ALL the effort. Patient does none of the effort to 

complete the activity. Or, the assistance of 2 or more helpers is required for 

the patient to complete the  


    activity.


If activity was not attempted, code reason:


7-Patient Refused.


9-Not Applicable-not attempted and the patient did not perform the activity 

before the current illness, exacerbation or injury.


10-Not Attempted due to Environmental Limitations-(lack of equipment, weather 

restraints, etc.).


88-Not Attempted due to Medical Conditions or Safety Concerns.


Roll Left to Right (QC):  4


Sit to Lying (QC):  4


Sit to Stand (QC):  2


Chair/Bed-to-Chair Xfer(QC):  2


Car Transfer (QC):  2





Gait Training


Does the Patient Walk?:  No and Walking Goal NOT indicated


Walk 10 feet (QC):  88


Walk 50 ft with 2 Turns(QC):  88


Walk 150 ft (QC):  88


Walking 10ft/uneven surface-QC:  88





Wheelchair Training


Does the Pt Use a Wheelchair?:  Yes


Distance:  100'x2


Wheel 50 ft with 2 turns (QC):  4


Wheel 150 ft (QC):  4


Type of Wheelchair:  Manual





Stair Training


1 Step (curb) (QC):  88


4 Steps (QC):  88


12 Steps (QC):  88





Balance


Picking up an Object (QC):  88





ADL-Treatment


Eating (QC):  6


Oral Hygiene (QC):  5 (set up at sink)


Shower/Bathe Self (QC):  1 (Assist x2 with sponge bath to help with bed mobility

and wiping)


Upper Body Dressing (QC):  3 (Mod A. Assist required overhead and slight 

assistance down trunk.)


Lower Body Dressing (QC):  1 (Assist x2)


On/Off Footwear (QC):  1


Toileting Hygiene (QC):  1 (Assist x2 for bowel movement. Setup with urinal)





Assessment/Plan


Assessment and Plan


Assess & Plan/Chief Complaint


Assessment:


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


Fall 7/5/22 resulting in left shoulder injury causing further debility and 

limiting ADL/independence


Severe PVD


PAF


OAC


HTN


HLP


DM


Hypothyroidism


Previous hypoglycemia required holding metformin and glimepiride but now on 

lower dose


Constipation resolved


Poor venous access requiring PICC 7/11/22


Anemia post op requiring transfusion 7/13/22


Volume overload CHF requiring IV Lasix 7/18/2022








Plan:


PT OT


w/c mobility


Home meds





7/10/2022:


Pain control


Monitor sugar


Hold Metformin





7/11/22:


Empiric broad spectrum abx


Hold Metformin and OAC


PICC line





7/12/2022:


IV abx


Monitor closely





7/13/2022:


Transfuse


Broad spectrum abx





7/14/2022:


Monitor hgb


Broad spectrum abx





7/15/2022:


IV abx


Monitor hgb





7/16/2022:


DC Melatonin


IV abx





7/17/2022:


Monitor closely


Check labs in am





7/18/2022:


IV Lasix


Oxygen





7/19/2022:


Monitor labs





(1) S/P AKA (above knee amputation)


(2) Gangrene











DIANA HENRIQUEZ DO                Jul 19, 2022 08:56

## 2022-07-19 NOTE — PHYSICAL THERAPY DAILY NOTE
PT Daily Note-Current


Subjective


Patient sitting on edge of bed with OT beginning bed bath upon PT arrival, 

agreeable to treatment.  Patient will be co-treated with OT due to patients 

increased need for 2 disciplines to efficiently and safely perform treatment 

with patient due to poor balance, increased fall risk, decline in strength and 

difficulty performing ADLs.  OT will focus on the UEs and functional ADLS while 

PT will focus on LE strength and mobility ADLs.





Mental Status


Patient Orientation:  Person





Transfers


SCALE: Activities may be completed with or without assistive devices.





6-Indepedent-patient completes the activity by him/herself with no assistance 

from a helper.


5-Set-up or Clean-up Assistance-helper sets up or cleans up; patient completes 

activity. [place] assists only prior to or  


    following the activity.


4-Supervision or Touching Assistance-helper provides verbal cues and/or 

touching/steadying and/or contact guard assistance as patient completes 

activity. Assistance may be provided   


    throughout the activity or intermittently.


3-Partial/Moderate Assistance-helper does LESS THAN HALF the effort. [place] 

lifts, holds or supports trunk or limbs, but provides less than half the effort.


2-Substantial/Maximal Assistance-helper does MORE THAN HALF the effort. [place] 

lifts or holds trunk or limbs and provides more than half the effort.


3-Hvgcfauww-rzabry does ALL the effort. Patient does none of the effort to 

complete the activity. Or, the assistance of 2 or more helpers is required for 

the patient to complete the  


    activity.


If activity was not attempted, code reason:


7-Patient Refused.


9-Not Applicable-not attempted and the patient did not perform the activity 

before the current illness, exacerbation or injury.


10-Not Attempted due to Environmental Limitations-(lack of equipment, weather 

restraints, etc.).


88-Not Attempted due to Medical Conditions or Safety Concerns.


Roll Left & Right (QC):  4


Sit to Lying (QC):  4


Lying to Sitting/Side of Bed(Q:  4


Sit to Stand (QC):  1


Chair/Bed-to-Chair Xfer(QC):  1


Toilet Transfer (QC):  1





Weight Bearing


Full Weight Bearing





Wheelchair Training


Does the Pt Use a Wheelchair?:  Yes


Wheel 50 ft with 2 turns (QC):  4


Wheel 150 ft (QC):  4


Type of Wheelchair:  Manual


250 feet x 2 with SBA





Exercises


Seated Therapy Exercises:  Ankle pumps, Sit to stand, Long arc quads


Seated Reps:  10





Assessment


Current Status:  Poor Progress


 Patient was co-treated with OT due to patients increased need for 2 disciplines

to efficiently and safely perform treatment with patient due to poor balance, 

increased fall risk, decline in strength and difficulty performing ADLs.  OT 

will focus on the UEs and functional ADLS while PT will focus on LE strength and

mobility ADLs.  Patient performs all observed bed mobility with SBA.  He 

performs sit to stand with max A and SPT with max A to the w/c.  Patient 

performs dynamic standing balance with PT focus on balance and mobility, OT 

focus on dressing and bathing.  Patient propels w/c 250 feet x 2 with SBA.  

Patient performs static and dynamic standing balance with mod A x 2 with UE 

raises to promote increased weight bearing on right LE and decreased reliance on

UEs.  Patient able to ambulate 6 feet with parallel bars, with max A and person 

following with w/c.  Patient propels w/c 250 feet back to room.  Patient 

requires mod A x 2 for transfer into bed.    Patient in bed post treatment with 

all needs met, nursing notified, call light in reach.





PT Short Term Goals


Short Term Goals


Time Frame:  Jul 15, 2022


Roll Left & Right:  4


Sit to lying:  3 (Kacie)


Lying to sitting on side of be:  3 (Kacie)


Sit to stand:  3 (modA)


Chair/bed-to-chair transfer:  3 (modA)





PT Long Term Goals


Long Term Goals


PT Long Term Goals Time Frame:  Jul 29, 2022


Roll Left & Right (QC):  6


Sit to Lying (QC):  4 (SBA)


Lying-Sitting on Side/Bed(QC):  4 (SBA)


Sit to Stand (QC):  3 (Kacie)


Chair/Bed-to-Chair Xfer(QC):  3 (Kacie)


Toilet Transfer (QC):  3 (Kacie)


Car Transfer (QC):  3 (Kacie)


Does the Patient Walk:  No and Walking Goal NOT indicated


Walk 10 feet (QC):  88


Walk 50ft with 2 Turns (QC):  88


Walk 150 ft (QC):  88


Walking 10ft on Uneven Surface:  88


1 Step (curb) (QC):  88


4 Steps (QC):  88


12 Steps (QC):  88


Picking up an Object (QC):  88


Wheel 50 feet with 2 turns (QC:  6


Wheel 150 feet:  6





PT Plan


Treatment/Plan


Treatment Plan:  Continue Plan of Care


Treatment Plan:  Bed Mobility, Education, Functional Activity [place], Functional 

Strength, Group Therapy, Safety, Therapeutic Exercise, Transfers


Treatment Duration:  Jul 29, 2022


Frequency:  At least 5 of 7 days/Wk (IRF)


Estimated Hrs Per Day:  1.5 hours per day


Patient and/or Family Agrees t:  Yes





Safety Risks/Education


Patient Education:  Gait Training, Transfer Techniques, W/C Management


Teaching Recipient:  Patient


Teaching Methods:  Demonstration, Discussion


Response to Teaching:  Reinforcement Needed





Time/GCodes


Time In:  900


Time Out:  1030


Total Billed Treatment Time:  90


Total Billed Treatment


Visit, W/C, FA (5)











NESTOR FOFANA PT                Jul 19, 2022 13:30

## 2022-07-19 NOTE — OCCUPATIONAL THER DAILY NOTE
OT Current Status-Daily Note


Subjective


Pt alert, lying in bed.  Pt states that he is tired today, but agrees to 

therapy.  Co-treat with PT [id number]), skills of 2 clinicians required to 

decrease fall risk, increase strength, mobility and activity tolerance.  PT 

focusing on transfers, w/c mobility, bed mobility, transfers and ambulation 

while OT focusing on ADLs, functional mobility, dynamic standing and B UE 

placement with all mobility.





Mental Status/Objective


Patient Orientation:  Person, Place, Time, Situation


Attachments:  IV, Oxygen (1.5L, pt went from 80% to 95% quickly then stayed 

above 90% throughout session.)





ADL-Treatment


Due to decrease of B shldr AROM pt requires assistance to hold B UE's at 90*and 

above.  Pt agrees to sponge bath.  Min A for supine to EOB then pt sits EOB 

independently.  After set up, pt able to complete upper body bathing by self 

though requires encouragement to complete.  Pt able to cleanse upper legs 

sitting on EOB and elen area while in supine.  Assist to cleanse R lower 

leg/feet.  Assist x2 to stand and cleanse buttocks.  Min A for upper body 

dressing.  Dependent for lower body dressing and footwear.  Independent with 

oral care sitting at sink.


Therapy Code Descriptions/Definitions 





Functional [place] Measure:


0=Not Assessed/NA        4=Minimal Assistance


1=Total Assistance        5=Supervision or Setup


2=Maximal Assistance  6=Modified [place]


3=Moderate Assistance 7=Complete IndependenceSCALE: Activities may be completed 

with or without assistive devices.





6-Indepedent-patient completes the activity by him/herself with no assistance 

from a helper.


5-Set-up or Clean-up Assistance-helper sets up or cleans up; patient completes 

activity. [place] assists only prior to or  


    following the activity.


4-Supervision or Touching Assistance-helper provides verbal cues and/or 

touching/steadying and/or contact guard assistance as patient completes 

activity. Assistance may be provided   


    throughout the activity or intermittently.


3-Partial/Moderate Assistance-helper does LESS THAN HALF the effort. [place] 

lifts, holds or supports trunk or limbs, but provides less than half the effort.


2-Substantial/Maximal Assistance-helper does MORE THAN HALF the effort. [place] l

ifts or holds trunk or limbs and provides more than half the effort.


4-[id number]-[id number] does ALL the effort. Patient does none of the effort to 

complete the activity. Or, the assistance of 2 or more helpers is required for 

the patient to complete the  


    activity.


If activity was not attempted, code reason:


7-Patient Refused.


9-Not Applicable-not attempted and the patient did not perform the activity 

before the current illness, exacerbation or injury.


10-Not Attempted due to Environmental Limitations-(lack of equipment, weather 

restraints, etc.).


88-Not Attempted due to Medical Conditions or Safety Concerns.


Oral Hygiene (QC):  6


Bathing Location:  L Arm, R Arm, L Upper Leg, R Upper Leg, Chest, Abdomen, 

Perineal Area


Shower/Bathe Self (QC):  1


Upper Body Dressing (QC):  3


Lower Body Dressing (QC):  1


On/Off Footwear:  1





Other Treatment


Pt able to propel w/c by self with minimal cues for efficiency.  Pt then stood 

at parallel bars and ambulated with max A x2 3x's.  Pt then stood at parallel 

bars and lifted one hand at a time to work on functional movement while in 

standing, assist x2.  Pt completed arm pulleys for 3 min against gravity.  After

session, pt lying in bed with call light/phone in reach.  All needs met in room.





OT Short Term Goals


Short Term Goals


Time Frame:  2022


Toileting hygiene:  2


Shower/bathe self:  3


Lower body dressin


Putting on/taking off footwear:  3





OT Long Term Goals


Long Term Goals


Time Frame:  Aug 5, 2022


Eating (QC):  6


Oral Hygiene (QC):  6


Toileting Hygiene (QC):  3 (bowel movements on toilet)


Shower/Bathe Self (QC):  4


Upper Body Dressing (QC):  5


Lower Body Dressing (QC):  3


On/Off Footwear (QC):  4


Additional Goals:  1-Demonstrate ADL Tasks, 2-Verbalize Understanding, 3-

ImproveStrength/[place]


1=Demonstrate adherence to instructed precautions during ADL tasks.


2=Patient will verbalize/demonstrate understanding of assistive 

devices/modifications for ADL.


3=Patient will improve strength/tolerance for activity to enable patient to 

perform ADL's.





OT Education/Plan


Problem List/Assessment


Assessment:  Decreased Activ Tolerance, Decreased Safety Aware, Decreased UE 

Strength, Dependent Transfers, Impaired Bed Mobility, Impaired Cognition, 

Impaired Funct Balance, Impaired Self-Care Skills, Restricted Funct UE ROM





Discharge Recommendations


Plan/Recommendations:  Continue POC





Treatment Plan/Plan of Care


Patient would benefit from OT for education, treatment and training to promote 

independence in ADL's, mobility, safety and/or upper extremity function for 

ADL's.


Plan of Care:  ADL Retraining, Caregiver Training, Functional Mobility, Group 

Exercise/Act as Ind, UE Funct Exercise/Act, W/C Management Training


Treatment Duration:  Aug 5, 2022


Frequency:  At least 5 of 7 days/Wk (IRF)


Estimated Hrs Per Day:  1.5 hours per day


Rehab Potential:  Guarded





Time/GCodes


Start Time:  09:00


Stop Time:  10:30


Total Time Billed (hr/min):  90


Billed Treatment Time


1 visit-ADL 3 (45 min)  FA 2 (30 min)  EX 1 (15 min)  co-treat with PT 90 min











MICHAEL ALVAREZ               2022 11:10

## 2022-07-19 NOTE — PROGRESS NOTE - SURGERY
Subjective


Time Seen by a Provider:  12:48


Subjective/Events-last exam


Pt seen and examined, he was sitting on the side of bed eating his lunch.  He 

did not appear to be in any distress.





Asked to look at AKA site, there was concern because of drainage and 

"tunneling".


Review of Systems


General:  No Chills, No Fatigue


Pulmonary:  No Dyspnea, No Cough


Cardiovascular:  No: Chest Pain, Palpitations


Gastrointestinal:  No: Nausea, Vomiting, Abdominal Pain


Musculoskeletal:  No: leg pain





Focused Exam


Lactate Level


7/18/22 08:55: Lactic Acid Level 1.28





Objective


Exam





Vital Signs








  Date Time  Temp Pulse Resp B/P (MAP) Pulse Ox O2 Delivery O2 Flow Rate FiO2


 


7/19/22 09:00     93 Nasal Cannula 1.00 


 


7/19/22 08:45     94 Nasal Cannula 1.50 


 


7/19/22 07:26 36.5 89 18 162/72 (102) 93 Nasal Cannula 1.00 


 


7/18/22 21:45     96 Nasal Cannula 2.00 


 


7/18/22 20:11 36.9 91 20 146/79 (101) 96 Nasal Cannula 1.50 














I & O 


 


 7/19/22





 07:00


 


Intake Total 1450 ml


 


Output Total 3525 ml


 


Balance -2075 ml





Capillary Refill :


General Appearance:  No Apparent Distress, WD/WN


HEENT:  PERRL/EOMI


Respiratory:  No Accessory Muscle Use, No Respiratory Distress


Cardiovascular:  Regular Rate, Rhythm, No Murmur


Gastrointestinal:  normal bowel sounds, non tender, soft


Extremity:  Other (left AKA with staples and sutures still in place.  Minimal 

erythema surrounding incision (looks like normal healing).  No foul smell, no 

spreading of erythema, there is some serous drainage with probable fat necrosis.

 I am unsure what tunneling was seen, he does have "space" under skin, but I 

believe this is just from the surgery and not actual tunneling)


Neurologic/Psychiatric:  Alert, Oriented x3


Skin:  Normal Color, Warm/Dry





Results


Lab


Laboratory Tests


7/18/22 15:22: Glucometer 185H


7/18/22 20:10: Glucometer 218H


7/19/22 05:28: 


White Blood Count 12.9H, Red Blood Count 3.90L, Hemoglobin 9.5L, Hematocrit 32L,

Mean Corpuscular Volume 83, Mean Corpuscular Hemoglobin 24L, Mean Corpuscular 

Hemoglobin Concent 29L, Red Cell Distribution Width 18.6H, Platelet Count 348, 

Mean Platelet Volume 10.4, Sodium Level 143, Potassium Level 3.6, Chloride Level

101, Carbon Dioxide Level 29, Anion Gap 13, Blood Urea Nitrogen 9, Creatinine 

0.67, Estimat Glomerular Filtration Rate 100, BUN/Creatinine Ratio 13, Glucose 

Level 188H, Calcium Level 9.1, Magnesium Level 1.6


7/19/22 10:54: Glucometer 199H





Microbiology


7/18/22 Gram Stain - Final, Resulted


          


7/18/22 Wound Culture - Preliminary, Resulted


          Gram Negative Gerardo


7/11/22 Blood Culture - Final, Complete


          No growth





Assessment/Plan


S/P AKA 





Pt is tolerating diet, there was a culture done which showed Gram Negative rods.

 Would wait for sensitivity; he cannot take the Vancomycin and Zosyn was 

recently stopped.  He did have a high WBC yesterday of 20.1, but


today it is 12.9.  Would just monitor and continue local wound care, start ABX 

tailored to sensitivity results.











GUMARO PENG DO               Jul 19, 2022 14:39

## 2022-07-20 VITALS — DIASTOLIC BLOOD PRESSURE: 91 MMHG | SYSTOLIC BLOOD PRESSURE: 199 MMHG

## 2022-07-20 VITALS — DIASTOLIC BLOOD PRESSURE: 67 MMHG | SYSTOLIC BLOOD PRESSURE: 149 MMHG

## 2022-07-20 LAB
%HYPO/RBC NFR BLD AUTO: (no result) %
ALBUMIN SERPL-MCNC: 2.8 GM/DL (ref 3.2–4.5)
ALP SERPL-CCNC: 79 U/L (ref 40–136)
ALT SERPL-CCNC: 21 U/L (ref 0–55)
ANISOCYTOSIS BLD QL SMEAR: SLIGHT
BASOPHILS # BLD AUTO: 0.1 10^3/UL (ref 0–0.1)
BASOPHILS NFR BLD AUTO: 0 % (ref 0–10)
BILIRUB SERPL-MCNC: 0.4 MG/DL (ref 0.1–1)
BUN/CREAT SERPL: 14
CALCIUM SERPL-MCNC: 9.3 MG/DL (ref 8.5–10.1)
CHLORIDE SERPL-SCNC: 99 MMOL/L (ref 98–107)
CO2 SERPL-SCNC: 31 MMOL/L (ref 21–32)
CREAT SERPL-MCNC: 0.69 MG/DL (ref 0.6–1.3)
EOSINOPHIL # BLD AUTO: 0.1 10^3/UL (ref 0–0.3)
EOSINOPHIL NFR BLD AUTO: 1 % (ref 0–10)
GFR SERPLBLD BASED ON 1.73 SQ M-ARVRAT: 99 ML/MIN
GLUCOSE SERPL-MCNC: 206 MG/DL (ref 70–105)
HCT VFR BLD CALC: 33 % (ref 40–54)
HGB BLD-MCNC: 9.7 G/DL (ref 13.3–17.7)
LYMPHOCYTES # BLD AUTO: 0.9 10^3/UL (ref 1–4)
LYMPHOCYTES NFR BLD AUTO: 6 % (ref 12–44)
MANUAL DIFFERENTIAL PERFORMED BLD QL: YES
MCH RBC QN AUTO: 25 PG (ref 25–34)
MCHC RBC AUTO-ENTMCNC: 30 G/DL (ref 32–36)
MCV RBC AUTO: 83 FL (ref 80–99)
MICROCYTES BLD QL SMEAR: SLIGHT
MONOCYTES # BLD AUTO: 0.5 10^3/UL (ref 0–1)
MONOCYTES NFR BLD AUTO: 4 % (ref 0–12)
MONOCYTES NFR BLD: 2 %
NEUTROPHILS # BLD AUTO: 13 10^3/UL (ref 1.8–7.8)
NEUTROPHILS NFR BLD AUTO: 88 % (ref 42–75)
NEUTS BAND NFR BLD MANUAL: 90 %
PLATELET # BLD: 359 10^3/UL (ref 130–400)
PMV BLD AUTO: 10.4 FL (ref 9–12.2)
POTASSIUM SERPL-SCNC: 4 MMOL/L (ref 3.6–5)
PROT SERPL-MCNC: 6.3 GM/DL (ref 6.4–8.2)
SODIUM SERPL-SCNC: 141 MMOL/L (ref 135–145)
STOMATOCYTES BLD QL SMEAR: SLIGHT
VARIANT LYMPHS NFR BLD MANUAL: 8 %
WBC # BLD AUTO: 14.7 10^3/UL (ref 4.3–11)

## 2022-07-20 RX ADMIN — POLYETHYLENE GLYCOL (3350) SCH GM: 17 POWDER, FOR SOLUTION ORAL at 21:29

## 2022-07-20 RX ADMIN — DOCUSATE SODIUM SCH MG: 100 CAPSULE ORAL at 09:00

## 2022-07-20 RX ADMIN — INSULIN ASPART SCH UNIT: 100 INJECTION, SOLUTION INTRAVENOUS; SUBCUTANEOUS at 17:12

## 2022-07-20 RX ADMIN — FUROSEMIDE SCH MG: 40 TABLET ORAL at 08:43

## 2022-07-20 RX ADMIN — SODIUM CHLORIDE SCH MLS/HR: 900 INJECTION INTRAVENOUS at 21:17

## 2022-07-20 RX ADMIN — METOPROLOL SUCCINATE SCH MG: 50 TABLET, EXTENDED RELEASE ORAL at 08:43

## 2022-07-20 RX ADMIN — POLYETHYLENE GLYCOL (3350) SCH GM: 17 POWDER, FOR SOLUTION ORAL at 09:00

## 2022-07-20 RX ADMIN — AMIODARONE HYDROCHLORIDE SCH MG: 200 TABLET ORAL at 08:43

## 2022-07-20 RX ADMIN — DOCUSATE SODIUM SCH MG: 100 CAPSULE ORAL at 21:29

## 2022-07-20 RX ADMIN — APIXABAN SCH MG: 5 TABLET, FILM COATED ORAL at 21:22

## 2022-07-20 RX ADMIN — DOCUSATE SODIUM AND SENNOSIDES SCH EA: 8.6; 5 TABLET, FILM COATED ORAL at 21:29

## 2022-07-20 RX ADMIN — LEVOTHYROXINE SODIUM SCH MCG: 100 TABLET ORAL at 06:10

## 2022-07-20 RX ADMIN — Medication SCH EACH: at 12:21

## 2022-07-20 RX ADMIN — Medication SCH EACH: at 17:12

## 2022-07-20 RX ADMIN — Medication SCH EACH: at 08:44

## 2022-07-20 RX ADMIN — INSULIN ASPART SCH UNIT: 100 INJECTION, SOLUTION INTRAVENOUS; SUBCUTANEOUS at 21:22

## 2022-07-20 RX ADMIN — APIXABAN SCH MG: 5 TABLET, FILM COATED ORAL at 08:43

## 2022-07-20 RX ADMIN — INSULIN ASPART SCH UNIT: 100 INJECTION, SOLUTION INTRAVENOUS; SUBCUTANEOUS at 06:10

## 2022-07-20 RX ADMIN — POTASSIUM CHLORIDE SCH MEQ: 750 TABLET, FILM COATED, EXTENDED RELEASE ORAL at 08:43

## 2022-07-20 RX ADMIN — INSULIN ASPART SCH UNIT: 100 INJECTION, SOLUTION INTRAVENOUS; SUBCUTANEOUS at 12:21

## 2022-07-20 RX ADMIN — DOCUSATE SODIUM AND SENNOSIDES SCH EA: 8.6; 5 TABLET, FILM COATED ORAL at 09:00

## 2022-07-20 RX ADMIN — LOSARTAN POTASSIUM SCH MG: 25 TABLET, FILM COATED ORAL at 08:43

## 2022-07-20 RX ADMIN — GLIMEPIRIDE SCH MG: 1 TABLET ORAL at 06:10

## 2022-07-20 RX ADMIN — POTASSIUM CHLORIDE SCH MEQ: 750 TABLET, FILM COATED, EXTENDED RELEASE ORAL at 21:22

## 2022-07-20 RX ADMIN — PANTOPRAZOLE SODIUM SCH MG: 40 TABLET, DELAYED RELEASE ORAL at 08:43

## 2022-07-20 NOTE — PROGRESS NOTE - CARDIOLOGY
Cardiology SOAP Progress Note


Subjective:


In bed


Spouse at the bedside


States he feels good this morning


No c/o CP or SOB





Objective:


I&O/Vital Signs











 7/14/22 7/14/22 7/14/22





 07:38 08:10 09:00


 


Temp 36.5  


 


Pulse 77  


 


Resp 18  


 


B/P (MAP) 132/76 (94)  


 


Pulse Ox 91  


 


O2 Delivery Nasal Cannula Nasal Cannula Nasal Cannula


 


O2 Flow Rate 1.50 1.50 1.50














 7/14/22





 00:00


 


Intake Total 2550 ml


 


Balance 2550 ml








Constitutional:  AAO x 3, well-developed, well-nourished


Respiratory:  No accessory muscle use, No respiratory distress; chest expansion 

is symmetric, chest is bilaterally symmetric, lungs clear to auscultation


Cardiovascular:  regular rate-rhythm; No JVD; S1 and S2


Gastrointestional:  No tender; soft, round, audible bowel sounds


Extremities:  other (RLE without swelling; dressing to R AKA stump which is D/I,

not removed)


Neurologic/Psychiatric:  grossly intact (moves all extremities)


Skin:  No rash on exposed areas, No ulcerations on exposed areas; other 

(abrasion to right leg)





Results/Procedures:


Labs


Laboratory Tests


7/13/22 16:03: Glucometer 167H


7/13/22 17:37: Glucometer 140H


7/13/22 20:33: Glucometer 138H


7/14/22 05:24: Glucometer 120H


7/14/22 06:50: 


White Blood Count 16.2H, Red Blood Count 3.47L, Hemoglobin 8.5L, Hematocrit 28L,

Mean Corpuscular Volume 82, Mean Corpuscular Hemoglobin 25, Mean Corpuscular 

Hemoglobin Concent 30L, Red Cell Distribution Width 18.6H, Platelet Count 259, 

Mean Platelet Volume 11.2, Immature Granulocyte % (Auto) 2, Neutrophils (%) 

(Auto) 86H, Lymphocytes (%) (Auto) 5L, Monocytes (%) (Auto) 5, Eosinophils (%) 

(Auto) 2, Basophils (%) (Auto) 0, Neutrophils # (Auto) 14.0H, Lymphocytes # 

(Auto) 0.7L, Monocytes # (Auto) 0.8, Eosinophils # (Auto) 0.3, Basophils # (A

uto) 0.1, Immature Granulocyte # (Auto) 0.3H, Neutrophils % (Manual) 90, 

Lymphocytes % (Manual) 3, Monocytes % (Manual) 4, Eosinophils % (Manual) 2, Band

Neutrophils 1, Hypochromasia MODERATE, Anisocytosis MODERATE, Sodium Level 138, 

Potassium Level 3.7, Chloride Level 105, Carbon Dioxide Level 21, Anion Gap 12, 

Blood Urea Nitrogen 18, Creatinine 0.70, Estimat Glomerular Filtration Rate 99, 

BUN/Creatinine Ratio 26, Glucose Level 127H, Calcium Level 8.3L, Corrected 

Calcium 9.7, Total Bilirubin 0.5, Aspartate Amino Transf (AST/SGOT) 45H, Alanine

Aminotransferase (ALT/SGPT) 49, Alkaline Phosphatase 94, Total Protein 5.6L, 

Albumin 2.3L


7/14/22 10:47: Glucometer 176H





Microbiology


7/11/22 Blood Culture - Preliminary, Resulted


          No growth








A/P:


Assessment:





Transient hypotension


- asymptomatic





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 

necessitating L AKA by Dr. Kaiser on 7-7-22.





Leukocytosis


- management per medical services





PVD


- reports PTCA bilat by Dr. Rogers at [facility] in 2021


- reports stent x 2 to the L leg 6-17-22 (prior to BKA) by Dr. Hickey at 

[facility]


- reports PTCA to R leg 6-19-22 by Dr. Hickey





CAD


- H/O CABG x 5 vessel in 2010 by Dr. Jacob





Chronic systolic CHF


- maintained on diuretics 





ICM


- last echo at [place] reported LVEF 40% (per nursing staff, date unknown)


- maintained on Losartan





Carotid dz


- H/O L CEA in 2011 at [facility]


- H/O chronic occlusion of the R carotid per pt report





PAF


- maintained on Amiodarone and Toprol


- Previously on OAC with Eliquis (currently not taking)


- H/O cardioversions in the past, last one >2 yrs ago





HTN





HLD





DM 2





Anemia


- undetermined etiology


- management by medical services


Plan:





Transient hypotension


- possible d/t vol depletion asymptomatic


- reduce antihypertensive regimen and diuretics


- BP improved


H/O PAF


- continue Amiodarone


- OAC has been resumed


CAD and carotid dz


- continue ASA, low dose


ICM


- request recent echo from [place] - if not done in the last 6 months, request


- reduce Losartan


H/O systolic CHF


- clinically compensated


- reduce diuretics


Leukocytosis


- management per medical services (IV abx)


Monitor lab closely


We have not received records from [place] as of yet











[facility]           Jul 13, 2022 12:45
Cardiology SOAP Progress Note


Subjective:


Lying in bed


Dyspneic at this time


No c/o CP


Nursing reports worsening dyspnea starting this morning





Objective:


I&O/Vital Signs











 22





 07:26 08:45 09:00


 


Temp 36.5  


 


Pulse 89  


 


Resp 18  


 


B/P (MAP) 162/72 (102)  


 


Pulse Ox 93 94 93


 


O2 Delivery Nasal Cannula Nasal Cannula Nasal Cannula


 


O2 Flow Rate 1.00 1.50 1.00














 22





 00:00


 


Intake Total 1200 ml


 


Output Total 2950 ml


 


Balance -1750 ml








Constitutional:  AAO x 3, well-developed, well-nourished


Respiratory:  No accessory muscle use, No respiratory distress; chest expansion 

is symmetric, chest is bilaterally symmetric, other (diminished lower lobes)


Cardiovascular:  regular rate-rhythm; No JVD; S1 and S2


Gastrointestional:  No tender; soft, round, audible bowel sounds


Extremities:  other (RLE without swelling; dressing to R AKA stump which is D/I,

not removed)


Neurologic/Psychiatric:  grossly intact (moves all extremities)


Skin:  No rash on exposed areas, No ulcerations on exposed areas; other 

(abrasion to right leg)





Results/Procedures:


Labs


Laboratory Tests


22 15:22: Glucometer 185H


22 20:10: Glucometer 218H


22 05:28: 


White Blood Count 12.9H, Red Blood Count 3.90L, Hemoglobin 9.5L, Hematocrit 32L,

Mean Corpuscular Volume 83, Mean Corpuscular Hemoglobin 24L, Mean Corpuscular 

Hemoglobin Concent 29L, Red Cell Distribution Width 18.6H, Platelet Count 348, 

Mean Platelet Volume 10.4, Sodium Level 143, Potassium Level 3.6, Chloride Level

101, Carbon Dioxide Level 29, Anion Gap 13, Blood Urea Nitrogen 9, Creatinine 

0.67, Estimat Glomerular Filtration Rate 100, BUN/Creatinine Ratio 13, Glucose 

Level 188H, Calcium Level 9.1, Magnesium Level 1.6


22 10:54: Glucometer 199H





Microbiology


22 Gram Stain - Final, Resulted


          


22 Wound Culture - Preliminary, Resulted


          Gram Negative Gerardo


22 Blood Culture - Final, Complete


          No growth





Procedures


NAME:   HECTOR TAO JAY


[facility] REC#:   [id number]


ACCOUNT#:   [id number]


PT STATUS:   ADM IN


:   1950


PHYSICIAN:   DIANA HENRIQUEZ DO


ADMIT DATE:   22/[facility]


***Signed***


Date of Exam:22





CHEST 1 VIEW, AP/PA ONLY








INDICATION: Hypoxia, increasing oxygen demands





COMPARISON: 2022 





TECHNIQUE: Single radiograph of the chest dated 2022. 





FINDINGS: Postsurgical changes of a CABG are again identified.


Right-sided PICC line is again seen, appearing stable. The


cardiac silhouette is enlarged. Central pulmonary vascular


congestion is present. Developing extensive mixed interstitial


and airspace opacities throughout the bilateral lungs with


associated small pleural effusions. No pneumothorax. No acute


osseous abnormality.





IMPRESSION: Developing extensive bilateral pulmonary opacities,


favored to relate to edema given cardiomegaly and increasing


pulmonary vascular congestion. Superimposed infiltrate not


excluded. Recommend continued radiographic followup.





Dictated by: 





  Dictated on workstation # [id number]








Dict:   [id number]


Trans:   [id number]


CVB [id number]





Interpreted by:     ALBERT ARRIAGA MD


Electronically signed by: ALBERT ARRIAGA MD [id number]





A/P:


Assessment:


Acute on chronic systolic CHF


- treat with IV diuretics





Transient hypotension


- asymptomatic, resolved after medication adjustment of 22





S/P L BKA in late 2022 at [facility] - subsequent wet gangrene 

necessitating L AKA by Dr. Kaiser on 22.





Leukocytosis- worsening


- managed by Dr Henriquez





PVD


- reports PTCA bilat by Dr. Rogers at [facility] in 


- reports stent x 2 to the L leg 22 (prior to BKA) by Dr. Hickey at 

[facility]


- reports PTCA to R leg 22 by Dr. Hickey





CAD


- H/O CABG x 5 vessel in  by Dr. Jacob


 


ICM


- last echo at [place] reported LVEF 40% (per nursing staff, date unknown)


- maintained on Losartan





Carotid dz


- H/O L CEA in  at [facility]


- H/O chronic occlusion of the R carotid per pt report





PAF


- maintained on Amiodarone and Toprol


- Previously on OAC with Eliquis (currently not taking)


- H/O cardioversions in the past, last one >2 yrs ago





HTN





HLD





DM 2





Anemia


- undetermined etiology


- management by Dr Henriquez


Plan:


Acute on chronic systolic CHF


- treat with IV diuretics


- Echocardiogram today


- replace electrolytes as indicated


Management of worsening leukocytosis is per medical services











KAMILLE WILHELM           2022 11:38
Cardiology SOAP Progress Note


Subjective:


Lying in bed


States he feels "pretty good"





Objective:


I&O/Vital Signs











 7/18/22 7/18/22 7/18/22





 07:36 08:42 09:00


 


Temp 34.9  


 


Pulse 101  


 


Resp 20  


 


B/P (MAP) 172/78 (109)  


 


Pulse Ox 96 96 97


 


O2 Delivery Nasal Cannula Nasal Cannula Nasal Cannula


 


O2 Flow Rate 3.00 3.00 3.00








Constitutional:  AAO x 3, well-developed, well-nourished


Respiratory:  No accessory muscle use, No respiratory distress; chest expansion 

is symmetric, chest is bilaterally symmetric, lungs clear to auscultation


Cardiovascular:  regular rate-rhythm; No JVD; S1 and S2


Gastrointestional:  No tender; soft, round, audible bowel sounds


Extremities:  other (RLE without swelling; dressing to R AKA stump which is D/I,

not removed)


Neurologic/Psychiatric:  grossly intact (moves all extremities)


Skin:  No rash on exposed areas, No ulcerations on exposed areas; other 

(abrasion to right leg)





Results/Procedures:


Labs


Laboratory Tests


7/17/22 15:10: Glucometer 204H


7/17/22 16:10: Vancomycin Level Trough 23.0H


7/17/22 20:31: Glucometer 227H


7/18/22 05:10: 


White Blood Count 20.1H, Red Blood Count 4.20L, Hemoglobin 10.2L, Hematocrit 35L

, Mean Corpuscular Volume 84, Mean Corpuscular Hemoglobin 24L, Mean Corpuscular 

Hemoglobin Concent 29L, Red Cell Distribution Width 18.7H, Platelet Count 364, 

Mean Platelet Volume 10.6, Immature Granulocyte % (Auto) 2, Neutrophils (%) 

(Auto) 90H, Lymphocytes (%) (Auto) 4L, Monocytes (%) (Auto) 3, Eosinophils (%) 

(Auto) 0, Basophils (%) (Auto) 0, Neutrophils # (Auto) 18.2H, Lymphocytes # 

(Auto) 0.8L, Monocytes # (Auto) 0.6, Eosinophils # (Auto) 0.1, Basophils # 

(Auto) 0.1, Immature Granulocyte # (Auto) 0.4H, Sodium Level 139, Potassium 

Level 3.9, Chloride Level 101, Carbon Dioxide Level 26, Anion Gap 12, Blood Urea

Nitrogen 9, Creatinine 0.67, Estimat Glomerular Filtration Rate 100, 

BUN/Creatinine Ratio 13, Glucose Level 241H, Calcium Level 9.2, Corrected Jamari

cium 10.1, Total Bilirubin 0.4, Aspartate Amino Transf (AST/SGOT) 22, Alanine 

Aminotransferase (ALT/SGPT) 31, Alkaline Phosphatase 97, B-Type Natriuretic 

Peptide 913.4H, Total Protein 6.6, Albumin 2.9L


7/18/22 06:51: Procalcitonin 0.07


7/18/22 08:46: 


Blood Gas Puncture Site L RAD, Blood Gas Patient Temperature 37, Arterial Blood 

pH 7.40, Arterial Blood Partial Pressure CO2 52H, Arterial Blood Partial 

Pressure O2 79, Arterial Blood HCO3 31H, Arterial Blood Total CO2 33.0H, 

Arterial Blood Oxygen Saturation 96, Arterial Blood Base Excess 6.6H, Ronal Test

YES-POS, Blood Gas Ventilator Setting NO, Blood Gas Inspired Oxygen 3


7/18/22 08:55: Lactic Acid Level 1.28


7/18/22 10:54: Glucometer 253H





Microbiology


7/11/22 Blood Culture - Final, Complete


          No growth








A/P:


Assessment:





Transient hypotension


- asymptomatic





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

itating L AKA by Dr. Kaiser on 7-7-22.





Leukocytosis


- management per medical services





PVD


- reports PTCA bilat by Dr. Rogers at [facility] in 2021


- reports stent x 2 to the L leg 6-17-22 (prior to BKA) by Dr. Hickey at 

[facility]


- reports PTCA to R leg 6-19-22 by Dr. Hickey





CAD


- H/O CABG x 5 vessel in 2010 by Dr. Jacob





Chronic systolic CHF


- maintained on diuretics 





ICM


- last echo at [place] reported LVEF 40% (per nursing staff, date unknown)


- maintained on Losartan





Carotid dz


- H/O L CEA in 2011 at [facility]


- H/O chronic occlusion of the R carotid per pt report





PAF


- maintained on Amiodarone and Toprol


- Previously on OAC with Eliquis (currently not taking)


- H/O cardioversions in the past, last one >2 yrs ago





HTN





HLD





DM 2





Anemia


- undetermined etiology


- management by medical services


Plan:





Transient hypotension


- possible d/t vol depletion asymptomatic


- reduce antihypertensive regimen and diuretics


- BP improved


H/O PAF


- continue Amiodarone


- OAC has been resumed


CAD and carotid dz


- continue ASA, low dose


ICM


- reduce Losartan


H/O systolic CHF


- clinically compensated


- continue reduced dose of diuretics


Leukocytosis


- management per medical services (IV abx)


Monitor lab closely


We have not received records from [place] as of yet











KAMILLE WILHELM [facility]           Jul 14, 2022 12:50
Cardiology SOAP Progress Note


Subjective:


Lying in bed


States he is feeling much better than yesterday


No c/o CP, SOB or palpitations





Objective:


I&O/Vital Signs











 7/20/22 7/20/22 7/20/22





 07:23 07:47 09:00


 


Temp  35.5 


 


Pulse  95 


 


Resp  21 


 


B/P (MAP)  199/91 (127) 


 


Pulse Ox  91 94


 


O2 Delivery Nasal Cannula Nasal Cannula Nasal Cannula


 


O2 Flow Rate 3.00 3.00 2.00








Constitutional:  AAO x 3, well-developed, well-nourished


Respiratory:  No accessory muscle use, No respiratory distress; chest expansion 

is symmetric, chest is bilaterally symmetric, lungs clear to auscultation


Cardiovascular:  regular rate-rhythm; No JVD; S1 and S2


Gastrointestional:  No tender; soft, round, audible bowel sounds


Extremities:  other (RLE without swelling; dressing to R AKA stump which is D/I,

not removed)


Neurologic/Psychiatric:  oriented x 3, other (moves all limbs)


Skin:  No rash on exposed areas, No ulcerations on exposed areas; other 

(abrasion to right leg)





Results/Procedures:


Labs


Laboratory Tests


7/19/22 20:10: Glucometer 200H


7/20/22 05:47: Glucometer 166H


7/20/22 06:25: 


White Blood Count 14.7H, Red Blood Count 3.92L, Hemoglobin 9.7L, Hematocrit 33L,

Mean Corpuscular Volume 83, Mean Corpuscular Hemoglobin 25, Mean Corpuscular 

Hemoglobin Concent 30L, Red Cell Distribution Width 18.6H, Platelet Count 359, 

Mean Platelet Volume 10.4, Immature Granulocyte % (Auto) 1, Neutrophils (%) 

(Auto) 88H, Lymphocytes (%) (Auto) 6L, Monocytes (%) (Auto) 4, Eosinophils (%) 

(Auto) 1, Basophils (%) (Auto) 0, Neutrophils # (Auto) 13.0H, Lymphocytes # 

(Auto) 0.9L, Monocytes # (Auto) 0.5, Eosinophils # (Auto) 0.1, Basophils # 

(Auto) 0.1, Immature Granulocyte # (Auto) 0.1, Neutrophils % (Manual) 90, 

Lymphocytes % (Manual) 8, Monocytes % (Manual) 2, Hypochromasia MODERATE, 

Poikilocytosis , Anisocytosis SLIGHT, Microcytosis SLIGHT, Stomatocytes SLIGHT, 

Sodium Level 141, Potassium Level 4.0, Chloride Level 99, Carbon Dioxide Level 

31, Anion Gap 11, Blood Urea Nitrogen 10, Creatinine 0.69, Estimat Glomerular 

Filtration Rate 99, BUN/Creatinine Ratio 14, Glucose Level 206H, Calcium Level 

9.3, Corrected Calcium 10.3H, Total Bilirubin 0.4, Aspartate Amino Transf 

(AST/SGOT) 19, Alanine Aminotransferase (ALT/SGPT) 21, Alkaline Phosphatase 79, 

Total Protein 6.3L, Albumin 2.8L


7/20/22 11:01: Glucometer 219H


7/20/22 16:20: Glucometer 166H





Microbiology


7/18/22 Gram Stain - Final, Resulted


          


7/18/22 Wound Culture - Preliminary, Resulted


          Serratia marcescens


          Pseudomonas species


7/11/22 Blood Culture - Final, Complete


          No growth








A/P:


Assessment:





Acute on chronic systolic/diastolic CHF on 7/18/22


- treated with IV diuretics


- clinically compensated at this time





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 

necessitating L AKA by Dr. Kaiser on 7-7-22.





Leukocytosis


- managed by Dr Velez





PVD


- reports PTCA bilat by Dr. Rogers at [facility] in 2021


- reports stent x 2 to the L leg 6-17-22 (prior to BKA) by Dr. Hickey at 

[facility]


- reports PTCA to R leg 6-19-22 by Dr. Hickey





CAD


- H/O CABG x 5 vessel in 2010 by Dr. Jacob


 


ICM


- last echo at [place] reported LVEF 40% (per nursing staff, date unknown)


- maintained on Losartan


- Echocardiogram of 7-18-22 showed LVEF 45-50%.  Grade 2 diastolic dysfunction. 

PASP 55-60 mmHg.  





Carotid dz


- H/O L CEA in 2011 at [facility]


- H/O chronic occlusion of the R carotid per pt report





PAF


- maintained on Amiodarone and Toprol


- Previously on OAC with Eliquis (currently not taking)


- H/O cardioversions in the past, last one >2 yrs ago





HTN





HLD





DM 2





Anemia


- undetermined etiology


- management by Dr Velez


Plan:


Chronic systolic/diastolic CHF


- clinically compensated


- continue diuretics


HTN not well controlled


- increase losartan


Management of leukocytosis by Dr. Velez


Monitor lab











KAMILLE WILHELM           Jul 20, 2022 11:29
Cardiology SOAP Progress Note


Subjective:


No cp or palp or syncope


No n/v/d


No shortness of breath at rest


Gen weakness present





Objective:


I&O/Vital Signs











 7/17/22 7/17/22 7/17/22





 07:30 08:00 08:13


 


Temp 36.0  


 


Pulse 94  84


 


Resp 18  20


 


B/P (MAP) 196/82 (120) 125/75 (92) 125/75 (92)


 


Pulse Ox 94  94


 


O2 Delivery Nasal Cannula  Nasal Cannula


 


O2 Flow Rate 1.50  1.50














 7/17/22





 00:00


 


Intake Total 100 ml


 


Balance 100 ml








Constitutional:  AAO x 3, well-developed, well-nourished


Respiratory:  No accessory muscle use, No respiratory distress; chest expansion 

is symmetric, chest is bilaterally symmetric, lungs clear to auscultation


Cardiovascular:  regular rate-rhythm; No JVD; S1 and S2


Gastrointestional:  No tender; soft, round, audible bowel sounds


Extremities:  other (RLE without swelling; dressing to R AKA stump which is D/I,

not removed)


Neurologic/Psychiatric:  oriented x 3, other (able to move all limbs)


Skin:  No rash on exposed areas, No ulcerations on exposed areas; other 

(abrasion to right leg)





Results/Procedures:


Labs


Laboratory Tests


7/16/22 15:17: Glucometer 187H


7/16/22 20:10: Glucometer 205H


7/17/22 06:23: Glucometer 165H


7/17/22 11:19: Glucometer 239H





Microbiology


7/11/22 Blood Culture - Final, Complete


          No growth





A/P:


Assessment:





Transient hypotension


- asymptomatic, resolved after medication adjustment of 7/12/22





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 

necessitating L AKA by Dr. Kaiser on 7-7-22.





Leukocytosis


- managed by Dr Velez





PVD


- reports PTCA bilat by Dr. Rogers at [facility] in 2021


- reports stent x 2 to the L leg 6-17-22 (prior to BKA) by Dr. Hickey at 

[facility]


- reports PTCA to R leg 6-19-22 by Dr. Hickey





CAD


- H/O CABG x 5 vessel in 2010 by Dr. Jacob





Chronic systolic CHF


- maintained on diuretics 





ICM


- last echo at [place] reported LVEF 40% (per nursing staff, date unknown)


- maintained on Losartan





Carotid dz


- H/O L CEA in 2011 at [facility]


- H/O chronic occlusion of the R carotid per pt report





PAF


- maintained on Amiodarone and Toprol


- Previously on OAC with Eliquis (currently not taking)


- H/O cardioversions in the past, last one >2 yrs ago





HTN





HLD





DM 2





Anemia


- undetermined etiology


- management by Dr Velez


Plan:





Continue current regimen


Monitor labs from time to time











KAREEM SHELTON MD FACP FAC [facility]   Jul 17, 2022 14:59
Cardiology SOAP Progress Note


Subjective:


No cp or palp or syncope


No shortness of breath at rest


No n/v/d


Gen weakness and malaise are improving





Objective:


I&O/Vital Signs











 7/15/22 7/15/22





 07:41 08:37


 


Temp 36.5 


 


Pulse 92 


 


Resp 22 


 


B/P (MAP) 151/68 (95) 


 


Pulse Ox 91 


 


O2 Delivery Nasal Cannula Nasal Cannula


 


O2 Flow Rate 1.50 1.50














 7/15/22





 00:00


 


Intake Total 200 ml


 


Balance 200 ml








Constitutional:  AAO x 3, well-developed, well-nourished


Respiratory:  No accessory muscle use, No respiratory distress; chest expansion 

is symmetric, chest is bilaterally symmetric, lungs clear to auscultation


Cardiovascular:  regular rate-rhythm; No JVD; S1 and S2


Gastrointestional:  No tender; soft, round, audible bowel sounds


Extremities:  other (RLE without swelling; dressing to R AKA stump which is D/I,

not removed)


Neurologic/Psychiatric:  grossly intact (moves all extremities)


Skin:  No rash on exposed areas, No ulcerations on exposed areas; other 

(abrasion to right leg)





Results/Procedures:


Labs


Laboratory Tests


7/14/22 15:40: Glucometer 162H


7/14/22 21:10: Glucometer 176H


7/15/22 07:45: 


White Blood Count 14.8H, Red Blood Count 3.66L, Hemoglobin 8.9L, Hematocrit 30L,

Mean Corpuscular Volume 82, Mean Corpuscular Hemoglobin 24L, Mean Corpuscular 

Hemoglobin Concent 30L, Red Cell Distribution Width 18.8H, Platelet Count 304, 

Mean Platelet Volume 11.2, Immature Granulocyte % (Auto) 3, Neutrophils (%) 

(Auto) 86H, Lymphocytes (%) (Auto) 6L, Monocytes (%) (Auto) 4, Eosinophils (%) 

(Auto) 2, Basophils (%) (Auto) 0, Neutrophils # (Auto) 12.7H, Lymphocytes # 

(Auto) 0.8L, Monocytes # (Auto) 0.6, Eosinophils # (Auto) 0.2, Basophils # 

(Auto) 0.1, Immature Granulocyte # (Auto) 0.4H, Sodium Level 140, Potassium 

Level 3.6, Chloride Level 106, Carbon Dioxide Level 22, Anion Gap 12, Blood Urea

Nitrogen 14, Creatinine 0.68, Estimat Glomerular Filtration Rate 99, 

BUN/Creatinine Ratio 21, Glucose Level 163H, Calcium Level 8.4L, Corrected 

Calcium 9.7, Total Bilirubin 0.4, Aspartate Amino Transf (AST/SGOT) 31, Alanine 

Aminotransferase (ALT/SGPT) 44, Alkaline Phosphatase 94, Total Protein 5.7L, 

Albumin 2.4L


7/15/22 10:53: Glucometer 221H





Microbiology


7/11/22 Blood Culture - Preliminary, Resulted


          No growth





Laboratory Tests


7/14/22 06:50








7/15/22 07:45











A/P:


Assessment:





Transient hypotension


- asymptomatic, resolved after medication adjustment of 7/12/22





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 

necessitating L AKA by Dr. Kaiser on 7-7-22.





Leukocytosis


- managed by Dr Velez





PVD


- reports PTCA bilat by Dr. Rogers at [facility] in 2021


- reports stent x 2 to the L leg 6-17-22 (prior to BKA) by Dr. Hickey at 

[facility]


- reports PTCA to R leg 6-19-22 by Dr. Hickey





CAD


- H/O CABG x 5 vessel in 2010 by Dr. Jacob





Chronic systolic CHF


- maintained on diuretics 





ICM


- last echo at [place] reported LVEF 40% (per nursing staff, date unknown)


- maintained on Losartan





Carotid dz


- H/O L CEA in 2011 at [facility]


- H/O chronic occlusion of the R carotid per pt report





PAF


- maintained on Amiodarone and Toprol


- Previously on OAC with Eliquis (currently not taking)


- H/O cardioversions in the past, last one >2 yrs ago





HTN





HLD





DM 2





Anemia


- undetermined etiology


- management by Dr Velez


Plan:





Transient hypotension


- possible d/t vol depletion asymptomatic


- reduce antihypertensive regimen and diuretics


- BP improved


H/O PAF


- continue Amiodarone


- OAC has been resumed


CAD and carotid dz


- continue ASA, low dose


ICM


- reduce Losartan


H/O systolic CHF


- clinically compensated


- continue reduced dose of diuretics


Leukocytosis


- management per medical services (IV abx)


Monitor lab closely


We have not received records from [place] as of yet











KAREEM SHELTON MD FACP FAC CCDS   Jul 15, 2022 11:55
Cardiology SOAP Progress Note


Subjective:


No cp or palp or syncope or shortness of breath


Gen weakness and malaise present - improving


No n/v/d





Objective:


I&O/Vital Signs











 7/20/22 7/20/22 7/20/22





 07:23 07:47 09:00


 


Temp  35.5 


 


Pulse  95 


 


Resp  21 


 


B/P (MAP)  199/91 (127) 


 


Pulse Ox  91 94


 


O2 Delivery Nasal Cannula Nasal Cannula Nasal Cannula


 


O2 Flow Rate 3.00 3.00 2.00








Constitutional:  AAO x 3, well-developed, well-nourished


Respiratory:  No accessory muscle use, No respiratory distress; chest expansion 

is symmetric, chest is bilaterally symmetric, lungs clear to auscultation


Cardiovascular:  regular rate-rhythm; No JVD; S1 and S2


Gastrointestional:  No tender; soft, round, audible bowel sounds


Extremities:  other (RLE without swelling; dressing to R AKA stump which is D/I,

not removed)


Neurologic/Psychiatric:  oriented x 3, other (moves all limbs)


Skin:  No rash on exposed areas, No ulcerations on exposed areas; other 

(abrasion to right leg)





Results/Procedures:


Labs


Laboratory Tests


7/19/22 20:10: Glucometer 200H


7/20/22 05:47: Glucometer 166H


7/20/22 06:25: 


White Blood Count 14.7H, Red Blood Count 3.92L, Hemoglobin 9.7L, Hematocrit 33L,

Mean Corpuscular Volume 83, Mean Corpuscular Hemoglobin 25, Mean Corpuscular 

Hemoglobin Concent 30L, Red Cell Distribution Width 18.6H, Platelet Count 359, 

Mean Platelet Volume 10.4, Immature Granulocyte % (Auto) 1, Neutrophils (%) 

(Auto) 88H, Lymphocytes (%) (Auto) 6L, Monocytes (%) (Auto) 4, Eosinophils (%) 

(Auto) 1, Basophils (%) (Auto) 0, Neutrophils # (Auto) 13.0H, Lymphocytes # 

(Auto) 0.9L, Monocytes # (Auto) 0.5, Eosinophils # (Auto) 0.1, Basophils # 

(Auto) 0.1, Immature Granulocyte # (Auto) 0.1, Neutrophils % (Manual) 90, 

Lymphocytes % (Manual) 8, Monocytes % (Manual) 2, Hypochromasia MODERATE, 

Poikilocytosis , Anisocytosis SLIGHT, Microcytosis SLIGHT, Stomatocytes SLIGHT, 

Sodium Level 141, Potassium Level 4.0, Chloride Level 99, Carbon Dioxide Level 

31, Anion Gap 11, Blood Urea Nitrogen 10, Creatinine 0.69, Estimat Glomerular 

Filtration Rate 99, BUN/Creatinine Ratio 14, Glucose Level 206H, Calcium Level 9

.3, Corrected Calcium 10.3H, Total Bilirubin 0.4, Aspartate Amino Transf 

(AST/SGOT) 19, Alanine Aminotransferase (ALT/SGPT) 21, Alkaline Phosphatase 79, 

Total Protein 6.3L, Albumin 2.8L


7/20/22 11:01: Glucometer 219H





Microbiology


7/18/22 Gram Stain - Final, Resulted


          


7/18/22 Wound Culture - Preliminary, Resulted


          Serratia marcescens


          Pseudomonas species


7/11/22 Blood Culture - Final, Complete


          No growth








A/P:


Assessment:





Acute on chronic systolic/diastolic CHF on 7/18/22


- treated with IV diuretics


- clinically compensated at this time





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 

necessitating L AKA by Dr. Kaiser on 7-7-22.





Leukocytosis


- managed by Dr Velez





PVD


- reports PTCA bilat by Dr. Rogers at [facility] in 2021


- reports stent x 2 to the L leg 6-17-22 (prior to BKA) by Dr. Hickey at 

[facility]


- reports PTCA to R leg 6-19-22 by Dr. Hikcey





CAD


- H/O CABG x 5 vessel in 2010 by Dr. Jacob


 


ICM


- last echo at [facility] reported LVEF 40% (per nursing staff, date unknown)


- maintained on Losartan


- Echocardiogram of 7-18-22 showed LVEF 45-50%.  Grade 2 diastolic dysfunction. 

PASP 55-60 mmHg.  





Carotid dz


- H/O L CEA in 2011 at [facility]


- H/O chronic occlusion of the R carotid per pt report





PAF


- maintained on Amiodarone and Toprol


- Previously on OAC with Eliquis (currently not taking)


- H/O cardioversions in the past, last one >2 yrs ago





HTN





HLD





DM 2





Anemia


- undetermined etiology


- management by Dr Velez


Plan:


Chronic systolic/diastolic CHF


- clinically compensated


- continue diuretics


HTN not well controlled


- increase losartan


Management of leukocytosis by Dr. Velez


Monitor lab











NIKITA,ALI MD FACP FAC CCDS   Jul 20, 2022 16:10
Cardiology SOAP Progress Note


Subjective:


No cp or palp or syncope or shortness of breath at rest


Gen weakness and malaise 


No n/v/d





Objective:


I&O/Vital Signs











 7/16/22 7/16/22





 07:30 07:53


 


Temp 36.0 


 


Pulse 90 


 


Resp 18 


 


B/P (MAP) 148/67 (94) 


 


Pulse Ox 94 


 


O2 Delivery Nasal Cannula Nasal Cannula


 


O2 Flow Rate 1.50 1.50














 7/16/22





 00:00


 


Intake Total 615 ml


 


Balance 615 ml








Constitutional:  AAO x 3, well-developed, well-nourished


Respiratory:  No accessory muscle use, No respiratory distress; chest expansion 

is symmetric, chest is bilaterally symmetric, lungs clear to auscultation


Cardiovascular:  regular rate-rhythm; No JVD; S1 and S2


Gastrointestional:  No tender; soft, round, audible bowel sounds


Extremities:  other (RLE without swelling; dressing to R AKA stump which is D/I,

not removed)


Neurologic/Psychiatric:  oriented x 3, other (able to move all limbs)


Skin:  No rash on exposed areas, No ulcerations on exposed areas; other 

(abrasion to right leg)





Results/Procedures:


Labs


Laboratory Tests


7/15/22 15:25: Glucometer 215H


7/15/22 20:28: Glucometer 220H


7/16/22 06:18: Glucometer 138H


7/16/22 09:00: Vancomycin Level Trough 11.0


7/16/22 10:49: Glucometer 205H





Microbiology


7/11/22 Blood Culture - Preliminary, Resulted


          No growth





Laboratory Tests


7/15/22 07:45











A/P:


Assessment:





Transient hypotension


- asymptomatic, resolved after medication adjustment of 7/12/22





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 

necessitating L AKA by Dr. Kaiser on 7-7-22.





Leukocytosis


- managed by Dr Velez





PVALEXSANDER


- reports PTCA bilat by Dr. Rogers at [facility] in 2021


- reports stent x 2 to the L leg 6-17-22 (prior to BKA) by Dr. Hickey at 

[facility]


- reports PTCA to R leg 6-19-22 by Dr. Hickey





CAD


- H/O CABG x 5 vessel in 2010 by Dr. Jacob





Chronic systolic CHF


- maintained on diuretics 





ICM


- last echo at [facility] reported LVEF 40% (per nursing staff, date unknown)


- maintained on Losartan





Carotid dz


- H/O L CEA in 2011 at [facility]


- H/O chronic occlusion of the R carotid per pt report





PAF


- maintained on Amiodarone and Toprol


- Previously on OAC with Eliquis (currently not taking)


- H/O cardioversions in the past, last one >2 yrs ago





HTN





HLD





DM 2





Anemia


- undetermined etiology


- management by Dr eVlez


Plan:








Continue current regimen


Monitor labs from time to time











KAREEM SHELTON MD FACP FAC CCDS   Jul 16, 2022 14:53
Cardiology SOAP Progress Note


Subjective:


States he feels much better today


Breathing is back to baseline


No c/o CP or palpitations





Objective:


I&O/Vital Signs











 7/20/22 7/20/22 7/20/22





 07:23 07:47 09:00


 


Temp  35.5 


 


Pulse  95 


 


Resp  21 


 


B/P (MAP)  199/91 (127) 


 


Pulse Ox  91 94


 


O2 Delivery Nasal Cannula Nasal Cannula Nasal Cannula


 


O2 Flow Rate 3.00 3.00 2.00








Constitutional:  AAO x 3, well-developed, well-nourished


Respiratory:  No accessory muscle use, No respiratory distress; chest expansion 

is symmetric, chest is bilaterally symmetric, lungs clear to auscultation


Cardiovascular:  regular rate-rhythm; No JVD; S1 and S2


Gastrointestional:  No tender; soft, round, audible bowel sounds


Extremities:  other (RLE without swelling; dressing to R AKA stump which is D/I,

not removed)


Neurologic/Psychiatric:  oriented x 3, other (moves all limbs)


Skin:  No rash on exposed areas, No ulcerations on exposed areas; other 

(abrasion to right leg)





Results/Procedures:


Labs


Laboratory Tests


7/19/22 15:33: Glucometer 173H


7/19/22 20:10: Glucometer 200H


7/20/22 05:47: Glucometer 166H


7/20/22 06:25: 


White Blood Count 14.7H, Red Blood Count 3.92L, Hemoglobin 9.7L, Hematocrit 33L,

Mean Corpuscular Volume 83, Mean Corpuscular Hemoglobin 25, Mean Corpuscular 

Hemoglobin Concent 30L, Red Cell Distribution Width 18.6H, Platelet Count 359, 

Mean Platelet Volume 10.4, Immature Granulocyte % (Auto) 1, Neutrophils (%) (A

uto) 88H, Lymphocytes (%) (Auto) 6L, Monocytes (%) (Auto) 4, Eosinophils (%) 

(Auto) 1, Basophils (%) (Auto) 0, Neutrophils # (Auto) 13.0H, Lymphocytes # 

(Auto) 0.9L, Monocytes # (Auto) 0.5, Eosinophils # (Auto) 0.1, Basophils # 

(Auto) 0.1, Immature Granulocyte # (Auto) 0.1, Neutrophils % (Manual) 90, 

Lymphocytes % (Manual) 8, Monocytes % (Manual) 2, Hypochromasia MODERATE, 

Poikilocytosis , Anisocytosis SLIGHT, Microcytosis SLIGHT, Stomatocytes SLIGHT, 

Sodium Level 141, Potassium Level 4.0, Chloride Level 99, Carbon Dioxide Level 

31, Anion Gap 11, Blood Urea Nitrogen 10, Creatinine 0.69, Estimat Glomerular 

Filtration Rate 99, BUN/Creatinine Ratio 14, Glucose Level 206H, Calcium Level 

9.3, Corrected Calcium 10.3H, Total Bilirubin 0.4, Aspartate Amino Transf 

(AST/SGOT) 19, Alanine Aminotransferase (ALT/SGPT) 21, Alkaline Phosphatase 79, 

Total Protein 6.3L, Albumin 2.8L


7/20/22 11:01: Glucometer 219H





Microbiology


7/18/22 Gram Stain - Final, Resulted


          


7/18/22 Wound Culture - Preliminary, Resulted


          Gram Negative Gerardo


7/11/22 Blood Culture - Final, Complete


          No growth








A/P:


Assessment:





Acute on chronic systolic/diastolic CHF on 7/18/22


- treated with IV diuretics


- clinically compensated at this time





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 

necessitating L AKA by Dr. Kaiser on 7-7-22.





Leukocytosis


- managed by Dr Velez





PVD


- reports PTCA bilat by Dr. Rogers at [facility] in 2021


- reports stent x 2 to the L leg 6-17-22 (prior to BKA) by Dr. Hickey at 

[facility]


- reports PTCA to R leg 6-19-22 by Dr. Hickey





CAD


- H/O CABG x 5 vessel in 2010 by Dr. Jacob


 


ICM


- last echo at [place] reported LVEF 40% (per nursing staff, date unknown)


- maintained on Losartan


- Echocardiogram of 7-18-22 showed LVEF 45-50%.  Grade 2 diastolic dysfunction. 

PASP 55-60 mmHg.  





Carotid dz


- H/O L CEA in 2011 at [facility]


- H/O chronic occlusion of the R carotid per pt report





PAF


- maintained on Amiodarone and Toprol


- Previously on OAC with Eliquis (currently not taking)


- H/O cardioversions in the past, last one >2 yrs ago





HTN





HLD





DM 2





Anemia


- undetermined etiology


- management by Dr Velez


Plan:


Acute on chronic systolic/diastolic CHF


- clinically improved


- increased oral Lasix dose


HTN with tachycardia


- not well controlled


- increase BB


Monitor lab











KAMILLE WILHELM [facility]           Jul 19, 2022 12:43
Cardiology SOAP Progress Note


Subjective:


Sudden onset of shortness of breath this am, treated with diuretics


No cp or palp


No n/v/d/


No focal weakness


Gen weakness worse





Objective:


I&O/Vital Signs











 7/18/22 7/18/22 7/18/22





 07:36 08:42 09:00


 


Temp 34.9  


 


Pulse 101  


 


Resp 20  


 


B/P (MAP) 172/78 (109)  


 


Pulse Ox 96 96 97


 


O2 Delivery Nasal Cannula Nasal Cannula Nasal Cannula


 


O2 Flow Rate 3.00 3.00 3.00








Constitutional:  AAO x 3, well-developed, well-nourished


Respiratory:  No accessory muscle use, No respiratory distress; chest expansion 

is symmetric, chest is bilaterally symmetric, other (diminished lower lobes)


Cardiovascular:  regular rate-rhythm; No JVD; S1 and S2


Gastrointestional:  No tender; soft, round, audible bowel sounds


Extremities:  other (RLE without swelling; dressing to R AKA stump which is D/I,

not removed)


Neurologic/Psychiatric:  oriented x 3, other (moves all limbs)


Skin:  No rash on exposed areas, No ulcerations on exposed areas; other (a

brasion to right leg)





Results/Procedures:


Labs


Laboratory Tests


7/17/22 20:31: Glucometer 227H


7/18/22 05:10: 


White Blood Count 20.1H, Red Blood Count 4.20L, Hemoglobin 10.2L, Hematocrit 35L

, Mean Corpuscular Volume 84, Mean Corpuscular Hemoglobin 24L, Mean Corpuscular 

Hemoglobin Concent 29L, Red Cell Distribution Width 18.7H, Platelet Count 364, 

Mean Platelet Volume 10.6, Immature Granulocyte % (Auto) 2, Neutrophils (%) (Aut

o) 90H, Lymphocytes (%) (Auto) 4L, Monocytes (%) (Auto) 3, Eosinophils (%) 

(Auto) 0, Basophils (%) (Auto) 0, Neutrophils # (Auto) 18.2H, Lymphocytes # 

(Auto) 0.8L, Monocytes # (Auto) 0.6, Eosinophils # (Auto) 0.1, Basophils # (

Auto) 0.1, Immature Granulocyte # (Auto) 0.4H, Sodium Level 139, Potassium Level

3.9, Chloride Level 101, Carbon Dioxide Level 26, Anion Gap 12, Blood Urea 

Nitrogen 9, Creatinine 0.67, Estimat Glomerular Filtration Rate 100, 

BUN/Creatinine Ratio 13, Glucose Level 241H, Calcium Level 9.2, Corrected 

Calcium 10.1, Total Bilirubin 0.4, Aspartate Amino Transf (AST/SGOT) 22, Alanine

Aminotransferase (ALT/SGPT) 31, Alkaline Phosphatase 97, B-Type Natriuretic 

Peptide 913.4H, Total Protein 6.6, Albumin 2.9L


7/18/22 06:51: Procalcitonin 0.07


7/18/22 08:46: 


Blood Gas Puncture Site L RAD, Blood Gas Patient Temperature 37, Arterial Blood 

pH 7.40, Arterial Blood Partial Pressure CO2 52H, Arterial Blood Partial 

Pressure O2 79, Arterial Blood HCO3 31H, Arterial Blood Total CO2 33.0H, 

Arterial Blood Oxygen Saturation 96, Arterial Blood Base Excess 6.6H, Ronal Test

YES-POS, Blood Gas Ventilator Setting NO, Blood Gas Inspired Oxygen 3


7/18/22 08:55: Lactic Acid Level 1.28


7/18/22 10:54: Glucometer 253H


7/18/22 15:22: Glucometer 185H





Microbiology


7/11/22 Blood Culture - Final, Complete


          No growth





Laboratory Tests


7/18/22 05:10








Laboratory Tests


7/18/22 05:10











A/P:


Assessment:





Acute on chronic systolic CHF on 7/18/22


- treat with IV diuretics





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 

necessitating L AKA by Dr. Kaiser on 7-7-22.





Leukocytosis- worsening


- managed by Dr Velez





PVD


- reports PTCA bilat by Dr. Rogers at [facility] in 2021


- reports stent x 2 to the L leg 6-17-22 (prior to BKA) by Dr. Hickey at 

[facility]


- reports PTCA to R leg 6-19-22 by Dr. Hickey





CAD


- H/O CABG x 5 vessel in 2010 by Dr. Jacob


 


ICM


- last echo at [place] reported LVEF 40% (per nursing staff, date unknown)


- maintained on Losartan





Carotid dz


- H/O L CEA in 2011 at [facility]


- H/O chronic occlusion of the R carotid per pt report





PAF


- maintained on Amiodarone and Toprol


- Previously on OAC with Eliquis (currently not taking)


- H/O cardioversions in the past, last one >2 yrs ago





HTN





HLD





DM 2





Anemia


- undetermined etiology


- management by Dr Velez


Plan:


Acute on chronic systolic CHF


- treat with IV diuretics


- Echocardiogram today


- replace electrolytes as indicated


Management of worsening leukocytosis is by KAREEM Villafana MD FACP FACC CCDS   Jul 18, 2022 19:00
What Type Of Note Output Would You Prefer (Optional)?: Standard Output
How Severe Is Your Skin Lesion?: moderate
Has Your Skin Lesion Been Treated?: not been treated
Is This A New Presentation, Or A Follow-Up?: Growth
Which Family Member (Optional)?: Daughter

## 2022-07-20 NOTE — PM&R PROGRESS NOTE
Subjective


HPI/CC On Admission


Date Seen by Provider:  Jul 20, 2022


Time Seen by Provider:  09:00


Subjective/Events-last exam


7/20/2022:


Pt is doing pretty well


Changed dressing


Dr. Kaiser assessed the wound


Gram negative rods in the wound so Zosyn started


Increasing Toprol and Lasix per cardiology


BP was 199


Discharge planning in the midst of evaluations of his needs








7/19/2022:


Doing well today


Volume overload resolved


Labs reviewed


Sugars noted


Pain controlled








7/18/2022:


Patient having difficulty with shortness of breath and tachypnea


Oxygen placed due to hypoxia


Septic work-up normal but it appears he has congestive heart failure and volume 

overload


Cardiology consulted after he gave him Lasix 40 mg IV


Supportive care will continue








7/17/2022:


Patient doing well


No pain reported


Slept well last night


No falls








7/16/2022:


Late entry note inadvertently missed note


Doing well


IV abx maintained


BM+


Wife at bedside


Melatonin gave him bad dreams








7/15/2022:


Pt is doing well


White blood cell count is 14


Hemoglobin is 8.9


Stump looks really good


Overall doing much better








7/14/2022:


Hemoglobin is 8.5 after one unit of blood yesterday


White count is 16,000


Bowels moved a little bit today, will get aggressive with that


Hep locking IV fluid











7/13/2022:


Pt had some incontinence today


Dressing was saturated


Decrease BP medication for Dr. Alejandra


A little bit confused


Will give one unit of blood today


Maintain on broad spectrum antibiotics


White count still elevated at 17








7/12/2022:


Pt is doing well 


Mild hypotension not due to sepsis so will give 500 ccs bolus


IV fluids 60 an hour


Decrease procalcitonin


Zosyn and Vancomycin maintained








7/11/2022:


Pt is doing a lot better


Sleeps all the time


Fever and elevated white count with procalcitonin elevation prompting blood and 

urine cultures, chest x-ray, and empiric IV antibiotics


PICC will be placed








7/10/2022:


Patient settling in well


Sugars lower so will hold Metformin


No pain except hip and left shoulder


Glucometer assessed


Wife at bedside


No issues otherwise





Focused Exam


Lactate Level


7/18/22 08:55: Lactic Acid Level 1.28








Objective


Exam


Vital Signs





Vital Signs








  Date Time  Temp Pulse Resp B/P (MAP) Pulse Ox O2 Delivery O2 Flow Rate FiO2


 


7/20/22 20:16 36.3 76 20 149/67 (94 94 Nasal Cannula 2.00 





Capillary Refill :


General Appearance:  No Apparent Distress, WD/WN


HEENT:  PERRL/EOMI, Normal ENT Inspection, Pharynx Normal


Neck:  Normal Inspection, Supple


Respiratory:  No Accessory Muscle Use, No Respiratory Distress


Cardiovascular:  Regular Rate, Rhythm, No Edema, No Gallop, No JVD, No Murmur, 

Normal Peripheral Pulses


Gastrointestinal:  Normal Bowel Sounds, No Organomegaly, No Pulsatile Mass, Non 

Tender, Soft


Back:  Normal Inspection, No CVA Tenderness, No Vertebral Tenderness


Extremity:  Other (left AKA with dressing in place C/D/I)


Neurologic/Psychiatric:  Alert, Oriented x3


Skin:  Normal Color, Warm/Dry


Lymphatic:  No Adenopathy





Results/Procedures


Lab


Laboratory Tests


7/20/22 06:25








Patient resulted labs reviewed.





FIM


Transfers


Therapy Code Descriptions/Definitions 





Functional [place] Measure:


0=Not Assessed/NA        4=Minimal Assistance


1=Total Assistance        5=Supervision or Setup


2=Maximal Assistance  6=Modified [place]


3=Moderate Assistance 7=Complete IndependenceSCALE: Activities may be completed 

with or without assistive devices.





6-Indepedent-patient completes the activity by him/herself with no assistance 

from a helper.


5-Set-up or Clean-up Assistance-helper sets up or cleans up; patient completes 

activity. [place] assists only prior to or  


    following the activity.


4-Supervision or Touching Assistance-helper provides verbal cues and/or 

touching/steadying and/or contact guard assistance as patient completes 

activity. Assistance may be provided   


    throughout the activity or intermittently.


3-Partial/Moderate Assistance-helper does LESS THAN HALF the effort. [place] 

lifts, holds or supports trunk or limbs, but provides less than half the effort.


2-Substantial/Maximal Assistance-helper does MORE THAN HALF the effort. [place] 

lifts or holds trunk or limbs and provides more than half the effort.


1-Axazqhxxo-ovfrpa does ALL the effort. Patient does none of the effort to 

complete the activity. Or, the assistance of 2 or more helpers is required for 

the patient to complete the  


    activity.


If activity was not attempted, code reason:


7-Patient Refused.


9-Not Applicable-not attempted and the patient did not perform the activity 

before the current illness, exacerbation or injury.


10-Not Attempted due to Environmental Limitations-(lack of equipment, weather 

restraints, etc.).


88-Not Attempted due to Medical Conditions or Safety Concerns.


Roll Left to Right (QC):  4


Sit to Lying (QC):  4


Sit to Stand (QC):  1


Chair/Bed-to-Chair Xfer(QC):  1


Car Transfer (QC):  2





Gait Training


Does the Patient Walk?:  No and Walking Goal NOT indicated


Walk 10 feet (QC):  88


Walk 50 ft with 2 Turns(QC):  88


Walk 150 ft (QC):  88


Walking 10ft/uneven surface-QC:  88





Wheelchair Training


Does the Pt Use a Wheelchair?:  Yes


Distance:  100'x2


Wheel 50 ft with 2 turns (QC):  4


Wheel 150 ft (QC):  4


Type of Wheelchair:  Manual





Stair Training


1 Step (curb) (QC):  88


4 Steps (QC):  88


12 Steps (QC):  88





Balance


Picking up an Object (QC):  88





ADL-Treatment


Eating (QC):  6


Oral Hygiene (QC):  6


Bathing Location:  L Arm, R Arm, L Upper Leg, R Upper Leg, Chest, Abdomen, Noy

romana Area


Shower/Bathe Self (QC):  1


Upper Body Dressing (QC):  3


Lower Body Dressing (QC):  1


On/Off Footwear (QC):  1


Toileting Hygiene (QC):  1 (Assist x2 for bowel movement. Setup with urinal)





Assessment/Plan


Assessment and Plan


Assess & Plan/Chief Complaint


Assessment:


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


Fall 7/5/22 resulting in left shoulder injury causing further debility and 

limiting ADL/independence


Severe PVD


PAF


OAC


HTN


HLP


DM


Hypothyroidism


Previous hypoglycemia required holding metformin and glimepiride but now on 

lower dose


Constipation resolved


Poor venous access requiring PICC 7/11/22


Anemia post op requiring transfusion 7/13/22


Volume overload CHF requiring IV Lasix 7/18/2022


Infected left stump started on abx Zosyn 7/20








Plan:


PT OT


w/c mobility


Home meds





7/10/2022:


Pain control


Monitor sugar


Hold Metformin





7/11/22:


Empiric broad spectrum abx


Hold Metformin and OAC


PICC line





7/12/2022:


IV abx


Monitor closely





7/13/2022:


Transfuse


Broad spectrum abx





7/14/2022:


Monitor hgb


Broad spectrum abx





7/15/2022:


IV abx


Monitor hgb





7/16/2022:


DC Melatonin


IV abx





7/17/2022:


Monitor closely


Check labs in am





7/18/2022:


IV Lasix


Oxygen





7/19/2022:


Monitor labs





7/20/22:


Abx





(1) S/P AKA (above knee amputation)


(2) Gangrene











DIANA HENRIQUEZ DO                Jul 20, 2022 07:13

## 2022-07-20 NOTE — PHYSICAL THERAPY DAILY NOTE
PT Daily Note-Current


Subjective


Upon arrival, OT and RN were present changing pts dressing. Pt agrees to work 

with PT. Pt spouse was present.





Mental Status


Patient Orientation:  Person, Place, Situation


Attachments:  Oxygen (3L)





Transfers


SCALE: Activities may be completed with or without assistive devices.





6-Indepedent-patient completes the activity by him/herself with no assistance 

from a helper.


5-Set-up or Clean-up Assistance-helper sets up or cleans up; patient completes 

activity. [place] assists only prior to or  


    following the activity.


4-Supervision or Touching Assistance-helper provides verbal cues and/or 

touching/steadying and/or contact guard assistance as patient completes 

activity. Assistance may be provided   


    throughout the activity or intermittently.


3-Partial/Moderate Assistance-helper does LESS THAN HALF the effort. [place] 

lifts, holds or supports trunk or limbs, but provides less than half the effort.


2-Substantial/Maximal Assistance-helper does MORE THAN HALF the effort. [place] 

lifts or holds trunk or limbs and provides more than half the effort.


7-Uthvkvhvy-kjhfei does ALL the effort. Patient does none of the effort to 

complete the activity. Or, the assistance of 2 or more helpers is required for 

the patient to complete the  


    activity.


If activity was not attempted, code reason:


7-Patient Refused.


9-Not Applicable-not attempted and the patient did not perform the activity 

before the current illness, exacerbation or injury.


10-Not Attempted due to Environmental Limitations-(lack of equipment, weather 

restraints, etc.).


88-Not Attempted due to Medical Conditions or Safety Concerns.


Sit to Stand (QC):  3


Pt performed a stand pivot transfer with OT. OT teaches pts spouse how to 

perform transfer safely, PT present to help assist.





Weight Bearing


Full Weight Bearing





Gait Training


Does the Patient Walk?:  No and Walking Goal IS indicated





Wheelchair Training


Does the Pt Use a Wheelchair?:  Yes


Wheel 50 ft with 2 turns (QC):  4


Wheel 150 ft (QC):  4


Type of Wheelchair:  Manual


Pt maneuvered his way to the gym for room, and back to room, for a BR break. Pt 

then turned down to the urbina with windows and back to room. Pt was slow, but 

controlled with w/c. Pt self corrected once off course.





Exercises


Seated Therapy Exercises:  Sit to stand, Long arc quads, Hamstring Curls, Hip 

abd/add


Seated Reps:  10





Treatments


Pt performed Exs listed above, in w/c. PT was focused on LE strengthening, OT 

focused on UE strengthening, and balance at the // bars. Once PT was concluded, 

pt was in w/c, with spouse present. As PT exited the room, OT was present with 

the pt, along with pts spouse.





Assessment


Current Status:  Fair Progress


Pt would benefit from continued PT to improve on transfers, balance and 

strength.





PT Short Term Goals


Short Term Goals


Time Frame:  Jul 15, 2022


Roll Left & Right:  4


Sit to lying:  3 (Kacie)


Lying to sitting on side of be:  3 (Kacie)


Sit to stand:  3 (modA)


Chair/bed-to-chair transfer:  3 (modA)





PT Long Term Goals


Long Term Goals


PT Long Term Goals Time Frame:  Jul 29, 2022


Roll Left & Right (QC):  6


Sit to Lying (QC):  4 (SBA)


Lying-Sitting on Side/Bed(QC):  4 (SBA)


Sit to Stand (QC):  3 (Kacie)


Chair/Bed-to-Chair Xfer(QC):  3 (Kacie)


Toilet Transfer (QC):  3 (Kacie)


Car Transfer (QC):  3 (Kacie)


Does the Patient Walk:  No and Walking Goal NOT indicated


Walk 10 feet (QC):  88


Walk 50ft with 2 Turns (QC):  88


Walk 150 ft (QC):  88


Walking 10ft on Uneven Surface:  88


1 Step (curb) (QC):  88


4 Steps (QC):  88


12 Steps (QC):  88


Picking up an Object (QC):  88


Wheel 50 feet with 2 turns (QC:  6


Wheel 150 feet:  6





PT Plan


Problem List


Problem List:  Functional Strength, Balance, Transfer





Treatment/Plan


Treatment Plan:  Continue Plan of Care


Treatment Plan:  Bed Mobility, Education, Functional Activity [place], Functional 

Strength, Group Therapy, Safety, Therapeutic Exercise, Transfers


Treatment Duration:  Jul 29, 2022


Frequency:  At least 5 of 7 days/Wk (IRF)


Estimated Hrs Per Day:  1.5 hours per day


Patient and/or Family Agrees t:  Yes





Safety Risks/Education


Patient Education:  Gait Training


Teaching Recipient:  Patient, Primary Caregiver


Teaching Methods:  Demonstration, Discussion


Response to Teaching:  Verbalize Understanding





Time/GCodes


Time In:  900


Time Out:  1030


Total Billed Treatment Time:  90


Total Billed Treatment


1, Ex (2) 30, FA (2) 30, NM (2) 30











LUDA DOSS PTA          Jul 20, 2022 12:20

## 2022-07-20 NOTE — OCCUPATIONAL THER DAILY NOTE
OT Current Status-Daily Note


Subjective


Pt alert, lying in bed.  Pt c/o fatigue though agrees to participate in therapy.

 Bandage came off of stump, nrsg in room for new bandage.  Co-treat with PT 

[id number], skills of 2 clinicians required to decrease fall risk, family 

education, increase mobility and strength.  PT focusing on transfers, dynamic 

standing, B LE strengthening and w/c mobility while OT focusing on ADLs, 

functional mobility and B UE strengthening.





Mental Status/Objective


Patient Orientation:  Person, Place, Time, Situation


Attachments:  IV, Oxygen (3L, nrsg reported that pt's O2 sats were low earlier 

this am)





ADL-Treatment


Pt completed elen care in supine by self after set up.  In supine, pt able to 

roll side to side to hike pants on/off hips and down legs then assist to thread 

R LE into pants.  Mod A for supine to EOB.  Doffs shirt by self then mod A to 

don shirt sitting EOB.  Assist to start shoe then pt able to finish donning shoe

with SBA for safety when leaning forward.  Pt declined oral care.  Wife assists 

with using urinal.


Therapy Code Descriptions/Definitions 





Functional [place] Measure:


0=Not Assessed/NA        4=Minimal Assistance


1=Total Assistance        5=Supervision or Setup


2=Maximal Assistance  6=Modified [place]


3=Moderate Assistance 7=Complete IndependenceSCALE: Activities may be completed 

with or without assistive devices.





6-Indepedent-patient completes the activity by him/herself with no assistance 

from a helper.


5-Set-up or Clean-up Assistance-helper sets up or cleans up; patient completes 

activity. [place] assists only prior to or  


    following the activity.


4-Supervision or Touching Assistance-helper provides verbal cues and/or 

touching/steadying and/or contact guard assistance as patient completes 

activity. Assistance may be provided   


    throughout the activity or intermittently.


3-Partial/Moderate Assistance-helper does LESS THAN HALF the effort. [place] 

lifts, holds or supports trunk or limbs, but provides less than half the effort.


2-Substantial/Maximal Assistance-helper does MORE THAN HALF the effort. [place] 

lifts or holds trunk or limbs and provides more than half the effort.


2-Etksfgqxw-ohfqtw does ALL the effort. Patient does none of the effort to 

complete the activity. Or, the assistance of 2 or more helpers is required for 

the patient to complete the  


    activity.


If activity was not attempted, code reason:


7-Patient Refused.


9-Not Applicable-not attempted and the patient did not perform the activity 

before the current illness, exacerbation or injury.


10-Not Attempted due to Environmental Limitations-(lack of equipment, weather 

restraints, etc.).


88-Not Attempted due to Medical Conditions or Safety Concerns.


Oral Hygiene (QC):  7


Upper Body Dressing (QC):  3 (mod A)


Lower Body Dressing (QC):  3 (Min A in supine)





Other Treatment


Pt able to propel w/c to/from room<-->gym, progressing distance before recovery 

break is needed.  Family education on modified squat pivot transfers, wife 

demonstrated understanding though will need more practice to become safe and 

proficient.  Pt stood 2x's at bars with mod A for sit to stand then min to mod A

in standing while alternating lifting hands off of bar ~15 reps.  Pt then 

complete B UE strengthening by using dowel lien placed on bars for shldr strengt

hening, gravity eliminated due to decrease AROM and strength in B shldrs.  After

therapy, pt stated he wanted to stay in w/c.  Wife in room and pt able to reach 

phone and call light.  All needs met.





OT Short Term Goals


Short Term Goals


Time Frame:  2022


Toileting hygiene:  2


Shower/bathe self:  3


Lower body dressin


Putting on/taking off footwear:  3





OT Long Term Goals


Long Term Goals


Time Frame:  Aug 5, 2022


Eating (QC):  6


Oral Hygiene (QC):  6


Toileting Hygiene (QC):  3 (bowel movements on toilet)


Shower/Bathe Self (QC):  4


Upper Body Dressing (QC):  5


Lower Body Dressing (QC):  3


On/Off Footwear (QC):  4


Additional Goals:  1-Demonstrate ADL Tasks, 2-Verbalize Understanding, 3-

ImproveStrength/[place]


1=Demonstrate adherence to instructed precautions during ADL tasks.


2=Patient will verbalize/demonstrate understanding of assistive 

devices/modifications for ADL.


3=Patient will improve strength/tolerance for activity to enable patient to 

perform ADL's.





OT Education/Plan


Problem List/Assessment


Assessment:  Decreased Activ Tolerance, Decreased UE Strength, Impaired Bed 

Mobility, Impaired Coordination, Impaired Funct Balance, Impaired Self-Care 

Skills, Restricted Funct UE ROM





Discharge Recommendations


Plan/Recommendations:  Continue POC





Treatment Plan/Plan of Care


Patient would benefit from OT for education, treatment and training to promote 

independence in ADL's, mobility, safety and/or upper extremity function for 

ADL's.


Plan of Care:  ADL Retraining, Caregiver Training, Functional Mobility, Group 

Exercise/Act as Ind, UE Funct Exercise/Act, W/C Management Training


Treatment Duration:  Aug 5, 2022


Frequency:  At least 5 of 7 days/Wk (IRF)


Estimated Hrs Per Day:  1.5 hours per day


Rehab Potential:  Guarded





Time/GCodes


Start Time:  08:45


Stop Time:  10:30


Total Time Billed (hr/min):  105


Billed Treatment Time


1 visit-ADL 3 (45 min), FA 2 (35 min) EX 2 (25 min)  co-treat with PT [id number],

individual [id number]











MICHAEL ALVAREZ               2022 11:53

## 2022-07-21 VITALS — DIASTOLIC BLOOD PRESSURE: 65 MMHG | SYSTOLIC BLOOD PRESSURE: 144 MMHG

## 2022-07-21 VITALS — DIASTOLIC BLOOD PRESSURE: 64 MMHG | SYSTOLIC BLOOD PRESSURE: 143 MMHG

## 2022-07-21 LAB
ALBUMIN SERPL-MCNC: 2.7 GM/DL (ref 3.2–4.5)
ALP SERPL-CCNC: 71 U/L (ref 40–136)
ALT SERPL-CCNC: 18 U/L (ref 0–55)
BASOPHILS # BLD AUTO: 0.1 10^3/UL (ref 0–0.1)
BASOPHILS NFR BLD AUTO: 0 % (ref 0–10)
BILIRUB SERPL-MCNC: 0.4 MG/DL (ref 0.1–1)
BUN/CREAT SERPL: 14
CALCIUM SERPL-MCNC: 9.1 MG/DL (ref 8.5–10.1)
CHLORIDE SERPL-SCNC: 99 MMOL/L (ref 98–107)
CO2 SERPL-SCNC: 31 MMOL/L (ref 21–32)
CREAT SERPL-MCNC: 0.76 MG/DL (ref 0.6–1.3)
EOSINOPHIL # BLD AUTO: 0.1 10^3/UL (ref 0–0.3)
EOSINOPHIL NFR BLD AUTO: 1 % (ref 0–10)
GFR SERPLBLD BASED ON 1.73 SQ M-ARVRAT: 96 ML/MIN
GLUCOSE SERPL-MCNC: 151 MG/DL (ref 70–105)
HCT VFR BLD CALC: 32 % (ref 40–54)
HGB BLD-MCNC: 9.3 G/DL (ref 13.3–17.7)
LYMPHOCYTES # BLD AUTO: 0.8 10^3/UL (ref 1–4)
LYMPHOCYTES NFR BLD AUTO: 6 % (ref 12–44)
MANUAL DIFFERENTIAL PERFORMED BLD QL: NO
MCH RBC QN AUTO: 24 PG (ref 25–34)
MCHC RBC AUTO-ENTMCNC: 29 G/DL (ref 32–36)
MCV RBC AUTO: 83 FL (ref 80–99)
MONOCYTES # BLD AUTO: 0.5 10^3/UL (ref 0–1)
MONOCYTES NFR BLD AUTO: 4 % (ref 0–12)
NEUTROPHILS # BLD AUTO: 12.4 10^3/UL (ref 1.8–7.8)
NEUTROPHILS NFR BLD AUTO: 89 % (ref 42–75)
PLATELET # BLD: 355 10^3/UL (ref 130–400)
PMV BLD AUTO: 10.6 FL (ref 9–12.2)
POTASSIUM SERPL-SCNC: 3.7 MMOL/L (ref 3.6–5)
PROT SERPL-MCNC: 6.1 GM/DL (ref 6.4–8.2)
SODIUM SERPL-SCNC: 140 MMOL/L (ref 135–145)
WBC # BLD AUTO: 14 10^3/UL (ref 4.3–11)

## 2022-07-21 RX ADMIN — FUROSEMIDE SCH MG: 40 TABLET ORAL at 08:24

## 2022-07-21 RX ADMIN — APIXABAN SCH MG: 5 TABLET, FILM COATED ORAL at 20:15

## 2022-07-21 RX ADMIN — DOCUSATE SODIUM SCH MG: 100 CAPSULE ORAL at 08:25

## 2022-07-21 RX ADMIN — Medication SCH EACH: at 08:24

## 2022-07-21 RX ADMIN — SODIUM CHLORIDE SCH MLS/HR: 900 INJECTION INTRAVENOUS at 05:17

## 2022-07-21 RX ADMIN — POLYETHYLENE GLYCOL (3350) SCH GM: 17 POWDER, FOR SOLUTION ORAL at 08:25

## 2022-07-21 RX ADMIN — APIXABAN SCH MG: 5 TABLET, FILM COATED ORAL at 08:24

## 2022-07-21 RX ADMIN — INSULIN ASPART SCH UNIT: 100 INJECTION, SOLUTION INTRAVENOUS; SUBCUTANEOUS at 21:09

## 2022-07-21 RX ADMIN — DOCUSATE SODIUM SCH MG: 100 CAPSULE ORAL at 20:18

## 2022-07-21 RX ADMIN — POLYETHYLENE GLYCOL (3350) SCH GM: 17 POWDER, FOR SOLUTION ORAL at 20:18

## 2022-07-21 RX ADMIN — DOCUSATE SODIUM AND SENNOSIDES SCH EA: 8.6; 5 TABLET, FILM COATED ORAL at 20:19

## 2022-07-21 RX ADMIN — INSULIN ASPART SCH UNIT: 100 INJECTION, SOLUTION INTRAVENOUS; SUBCUTANEOUS at 06:06

## 2022-07-21 RX ADMIN — SODIUM CHLORIDE SCH MLS/HR: 900 INJECTION INTRAVENOUS at 21:09

## 2022-07-21 RX ADMIN — PANTOPRAZOLE SODIUM SCH MG: 40 TABLET, DELAYED RELEASE ORAL at 08:24

## 2022-07-21 RX ADMIN — GLIMEPIRIDE SCH MG: 1 TABLET ORAL at 06:04

## 2022-07-21 RX ADMIN — SODIUM CHLORIDE SCH MLS/HR: 900 INJECTION INTRAVENOUS at 12:52

## 2022-07-21 RX ADMIN — INSULIN ASPART SCH UNIT: 100 INJECTION, SOLUTION INTRAVENOUS; SUBCUTANEOUS at 12:06

## 2022-07-21 RX ADMIN — AMIODARONE HYDROCHLORIDE SCH MG: 200 TABLET ORAL at 08:24

## 2022-07-21 RX ADMIN — DOCUSATE SODIUM AND SENNOSIDES SCH EA: 8.6; 5 TABLET, FILM COATED ORAL at 08:25

## 2022-07-21 RX ADMIN — POTASSIUM CHLORIDE SCH MEQ: 750 TABLET, FILM COATED, EXTENDED RELEASE ORAL at 20:15

## 2022-07-21 RX ADMIN — INSULIN ASPART SCH UNIT: 100 INJECTION, SOLUTION INTRAVENOUS; SUBCUTANEOUS at 17:40

## 2022-07-21 RX ADMIN — METOPROLOL SUCCINATE SCH MG: 50 TABLET, EXTENDED RELEASE ORAL at 08:24

## 2022-07-21 RX ADMIN — LEVOTHYROXINE SODIUM SCH MCG: 100 TABLET ORAL at 06:05

## 2022-07-21 RX ADMIN — Medication SCH EACH: at 17:40

## 2022-07-21 RX ADMIN — POTASSIUM CHLORIDE SCH MEQ: 750 TABLET, FILM COATED, EXTENDED RELEASE ORAL at 08:25

## 2022-07-21 RX ADMIN — Medication SCH EACH: at 12:52

## 2022-07-21 RX ADMIN — LOSARTAN POTASSIUM SCH MG: 50 TABLET, FILM COATED ORAL at 08:25

## 2022-07-21 NOTE — PM&R PROGRESS NOTE
Subjective


HPI/CC On Admission


Date Seen by Provider:  Jul 21, 2022


Time Seen by Provider:  10:30


Subjective/Events-last exam


7/21/2022:


Patient doing well


Tolerating Zosyn


Pseudomonas in wound


Sugars are satisfactory








7/20/2022:


Pt is doing pretty well


Changed dressing


Dr. Kaiser assessed the wound


Gram negative rods in the wound so Zosyn started


Increasing Toprol and Lasix per cardiology


BP was 199


Discharge planning in the midst of evaluations of his needs








7/19/2022:


Doing well today


Volume overload resolved


Labs reviewed


Sugars noted


Pain controlled








7/18/2022:


Patient having difficulty with shortness of breath and tachypnea


Oxygen placed due to hypoxia


Septic work-up normal but it appears he has congestive heart failure and volume 

overload


Cardiology consulted after he gave him Lasix 40 mg IV


Supportive care will continue








7/17/2022:


Patient doing well


No pain reported


Slept well last night


No falls








7/16/2022:


Late entry note inadvertently missed note


Doing well


IV abx maintained


BM+


Wife at bedside


Melatonin gave him bad dreams








7/15/2022:


Pt is doing well


White blood cell count is 14


Hemoglobin is 8.9


Stump looks really good


Overall doing much better








7/14/2022:


Hemoglobin is 8.5 after one unit of blood yesterday


White count is 16,000


Bowels moved a little bit today, will get aggressive with that


Hep locking IV fluid











7/13/2022:


Pt had some incontinence today


Dressing was saturated


Decrease BP medication for Dr. Alejandra


A little bit confused


Will give one unit of blood today


Maintain on broad spectrum antibiotics


White count still elevated at 17








7/12/2022:


Pt is doing well 


Mild hypotension not due to sepsis so will give 500 ccs bolus


IV fluids 60 an hour


Decrease procalcitonin


Zosyn and Vancomycin maintained








7/11/2022:


Pt is doing a lot better


Sleeps all the time


Fever and elevated white count with procalcitonin elevation prompting blood and 

urine cultures, chest x-ray, and empiric IV antibiotics


PICC will be placed








7/10/2022:


Patient settling in well


Sugars lower so will hold Metformin


No pain except hip and left shoulder


Glucometer assessed


Wife at bedside


No issues otherwise





Review of Systems


General:  Fatigue, Malaise





Focused Exam


Lactate Level








Objective


Exam


Vital Signs





Vital Signs








  Date Time  Temp Pulse Resp B/P (MAP) Pulse Ox O2 Delivery O2 Flow Rate FiO2


 


7/21/22 20:21 36.7 71 20 143/64 (90) 96 Nasal Cannula 2.00 





Capillary Refill :


General Appearance:  No Apparent Distress, WD/WN, Chronically ill


HEENT:  PERRL/EOMI, Normal ENT Inspection, Pharynx Normal


Neck:  Normal Inspection, Supple


Respiratory:  Lungs Clear, Normal Breath Sounds, No Accessory Muscle Use, No 

Respiratory Distress


Cardiovascular:  Regular Rate, Rhythm, No Edema, No Gallop, No JVD, No Murmur, 

Normal Peripheral Pulses


Gastrointestinal:  Normal Bowel Sounds, No Organomegaly, No Pulsatile Mass, Non 

Tender, Soft


Back:  Normal Inspection, No CVA Tenderness, No Vertebral Tenderness


Extremity:  Other (left AKA with dressing in place C/D/I)


Neurologic/Psychiatric:  Alert, Oriented x3, CNs II-XII Norm as Tested, Motor 

Weakness


Skin:  Normal Color, Warm/Dry


Lymphatic:  No Adenopathy





Results/Procedures


Lab


Laboratory Tests


7/21/22 06:20








Patient resulted labs reviewed.





FIM


Transfers


Therapy Code Descriptions/Definitions 





Functional [place] Measure:


0=Not Assessed/NA        4=Minimal Assistance


1=Total Assistance        5=Supervision or Setup


2=Maximal Assistance  6=Modified [place]


3=Moderate Assistance 7=Complete IndependenceSCALE: Activities may be completed 

with or without assistive devices.





6-Indepedent-patient completes the activity by him/herself with no assistance 

from a helper.


5-Set-up or Clean-up Assistance-helper sets up or cleans up; patient completes 

activity. [place] assists only prior to or  


    following the activity.


4-Supervision or Touching Assistance-helper provides verbal cues and/or 

touching/steadying and/or contact guard assistance as patient completes 

activity. Assistance may be provided   


    throughout the activity or intermittently.


3-Partial/Moderate Assistance-helper does LESS THAN HALF the effort. [place] 

lifts, holds or supports trunk or limbs, but provides less than half the effort.


2-Substantial/Maximal Assistance-helper does MORE THAN HALF the effort. [place] 

lifts or holds trunk or limbs and provides more than half the effort.


2-Asqcgvwij-ixkdod does ALL the effort. Patient does none of the effort to 

complete the activity. Or, the assistance of 2 or more helpers is required for 

the patient to complete the  


    activity.


If activity was not attempted, code reason:


7-Patient Refused.


9-Not Applicable-not attempted and the patient did not perform the activity 

before the current illness, exacerbation or injury.


10-Not Attempted due to Environmental Limitations-(lack of equipment, weather 

restraints, etc.).


88-Not Attempted due to Medical Conditions or Safety Concerns.


Roll Left to Right (QC):  4


Sit to Lying (QC):  4


Sit to Stand (QC):  3


Chair/Bed-to-Chair Xfer(QC):  1


Car Transfer (QC):  2





Gait Training


Does the Patient Walk?:  No and Walking Goal IS indicated


Walk 10 feet (QC):  88


Walk 50 ft with 2 Turns(QC):  88


Walk 150 ft (QC):  88


Walking 10ft/uneven surface-QC:  88





Wheelchair Training


Does the Pt Use a Wheelchair?:  Yes


Distance:  100'x2


Wheel 50 ft with 2 turns (QC):  4


Wheel 150 ft (QC):  4


Type of Wheelchair:  Manual





Stair Training


1 Step (curb) (QC):  88


4 Steps (QC):  88


12 Steps (QC):  88





Balance


Picking up an Object (QC):  88





ADL-Treatment


Eating (QC):  6


Oral Hygiene (QC):  7


Bathing Location:  L Arm, R Arm, L Upper Leg, R Upper Leg, Chest, Abdomen, 

Perineal Area


Shower/Bathe Self (QC):  1


Upper Body Dressing (QC):  3 (mod A)


Lower Body Dressing (QC):  3 (Min A in supine)


On/Off Footwear (QC):  1


Toileting Hygiene (QC):  1 (Assist x2 for bowel movement. Setup with urinal)





Assessment/Plan


Assessment and Plan


Assess & Plan/Chief Complaint


Assessment:


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


Fall 7/5/22 resulting in left shoulder injury causing further debility and 

limiting ADL/independence


Severe PVD


PAF


OAC


HTN


HLP


DM


Hypothyroidism


Previous hypoglycemia required holding metformin and glimepiride but now on 

lower dose


Constipation resolved


Poor venous access requiring PICC 7/11/22


Anemia post op requiring transfusion 7/13/22


Volume overload CHF requiring IV Lasix 7/18/2022


Infected left stump with Pseudomonas started on abx Zosyn 7/20








Plan:


PT OT


w/c mobility


Home meds





7/10/2022:


Pain control


Monitor sugar


Hold Metformin





7/11/22:


Empiric broad spectrum abx


Hold Metformin and OAC


PICC line





7/12/2022:


IV abx


Monitor closely





7/13/2022:


Transfuse


Broad spectrum abx





7/14/2022:


Monitor hgb


Broad spectrum abx





7/15/2022:


IV abx


Monitor hgb





7/16/2022:


DC Melatonin


IV abx





7/17/2022:


Monitor closely


Check labs in am





7/18/2022:


IV Lasix


Oxygen





7/19/2022:


Monitor labs





7/20/22:


Abx





7/21/2022:


Zosyn





(1) S/P AKA (above knee amputation)


(2) Gangrene











DIANA HENRIQUEZ DO                Jul 21, 2022 05:59

## 2022-07-21 NOTE — PHYSICAL THERAPY DAILY NOTE
PT Daily Note-Current


Subjective


Patient in WC at bedside pre tx, agrees to PT, voices no complaints of pain.





Appearance


Patient in WC at bedside post tx with nurse call, phone, tray, all needs met.





Mental Status


Patient Orientation:  Person, Place, Situation





Transfers


SCALE: Activities may be completed with or without assistive devices.





6-Indepedent-patient completes the activity by him/herself with no assistance 

from a helper.


5-Set-up or Clean-up Assistance-helper sets up or cleans up; patient completes 

activity. [place] assists only prior to or  


    following the activity.


4-Supervision or Touching Assistance-helper provides verbal cues and/or 

touching/steadying and/or contact guard assistance as patient completes 

activity. Assistance may be provided   


    throughout the activity or intermittently.


3-Partial/Moderate Assistance-helper does LESS THAN HALF the effort. [place] 

lifts, holds or supports trunk or limbs, but provides less than half the effort.


2-Substantial/Maximal Assistance-helper does MORE THAN HALF the effort. [place] 

lifts or holds trunk or limbs and provides more than half the effort.


6-Ztpzjxzsb-[place] does ALL the effort. Patient does none of the effort to 

complete the activity. Or, the assistance of 2 or more helpers is required for 

the patient to complete the  


    activity.


If activity was not attempted, code reason:


7-Patient Refused.


9-Not Applicable-not attempted and the patient did not perform the activity 

before the current illness, exacerbation or injury.


10-Not Attempted due to Environmental Limitations-(lack of equipment, weather 

restraints, etc.).


88-Not Attempted due to Medical Conditions or Safety Concerns.





Weight Bearing


Full Weight Bearing





Wheelchair Training


Does the Pt Use a Wheelchair?:  Yes


Wheel 50 ft with 2 turns (QC):  4


Type of Wheelchair:  Manual


120'x2, very slow, occasional rest break





Exercises


Seated Therapy Exercises:  Ankle pumps (RLE), Long arc quads (RLE), Hip flexion,

Hip abd/add (with ball and YTB)


Seated Reps:  20





Treatments


WC mobility, LE exercise





Assessment


Current Status:  Poor Progress


patient very fatigued today





PT Short Term Goals


Short Term Goals


Time Frame:  Jul 15, 2022


Roll Left & Right:  4


Sit to lying:  3 (Kacie)


Lying to sitting on side of be:  3 (Kacie)


Sit to stand:  3 (modA)


Chair/bed-to-chair transfer:  3 (modA)





PT Long Term Goals


Long Term Goals


PT Long Term Goals Time Frame:  Jul 29, 2022


Roll Left & Right (QC):  6


Sit to Lying (QC):  4 (SBA)


Lying-Sitting on Side/Bed(QC):  4 (SBA)


Sit to Stand (QC):  3 (Kacie)


Chair/Bed-to-Chair Xfer(QC):  3 (Kacie)


Toilet Transfer (QC):  3 (Kacie)


Car Transfer (QC):  3 (Kacie)


Does the Patient Walk:  No and Walking Goal NOT indicated


Walk 10 feet (QC):  88


Walk 50ft with 2 Turns (QC):  88


Walk 150 ft (QC):  88


Walking 10ft on Uneven Surface:  88


1 Step (curb) (QC):  88


4 Steps (QC):  88


12 Steps (QC):  88


Picking up an Object (QC):  88


Wheel 50 feet with 2 turns (QC:  6


Wheel 150 feet:  6





PT Plan


Problem List


Problem List:  Activity Tolerance, Functional Strength, Safety, Balance, 

Transfer, Bed Mobility, ROM





Treatment/Plan


Treatment Plan:  Continue Plan of Care


Treatment Plan:  Bed Mobility, Education, Functional Activity [place], Functional 

Strength, Group Therapy, Safety, Therapeutic Exercise, Transfers


Treatment Duration:  Jul 29, 2022


Frequency:  At least 5 of 7 days/Wk (IRF)


Estimated Hrs Per Day:  1.5 hours per day


Patient and/or Family Agrees t:  Yes





Safety Risks/Education


Patient Education:  Correct Positioning, Safety Issues


Teaching Recipient:  Patient


Teaching Methods:  Demonstration, Discussion


Response to Teaching:  Reinforcement Needed





Time/GCodes


Time In:  1230


Time Out:  1300


Total Billed Treatment Time:  30


Total Billed Treatment


1 visit


EX 15'


FA 15'











KIRAN RANGEL PT                Jul 21, 2022 12:59

## 2022-07-21 NOTE — OCCUPATIONAL THER DAILY NOTE
OT Current Status-Daily Note


Subjective


Pt sleeping in bed, woke to name.  Pt agrees to therapy.  Pt c/o pain though 

does not request pain meds.   Co-treat with PT [id number], skills of 2 clinicians

required to decrease falls and increase mobility.  PT focusing on transfers and 

standing while OT focusing on ADLs and w/c mobility.





Mental Status/Objective


Patient Orientation:  Person, Place, Time, Situation


Attachments:  IV





ADL-Treatment


Discussed continuing family training for transfers and jorden lift with wife.  Pt

agrees to shower.  Min A for supine to EOB.  Mod A for transfer using FWW to 

rolling shower chair.  Assist to doff/don pants due to time constraints.  Min A 

for upper body dressing.  Using shower chair with cutout for shower, pt able to 

cleanse all areas except buttock and underarms.  Independent with oral care.  

Nrsg in room to change stump dressing after shower.  Pt then working on w/c mobi

lity to increase B UE strength and functional mobility for daily functional 

tasks.  After session, pt sitting in recliner with call light/phone in reach.  

All needs met in room.


Therapy Code Descriptions/Definitions 





Functional [place] Measure:


0=Not Assessed/NA        4=Minimal Assistance


1=Total Assistance        5=Supervision or Setup


2=Maximal Assistance  6=Modified [place]


3=Moderate Assistance 7=Complete IndependenceSCALE: Activities may be completed 

with or without assistive devices.





6-Indepedent-patient completes the activity by him/herself with no assistance 

from a helper.


5-Set-up or Clean-up Assistance-helper sets up or cleans up; patient completes 

activity. [place] assists only prior to or  


    following the activity.


4-Supervision or Touching Assistance-helper provides verbal cues and/or 

touching/steadying and/or contact guard assistance as patient completes 

activity. Assistance may be provided   


    throughout the activity or intermittently.


3-Partial/Moderate Assistance-helper does LESS THAN HALF the effort. [place] 

lifts, holds or supports trunk or limbs, but provides less than half the effort.


2-Substantial/Maximal Assistance-helper does MORE THAN HALF the effort. [place] 

lifts or holds trunk or limbs and provides more than half the effort.


8-Oadqkgiau-vuuogt does ALL the effort. Patient does none of the effort to 

complete the activity. Or, the assistance of 2 or more helpers is required for 

the patient to complete the  


    activity.


If activity was not attempted, code reason:


7-Patient Refused.


9-Not Applicable-not attempted and the patient did not perform the activity 

before the current illness, exacerbation or injury.


10-Not Attempted due to Environmental Limitations-(lack of equipment, weather 

restraints, etc.).


88-Not Attempted due to Medical Conditions or Safety Concerns.


Oral Hygiene (QC):  6


Shower/Bathe Self (QC):  3


Upper Body Dressing (QC):  3





OT Short Term Goals


Short Term Goals


Time Frame:  2022


Toileting hygiene:  2


Shower/bathe self:  3


Lower body dressin


Putting on/taking off footwear:  3





OT Long Term Goals


Long Term Goals


Time Frame:  Aug 5, 2022


Eating (QC):  6


Oral Hygiene (QC):  6


Toileting Hygiene (QC):  3 (bowel movements on toilet)


Shower/Bathe Self (QC):  4


Upper Body Dressing (QC):  5


Lower Body Dressing (QC):  3


On/Off Footwear (QC):  4


Additional Goals:  1-Demonstrate ADL Tasks, 2-Verbalize Understanding, 3-

ImproveStrength/[place]


1=Demonstrate adherence to instructed precautions during ADL tasks.


2=Patient will verbalize/demonstrate understanding of assistive devices/modif

ications for ADL.


3=Patient will improve strength/tolerance for activity to enable patient to 

perform ADL's.





OT Education/Plan


Problem List/Assessment


Assessment:  Decreased Activ Tolerance, Decreased UE Strength, Impaired Bed 

Mobility, Impaired Funct Balance, Impaired Self-Care Skills, Restricted Funct UE

ROM





Discharge Recommendations


Plan/Recommendations:  Continue POC





Treatment Plan/Plan of Care


Patient would benefit from OT for education, treatment and training to promote 

independence in ADL's, mobility, safety and/or upper extremity function for 

ADL's.


Plan of Care:  ADL Retraining, Caregiver Training, Functional Mobility, Group 

Exercise/Act as Ind, UE Funct Exercise/Act, W/C Management Training


Treatment Duration:  Aug 5, 2022


Frequency:  At least 5 of 7 days/Wk (IRF)


Estimated Hrs Per Day:  1.5 hours per day


Rehab Potential:  Guarded





Time/GCodes


Start Time:  09:00


Stop Time:  10:30


Total Time Billed (hr/min):  90


Billed Treatment Time


1 visit-ADL 4 (60 min)  EX 2 (30 min)  co-treat with PT 60 min, individual 30 

min











MICHAEL ALVAREZ               2022 12:35

## 2022-07-21 NOTE — PHYSICAL THERAPY DAILY NOTE
PT Daily Note-Current


Subjective


Patient in bed pre tx, agrees to PT, voices no complaints of pain at rest.  Will

be co-treating with OT due to poor patient mobility, strength, endurance, severe

debility, coordinate UE and LE during activity, safety and reduce risk of falls.





Appearance


Patient in WC at bedside post tx working with wound care nurse.





Mental Status


Patient Orientation:  Person, Place, Situation


Attachments:  Oxygen





Transfers


SCALE: Activities may be completed with or without assistive devices.





6-Indepedent-patient completes the activity by him/herself with no assistance 

from a helper.


5-Set-up or Clean-up Assistance-helper sets up or cleans up; patient completes 

activity. [place] assists only prior to or  


    following the activity.


4-Supervision or Touching Assistance-helper provides verbal cues and/or 

touching/steadying and/or contact guard assistance as patient completes 

activity. Assistance may be provided   


    throughout the activity or intermittently.


3-Partial/Moderate Assistance-helper does LESS THAN HALF the effort. [place] 

lifts, holds or supports trunk or limbs, but provides less than half the effort.


2-Substantial/Maximal Assistance-helper does MORE THAN HALF the effort. [place] 

lifts or holds trunk or limbs and provides more than half the effort.


0-Qwmwquunc-nvxdkh does ALL the effort. Patient does none of the effort to 

complete the activity. Or, the assistance of 2 or more helpers is required for 

the patient to complete the  


    activity.


If activity was not attempted, code reason:


7-Patient Refused.


9-Not Applicable-not attempted and the patient did not perform the activity 

before the current illness, exacerbation or injury.


10-Not Attempted due to Environmental Limitations-(lack of equipment, weather 

restraints, etc.).


88-Not Attempted due to Medical Conditions or Safety Concerns.


Roll Left & Right (QC):  4


Lying to Sitting/Side of Bed(Q:  3


Sit to Stand (QC):  2


Chair/Bed-to-Chair Xfer(QC):  2


Patient needs min assist for supine to sit, max assist to transfer to shower 

chair using rolling walker, after getting in shower chair he has to stand again 

to get his shorts off, after sitting back down he undresses completely and OT 

preps patient for shower, patient is wheeled to shower.  After shower patient 

transfers to , he cannot stand at this time due to fatigue and a therapist 

performs stand pivot transfer with max assist therapist in front.





Weight Bearing


Full Weight Bearing





Treatments


PT performed bed mobility and transfers, standing, positioning and safety during

bathing and dressing, OT performed bathing, dressing, ADL's, UE positioning and 

safety during activity.





Assessment


Current Status:  Poor Progress


no change in mobility, patient fatigues quickly and can only really perform a st

and pivot transfer using a walker about one time





PT Short Term Goals


Short Term Goals


Time Frame:  Jul 15, 2022


Roll Left & Right:  4


Sit to lying:  3 (Kacie)


Lying to sitting on side of be:  3 (Kacie)


Sit to stand:  3 (modA)


Chair/bed-to-chair transfer:  3 (modA)





PT Long Term Goals


Long Term Goals


PT Long Term Goals Time Frame:  Jul 29, 2022


Roll Left & Right (QC):  6


Sit to Lying (QC):  4 (SBA)


Lying-Sitting on Side/Bed(QC):  4 (SBA)


Sit to Stand (QC):  3 (Kacie)


Chair/Bed-to-Chair Xfer(QC):  3 (Kacie)


Toilet Transfer (QC):  3 (Kacie)


Car Transfer (QC):  3 (Kacie)


Does the Patient Walk:  No and Walking Goal NOT indicated


Walk 10 feet (QC):  88


Walk 50ft with 2 Turns (QC):  88


Walk 150 ft (QC):  88


Walking 10ft on Uneven Surface:  88


1 Step (curb) (QC):  88


4 Steps (QC):  88


12 Steps (QC):  88


Picking up an Object (QC):  88


Wheel 50 feet with 2 turns (QC:  6


Wheel 150 feet:  6





PT Plan


Problem List


Problem List:  Activity Tolerance, Functional Strength, Safety, Balance, 

Transfer, Bed Mobility, ROM





Treatment/Plan


Treatment Plan:  Continue Plan of Care


Treatment Plan:  Bed Mobility, Education, Functional Activity [place], Functional 

Strength, Group Therapy, Safety, Therapeutic Exercise, Transfers


Treatment Duration:  Jul 29, 2022


Frequency:  At least 5 of 7 days/Wk (IRF)


Estimated Hrs Per Day:  1.5 hours per day


Patient and/or Family Agrees t:  Yes





Safety Risks/Education


Patient Education:  Transfer Techniques, Correct Positioning, Safety Issues


Teaching Recipient:  Patient


Teaching Methods:  Demonstration, Discussion


Response to Teaching:  Reinforcement Needed





Time/GCodes


Time In:  0900


Time Out:  1000


Total Billed Treatment Time:  60


Total Billed Treatment


1 visit


FA 60'





co-treated with OT for 60 min











KIRAN RANGEL PT                Jul 21, 2022 09:54

## 2022-07-22 VITALS — SYSTOLIC BLOOD PRESSURE: 134 MMHG | DIASTOLIC BLOOD PRESSURE: 61 MMHG

## 2022-07-22 VITALS — DIASTOLIC BLOOD PRESSURE: 63 MMHG | SYSTOLIC BLOOD PRESSURE: 134 MMHG

## 2022-07-22 RX ADMIN — GLIMEPIRIDE SCH MG: 1 TABLET ORAL at 06:00

## 2022-07-22 RX ADMIN — LEVOTHYROXINE SODIUM SCH MCG: 100 TABLET ORAL at 06:00

## 2022-07-22 RX ADMIN — LOSARTAN POTASSIUM SCH MG: 50 TABLET, FILM COATED ORAL at 08:56

## 2022-07-22 RX ADMIN — DOCUSATE SODIUM AND SENNOSIDES SCH EA: 8.6; 5 TABLET, FILM COATED ORAL at 20:08

## 2022-07-22 RX ADMIN — POLYETHYLENE GLYCOL (3350) SCH GM: 17 POWDER, FOR SOLUTION ORAL at 09:13

## 2022-07-22 RX ADMIN — APIXABAN SCH MG: 5 TABLET, FILM COATED ORAL at 21:03

## 2022-07-22 RX ADMIN — SODIUM CHLORIDE SCH MLS/HR: 900 INJECTION INTRAVENOUS at 13:58

## 2022-07-22 RX ADMIN — POTASSIUM CHLORIDE SCH MEQ: 750 TABLET, FILM COATED, EXTENDED RELEASE ORAL at 08:56

## 2022-07-22 RX ADMIN — INSULIN ASPART SCH UNIT: 100 INJECTION, SOLUTION INTRAVENOUS; SUBCUTANEOUS at 06:04

## 2022-07-22 RX ADMIN — POLYETHYLENE GLYCOL (3350) SCH GM: 17 POWDER, FOR SOLUTION ORAL at 20:08

## 2022-07-22 RX ADMIN — DOCUSATE SODIUM SCH MG: 100 CAPSULE ORAL at 09:13

## 2022-07-22 RX ADMIN — DOCUSATE SODIUM SCH MG: 100 CAPSULE ORAL at 20:07

## 2022-07-22 RX ADMIN — SODIUM CHLORIDE SCH MG: 900 INJECTION, SOLUTION INTRAVENOUS at 08:57

## 2022-07-22 RX ADMIN — FUROSEMIDE SCH MG: 40 TABLET ORAL at 08:57

## 2022-07-22 RX ADMIN — METOPROLOL SUCCINATE SCH MG: 50 TABLET, EXTENDED RELEASE ORAL at 08:56

## 2022-07-22 RX ADMIN — AMIODARONE HYDROCHLORIDE SCH MG: 200 TABLET ORAL at 08:56

## 2022-07-22 RX ADMIN — POTASSIUM CHLORIDE SCH MEQ: 750 TABLET, FILM COATED, EXTENDED RELEASE ORAL at 21:03

## 2022-07-22 RX ADMIN — APIXABAN SCH MG: 5 TABLET, FILM COATED ORAL at 08:56

## 2022-07-22 RX ADMIN — INSULIN ASPART SCH UNIT: 100 INJECTION, SOLUTION INTRAVENOUS; SUBCUTANEOUS at 11:16

## 2022-07-22 RX ADMIN — Medication SCH EACH: at 17:38

## 2022-07-22 RX ADMIN — Medication SCH EACH: at 08:56

## 2022-07-22 RX ADMIN — PANTOPRAZOLE SODIUM SCH MG: 40 TABLET, DELAYED RELEASE ORAL at 08:56

## 2022-07-22 RX ADMIN — SODIUM CHLORIDE SCH MLS/HR: 900 INJECTION INTRAVENOUS at 21:04

## 2022-07-22 RX ADMIN — DOCUSATE SODIUM AND SENNOSIDES SCH EA: 8.6; 5 TABLET, FILM COATED ORAL at 09:13

## 2022-07-22 RX ADMIN — INSULIN ASPART SCH UNIT: 100 INJECTION, SOLUTION INTRAVENOUS; SUBCUTANEOUS at 16:57

## 2022-07-22 RX ADMIN — Medication SCH EACH: at 13:58

## 2022-07-22 RX ADMIN — INSULIN ASPART SCH UNIT: 100 INJECTION, SOLUTION INTRAVENOUS; SUBCUTANEOUS at 21:02

## 2022-07-22 RX ADMIN — SODIUM CHLORIDE SCH MLS/HR: 900 INJECTION INTRAVENOUS at 04:49

## 2022-07-22 NOTE — THERAPY GROUP DAILY NOTE
Therapy Daily Group Note


Patient Education Topic


Home Safety





Exercises


LE Seated Exercise, UE Exercise





Session


Ratio (pt:therapist):  8:2


Goal of Session:  Home Safety Strategies, UE/LE Strengthing


Goal Met for this Session:  Yes


Pt Benefit of Group:  Contributions to Others, F/U Use of Strategies @Home, 

Increased Functional Safety, Increased Functional Strength, Improved Cognition, 

Recognition of Peers, Socialization





Other/Notes


Pt propelled self to therapy gym for OT/PT group.  Group consisted of 

introductions (name, place living, roll-a-question), socialization, B UE/LE 

exercises and educational topic of home safety.  Pt actively listened to peers 

and introduced self appropriately.  Pt was able to complete B UE/LE seated 

exercises with modifications due to decreased B shldr AROM.  Pt verbalized 

understanding of educational topic and gave personal strategies used in home 

environment.  After session, pt sat in w/c with call light/phone in reach.  All 

needs met in room.


Start Time:  13:00


Stop Time:  14:10


Total Billed Treatment Time:  70


Total Billed Treatment


1-[facility]











MICHAEL ALVAREZ               Jul 22, 2022 14:44

## 2022-07-22 NOTE — PM&R PROGRESS NOTE
Subjective


HPI/CC On Admission


Date Seen by Provider:  Jul 22, 2022


Time Seen by Provider:  12:30


Subjective/Events-last exam


7/22/2022:


Pt is doing really well


Blood sugar improved


Lortab used pretty much all the time for pain


Bowels moved on 7/20/22 7/21/2022:


Patient doing well


Tolerating Zosyn


Pseudomonas in wound


Sugars are satisfactory








7/20/2022:


Pt is doing pretty well


Changed dressing


Dr. Kaiser assessed the wound


Gram negative rods in the wound so Zosyn started


Increasing Toprol and Lasix per cardiology


BP was 199


Discharge planning in the midst of evaluations of his needs








7/19/2022:


Doing well today


Volume overload resolved


Labs reviewed


Sugars noted


Pain controlled








7/18/2022:


Patient having difficulty with shortness of breath and tachypnea


Oxygen placed due to hypoxia


Septic work-up normal but it appears he has congestive heart failure and volume 

overload


Cardiology consulted after he gave him Lasix 40 mg IV


Supportive care will continue








7/17/2022:


Patient doing well


No pain reported


Slept well last night


No falls








7/16/2022:


Late entry note inadvertently missed note


Doing well


IV abx maintained


BM+


Wife at bedside


Melatonin gave him bad dreams








7/15/2022:


Pt is doing well


White blood cell count is 14


Hemoglobin is 8.9


Stump looks really good


Overall doing much better








7/14/2022:


Hemoglobin is 8.5 after one unit of blood yesterday


White count is 16,000


Bowels moved a little bit today, will get aggressive with that


Hep locking IV fluid











7/13/2022:


Pt had some incontinence today


Dressing was saturated


Decrease BP medication for Dr. Alejandra


A little bit confused


Will give one unit of blood today


Maintain on broad spectrum antibiotics


White count still elevated at 17








7/12/2022:


Pt is doing well 


Mild hypotension not due to sepsis so will give 500 ccs bolus


IV fluids 60 an hour


Decrease procalcitonin


Zosyn and Vancomycin maintained








7/11/2022:


Pt is doing a lot better


Sleeps all the time


Fever and elevated white count with procalcitonin elevation prompting blood and 

urine cultures, chest x-ray, and empiric IV antibiotics


PICC will be placed








7/10/2022:


Patient settling in well


Sugars lower so will hold Metformin


No pain except hip and left shoulder


Glucometer assessed


Wife at bedside


No issues otherwise





Review of Systems


General:  Fatigue, Malaise





Objective


Exam


Vital Signs





Vital Signs








  Date Time  Temp Pulse Resp B/P (MAP) Pulse Ox O2 Delivery O2 Flow Rate FiO2


 


7/22/22 20:35 36.7 69 16 134/63 (86) 94 Nasal Cannula 1.50 





Capillary Refill :


General Appearance:  No Apparent Distress, WD/WN, Chronically ill


HEENT:  PERRL/EOMI, Normal ENT Inspection, Pharynx Normal


Neck:  Normal Inspection, Supple


Respiratory:  Lungs Clear, Normal Breath Sounds, No Accessory Muscle Use, No 

Respiratory Distress


Cardiovascular:  Regular Rate, Rhythm, No Edema, No Gallop, No JVD, No Murmur, 

Normal Peripheral Pulses


Gastrointestinal:  Normal Bowel Sounds, No Organomegaly, No Pulsatile Mass, Non 

Tender, Soft


Back:  Normal Inspection, No CVA Tenderness, No Vertebral Tenderness


Extremity:  Other (left AKA with dressing in place C/D/I)


Neurologic/Psychiatric:  Alert, Oriented x3, CNs II-XII Norm as Tested, Motor 

Weakness


Skin:  Normal Color, Warm/Dry


Lymphatic:  No Adenopathy





Results/Procedures


Lab


Patient resulted labs reviewed.





FIM


Transfers


Therapy Code Descriptions/Definitions 





Functional [place] Measure:


0=Not Assessed/NA        4=Minimal Assistance


1=Total Assistance        5=Supervision or Setup


2=Maximal Assistance  6=Modified [place]


3=Moderate Assistance 7=Complete IndependenceSCALE: Activities may be completed 

with or without assistive devices.





6-Indepedent-patient completes the activity by him/herself with no assistance 

from a helper.


5-Set-up or Clean-up Assistance-helper sets up or cleans up; patient completes 

activity. [place] assists only prior to or  


    following the activity.


4-Supervision or Touching Assistance-helper provides verbal cues and/or 

touching/steadying and/or contact guard assistance as patient completes 

activity. Assistance may be provided   


    throughout the activity or intermittently.


3-Partial/Moderate Assistance-helper does LESS THAN HALF the effort. [place] li

fts, holds or supports trunk or limbs, but provides less than half the effort.


2-Substantial/Maximal Assistance-helper does MORE THAN HALF the effort. [place] 

lifts or holds trunk or limbs and provides more than half the effort.


6-Xfqgdolma-oubbwb does ALL the effort. Patient does none of the effort to 

complete the activity. Or, the assistance of 2 or more helpers is required for 

the patient to complete the  


    activity.


If activity was not attempted, code reason:


7-Patient Refused.


9-Not Applicable-not attempted and the patient did not perform the activity 

before the current illness, exacerbation or injury.


10-Not Attempted due to Environmental Limitations-(lack of equipment, weather 

restraints, etc.).


88-Not Attempted due to Medical Conditions or Safety Concerns.


Roll Left to Right (QC):  4


Sit to Lying (QC):  4


Sit to Stand (QC):  2


Chair/Bed-to-Chair Xfer(QC):  2


Car Transfer (QC):  2





Gait Training


Does the Patient Walk?:  No and Walking Goal IS indicated


Walk 10 feet (QC):  88


Walk 50 ft with 2 Turns(QC):  88


Walk 150 ft (QC):  88


Walking 10ft/uneven surface-QC:  88





Wheelchair Training


Does the Pt Use a Wheelchair?:  Yes


Distance:  100'x2


Wheel 50 ft with 2 turns (QC):  4


Wheel 150 ft (QC):  4


Type of Wheelchair:  Manual





Stair Training


1 Step (curb) (QC):  88


4 Steps (QC):  88


12 Steps (QC):  88





Balance


Picking up an Object (QC):  88





ADL-Treatment


Eating (QC):  6


Oral Hygiene (QC):  6


Bathing Location:  L Arm, R Arm, L Upper Leg, R Upper Leg, Chest, Abdomen, 

Perineal Area


Shower/Bathe Self (QC):  3


Upper Body Dressing (QC):  3


Lower Body Dressing (QC):  3 (Min A in supine)


On/Off Footwear (QC):  1


Toileting Hygiene (QC):  1 (Assist x2 for bowel movement. Setup with urinal)





Assessment/Plan


Assessment and Plan


Assess & Plan/Chief Complaint


Assessment:


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


Fall 7/5/22 resulting in left shoulder injury causing further debility and 

limiting ADL/independence


Severe PVD


PAF


OAC


HTN


HLP


DM


Hypothyroidism


Previous hypoglycemia required holding metformin and glimepiride but now on 

lower dose


Constipation resolved


Poor venous access requiring PICC 7/11/22


Anemia post op requiring transfusion 7/13/22


Volume overload CHF requiring IV Lasix 7/18/2022


Infected left stump with Pseudomonas started on abx Zosyn 7/20








Plan:


PT OT


w/c mobility


Home meds





7/10/2022:


Pain control


Monitor sugar


Hold Metformin





7/11/22:


Empiric broad spectrum abx


Hold Metformin and OAC


PICC line





7/12/2022:


IV abx


Monitor closely





7/13/2022:


Transfuse


Broad spectrum abx





7/14/2022:


Monitor hgb


Broad spectrum abx





7/15/2022:


IV abx


Monitor hgb





7/16/2022:


DC Melatonin


IV abx





7/17/2022:


Monitor closely


Check labs in am





7/18/2022:


IV Lasix


Oxygen





7/19/2022:


Monitor labs





7/20/22:


Abx





7/21/2022:


Zosyn





7/22/2022:


Monitor pain





(1) S/P AKA (above knee amputation)


(2) Gangrene











DIANA HENRIQUEZ DO                Jul 22, 2022 06:26

## 2022-07-22 NOTE — OCCUPATIONAL THER DAILY NOTE
OT Current Status-Daily Note


Subjective


Pt dozing in bed, woke easily to name.  Pt agrees to therapy.  Pt c/o fatigue 

and L hip.  Co-treat with PT([id number]), skills of 2 clinicians required to 

decrease fall risk and increase all mobility.  PT focusing on transfers, w/c 

mobility and standing while OT focusing on ADLs, B UE placement with standing.





Mental Status/Objective


Patient Orientation:  Person, Place, Time, Situation


Attachments:  IV





ADL-Treatment


Therapy Code Descriptions/Definitions 





Functional [place] Measure:


0=Not Assessed/NA        4=Minimal Assistance


1=Total Assistance        5=Supervision or Setup


2=Maximal Assistance  6=Modified [place]


3=Moderate Assistance 7=Complete IndependenceSCALE: Activities may be completed 

with or without assistive devices.





6-Indepedent-patient completes the activity by him/herself with no assistance 

from a helper.


5-Set-up or Clean-up Assistance-helper sets up or cleans up; patient completes 

activity. [place] assists only prior to or  


    following the activity.


4-Supervision or Touching Assistance-helper provides verbal cues and/or 

touching/steadying and/or contact guard assistance as patient completes 

activity. Assistance may be provided   


    throughout the activity or intermittently.


3-Partial/Moderate Assistance-helper does LESS THAN HALF the effort. [place] 

lifts, holds or supports trunk or limbs, but provides less than half the effort.


2-Substantial/Maximal Assistance-helper does MORE THAN HALF the effort. [place] 

lifts or holds trunk or limbs and provides more than half the effort.


7-Ftszttcri-gvkihg does ALL the effort. Patient does none of the effort to 

complete the activity. Or, the assistance of 2 or more helpers is required for 

the patient to complete the  


    activity.


If activity was not attempted, code reason:


7-Patient Refused.


9-Not Applicable-not attempted and the patient did not perform the activity 

before the current illness, exacerbation or injury.


10-Not Attempted due to Environmental Limitations-(lack of equipment, weather 

restraints, etc.).


88-Not Attempted due to Medical Conditions or Safety Concerns.


On/Off Footwear:  5 (set up to don shoe)





Other Treatment


Family education for samira lift transfer and bed mobility.  Pt has medical 

condition which requires positioning of the body in ways not feasible with an 

ordinary be.  Elevation of the HOB is required for pt to perform supine <--> EOB

then requiring differing heights to transfer into and out of bed safely.  Due to

AKA diagnosis patient is unable to transfer safely without assistance.  Samira 

lift is being ordered to improve ADLs and safe transfers.  Pt's wife 

demonstrated understanding of using samira lift though will need further training

to become proficient.  Pt then propelled w/c to therapy gym to work on standing 

at parallel bars 4x's during which he performs squats and alternating lifting 

each arm to work on dynamic balance for daily functional tasks.  After therapy, 

pt sitting in w/c in room with call light/phone in reach.  All needs met in 

room.





OT Short Term Goals


Short Term Goals


Time Frame:  2022


Toileting hygiene:  2


Shower/bathe self:  3


Lower body dressin


Putting on/taking off footwear:  3





OT Long Term Goals


Long Term Goals


Time Frame:  Aug 5, 2022


Eating (QC):  6


Oral Hygiene (QC):  6


Toileting Hygiene (QC):  3 (bowel movements on toilet)


Shower/Bathe Self (QC):  4


Upper Body Dressing (QC):  5


Lower Body Dressing (QC):  3


On/Off Footwear (QC):  4


Additional Goals:  1-Demonstrate ADL Tasks, 2-Verbalize Understanding, 3-

ImproveStrength/[place]


1=Demonstrate adherence to instructed precautions during ADL tasks.


2=Patient will verbalize/demonstrate understanding of assistive 

devices/modifications for ADL.


3=Patient will improve strength/tolerance for activity to enable patient to 

perform ADL's.





OT Education/Plan


Problem List/Assessment


Assessment:  Decreased Activ Tolerance, Decreased UE Strength, Impaired Bed 

Mobility, Impaired Coordination, Impaired Funct Balance, Impaired Self-Care 

Skills, Restricted Funct UE ROM





Discharge Recommendations


Plan/Recommendations:  Continue POC





Treatment Plan/Plan of Care


Patient would benefit from OT for education, treatment and training to promote 

independence in ADL's, mobility, safety and/or upper extremity function for 

ADL's.


Plan of Care:  ADL Retraining, Caregiver Training, Functional Mobility, Group 

Exercise/Act as Ind, UE Funct Exercise/Act, W/C Management Training


Treatment Duration:  Aug 5, 2022


Frequency:  At least 5 of 7 days/Wk (IRF)


Estimated Hrs Per Day:  1.5 hours per day


Rehab Potential:  Guarded





Time/GCodes


Start Time:  09:00


Stop Time:  10:00


Total Time Billed (hr/min):  60


Billed Treatment Time


1 visit-ADL 1 (15 min)  FA 3 (45 min) co-treat with PT 60 min











MICHAEL ALVAREZ               2022 10:02

## 2022-07-22 NOTE — PHYSICAL THERAPY DAILY NOTE
PT Daily Note-Current


Subjective


Patient in bed pre tx, agrees to PT, voices no complaints of pain at rest.  Will

be co-treating with OT due to poor patient mobility, strength, endurance, severe

debility, coordinate UE and LE with activity, safety and reduce risk of falls.  

Wife is here for jorden training.





Appearance


Patient in WC at bedside post tx with nurse call, phone, tray, all needs met.





Mental Status


Patient Orientation:  Person, Place, Situation


Attachments:  Oxygen





Transfers


SCALE: Activities may be completed with or without assistive devices.





6-Indepedent-patient completes the activity by him/herself with no assistance 

from a helper.


5-Set-up or Clean-up Assistance-helper sets up or cleans up; patient completes 

activity. [place] assists only prior to or  


    following the activity.


4-Supervision or Touching Assistance-helper provides verbal cues and/or touchi

ng/steadying and/or contact guard assistance as patient completes activity. 

Assistance may be provided   


    throughout the activity or intermittently.


3-Partial/Moderate Assistance-helper does LESS THAN HALF the effort. [place] 

lifts, holds or supports trunk or limbs, but provides less than half the effort.


2-Substantial/Maximal Assistance-helper does MORE THAN HALF the effort. [place] 

lifts or holds trunk or limbs and provides more than half the effort.


2-Choghohhy-denziq does ALL the effort. Patient does none of the effort to 

complete the activity. Or, the assistance of 2 or more helpers is required for 

the patient to complete the  


    activity.


If activity was not attempted, code reason:


7-Patient Refused.


9-Not Applicable-not attempted and the patient did not perform the activity 

before the current illness, exacerbation or injury.


10-Not Attempted due to Environmental Limitations-(lack of equipment, weather 

restraints, etc.).


88-Not Attempted due to Medical Conditions or Safety Concerns.


Roll Left & Right (QC):  4


Sit to Lying (QC):  4


Lying to Sitting/Side of Bed(Q:  3


Sit to Stand (QC):  1


Chair/Bed-to-Chair Xfer(QC):  1


Patient needs min assist for supine to sit, patient requires the head of the bed

to be elevated for supine to sit due to his shoulder impairments, otherwise he 

is dependent for it.  Patient lays back down and wife is educated on how to get 

the jorden sling under him and how to operate the jorden and she successfully 

transfers patient to the  (with therapist direction).





Weight Bearing


Full Weight Bearing





Wheelchair Training


Does the Pt Use a Wheelchair?:  Yes


Wheel 50 ft with 2 turns (QC):  4


Type of Wheelchair:  Manual


120'x2, very slow, needs occasional rest breaks





Exercises


Standing:  3 way Ex=Flex, Abd, Ext (x10, LLE), Mini squats (x5)


standing in parallel bars x4 for about 30 sec each time, 2 of those times 

patient practiced letting go of parallel bars with alternate hands and reaching 

across to shoulders





Treatments


PT performed bed mobility and transfers, standing, family education, standing, 

LE exercise, OT performed UE positioning and safety during activity, family 

education, UE exercise.





Assessment


Current Status:  Poor Progress


Patient will need a jorden lift for family to use at home.  Patient has too much 

difficulty with transfers without the use of a skilled clinician to decrease 

risk of falls and the amount of lifting required would also increase risk of 

injury for helper.  The patient may otherwise be confined to bed due to 

difficulty of transfers.





PT Short Term Goals


Short Term Goals


Time Frame:  Jul 15, 2022


Roll Left & Right:  4


Sit to lying:  3 (Kacie)


Lying to sitting on side of be:  3 (Kacie)


Sit to stand:  3 (modA)


Chair/bed-to-chair transfer:  3 (modA)





PT Long Term Goals


Long Term Goals


PT Long Term Goals Time Frame:  Jul 29, 2022


Roll Left & Right (QC):  6


Sit to Lying (QC):  4 (SBA)


Lying-Sitting on Side/Bed(QC):  4 (SBA)


Sit to Stand (QC):  3 (Kacie)


Chair/Bed-to-Chair Xfer(QC):  3 (Kacie)


Toilet Transfer (QC):  3 (Kacie)


Car Transfer (QC):  3 (Kacie)


Does the Patient Walk:  No and Walking Goal NOT indicated


Walk 10 feet (QC):  88


Walk 50ft with 2 Turns (QC):  88


Walk 150 ft (QC):  88


Walking 10ft on Uneven Surface:  88


1 Step (curb) (QC):  88


4 Steps (QC):  88


12 Steps (QC):  88


Picking up an Object (QC):  88


Wheel 50 feet with 2 turns (QC:  6


Wheel 150 feet:  6





PT Plan


Problem List


Problem List:  Activity Tolerance, Functional Strength, Safety, Balance, Gait, 

Transfer, Bed Mobility, ROM





Treatment/Plan


Treatment Plan:  Continue Plan of Care


Treatment Plan:  Bed Mobility, Education, Functional Activity [place], Functional 

Strength, Group Therapy, Safety, Therapeutic Exercise, Transfers


Treatment Duration:  Jul 29, 2022


Frequency:  At least 5 of 7 days/Wk (IRF)


Estimated Hrs Per Day:  1.5 hours per day


Patient and/or Family Agrees t:  Yes





Safety Risks/Education


Patient Education:  Transfer Techniques, Correct Positioning, W/C Management, 

Safety Issues


Teaching Recipient:  Patient, Family


Teaching Methods:  Demonstration, Discussion


Response to Teaching:  Verbalize Understanding, Return Demonstration, 

Reinforcement Needed





Time/GCodes


Time In:  0900


Time Out:  1000


Total Billed Treatment Time:  60


Total Billed Treatment


1 visit





FA 60'





co-treated for 60'











KIRAN RANGEL PT                Jul 22, 2022 10:00

## 2022-07-23 VITALS — DIASTOLIC BLOOD PRESSURE: 60 MMHG | SYSTOLIC BLOOD PRESSURE: 138 MMHG

## 2022-07-23 VITALS — SYSTOLIC BLOOD PRESSURE: 137 MMHG | DIASTOLIC BLOOD PRESSURE: 63 MMHG

## 2022-07-23 LAB
ALBUMIN SERPL-MCNC: 2.7 GM/DL (ref 3.2–4.5)
ALP SERPL-CCNC: 66 U/L (ref 40–136)
ALT SERPL-CCNC: 17 U/L (ref 0–55)
BASOPHILS # BLD AUTO: 0.1 10^3/UL (ref 0–0.1)
BASOPHILS NFR BLD AUTO: 1 % (ref 0–10)
BILIRUB SERPL-MCNC: 0.3 MG/DL (ref 0.1–1)
BUN/CREAT SERPL: 13
CALCIUM SERPL-MCNC: 9 MG/DL (ref 8.5–10.1)
CHLORIDE SERPL-SCNC: 102 MMOL/L (ref 98–107)
CO2 SERPL-SCNC: 29 MMOL/L (ref 21–32)
CREAT SERPL-MCNC: 0.76 MG/DL (ref 0.6–1.3)
EOSINOPHIL # BLD AUTO: 0.2 10^3/UL (ref 0–0.3)
EOSINOPHIL NFR BLD AUTO: 1 % (ref 0–10)
GFR SERPLBLD BASED ON 1.73 SQ M-ARVRAT: 96 ML/MIN
GLUCOSE SERPL-MCNC: 102 MG/DL (ref 70–105)
HCT VFR BLD CALC: 31 % (ref 40–54)
HGB BLD-MCNC: 9 G/DL (ref 13.3–17.7)
LYMPHOCYTES # BLD AUTO: 0.8 10^3/UL (ref 1–4)
LYMPHOCYTES NFR BLD AUTO: 7 % (ref 12–44)
MANUAL DIFFERENTIAL PERFORMED BLD QL: NO
MCH RBC QN AUTO: 25 PG (ref 25–34)
MCHC RBC AUTO-ENTMCNC: 29 G/DL (ref 32–36)
MCV RBC AUTO: 84 FL (ref 80–99)
MONOCYTES # BLD AUTO: 0.7 10^3/UL (ref 0–1)
MONOCYTES NFR BLD AUTO: 6 % (ref 0–12)
NEUTROPHILS # BLD AUTO: 9.5 10^3/UL (ref 1.8–7.8)
NEUTROPHILS NFR BLD AUTO: 85 % (ref 42–75)
PLATELET # BLD: 327 10^3/UL (ref 130–400)
PMV BLD AUTO: 10.9 FL (ref 9–12.2)
POTASSIUM SERPL-SCNC: 3.7 MMOL/L (ref 3.6–5)
PROT SERPL-MCNC: 6 GM/DL (ref 6.4–8.2)
SODIUM SERPL-SCNC: 144 MMOL/L (ref 135–145)
WBC # BLD AUTO: 11.2 10^3/UL (ref 4.3–11)

## 2022-07-23 RX ADMIN — FUROSEMIDE SCH MG: 40 TABLET ORAL at 08:11

## 2022-07-23 RX ADMIN — LEVOTHYROXINE SODIUM SCH MCG: 100 TABLET ORAL at 06:45

## 2022-07-23 RX ADMIN — Medication SCH MCG: at 06:45

## 2022-07-23 RX ADMIN — SODIUM CHLORIDE SCH MLS/HR: 900 INJECTION INTRAVENOUS at 13:26

## 2022-07-23 RX ADMIN — LOSARTAN POTASSIUM SCH MG: 50 TABLET, FILM COATED ORAL at 08:11

## 2022-07-23 RX ADMIN — DOCUSATE SODIUM AND SENNOSIDES SCH EA: 8.6; 5 TABLET, FILM COATED ORAL at 09:43

## 2022-07-23 RX ADMIN — AMIODARONE HYDROCHLORIDE SCH MG: 200 TABLET ORAL at 08:12

## 2022-07-23 RX ADMIN — INSULIN ASPART SCH UNIT: 100 INJECTION, SOLUTION INTRAVENOUS; SUBCUTANEOUS at 12:20

## 2022-07-23 RX ADMIN — SODIUM CHLORIDE SCH MLS/HR: 900 INJECTION INTRAVENOUS at 20:31

## 2022-07-23 RX ADMIN — POTASSIUM CHLORIDE SCH MEQ: 750 TABLET, FILM COATED, EXTENDED RELEASE ORAL at 20:31

## 2022-07-23 RX ADMIN — PANTOPRAZOLE SODIUM SCH MG: 40 TABLET, DELAYED RELEASE ORAL at 08:11

## 2022-07-23 RX ADMIN — DOCUSATE SODIUM AND SENNOSIDES SCH EA: 8.6; 5 TABLET, FILM COATED ORAL at 20:32

## 2022-07-23 RX ADMIN — INSULIN ASPART SCH UNIT: 100 INJECTION, SOLUTION INTRAVENOUS; SUBCUTANEOUS at 21:59

## 2022-07-23 RX ADMIN — INSULIN ASPART SCH UNIT: 100 INJECTION, SOLUTION INTRAVENOUS; SUBCUTANEOUS at 16:57

## 2022-07-23 RX ADMIN — METOPROLOL SUCCINATE SCH MG: 50 TABLET, EXTENDED RELEASE ORAL at 08:11

## 2022-07-23 RX ADMIN — POTASSIUM CHLORIDE SCH MEQ: 750 TABLET, FILM COATED, EXTENDED RELEASE ORAL at 08:11

## 2022-07-23 RX ADMIN — APIXABAN SCH MG: 5 TABLET, FILM COATED ORAL at 20:31

## 2022-07-23 RX ADMIN — INSULIN ASPART SCH UNIT: 100 INJECTION, SOLUTION INTRAVENOUS; SUBCUTANEOUS at 06:16

## 2022-07-23 RX ADMIN — Medication SCH EACH: at 16:57

## 2022-07-23 RX ADMIN — DOCUSATE SODIUM SCH MG: 100 CAPSULE ORAL at 20:31

## 2022-07-23 RX ADMIN — POLYETHYLENE GLYCOL (3350) SCH GM: 17 POWDER, FOR SOLUTION ORAL at 09:43

## 2022-07-23 RX ADMIN — SODIUM CHLORIDE SCH MLS/HR: 900 INJECTION INTRAVENOUS at 05:20

## 2022-07-23 RX ADMIN — Medication SCH EACH: at 08:11

## 2022-07-23 RX ADMIN — POLYETHYLENE GLYCOL (3350) SCH GM: 17 POWDER, FOR SOLUTION ORAL at 20:32

## 2022-07-23 RX ADMIN — DOCUSATE SODIUM SCH MG: 100 CAPSULE ORAL at 09:32

## 2022-07-23 RX ADMIN — APIXABAN SCH MG: 5 TABLET, FILM COATED ORAL at 08:11

## 2022-07-23 RX ADMIN — GLIMEPIRIDE SCH MG: 1 TABLET ORAL at 06:45

## 2022-07-23 RX ADMIN — Medication SCH EACH: at 13:26

## 2022-07-23 NOTE — PHYSICAL THERAPY DAILY NOTE
PT Daily Note-Current


Subjective


Upon arrival, Pt was supine in bed. Pts wife was present. Pt agrees to PT.





Mental Status


Patient Orientation:  Person, Place, Situation


Attachments:  Oxygen (2L)





Transfers


SCALE: Activities may be completed with or without assistive devices.





6-Indepedent-patient completes the activity by him/herself with no assistance 

from a helper.


5-Set-up or Clean-up Assistance-helper sets up or cleans up; patient completes 

activity. [place] assists only prior to or  


    following the activity.


4-Supervision or Touching Assistance-helper provides verbal cues and/or 

touching/steadying and/or contact guard assistance as patient completes 

activity. Assistance may be provided   


    throughout the activity or intermittently.


3-Partial/Moderate Assistance-helper does LESS THAN HALF the effort. [place] 

lifts, holds or supports trunk or limbs, but provides less than half the effort.


2-Substantial/Maximal Assistance-helper does MORE THAN HALF the effort. [place] 

lifts or holds trunk or limbs and provides more than half the effort.


9-Kbgjmxtqv-zkgppm does ALL the effort. Patient does none of the effort to 

complete the activity. Or, the assistance of 2 or more helpers is required for 

the patient to complete the  


    activity.


If activity was not attempted, code reason:


7-Patient Refused.


9-Not Applicable-not attempted and the patient did not perform the activity 

before the current illness, exacerbation or injury.


10-Not Attempted due to Environmental Limitations-(lack of equipment, weather 

restraints, etc.).


88-Not Attempted due to Medical Conditions or Safety Concerns.


Pt completed a pivot transfer to / with assistance from PT.





Weight Bearing


Full Weight Bearing





Gait Training


Does the Patient Walk?:  No and Walking Goal IS indicated





Wheelchair Training


Does the Pt Use a Wheelchair?:  Yes





Exercises


Supine Ex:  Ankle pumps, Glut sets, Heel Slides, Straight leg raise, Hip abd/add


Supine Reps:  15


Seated Therapy Exercises:  Long arc quads, Hip flexion


Seated Reps:  15





Treatments


Pt performed and completed all Exs listed above along with hamstring bed press. 

Pt then transferred to /. Once PT was completed, pt wheeled out of room with 

wife present.





Assessment


Current Status:  Good Progress


Pt would benefit from continued PT to improve on strength, and activity 

tolerance.





PT Short Term Goals


Short Term Goals


Time Frame:  Jul 15, 2022


Roll Left & Right:  4


Sit to lying:  3 (Kacie)


Lying to sitting on side of be:  3 (Kacie)


Sit to stand:  3 (modA)


Chair/bed-to-chair transfer:  3 (modA)





PT Long Term Goals


Long Term Goals


PT Long Term Goals Time Frame:  Jul 29, 2022


Roll Left & Right (QC):  6


Sit to Lying (QC):  4 (SBA)


Lying-Sitting on Side/Bed(QC):  4 (SBA)


Sit to Stand (QC):  3 (Kacie)


Chair/Bed-to-Chair Xfer(QC):  3 (Kacie)


Toilet Transfer (QC):  3 (Kacie)


Car Transfer (QC):  3 (Kacie)


Does the Patient Walk:  No and Walking Goal NOT indicated


Walk 10 feet (QC):  88


Walk 50ft with 2 Turns (QC):  88


Walk 150 ft (QC):  88


Walking 10ft on Uneven Surface:  88


1 Step (curb) (QC):  88


4 Steps (QC):  88


12 Steps (QC):  88


Picking up an Object (QC):  88


Wheel 50 feet with 2 turns (QC:  6


Wheel 150 feet:  6





PT Plan


Problem List


Problem List:  Activity Tolerance, Functional Strength





Treatment/Plan


Treatment Plan:  Continue Plan of Care


Treatment Plan:  Bed Mobility, Education, Functional Activity [place], Functional 

Strength, Group Therapy, Safety, Therapeutic Exercise, Transfers


Treatment Duration:  Jul 29, 2022


Frequency:  At least 5 of 7 days/Wk (IRF)


Estimated Hrs Per Day:  1.5 hours per day


Patient and/or Family Agrees t:  Yes





Time/GCodes


Time In:  1020


Time Out:  1045


Total Billed Treatment Time:  25


Total Billed Treatment


1, Ex (2) 25











LUDA DOSS PTA          Jul 23, 2022 11:33

## 2022-07-23 NOTE — PM&R PROGRESS NOTE
Subjective


HPI/CC On Admission


Date Seen by Provider:  Jul 23, 2022


Time Seen by Provider:  12:00


Subjective/Events-last exam


7/23/2022:


Doing well


Pain controlled


Abx maintained


No falls


Improved status


Labs reviewed








7/22/2022:


Pt is doing really well


Blood sugar improved


Lortab used pretty much all the time for pain


Bowels moved on 7/20/22 7/21/2022:


Patient doing well


Tolerating Zosyn


Pseudomonas in wound


Sugars are satisfactory








7/20/2022:


Pt is doing pretty well


Changed dressing


Dr. Kaiser assessed the wound


Gram negative rods in the wound so Zosyn started


Increasing Toprol and Lasix per cardiology


BP was 199


Discharge planning in the midst of evaluations of his needs








7/19/2022:


Doing well today


Volume overload resolved


Labs reviewed


Sugars noted


Pain controlled








7/18/2022:


Patient having difficulty with shortness of breath and tachypnea


Oxygen placed due to hypoxia


Septic work-up normal but it appears he has congestive heart failure and volume 

overload


Cardiology consulted after he gave him Lasix 40 mg IV


Supportive care will continue








7/17/2022:


Patient doing well


No pain reported


Slept well last night


No falls








7/16/2022:


Late entry note inadvertently missed note


Doing well


IV abx maintained


BM+


Wife at bedside


Melatonin gave him bad dreams








7/15/2022:


Pt is doing well


White blood cell count is 14


Hemoglobin is 8.9


Stump looks really good


Overall doing much better








7/14/2022:


Hemoglobin is 8.5 after one unit of blood yesterday


White count is 16,000


Bowels moved a little bit today, will get aggressive with that


Hep locking IV fluid











7/13/2022:


Pt had some incontinence today


Dressing was saturated


Decrease BP medication for Dr. Alejandra


A little bit confused


Will give one unit of blood today


Maintain on broad spectrum antibiotics


White count still elevated at 17








7/12/2022:


Pt is doing well 


Mild hypotension not due to sepsis so will give 500 ccs bolus


IV fluids 60 an hour


Decrease procalcitonin


Zosyn and Vancomycin maintained








7/11/2022:


Pt is doing a lot better


Sleeps all the time


Fever and elevated white count with procalcitonin elevation prompting blood and 

urine cultures, chest x-ray, and empiric IV antibiotics


PICC will be placed








7/10/2022:


Patient settling in well


Sugars lower so will hold Metformin


No pain except hip and left shoulder


Glucometer assessed


Wife at bedside


No issues otherwise





Review of Systems


General:  Fatigue, Malaise





Objective


Exam


Vital Signs





Vital Signs








  Date Time  Temp Pulse Resp B/P (MAP) Pulse Ox O2 Delivery O2 Flow Rate FiO2


 


7/23/22 21:14      Nasal Cannula 2.00 


 


7/23/22 20:00 36.5 70 18 138/60 (86) 93   





Capillary Refill :


General Appearance:  No Apparent Distress, WD/WN, Chronically ill


HEENT:  PERRL/EOMI, Normal ENT Inspection, Pharynx Normal


Neck:  Normal Inspection, Supple


Respiratory:  Lungs Clear, Normal Breath Sounds, No Accessory Muscle Use, No 

Respiratory Distress


Cardiovascular:  Regular Rate, Rhythm, No Edema, No Gallop, No JVD, No Murmur, 

Normal Peripheral Pulses


Gastrointestinal:  Normal Bowel Sounds, No Organomegaly, No Pulsatile Mass, Non 

Tender, Soft


Back:  Normal Inspection, No CVA Tenderness, No Vertebral Tenderness


Extremity:  Other (left AKA with dressing in place C/D/I)


Neurologic/Psychiatric:  Alert, Oriented x3, CNs II-XII Norm as Tested, Motor 

Weakness


Skin:  Normal Color, Warm/Dry


Lymphatic:  No Adenopathy





Results/Procedures


Lab


Laboratory Tests


7/23/22 05:35








Patient resulted labs reviewed.





FIM


Transfers


Therapy Code Descriptions/Definitions 





Functional [place] Measure:


0=Not Assessed/NA        4=Minimal Assistance


1=Total Assistance        5=Supervision or Setup


2=Maximal Assistance  6=Modified [place]


3=Moderate Assistance 7=Complete IndependenceSCALE: Activities may be completed 

with or without assistive devices.





6-Indepedent-patient completes the activity by him/herself with no assistance 

from a helper.


5-Set-up or Clean-up Assistance-helper sets up or cleans up; patient completes 

activity. [place] assists only prior to or  


    following the activity.


4-Supervision or Touching Assistance-helper provides verbal cues and/or 

touching/steadying and/or contact guard assistance as patient completes 

activity. Assistance may be provided   


    throughout the activity or intermittently.


3-Partial/Moderate Assistance-helper does LESS THAN HALF the effort. [place] 

lifts, holds or supports trunk or limbs, but provides less than half the effort.


2-Substantial/Maximal Assistance-helper does MORE THAN HALF the effort. [place] 

lifts or holds trunk or limbs and provides more than half the effort.


4-Wkqkzmxxq-xtqyjp does ALL the effort. Patient does none of the effort to 

complete the activity. Or, the assistance of 2 or more helpers is required for 

the patient to complete the  


    activity.


If activity was not attempted, code reason:


7-Patient Refused.


9-Not Applicable-not attempted and the patient did not perform the activity 

before the current illness, exacerbation or injury.


10-Not Attempted due to Environmental Limitations-(lack of equipment, weather 

restraints, etc.).


88-Not Attempted due to Medical Conditions or Safety Concerns.


Roll Left to Right (QC):  4


Sit to Lying (QC):  4


Sit to Stand (QC):  1


Chair/Bed-to-Chair Xfer(QC):  1


Car Transfer (QC):  2





Gait Training


Does the Patient Walk?:  No and Walking Goal IS indicated


Walk 10 feet (QC):  88


Walk 50 ft with 2 Turns(QC):  88


Walk 150 ft (QC):  88


Walking 10ft/uneven surface-QC:  88





Wheelchair Training


Does the Pt Use a Wheelchair?:  Yes


Distance:  100'x2


Wheel 50 ft with 2 turns (QC):  4


Wheel 150 ft (QC):  4


Type of Wheelchair:  Manual





Stair Training


1 Step (curb) (QC):  88


4 Steps (QC):  88


12 Steps (QC):  88





Balance


Picking up an Object (QC):  88





ADL-Treatment


Eating (QC):  6


Oral Hygiene (QC):  6


Bathing Location:  L Arm, R Arm, L Upper Leg, R Upper Leg, Chest, Abdomen, 

Perineal Area


Shower/Bathe Self (QC):  3


Upper Body Dressing (QC):  3


Lower Body Dressing (QC):  3 (Min A in supine)


On/Off Footwear (QC):  5 (set up to don shoe)


Toileting Hygiene (QC):  1 (Assist x2 for bowel movement. Setup with urinal)





Assessment/Plan


Assessment and Plan


Assess & Plan/Chief Complaint


Assessment:


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


Fall 7/5/22 resulting in left shoulder injury causing further debility and 

limiting ADL/independence


Severe PVD


PAF


OAC


HTN


HLP


DM


Hypothyroidism


Previous hypoglycemia required holding metformin and glimepiride but now on 

lower dose


Constipation resolved


Poor venous access requiring PICC 7/11/22


Anemia post op requiring transfusion 7/13/22


Volume overload CHF requiring IV Lasix 7/18/2022


Infected left stump with Pseudomonas started on abx Zosyn 7/20








Plan:


PT OT


w/c mobility


Home meds





7/10/2022:


Pain control


Monitor sugar


Hold Metformin





7/11/22:


Empiric broad spectrum abx


Hold Metformin and OAC


PICC line





7/12/2022:


IV abx


Monitor closely





7/13/2022:


Transfuse


Broad spectrum abx





7/14/2022:


Monitor hgb


Broad spectrum abx





7/15/2022:


IV abx


Monitor hgb





7/16/2022:


DC Melatonin


IV abx





7/17/2022:


Monitor closely


Check labs in am





7/18/2022:


IV Lasix


Oxygen





7/19/2022:


Monitor labs





7/20/22:


Abx





7/21/2022:


Zosyn





7/22/2022:


Monitor pain





7/23/2022:


Continue abx





(1) S/P AKA (above knee amputation)


(2) Gangrene











DIANA HENRIQUEZ DO                Jul 23, 2022 07:17

## 2022-07-24 VITALS — SYSTOLIC BLOOD PRESSURE: 147 MMHG | DIASTOLIC BLOOD PRESSURE: 65 MMHG

## 2022-07-24 VITALS — SYSTOLIC BLOOD PRESSURE: 129 MMHG | DIASTOLIC BLOOD PRESSURE: 58 MMHG

## 2022-07-24 RX ADMIN — SODIUM CHLORIDE SCH MLS/HR: 900 INJECTION INTRAVENOUS at 05:02

## 2022-07-24 RX ADMIN — INSULIN ASPART SCH UNIT: 100 INJECTION, SOLUTION INTRAVENOUS; SUBCUTANEOUS at 17:08

## 2022-07-24 RX ADMIN — POLYETHYLENE GLYCOL (3350) SCH GM: 17 POWDER, FOR SOLUTION ORAL at 09:50

## 2022-07-24 RX ADMIN — APIXABAN SCH MG: 5 TABLET, FILM COATED ORAL at 19:59

## 2022-07-24 RX ADMIN — LOSARTAN POTASSIUM SCH MG: 50 TABLET, FILM COATED ORAL at 09:39

## 2022-07-24 RX ADMIN — Medication SCH EACH: at 18:01

## 2022-07-24 RX ADMIN — FUROSEMIDE SCH MG: 40 TABLET ORAL at 09:39

## 2022-07-24 RX ADMIN — POTASSIUM CHLORIDE SCH MEQ: 750 TABLET, FILM COATED, EXTENDED RELEASE ORAL at 09:38

## 2022-07-24 RX ADMIN — POLYETHYLENE GLYCOL (3350) SCH GM: 17 POWDER, FOR SOLUTION ORAL at 19:59

## 2022-07-24 RX ADMIN — INSULIN ASPART SCH UNIT: 100 INJECTION, SOLUTION INTRAVENOUS; SUBCUTANEOUS at 05:24

## 2022-07-24 RX ADMIN — LEVOTHYROXINE SODIUM SCH MCG: 100 TABLET ORAL at 06:00

## 2022-07-24 RX ADMIN — SODIUM CHLORIDE SCH MG: 900 INJECTION, SOLUTION INTRAVENOUS at 09:05

## 2022-07-24 RX ADMIN — INSULIN ASPART SCH UNIT: 100 INJECTION, SOLUTION INTRAVENOUS; SUBCUTANEOUS at 21:08

## 2022-07-24 RX ADMIN — APIXABAN SCH MG: 5 TABLET, FILM COATED ORAL at 09:39

## 2022-07-24 RX ADMIN — GLIMEPIRIDE SCH MG: 1 TABLET ORAL at 06:00

## 2022-07-24 RX ADMIN — Medication SCH EACH: at 13:17

## 2022-07-24 RX ADMIN — METOPROLOL SUCCINATE SCH MG: 50 TABLET, EXTENDED RELEASE ORAL at 09:38

## 2022-07-24 RX ADMIN — DOCUSATE SODIUM AND SENNOSIDES SCH EA: 8.6; 5 TABLET, FILM COATED ORAL at 09:50

## 2022-07-24 RX ADMIN — INSULIN ASPART SCH UNIT: 100 INJECTION, SOLUTION INTRAVENOUS; SUBCUTANEOUS at 12:05

## 2022-07-24 RX ADMIN — SODIUM CHLORIDE SCH MLS/HR: 900 INJECTION INTRAVENOUS at 13:17

## 2022-07-24 RX ADMIN — POTASSIUM CHLORIDE SCH MEQ: 750 TABLET, FILM COATED, EXTENDED RELEASE ORAL at 19:59

## 2022-07-24 RX ADMIN — Medication SCH MCG: at 06:00

## 2022-07-24 RX ADMIN — DOCUSATE SODIUM SCH MG: 100 CAPSULE ORAL at 19:59

## 2022-07-24 RX ADMIN — DOCUSATE SODIUM AND SENNOSIDES SCH EA: 8.6; 5 TABLET, FILM COATED ORAL at 19:42

## 2022-07-24 RX ADMIN — AMIODARONE HYDROCHLORIDE SCH MG: 200 TABLET ORAL at 09:39

## 2022-07-24 RX ADMIN — Medication SCH EACH: at 09:39

## 2022-07-24 RX ADMIN — PANTOPRAZOLE SODIUM SCH MG: 40 TABLET, DELAYED RELEASE ORAL at 09:38

## 2022-07-24 RX ADMIN — SODIUM CHLORIDE SCH MLS/HR: 900 INJECTION INTRAVENOUS at 21:09

## 2022-07-24 RX ADMIN — DOCUSATE SODIUM SCH MG: 100 CAPSULE ORAL at 09:50

## 2022-07-24 NOTE — PM&R PROGRESS NOTE
Subjective


HPI/CC On Admission


Date Seen by Provider:  Jul 24, 2022


Time Seen by Provider:  12:00


Subjective/Events-last exam


7/24/2022:


Doing well


No pain


Lortab used


Abx maintained


Stump appears improved








7/23/2022:


Doing well


Pain controlled


Abx maintained


No falls


Improved status


Labs reviewed








7/22/2022:


Pt is doing really well


Blood sugar improved


Lortab used pretty much all the time for pain


Bowels moved on 7/20/22 7/21/2022:


Patient doing well


Tolerating Zosyn


Pseudomonas in wound


Sugars are satisfactory








7/20/2022:


Pt is doing pretty well


Changed dressing


Dr. Kaiser assessed the wound


Gram negative rods in the wound so Zosyn started


Increasing Toprol and Lasix per cardiology


BP was 199


Discharge planning in the midst of evaluations of his needs








7/19/2022:


Doing well today


Volume overload resolved


Labs reviewed


Sugars noted


Pain controlled








7/18/2022:


Patient having difficulty with shortness of breath and tachypnea


Oxygen placed due to hypoxia


Septic work-up normal but it appears he has congestive heart failure and volume 

overload


Cardiology consulted after he gave him Lasix 40 mg IV


Supportive care will continue








7/17/2022:


Patient doing well


No pain reported


Slept well last night


No falls








7/16/2022:


Late entry note inadvertently missed note


Doing well


IV abx maintained


BM+


Wife at bedside


Melatonin gave him bad dreams








7/15/2022:


Pt is doing well


White blood cell count is 14


Hemoglobin is 8.9


Stump looks really good


Overall doing much better








7/14/2022:


Hemoglobin is 8.5 after one unit of blood yesterday


White count is 16,000


Bowels moved a little bit today, will get aggressive with that


Hep locking IV fluid











7/13/2022:


Pt had some incontinence today


Dressing was saturated


Decrease BP medication for Dr. Alejandra


A little bit confused


Will give one unit of blood today


Maintain on broad spectrum antibiotics


White count still elevated at 17








7/12/2022:


Pt is doing well 


Mild hypotension not due to sepsis so will give 500 ccs bolus


IV fluids 60 an hour


Decrease procalcitonin


Zosyn and Vancomycin maintained








7/11/2022:


Pt is doing a lot better


Sleeps all the time


Fever and elevated white count with procalcitonin elevation prompting blood and 

urine cultures, chest x-ray, and empiric IV antibiotics


PICC will be placed








7/10/2022:


Patient settling in well


Sugars lower so will hold Metformin


No pain except hip and left shoulder


Glucometer assessed


Wife at bedside


No issues otherwise





Review of Systems


General:  Fatigue, Malaise





Objective


Exam


Vital Signs





Vital Signs








  Date Time  Temp Pulse Resp B/P (MAP) Pulse Ox O2 Delivery O2 Flow Rate FiO2


 


7/24/22 20:04      Nasal Cannula 2.00 


 


7/24/22 19:54 35.9 71 20 129/58 (81) 92   





Capillary Refill :


General Appearance:  No Apparent Distress, WD/WN, Chronically ill


HEENT:  PERRL/EOMI, Normal ENT Inspection, Pharynx Normal


Neck:  Normal Inspection, Supple


Respiratory:  Lungs Clear, Normal Breath Sounds, No Accessory Muscle Use, No 

Respiratory Distress


Cardiovascular:  Regular Rate, Rhythm, No Edema, No Gallop, No JVD, No Murmur, 

Normal Peripheral Pulses


Gastrointestinal:  Normal Bowel Sounds, No Organomegaly, No Pulsatile Mass, Non 

Tender, Soft


Back:  Normal Inspection, No CVA Tenderness, No Vertebral Tenderness


Extremity:  Other (left AKA with dressing in place C/D/I)


Neurologic/Psychiatric:  Alert, Oriented x3, CNs II-XII Norm as Tested, Motor 

Weakness


Skin:  Normal Color, Warm/Dry


Lymphatic:  No Adenopathy





Results/Procedures


Lab


Laboratory Tests


7/25/22 04:05








Patient resulted labs reviewed.





FIM


Transfers


Therapy Code Descriptions/Definitions 





Functional [place] Measure:


0=Not Assessed/NA        4=Minimal Assistance


1=Total Assistance        5=Supervision or Setup


2=Maximal Assistance  6=Modified [place]


3=Moderate Assistance 7=Complete IndependenceSCALE: Activities may be completed 

with or without assistive devices.





6-Indepedent-patient completes the activity by him/herself with no assistance 

from a helper.


5-Set-up or Clean-up Assistance-helper sets up or cleans up; patient completes 

activity. [place] assists only prior to or  


    following the activity.


4-Supervision or Touching Assistance-helper provides verbal cues and/or 

touching/steadying and/or contact guard assistance as patient completes 

activity. Assistance may be provided   


    throughout the activity or intermittently.


3-Partial/Moderate Assistance-helper does LESS THAN HALF the effort. [place] 

lifts, holds or supports trunk or limbs, but provides less than half the effort.


2-Substantial/Maximal Assistance-helper does MORE THAN HALF the effort. [place] 

lifts or holds trunk or limbs and provides more than half the effort.


3-Qhjhwchfy-lfmvrl does ALL the effort. Patient does none of the effort to 

complete the activity. Or, the assistance of 2 or more helpers is required for 

the patient to complete the  


    activity.


If activity was not attempted, code reason:


7-Patient Refused.


9-Not Applicable-not attempted and the patient did not perform the activity 

before the current illness, exacerbation or injury.


10-Not Attempted due to Environmental Limitations-(lack of equipment, weather 

restraints, etc.).


88-Not Attempted due to Medical Conditions or Safety Concerns.


Roll Left to Right (QC):  4


Sit to Lying (QC):  4


Sit to Stand (QC):  1


Chair/Bed-to-Chair Xfer(QC):  1


Car Transfer (QC):  2





Gait Training


Does the Patient Walk?:  No and Walking Goal IS indicated


Walk 10 feet (QC):  88


Walk 50 ft with 2 Turns(QC):  88


Walk 150 ft (QC):  88


Walking 10ft/uneven surface-QC:  88





Wheelchair Training


Does the Pt Use a Wheelchair?:  Yes


Distance:  100'x2


Wheel 50 ft with 2 turns (QC):  4


Wheel 150 ft (QC):  4


Type of Wheelchair:  Manual





Stair Training


1 Step (curb) (QC):  88


4 Steps (QC):  88


12 Steps (QC):  88





Balance


Picking up an Object (QC):  88





ADL-Treatment


Eating (QC):  6


Oral Hygiene (QC):  6


Bathing Location:  L Arm, R Arm, L Upper Leg, R Upper Leg, Chest, Abdomen, 

Perineal Area


Shower/Bathe Self (QC):  3


Upper Body Dressing (QC):  3


Lower Body Dressing (QC):  3 (Min A in supine)


On/Off Footwear (QC):  5 (set up to don shoe)


Toileting Hygiene (QC):  1 (Assist x2 for bowel movement. Setup with urinal)





Assessment/Plan


Assessment and Plan


Assess & Plan/Chief Complaint


Assessment:


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


Fall 7/5/22 resulting in left shoulder injury causing further debility and 

limiting ADL/independence


Severe PVD


PAF


OAC


HTN


HLP


DM


Hypothyroidism


Previous hypoglycemia required holding metformin and glimepiride but now on 

lower dose


Constipation resolved


Poor venous access requiring PICC 7/11/22


Anemia post op requiring transfusion 7/13/22


Volume overload CHF requiring IV Lasix 7/18/2022


Infected left stump with Pseudomonas started on abx Zosyn 7/20


Iron deficiency receiving iron infusions








Plan:


PT OT


w/c mobility


Home meds





7/10/2022:


Pain control


Monitor sugar


Hold Metformin





7/11/22:


Empiric broad spectrum abx


Hold Metformin and OAC


PICC line





7/12/2022:


IV abx


Monitor closely





7/13/2022:


Transfuse


Broad spectrum abx





7/14/2022:


Monitor hgb


Broad spectrum abx





7/15/2022:


IV abx


Monitor hgb





7/16/2022:


DC Melatonin


IV abx





7/17/2022:


Monitor closely


Check labs in am





7/18/2022:


IV Lasix


Oxygen





7/19/2022:


Monitor labs





7/20/22:


Abx





7/21/2022:


Zosyn





7/22/2022:


Monitor pain





7/23/2022:


Continue abx





7/24/2022:


Venofer





(1) S/P AKA (above knee amputation)


(2) Gangrene











DIANA HENRIQUEZ DO                Jul 24, 2022 07:14

## 2022-07-25 VITALS — SYSTOLIC BLOOD PRESSURE: 126 MMHG | DIASTOLIC BLOOD PRESSURE: 57 MMHG

## 2022-07-25 VITALS — SYSTOLIC BLOOD PRESSURE: 141 MMHG | DIASTOLIC BLOOD PRESSURE: 65 MMHG

## 2022-07-25 LAB
ALBUMIN SERPL-MCNC: 2.8 GM/DL (ref 3.2–4.5)
ALP SERPL-CCNC: 63 U/L (ref 40–136)
ALT SERPL-CCNC: 13 U/L (ref 0–55)
BASOPHILS # BLD AUTO: 0.1 10^3/UL (ref 0–0.1)
BASOPHILS NFR BLD AUTO: 1 % (ref 0–10)
BILIRUB SERPL-MCNC: 0.4 MG/DL (ref 0.1–1)
BUN/CREAT SERPL: 17
CALCIUM SERPL-MCNC: 9.2 MG/DL (ref 8.5–10.1)
CHLORIDE SERPL-SCNC: 102 MMOL/L (ref 98–107)
CO2 SERPL-SCNC: 29 MMOL/L (ref 21–32)
CREAT SERPL-MCNC: 0.78 MG/DL (ref 0.6–1.3)
EOSINOPHIL # BLD AUTO: 0.2 10^3/UL (ref 0–0.3)
EOSINOPHIL NFR BLD AUTO: 2 % (ref 0–10)
GFR SERPLBLD BASED ON 1.73 SQ M-ARVRAT: 95 ML/MIN
GLUCOSE SERPL-MCNC: 110 MG/DL (ref 70–105)
HCT VFR BLD CALC: 30 % (ref 40–54)
HGB BLD-MCNC: 8.8 G/DL (ref 13.3–17.7)
LYMPHOCYTES # BLD AUTO: 1 10^3/UL (ref 1–4)
LYMPHOCYTES NFR BLD AUTO: 9 % (ref 12–44)
MANUAL DIFFERENTIAL PERFORMED BLD QL: NO
MCH RBC QN AUTO: 25 PG (ref 25–34)
MCHC RBC AUTO-ENTMCNC: 29 G/DL (ref 32–36)
MCV RBC AUTO: 85 FL (ref 80–99)
MONOCYTES # BLD AUTO: 0.8 10^3/UL (ref 0–1)
MONOCYTES NFR BLD AUTO: 7 % (ref 0–12)
NEUTROPHILS # BLD AUTO: 8.8 10^3/UL (ref 1.8–7.8)
NEUTROPHILS NFR BLD AUTO: 81 % (ref 42–75)
PLATELET # BLD: 295 10^3/UL (ref 130–400)
PMV BLD AUTO: 10.5 FL (ref 9–12.2)
POTASSIUM SERPL-SCNC: 3.7 MMOL/L (ref 3.6–5)
PROT SERPL-MCNC: 6.2 GM/DL (ref 6.4–8.2)
SODIUM SERPL-SCNC: 142 MMOL/L (ref 135–145)
WBC # BLD AUTO: 10.9 10^3/UL (ref 4.3–11)

## 2022-07-25 RX ADMIN — APIXABAN SCH MG: 5 TABLET, FILM COATED ORAL at 08:57

## 2022-07-25 RX ADMIN — INSULIN ASPART SCH UNIT: 100 INJECTION, SOLUTION INTRAVENOUS; SUBCUTANEOUS at 16:54

## 2022-07-25 RX ADMIN — POLYETHYLENE GLYCOL (3350) SCH GM: 17 POWDER, FOR SOLUTION ORAL at 09:59

## 2022-07-25 RX ADMIN — Medication SCH EACH: at 08:56

## 2022-07-25 RX ADMIN — Medication SCH EACH: at 12:13

## 2022-07-25 RX ADMIN — Medication SCH MCG: at 06:35

## 2022-07-25 RX ADMIN — Medication SCH EACH: at 17:12

## 2022-07-25 RX ADMIN — SODIUM CHLORIDE SCH MLS/HR: 900 INJECTION INTRAVENOUS at 13:30

## 2022-07-25 RX ADMIN — PANTOPRAZOLE SODIUM SCH MG: 40 TABLET, DELAYED RELEASE ORAL at 08:57

## 2022-07-25 RX ADMIN — LEVOTHYROXINE SODIUM SCH MCG: 100 TABLET ORAL at 06:35

## 2022-07-25 RX ADMIN — DOCUSATE SODIUM AND SENNOSIDES SCH EA: 8.6; 5 TABLET, FILM COATED ORAL at 21:09

## 2022-07-25 RX ADMIN — INSULIN ASPART SCH UNIT: 100 INJECTION, SOLUTION INTRAVENOUS; SUBCUTANEOUS at 12:14

## 2022-07-25 RX ADMIN — AMIODARONE HYDROCHLORIDE SCH MG: 200 TABLET ORAL at 08:57

## 2022-07-25 RX ADMIN — DOCUSATE SODIUM SCH MG: 100 CAPSULE ORAL at 09:59

## 2022-07-25 RX ADMIN — APIXABAN SCH MG: 5 TABLET, FILM COATED ORAL at 21:17

## 2022-07-25 RX ADMIN — POTASSIUM CHLORIDE SCH MEQ: 750 TABLET, FILM COATED, EXTENDED RELEASE ORAL at 08:57

## 2022-07-25 RX ADMIN — POLYETHYLENE GLYCOL (3350) SCH GM: 17 POWDER, FOR SOLUTION ORAL at 21:09

## 2022-07-25 RX ADMIN — METOPROLOL SUCCINATE SCH MG: 50 TABLET, EXTENDED RELEASE ORAL at 08:57

## 2022-07-25 RX ADMIN — INSULIN ASPART SCH UNIT: 100 INJECTION, SOLUTION INTRAVENOUS; SUBCUTANEOUS at 21:09

## 2022-07-25 RX ADMIN — LOSARTAN POTASSIUM SCH MG: 50 TABLET, FILM COATED ORAL at 08:57

## 2022-07-25 RX ADMIN — SODIUM CHLORIDE SCH MLS/HR: 900 INJECTION INTRAVENOUS at 04:57

## 2022-07-25 RX ADMIN — DOCUSATE SODIUM AND SENNOSIDES SCH EA: 8.6; 5 TABLET, FILM COATED ORAL at 09:59

## 2022-07-25 RX ADMIN — FUROSEMIDE SCH MG: 40 TABLET ORAL at 08:57

## 2022-07-25 RX ADMIN — POTASSIUM CHLORIDE SCH MEQ: 750 TABLET, FILM COATED, EXTENDED RELEASE ORAL at 21:17

## 2022-07-25 RX ADMIN — GLIMEPIRIDE SCH MG: 1 TABLET ORAL at 06:35

## 2022-07-25 RX ADMIN — INSULIN ASPART SCH UNIT: 100 INJECTION, SOLUTION INTRAVENOUS; SUBCUTANEOUS at 05:33

## 2022-07-25 RX ADMIN — DOCUSATE SODIUM SCH MG: 100 CAPSULE ORAL at 21:09

## 2022-07-25 NOTE — PHYSICAL THERAPY DAILY NOTE
PT Daily Note-Current


Subjective


Patient lying supine in bed upon PT arrival, agreeable to treatment.





Transfers


SCALE: Activities may be completed with or without assistive devices.





6-Indepedent-patient completes the activity by him/herself with no assistance 

from a helper.


5-Set-up or Clean-up Assistance-helper sets up or cleans up; patient completes 

activity. [place] assists only prior to or  


    following the activity.


4-Supervision or Touching Assistance-helper provides verbal cues and/or 

touching/steadying and/or contact guard assistance as patient completes act

ivity. Assistance may be provided   


    throughout the activity or intermittently.


3-Partial/Moderate Assistance-helper does LESS THAN HALF the effort. [place] 

lifts, holds or supports trunk or limbs, but provides less than half the effort.


2-Substantial/Maximal Assistance-helper does MORE THAN HALF the effort. [place] 

lifts or holds trunk or limbs and provides more than half the effort.


7-Ejbyyiubd-ensspv does ALL the effort. Patient does none of the effort to 

complete the activity. Or, the assistance of 2 or more helpers is required for 

the patient to complete the  


    activity.


If activity was not attempted, code reason:


7-Patient Refused.


9-Not Applicable-not attempted and the patient did not perform the activity 

before the current illness, exacerbation or injury.


10-Not Attempted due to Environmental Limitations-(lack of equipment, weather 

restraints, etc.).


88-Not Attempted due to Medical Conditions or Safety Concerns.


Roll Left & Right (QC):  4


Sit to Lying (QC):  4


Lying to Sitting/Side of Bed(Q:  4


Sit to Stand (QC):  2


Chair/Bed-to-Chair Xfer(QC):  2





Weight Bearing


Full Weight Bearing





Exercises


Seated Therapy Exercises:  Ankle pumps, Long arc quads, Hip flexion, Hamstring 

Curls, Hip abd/add


Seated Reps:  20





Assessment


Current Status:  Fair Progress


Patient tolerated treatment well.  Performs all observed bed mobility with SBA 

and all transfers with max A.  Patient performs LE therapeutic exercise as 

listed above.  Patient propels w/c 150 feet to room.  Patient in w/c post 

treatment with wife in the room, all needs met, nursing notified, call light in 

reach.





PT Short Term Goals


Short Term Goals


Time Frame:  Jul 15, 2022


Roll Left & Right:  4


Sit to lying:  3 (Kacie)


Lying to sitting on side of be:  3 (Kacie)


Sit to stand:  3 (modA)


Chair/bed-to-chair transfer:  3 (modA)





PT Long Term Goals


Long Term Goals


PT Long Term Goals Time Frame:  Jul 29, 2022


Roll Left & Right (QC):  6


Sit to Lying (QC):  4 (SBA)


Lying-Sitting on Side/Bed(QC):  4 (SBA)


Sit to Stand (QC):  3 (Kacie)


Chair/Bed-to-Chair Xfer(QC):  3 (aKcie)


Toilet Transfer (QC):  3 (Kacie)


Car Transfer (QC):  3 (Kacie)


Does the Patient Walk:  No and Walking Goal NOT indicated


Walk 10 feet (QC):  88


Walk 50ft with 2 Turns (QC):  88


Walk 150 ft (QC):  88


Walking 10ft on Uneven Surface:  88


1 Step (curb) (QC):  88


4 Steps (QC):  88


12 Steps (QC):  88


Picking up an Object (QC):  88


Wheel 50 feet with 2 turns (QC:  6


Wheel 150 feet:  6





PT Plan


Treatment/Plan


Treatment Plan:  Continue Plan of Care


Treatment Plan:  Bed Mobility, Education, Functional Activity [place], Functional 

Strength, Group Therapy, Safety, Therapeutic Exercise, Transfers


Treatment Duration:  Jul 29, 2022


Frequency:  At least 5 of 7 days/Wk (IRF)


Estimated Hrs Per Day:  1.5 hours per day


Patient and/or Family Agrees t:  Yes





Safety Risks/Education


Patient Education:  Transfer Techniques


Teaching Recipient:  Patient


Teaching Methods:  Demonstration, Discussion


Response to Teaching:  Verbalize Understanding, Return Demonstration





Time/GCodes


Time In:  1330


Time Out:  1400


Total Billed Treatment Time:  30


Total Billed Treatment


Visit, FA, Ex











NESTOR FOFANA PT                Jul 25, 2022 16:02

## 2022-07-25 NOTE — OCCUPATIONAL THER DAILY NOTE
OT Current Status-Daily Note


Subjective


Pt alert, sitting EOB.  Pt agrees to therapy.  Pt's wife present in room.  

Educating wife on transfers.  Co-treat with PT [id number], skills of 2 clinicians

required to decrease fall risk, increase functional mobility, decrease pain and 

increase independence.  OT focusing on positioning for functional mobility, ADLs

and B UE strengthening.  Pt c/o pain in multiple areas, nrsg brought pain meds.





Mental Status/Objective


Patient Orientation:  Person, Place, Time, Situation


Attachments:  Oxygen





ADL-Treatment


Mod A for SPT from EOB.  Propels w/c by self into bathroom.  Sitting at sink, 

completes oral care.


Therapy Code Descriptions/Definitions 





Functional [place] Measure:


0=Not Assessed/NA        4=Minimal Assistance


1=Total Assistance        5=Supervision or Setup


2=Maximal Assistance  6=Modified [place]


3=Moderate Assistance 7=Complete IndependenceSCALE: Activities may be completed 

with or without assistive devices.





6-Indepedent-patient completes the activity by him/herself with no assistance 

from a helper.


5-Set-up or Clean-up Assistance-helper sets up or cleans up; patient completes 

activity. [place] assists only prior to or  


    following the activity.


4-Supervision or Touching Assistance-helper provides verbal cues and/or 

touching/steadying and/or contact guard assistance as patient completes 

activity. Assistance may be provided   


    throughout the activity or intermittently.


3-Partial/Moderate Assistance-helper does LESS THAN HALF the effort. [place] 

lifts, holds or supports trunk or limbs, but provides less than half the effort.


2-Substantial/Maximal Assistance-helper does MORE THAN HALF the effort. [place] 

lifts or holds trunk or limbs and provides more than half the effort.


4-Zlvwzgvyc-clfapf does ALL the effort. Patient does none of the effort to 

complete the activity. Or, the assistance of 2 or more helpers is required for 

the patient to complete the  


    activity.


If activity was not attempted, code reason:


7-Patient Refused.


9-Not Applicable-not attempted and the patient did not perform the activity 

before the current illness, exacerbation or injury.


10-Not Attempted due to Environmental Limitations-(lack of equipment, weather 

restraints, etc.).


88-Not Attempted due to Medical Conditions or Safety Concerns.


Oral Hygiene (QC):  6





Other Treatment


Pt propels w/c to therapy gym.  Pt working in parallel bars on hopping gait 3x's

with max A and verbal cues for correct positioning.  Pt then working on more 

efficient w/c mobility and strengthening.  SPT using FWW from w/c to chair com

pleted with max assist x2, 3 transfers.  Pt takes increased time to complete all

tasks due to need of lengthy recovery breaks.  After session, pt sitting in w/c 

with call light/phone in reach.  All needs met in room.





OT Short Term Goals


Short Term Goals


Time Frame:  2022


Toileting hygiene:  2


Shower/bathe self:  3


Lower body dressin


Putting on/taking off footwear:  3





OT Long Term Goals


Long Term Goals


Time Frame:  Aug 5, 2022


Eating (QC):  6


Oral Hygiene (QC):  6


Toileting Hygiene (QC):  3 (bowel movements on toilet)


Shower/Bathe Self (QC):  4


Upper Body Dressing (QC):  5


Lower Body Dressing (QC):  3


On/Off Footwear (QC):  4


Additional Goals:  1-Demonstrate ADL Tasks, 2-Verbalize Understanding, 3-

ImproveStrength/[place]


1=Demonstrate adherence to instructed precautions during ADL tasks.


2=Patient will verbalize/demonstrate understanding of assistive 

devices/modifications for ADL.


3=Patient will improve strength/tolerance for activity to enable patient to 

perform ADL's.





OT Education/Plan


Problem List/Assessment


Assessment:  Decreased Activ Tolerance, Decreased UE Strength, Impaired Bed 

Mobility, Impaired Funct Balance, Impaired Self-Care Skills, Restricted Funct UE

ROM





Discharge Recommendations


Plan/Recommendations:  Continue POC





Treatment Plan/Plan of Care


Patient would benefit from OT for education, treatment and training to promote 

independence in ADL's, mobility, safety and/or upper extremity function for 

ADL's.


Plan of Care:  ADL Retraining, Caregiver Training, Functional Mobility, Group 

Exercise/Act as Ind, UE Funct Exercise/Act, W/C Management Training


Treatment Duration:  Aug 5, 2022


Frequency:  At least 5 of 7 days/Wk (IRF)


Estimated Hrs Per Day:  1.5 hours per day


Rehab Potential:  Guarded





Time/GCodes


Start Time:  08:30


Stop Time:  10:00


Total Time Billed (hr/min):  90


Billed Treatment Time


1 visit-ADL 2 (30 min)  FA 4 (60 min)  co-treat with PT [id number], individual 

0830











BRITTA ALVAREZHANG CHAVEZ               2022 11:40

## 2022-07-25 NOTE — PHYSICAL THERAPY DAILY NOTE
PT Daily Note-Current


Subjective


Patient in w/c with OT upon PT arrival, agreeable to treatment.  Patient was co-

treated with OT due to patients increased need for 2 disciplines to efficiently 

and safely perform treatment with patient due to poor balance, increased fall 

risk, decline in strength and difficulty performing ADLs.  OT will focus on the 

UEs and functional ADLS while PT will focus on LE strength and mobility ADLs.





Transfers


SCALE: Activities may be completed with or without assistive devices.





6-Indepedent-patient completes the activity by him/herself with no assistance 

from a helper.


5-Set-up or Clean-up Assistance-helper sets up or cleans up; patient completes 

activity. [place] assists only prior to or  


    following the activity.


4-Supervision or Touching Assistance-helper provides verbal cues and/or touc

naren/steadying and/or contact guard assistance as patient completes activity. 

Assistance may be provided   


    throughout the activity or intermittently.


3-Partial/Moderate Assistance-helper does LESS THAN HALF the effort. [place] 

lifts, holds or supports trunk or limbs, but provides less than half the effort.


2-Substantial/Maximal Assistance-helper does MORE THAN HALF the effort. [place] 

lifts or holds trunk or limbs and provides more than half the effort.


0-Lyjustlmm-ami[place] does ALL the effort. Patient does none of the effort to 

complete the activity. Or, the assistance of 2 or more helpers is required for 

the patient to complete the  


    activity.


If activity was not attempted, code reason:


7-Patient Refused.


9-Not Applicable-not attempted and the patient did not perform the activity 

before the current illness, exacerbation or injury.


10-Not Attempted due to Environmental Limitations-(lack of equipment, weather 

restraints, etc.).


88-Not Attempted due to Medical Conditions or Safety Concerns.


Sit to Stand (QC):  1


Chair/Bed-to-Chair Xfer(QC):  1





Weight Bearing


Full Weight Bearing





Gait Training


Does the Patient Walk?:  Yes


Distance:  8 feet


Gait Assistive Device:  Parallel Bars





Assessment


Current Status:  Poor Progress


Patient was co-treated with OT due to patients increased need for 2 disciplines 

to efficiently and safely perform treatment with patient due to poor balance, 

increased fall risk, decline in strength and difficulty performing ADLs.  OT 

will focus on the UEs and functional ADLS while PT will focus on LE strength and

mobility ADLs.  Patient performs all observed bed mobility with SBA.  He 

performs sit to stand with max A and SPT with max A to the w/c.  Patient 

performs dynamic standing balance with PT focus on balance and mobility, OT 

focus on dressing and bathing.  Patient propels w/c 250 feet x 2 with SBA.  

Patient performs static and dynamic standing balance with mod A x 2 with UE 

raises to promote increased weight bearing on right LE and decreased reliance on

UEs.  Patient able to ambulate 8 feet with parallel bars x 3, with max A and 

person following with w/c.  Patient propels w/c 250 feet back to room.  Patient 

requires mod A x 2 for transfer into bed.    Patient in bed post treatment with 

all needs met, nursing notified, call light in reach.





PT Short Term Goals


Short Term Goals


Time Frame:  Jul 15, 2022


Roll Left & Right:  4


Sit to lying:  3 (Kacie)


Lying to sitting on side of be:  3 (Kacie)


Sit to stand:  3 (modA)


Chair/bed-to-chair transfer:  3 (modA)





PT Long Term Goals


Long Term Goals


PT Long Term Goals Time Frame:  Jul 29, 2022


Roll Left & Right (QC):  6


Sit to Lying (QC):  4 (SBA)


Lying-Sitting on Side/Bed(QC):  4 (SBA)


Sit to Stand (QC):  3 (Kacie)


Chair/Bed-to-Chair Xfer(QC):  3 (Kacie)


Toilet Transfer (QC):  3 (Kacie)


Car Transfer (QC):  3 (Kacie)


Does the Patient Walk:  No and Walking Goal NOT indicated


Walk 10 feet (QC):  88


Walk 50ft with 2 Turns (QC):  88


Walk 150 ft (QC):  88


Walking 10ft on Uneven Surface:  88


1 Step (curb) (QC):  88


4 Steps (QC):  88


12 Steps (QC):  88


Picking up an Object (QC):  88


Wheel 50 feet with 2 turns (QC:  6


Wheel 150 feet:  6





PT Plan


Treatment/Plan


Treatment Plan:  Continue Plan of Care


Treatment Plan:  Bed Mobility, Education, Functional Activity [place], Functional 

Strength, Group Therapy, Safety, Therapeutic Exercise, Transfers


Treatment Duration:  Jul 29, 2022


Frequency:  At least 5 of 7 days/Wk (IRF)


Estimated Hrs Per Day:  1.5 hours per day


Patient and/or Family Agrees t:  Yes





Time/GCodes


Time In:  900


Time Out:  1000


Total Billed Treatment Time:  60


Total Billed Treatment


Visit, FA(2), W/C, NESTOR [place] PT                Jul 25, 2022 15:55

## 2022-07-25 NOTE — PM&R PROGRESS NOTE
Subjective


HPI/CC On Admission


Date Seen by Provider:  Jul 25, 2022


Time Seen by Provider:  09:00


Subjective/Events-last exam


7/25/2022:


Pt is doing well


Hemoglobin is 8.8


Getting ready for discharge sometime this week


Progressing well


White count is normal








7/24/2022:


Doing well


No pain


Lortab used


Abx maintained


Stump appears improved








7/23/2022:


Doing well


Pain controlled


Abx maintained


No falls


Improved status


Labs reviewed








7/22/2022:


Pt is doing really well


Blood sugar improved


Lortab used pretty much all the time for pain


Bowels moved on 7/20/22 7/21/2022:


Patient doing well


Tolerating Zosyn


Pseudomonas in wound


Sugars are satisfactory








7/20/2022:


Pt is doing pretty well


Changed dressing


Dr. Kaiser assessed the wound


Gram negative rods in the wound so Zosyn started


Increasing Toprol and Lasix per cardiology


BP was 199


Discharge planning in the midst of evaluations of his needs








7/19/2022:


Doing well today


Volume overload resolved


Labs reviewed


Sugars noted


Pain controlled








7/18/2022:


Patient having difficulty with shortness of breath and tachypnea


Oxygen placed due to hypoxia


Septic work-up normal but it appears he has congestive heart failure and volume 

overload


Cardiology consulted after he gave him Lasix 40 mg IV


Supportive care will continue








7/17/2022:


Patient doing well


No pain reported


Slept well last night


No falls








7/16/2022:


Late entry note inadvertently missed note


Doing well


IV abx maintained


BM+


Wife at bedside


Melatonin gave him bad dreams








7/15/2022:


Pt is doing well


White blood cell count is 14


Hemoglobin is 8.9


Stump looks really good


Overall doing much better








7/14/2022:


Hemoglobin is 8.5 after one unit of blood yesterday


White count is 16,000


Bowels moved a little bit today, will get aggressive with that


Hep locking IV fluid











7/13/2022:


Pt had some incontinence today


Dressing was saturated


Decrease BP medication for Dr. Alejandra


A little bit confused


Will give one unit of blood today


Maintain on broad spectrum antibiotics


White count still elevated at 17








7/12/2022:


Pt is doing well 


Mild hypotension not due to sepsis so will give 500 ccs bolus


IV fluids 60 an hour


Decrease procalcitonin


Zosyn and Vancomycin maintained








7/11/2022:


Pt is doing a lot better


Sleeps all the time


Fever and elevated white count with procalcitonin elevation prompting blood and 

urine cultures, chest x-ray, and empiric IV antibiotics


PICC will be placed








7/10/2022:


Patient settling in well


Sugars lower so will hold Metformin


No pain except hip and left shoulder


Glucometer assessed


Wife at bedside


No issues otherwise





Review of Systems


General:  Fatigue, Malaise


Musculoskeletal:  leg pain





Objective


Exam


Vital Signs





Vital Signs








  Date Time  Temp Pulse Resp B/P (MAP) Pulse Ox O2 Delivery O2 Flow Rate FiO2


 


7/25/22 20:08 36.7 71 20 126/57 (80) 93 Nasal Cannula 1.50 





Capillary Refill :


General Appearance:  No Apparent Distress, WD/WN, Chronically ill


HEENT:  PERRL/EOMI, Normal ENT Inspection, Pharynx Normal


Neck:  Normal Inspection, Supple


Respiratory:  Lungs Clear, Normal Breath Sounds, No Accessory Muscle Use, No 

Respiratory Distress


Cardiovascular:  Regular Rate, Rhythm, No Edema, No Gallop, No JVD, No Murmur, 

Normal Peripheral Pulses


Gastrointestinal:  Normal Bowel Sounds, No Organomegaly, No Pulsatile Mass, Non 

Tender, Soft


Back:  Normal Inspection, No CVA Tenderness, No Vertebral Tenderness


Extremity:  Other (left AKA with dressing in place C/D/I)


Neurologic/Psychiatric:  Alert, Oriented x3, CNs II-XII Norm as Tested, Motor 

Weakness


Skin:  Normal Color, Warm/Dry


Lymphatic:  No Adenopathy





Results/Procedures


Lab


Laboratory Tests


7/25/22 04:05








Patient resulted labs reviewed.





FIM


Transfers


Therapy Code Descriptions/Definitions 





Functional [place] Measure:


0=Not Assessed/NA        4=Minimal Assistance


1=Total Assistance        5=Supervision or Setup


2=Maximal Assistance  6=Modified [place]


3=Moderate Assistance 7=Complete IndependenceSCALE: Activities may be completed 

with or without assistive devices.





6-Indepedent-patient completes the activity by him/herself with no assistance 

from a helper.


5-Set-up or Clean-up Assistance-helper sets up or cleans up; patient completes 

activity. [place] assists only prior to or  


    following the activity.


4-Supervision or Touching Assistance-helper provides verbal cues and/or 

touching/steadying and/or contact guard assistance as patient completes 

activity. Assistance may be provided   


    throughout the activity or intermittently.


3-Partial/Moderate Assistance-helper does LESS THAN HALF the effort. [place] 

lifts, holds or supports trunk or limbs, but provides less than half the effort.


2-Substantial/Maximal Assistance-helper does MORE THAN HALF the effort. [place] 

lifts or holds trunk or limbs and provides more than half the effort.


0-Mcojrylgc-kaeclo does ALL the effort. Patient does none of the effort to 

complete the activity. Or, the assistance of 2 or more helpers is required for 

the patient to complete the  


    activity.


If activity was not attempted, code reason:


7-Patient Refused.


9-Not Applicable-not attempted and the patient did not perform the activity 

before the current illness, exacerbation or injury.


10-Not Attempted due to Environmental Limitations-(lack of equipment, weather 

restraints, etc.).


88-Not Attempted due to Medical Conditions or Safety Concerns.


Roll Left to Right (QC):  4


Sit to Lying (QC):  4


Sit to Stand (QC):  1


Chair/Bed-to-Chair Xfer(QC):  1


Car Transfer (QC):  2





Gait Training


Does the Patient Walk?:  No and Walking Goal IS indicated


Walk 10 feet (QC):  88


Walk 50 ft with 2 Turns(QC):  88


Walk 150 ft (QC):  88


Walking 10ft/uneven surface-QC:  88





Wheelchair Training


Does the Pt Use a Wheelchair?:  Yes


Distance:  100'x2


Wheel 50 ft with 2 turns (QC):  4


Wheel 150 ft (QC):  4


Type of Wheelchair:  Manual





Stair Training


1 Step (curb) (QC):  88


4 Steps (QC):  88


12 Steps (QC):  88





Balance


Picking up an Object (QC):  88





ADL-Treatment


Eating (QC):  6


Oral Hygiene (QC):  6


Bathing Location:  L Arm, R Arm, L Upper Leg, R Upper Leg, Chest, Abdomen, 

Perineal Area


Shower/Bathe Self (QC):  3


Upper Body Dressing (QC):  3


Lower Body Dressing (QC):  3 (Min A in supine)


On/Off Footwear (QC):  5 (set up to don shoe)


Toileting Hygiene (QC):  1 (Assist x2 for bowel movement. Setup with urinal)





Assessment/Plan


Assessment and Plan


Assess & Plan/Chief Complaint


Assessment:


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


Fall 7/5/22 resulting in left shoulder injury causing further debility and 

limiting ADL/independence


Severe PVD


PAF


OAC


HTN


HLP


DM


Hypothyroidism


Previous hypoglycemia required holding metformin and glimepiride but now on 

lower dose


Constipation resolved


Poor venous access requiring PICC 7/11/22


Anemia post op requiring transfusion 7/13/22


Volume overload CHF requiring IV Lasix 7/18/2022


Infected left stump with Pseudomonas started on abx Zosyn 7/20


Iron deficiency receiving iron infusions








Plan:


PT OT


w/c mobility


Home meds





7/10/2022:


Pain control


Monitor sugar


Hold Metformin





7/11/22:


Empiric broad spectrum abx


Hold Metformin and OAC


PICC line





7/12/2022:


IV abx


Monitor closely





7/13/2022:


Transfuse


Broad spectrum abx





7/14/2022:


Monitor hgb


Broad spectrum abx





7/15/2022:


IV abx


Monitor hgb





7/16/2022:


DC Melatonin


IV abx





7/17/2022:


Monitor closely


Check labs in am





7/18/2022:


IV Lasix


Oxygen





7/19/2022:


Monitor labs





7/20/22:


Abx





7/21/2022:


Zosyn





7/22/2022:


Monitor pain





7/23/2022:


Continue abx





7/24/2022:


Venofer





7/25/2022:


Home O2 eval





(1) S/P AKA (above knee amputation)


(2) Gangrene











DIANA HENRIQUEZ DO                Jul 25, 2022 06:05

## 2022-07-26 VITALS — SYSTOLIC BLOOD PRESSURE: 127 MMHG | DIASTOLIC BLOOD PRESSURE: 52 MMHG

## 2022-07-26 VITALS — SYSTOLIC BLOOD PRESSURE: 141 MMHG | DIASTOLIC BLOOD PRESSURE: 63 MMHG

## 2022-07-26 RX ADMIN — Medication SCH EACH: at 08:17

## 2022-07-26 RX ADMIN — INSULIN ASPART SCH UNIT: 100 INJECTION, SOLUTION INTRAVENOUS; SUBCUTANEOUS at 21:42

## 2022-07-26 RX ADMIN — METOPROLOL SUCCINATE SCH MG: 50 TABLET, EXTENDED RELEASE ORAL at 08:17

## 2022-07-26 RX ADMIN — APIXABAN SCH MG: 5 TABLET, FILM COATED ORAL at 08:18

## 2022-07-26 RX ADMIN — Medication SCH EACH: at 18:04

## 2022-07-26 RX ADMIN — GLIMEPIRIDE SCH MG: 1 TABLET ORAL at 06:00

## 2022-07-26 RX ADMIN — DOCUSATE SODIUM SCH MG: 100 CAPSULE ORAL at 08:26

## 2022-07-26 RX ADMIN — INSULIN ASPART SCH UNIT: 100 INJECTION, SOLUTION INTRAVENOUS; SUBCUTANEOUS at 18:04

## 2022-07-26 RX ADMIN — FUROSEMIDE SCH MG: 40 TABLET ORAL at 08:17

## 2022-07-26 RX ADMIN — DOCUSATE SODIUM AND SENNOSIDES SCH EA: 8.6; 5 TABLET, FILM COATED ORAL at 08:26

## 2022-07-26 RX ADMIN — POTASSIUM CHLORIDE SCH MEQ: 750 TABLET, FILM COATED, EXTENDED RELEASE ORAL at 08:17

## 2022-07-26 RX ADMIN — INSULIN ASPART SCH UNIT: 100 INJECTION, SOLUTION INTRAVENOUS; SUBCUTANEOUS at 11:27

## 2022-07-26 RX ADMIN — POLYETHYLENE GLYCOL (3350) SCH GM: 17 POWDER, FOR SOLUTION ORAL at 08:26

## 2022-07-26 RX ADMIN — LOSARTAN POTASSIUM SCH MG: 50 TABLET, FILM COATED ORAL at 08:18

## 2022-07-26 RX ADMIN — DOCUSATE SODIUM SCH MG: 100 CAPSULE ORAL at 21:37

## 2022-07-26 RX ADMIN — INSULIN ASPART SCH UNIT: 100 INJECTION, SOLUTION INTRAVENOUS; SUBCUTANEOUS at 06:37

## 2022-07-26 RX ADMIN — Medication SCH EACH: at 14:27

## 2022-07-26 RX ADMIN — LEVOTHYROXINE SODIUM SCH MCG: 100 TABLET ORAL at 06:00

## 2022-07-26 RX ADMIN — SODIUM CHLORIDE SCH MG: 900 INJECTION, SOLUTION INTRAVENOUS at 08:18

## 2022-07-26 RX ADMIN — APIXABAN SCH MG: 5 TABLET, FILM COATED ORAL at 21:42

## 2022-07-26 RX ADMIN — PANTOPRAZOLE SODIUM SCH MG: 40 TABLET, DELAYED RELEASE ORAL at 08:18

## 2022-07-26 RX ADMIN — DOCUSATE SODIUM AND SENNOSIDES SCH EA: 8.6; 5 TABLET, FILM COATED ORAL at 21:38

## 2022-07-26 RX ADMIN — POTASSIUM CHLORIDE SCH MEQ: 750 TABLET, FILM COATED, EXTENDED RELEASE ORAL at 21:42

## 2022-07-26 RX ADMIN — POLYETHYLENE GLYCOL (3350) SCH GM: 17 POWDER, FOR SOLUTION ORAL at 21:38

## 2022-07-26 RX ADMIN — Medication SCH MCG: at 06:00

## 2022-07-26 RX ADMIN — AMIODARONE HYDROCHLORIDE SCH MG: 200 TABLET ORAL at 08:18

## 2022-07-26 NOTE — OCCUPATIONAL THER DAILY NOTE
OT Current Status-Daily Note


Subjective


Pt alert, lying in bed.  Pt agrees to therapy.  No c/o pain while lying, 

vocalized pain with movement.  OT/PT cotreat ([id number]), skills of 2 clinicians

required to decrease fall risk, increase safe mobility and increase activity 

tolerance/strength.  PT focusing on transfers, mobility using w/c and FWW while 

OT focusing on ADLs, functional transfers and positioning during mobility.





Mental Status/Objective


Patient Orientation:  Person, Place, Time, Situation


Attachments:  IV, Oxygen (2L)





ADL-Treatment


Pt agrees to shower.  Pt independent with eating.  Min A for supine to EOB with 

HOB elevated then independent with EOB to supine.  Squat pivot transfer from EOB

to shower chair mod A and 2nd person stabilizing chair and manipulating clothin

g.  Pt able to complete shower using rolling shower chair with cutout, LH 

sponge, grabbars and hand held shower.  Assist given in shower to wash under B 

arms due to decreased ROM of B shldrs.  Oral care independent while sitting at 

sink.  Min A for upper body dressing.  Min A with lower body dressing if pt is 

in supine.  Assist x2 if pt completes in standing.  MENG has recommended to wife

to complete lower body dressing and bathing while pt is in supine.  Pt is able 

to use sock aide to don sock, has one at home.  Set up for donning shoe.  Pt 

able to void with urinal by self though assist to empty.  Assist x2 when using 

BSC for BM hygiene and clothing manipulation.  Max A if using bed pan in supine.


Therapy Code Descriptions/Definitions 





Functional [place] Measure:


0=Not Assessed/NA        4=Minimal Assistance


1=Total Assistance        5=Supervision or Setup


2=Maximal Assistance  6=Modified [place]


3=Moderate Assistance 7=Complete IndependenceSCALE: Activities may be completed 

with or without assistive devices.





6-Indepedent-patient completes the activity by him/herself with no assistance 

from a helper.


5-Set-up or Clean-up Assistance-helper sets up or cleans up; patient completes 

activity. [place] assists only prior to or  


    following the activity.


4-Supervision or Touching Assistance-helper provides verbal cues and/or 

touching/steadying and/or contact guard assistance as patient completes 

activity. Assistance may be provided   


    throughout the activity or intermittently.


3-Partial/Moderate Assistance-helper does LESS THAN HALF the effort. [place] 

lifts, holds or supports trunk or limbs, but provides less than half the effort.


2-Substantial/Maximal Assistance-helper does MORE THAN HALF the effort. [place] 

lifts or holds trunk or limbs and provides more than half the effort.


5-Qimbusvwn-isimjw does ALL the effort. Patient does none of the effort to 

complete the activity. Or, the assistance of 2 or more helpers is required for 

the patient to complete the  


    activity.


If activity was not attempted, code reason:


7-Patient Refused.


9-Not Applicable-not attempted and the patient did not perform the activity 

before the current illness, exacerbation or injury.


10-Not Attempted due to Environmental Limitations-(lack of equipment, weather 

restraints, etc.).


88-Not Attempted due to Medical Conditions or Safety Concerns.


Eating (QC):  6


Oral Hygiene (QC):  6


Shower/Bathe Self (QC):  3


Upper Body Dressing (QC):  3


Lower Body Dressing (QC):  3 (mod A)


On/Off Footwear:  5


Toileting Hygiene (QC):  1


Toilet Transfer (QC):  1





Other Treatment


Pt able to manipulate w/c around ARU floor independently.  See PT notes for 

standing progress.  After session, pt sitting in w/c with call light/phone in 

reach.  All needs met in room.





OT Short Term Goals


Short Term Goals


Time Frame:  2022


Toileting hygiene:  2


Shower/bathe self:  3


Lower body dressin


Putting on/taking off footwear:  3





OT Long Term Goals


Long Term Goals


Time Frame:  Aug 5, 2022


Eating (QC):  6


Oral Hygiene (QC):  6


Toileting Hygiene (QC):  3 (bowel movements on toilet)


Shower/Bathe Self (QC):  4


Upper Body Dressing (QC):  5


Lower Body Dressing (QC):  3


On/Off Footwear (QC):  4


Additional Goals:  1-Demonstrate ADL Tasks, 2-Verbalize Understanding, 3-

ImproveStrength/[place]


1=Demonstrate adherence to instructed precautions during ADL tasks.


2=Patient will verbalize/demonstrate understanding of assistive 

devices/modifications for ADL.


3=Patient will improve strength/tolerance for activity to enable patient to 

perform ADL's.





OT Education/Plan


Problem List/Assessment


Assessment:  Decreased Activ Tolerance, Decreased UE Strength, Dependent 

Transfers, Impaired Bed Mobility, Impaired Cognition, Impaired Coordination, 

Impaired Funct Balance, Impaired I ADL's, Impaired Self-Care Skills, Restricted 

Funct UE ROM





Discharge Recommendations


Plan/Recommendations:  Continue POC





Treatment Plan/Plan of Care


Patient would benefit from OT for education, treatment and training to promote 

independence in ADL's, mobility, safety and/or upper extremity function for 

ADL's.


Plan of Care:  ADL Retraining, Caregiver Training, Functional Mobility, Group 

Exercise/Act as Ind, UE Funct Exercise/Act, W/C Management Training


Treatment Duration:  Aug 5, 2022


Frequency:  At least 5 of 7 days/Wk (IRF)


Estimated Hrs Per Day:  1.5 hours per day


Rehab Potential:  Guarded





Time/GCodes


Start Time:  08:30


Stop Time:  10:00


Total Time Billed (hr/min):  90


Billed Treatment Time


1 visit-ADL 4 (60 min)  FA 2 (30 min)  co-treat with PT [id number], individual 

[id number]











MICHAEL ALVAREZ               2022 10:12

## 2022-07-26 NOTE — PM&R PROGRESS NOTE
Subjective


HPI/CC On Admission


Date Seen by Provider:  Jul 26, 2022


Time Seen by Provider:  09:00


Subjective/Events-last exam


7/26/2022:


Doing well


DC tomorrow


Declined SNF at [facility] with me


Son in town who will help








7/25/2022:


Pt is doing well


Hemoglobin is 8.8


Getting ready for discharge sometime this week


Progressing well


White count is normal








7/24/2022:


Doing well


No pain


Lortab used


Abx maintained


Stump appears improved








7/23/2022:


Doing well


Pain controlled


Abx maintained


No falls


Improved status


Labs reviewed








7/22/2022:


Pt is doing really well


Blood sugar improved


Lortab used pretty much all the time for pain


Bowels moved on 7/20/22 7/21/2022:


Patient doing well


Tolerating Zosyn


Pseudomonas in wound


Sugars are satisfactory








7/20/2022:


Pt is doing pretty well


Changed dressing


Dr. Kaiser assessed the wound


Gram negative rods in the wound so Zosyn started


Increasing Toprol and Lasix per cardiology


BP was 199


Discharge planning in the midst of evaluations of his needs








7/19/2022:


Doing well today


Volume overload resolved


Labs reviewed


Sugars noted


Pain controlled








7/18/2022:


Patient having difficulty with shortness of breath and tachypnea


Oxygen placed due to hypoxia


Septic work-up normal but it appears he has congestive heart failure and volume 

overload


Cardiology consulted after he gave him Lasix 40 mg IV


Supportive care will continue








7/17/2022:


Patient doing well


No pain reported


Slept well last night


No falls








7/16/2022:


Late entry note inadvertently missed note


Doing well


IV abx maintained


BM+


Wife at bedside


Melatonin gave him bad dreams








7/15/2022:


Pt is doing well


White blood cell count is 14


Hemoglobin is 8.9


Stump looks really good


Overall doing much better








7/14/2022:


Hemoglobin is 8.5 after one unit of blood yesterday


White count is 16,000


Bowels moved a little bit today, will get aggressive with that


Hep locking IV fluid











7/13/2022:


Pt had some incontinence today


Dressing was saturated


Decrease BP medication for Dr. Alejandra


A little bit confused


Will give one unit of blood today


Maintain on broad spectrum antibiotics


White count still elevated at 17








7/12/2022:


Pt is doing well 


Mild hypotension not due to sepsis so will give 500 ccs bolus


IV fluids 60 an hour


Decrease procalcitonin


Zosyn and Vancomycin maintained








7/11/2022:


Pt is doing a lot better


Sleeps all the time


Fever and elevated white count with procalcitonin elevation prompting blood and 

urine cultures, chest x-ray, and empiric IV antibiotics


PICC will be placed








7/10/2022:


Patient settling in well


Sugars lower so will hold Metformin


No pain except hip and left shoulder


Glucometer assessed


Wife at bedside


No issues otherwise





Review of Systems


General:  Fatigue, Malaise





Objective


Exam


Vital Signs





Vital Signs








  Date Time  Temp Pulse Resp B/P (MAP) Pulse Ox O2 Delivery O2 Flow Rate FiO2


 


7/26/22 20:10 36.4 78 20 127/52 (77) 92 Nasal Cannula 1.50 





Capillary Refill :


General Appearance:  No Apparent Distress, WD/WN, Chronically ill


HEENT:  PERRL/EOMI, Normal ENT Inspection, Pharynx Normal


Neck:  Normal Inspection, Supple


Respiratory:  Lungs Clear, Normal Breath Sounds, No Accessory Muscle Use, No 

Respiratory Distress


Cardiovascular:  Regular Rate, Rhythm, No Edema, No Gallop, No JVD, No Murmur, 

Normal Peripheral Pulses


Gastrointestinal:  Normal Bowel Sounds, No Organomegaly, No Pulsatile Mass, Non 

Tender, Soft


Back:  Normal Inspection, No CVA Tenderness, No Vertebral Tenderness


Extremity:  Other (left AKA with dressing in place C/D/I)


Neurologic/Psychiatric:  Alert, Oriented x3, CNs II-XII Norm as Tested, Motor 

Weakness


Skin:  Normal Color, Warm/Dry


Lymphatic:  No Adenopathy





Results/Procedures


Lab


Patient resulted labs reviewed.





FIM


Transfers


Therapy Code Descriptions/Definitions 





Functional [place] Measure:


0=Not Assessed/NA        4=Minimal Assistance


1=Total Assistance        5=Supervision or Setup


2=Maximal Assistance  6=Modified [place]


3=Moderate Assistance 7=Complete IndependenceSCALE: Activities may be completed 

with or without assistive devices.





6-Indepedent-patient completes the activity by him/herself with no assistance 

from a helper.


5-Set-up or Clean-up Assistance-helper sets up or cleans up; patient completes 

activity. [place] assists only prior to or  


    following the activity.


4-Supervision or Touching Assistance-helper provides verbal cues and/or 

touching/steadying and/or contact guard assistance as patient completes 

activity. Assistance may be provided   


    throughout the activity or intermittently.


3-Partial/Moderate Assistance-helper does LESS THAN HALF the effort. [place] 

lifts, holds or supports trunk or limbs, but provides less than half the effort.


2-Substantial/Maximal Assistance-helper does MORE THAN HALF the effort. [place] 

lifts or holds trunk or limbs and provides more than half the effort.


6-Gmkdktqcr-vkcfqz does ALL the effort. Patient does none of the effort to 

complete the activity. Or, the assistance of 2 or more helpers is required for 

the patient to complete the  


    activity.


If activity was not attempted, code reason:


7-Patient Refused.


9-Not Applicable-not attempted and the patient did not perform the activity 

before the current illness, exacerbation or injury.


10-Not Attempted due to Environmental Limitations-(lack of equipment, weather 

restraints, etc.).


88-Not Attempted due to Medical Conditions or Safety Concerns.


Roll Left to Right (QC):  4


Sit to Lying (QC):  4


Sit to Stand (QC):  2


Chair/Bed-to-Chair Xfer(QC):  2


Car Transfer (QC):  2





Gait Training


Does the Patient Walk?:  Yes


Distance:  8 feet


Walk 10 feet (QC):  88


Walk 50 ft with 2 Turns(QC):  88


Walk 150 ft (QC):  88


Walking 10ft/uneven surface-QC:  88


Gait Assistive Device:  Parallel Bars





Wheelchair Training


Does the Pt Use a Wheelchair?:  Yes


Distance:  100'x2


Wheel 50 ft with 2 turns (QC):  4


Wheel 150 ft (QC):  4


Type of Wheelchair:  Manual





Stair Training


1 Step (curb) (QC):  88


4 Steps (QC):  88


12 Steps (QC):  88





Balance


Picking up an Object (QC):  88





ADL-Treatment


Eating (QC):  6


Oral Hygiene (QC):  6


Bathing Location:  L Arm, R Arm, L Upper Leg, R Upper Leg, Chest, Abdomen, 

Perineal Area


Shower/Bathe Self (QC):  3


Upper Body Dressing (QC):  3


Lower Body Dressing (QC):  3 (Min A in supine)


On/Off Footwear (QC):  5 (set up to don shoe)


Toileting Hygiene (QC):  1 (Assist x2 for bowel movement. Setup with urinal)





Assessment/Plan


Assessment and Plan


Assess & Plan/Chief Complaint


Assessment:


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


Fall 7/5/22 resulting in left shoulder injury causing further debility and 

limiting ADL/independence


Severe PVD


PAF


OAC


HTN


HLP


DM


Hypothyroidism


Previous hypoglycemia required holding metformin and glimepiride but now on 

lower dose


Constipation resolved


Poor venous access requiring PICC 7/11/22


Anemia post op requiring transfusion 7/13/22


Volume overload CHF requiring IV Lasix 7/18/2022


Infected left stump with Pseudomonas started on abx Zosyn 7/20


Iron deficiency receiving iron infusions








Plan:


PT OT


w/c mobility


Home meds





7/10/2022:


Pain control


Monitor sugar


Hold Metformin





7/11/22:


Empiric broad spectrum abx


Hold Metformin and OAC


PICC line





7/12/2022:


IV abx


Monitor closely





7/13/2022:


Transfuse


Broad spectrum abx





7/14/2022:


Monitor hgb


Broad spectrum abx





7/15/2022:


IV abx


Monitor hgb





7/16/2022:


DC Melatonin


IV abx





7/17/2022:


Monitor closely


Check labs in am





7/18/2022:


IV Lasix


Oxygen





7/19/2022:


Monitor labs





7/20/22:


Abx





7/21/2022:


Zosyn





7/22/2022:


Monitor pain





7/23/2022:


Continue abx





7/24/2022:


Venofer





7/25/2022:


Home O2 eval





7/26/2022:


Home O2 eval





(1) S/P AKA (above knee amputation)


(2) Gangrene











DIANA HENRIQUEZ DO                Jul 26, 2022 06:11

## 2022-07-26 NOTE — PHYSICAL THERAPY DAILY NOTE
PT Daily Note-Current


Subjective


Patient sitting in shower chair with OT upon PT arrival, agreeable to treatment.

 Patient will be co-treated with OT due to patients increased need for 2 

disciplines to efficiently and safely perform treatment with patient due to poor

balance, increased fall risk, decline in strength and difficulty performing 

ADLs.  OT will focus on the UEs and functional ADLS while PT will focus on LE 

strength and mobility ADLs.





Transfers


SCALE: Activities may be completed with or without assistive devices.





6-Indepedent-patient completes the activity by him/herself with no assistance 

from a helper.


5-Set-up or Clean-up Assistance-helper sets up or cleans up; patient completes 

activity. [place] assists only prior to or  


    following the activity.


4-Supervision or Touching Assistance-helper provides verbal cues and/or 

touching/steadying and/or contact guard assistance as patient completes 

activity. Assistance may be provided   


    throughout the activity or intermittently.


3-Partial/Moderate Assistance-helper does LESS THAN HALF the effort. [place] lift

s, holds or supports trunk or limbs, but provides less than half the effort.


2-Substantial/Maximal Assistance-helper does MORE THAN HALF the effort. [place] 

lifts or holds trunk or limbs and provides more than half the effort.


6-[id number] does ALL the effort. Patient does none of the effort to 

complete the activity. Or, the assistance of 2 or more helpers is required for 

the patient to complete the  


    activity.


If activity was not attempted, code reason:


7-Patient Refused.


9-Not Applicable-not attempted and the patient did not perform the activity 

before the current illness, exacerbation or injury.


10-Not Attempted due to Environmental Limitations-(lack of equipment, weather 

restraints, etc.).


88-Not Attempted due to Medical Conditions or Safety Concerns.


Roll Left & Right (QC):  3


Sit to Lying (QC):  3


Lying to Sitting/Side of Bed(Q:  3


Sit to Stand (QC):  2


Chair/Bed-to-Chair Xfer(QC):  2


Toilet Transfer (QC):  2


Car Transfer (QC):  2





Weight Bearing


Full Weight Bearing





Gait Training


Does the Patient Walk?:  Yes


Distance:  8 feet


Gait Assistive Device:  Parallel Bars





Wheelchair Training


Does the Pt Use a Wheelchair?:  Yes


Wheel 50 ft with 2 turns (QC):  4


Wheel 150 ft (QC):  4


Type of Wheelchair:  Manual





Stair Training


 Stair Training: Handrails/:  uses walker


#of Steps:  1


1 Step (curb) (QC):  2


Stairs:  Pattern:  Hops





Assessment


Current Status:  Poor Progress


Patient was co-treated with OT due to patients increased need for 2 disciplines 

to efficiently and safely perform treatment with patient due to poor balance, 

increased fall risk, decline in strength and difficulty performing ADLs.  OT 

will focus on the UEs and functional ADLS while PT will focus on LE strength and

mobility ADLs.  Patient performs all observed bed mobility with min A.  He 

performs sit to stand with max A and SPT with max A to the w/c.  Patient 

performs dynamic standing balance with PT focus on balance and mobility, OT 

focus on dressing and bathing.  Patient propels w/c 250 feet x 2 with SBA.  

Patient performs static and dynamic standing balance with mod A x 2. Patient 

propels w/c 250 feet back to room.  Patient requires mod A x 2 for transfer into

bed.  Patient in w/c post treatment with all needs met, nursing notified, call 

light in reach.





PT Short Term Goals


Short Term Goals


Time Frame:  Jul 15, 2022


Roll Left & Right:  4


Sit to lying:  3 (Kacie)


Lying to sitting on side of be:  3 (Kacie)


Sit to stand:  3 (modA)


Chair/bed-to-chair transfer:  3 (modA)





PT Long Term Goals


Long Term Goals


PT Long Term Goals Time Frame:  Jul 29, 2022


Roll Left & Right (QC):  6


Sit to Lying (QC):  4 (SBA)


Lying-Sitting on Side/Bed(QC):  4 (SBA)


Sit to Stand (QC):  3 (Kacie)


Chair/Bed-to-Chair Xfer(QC):  3 (Kacie)


Toilet Transfer (QC):  3 (Kacie)


Car Transfer (QC):  3 (Kacie)


Does the Patient Walk:  No and Walking Goal NOT indicated


Walk 10 feet (QC):  88


Walk 50ft with 2 Turns (QC):  88


Walk 150 ft (QC):  88


Walking 10ft on Uneven Surface:  88


1 Step (curb) (QC):  88


4 Steps (QC):  88


12 Steps (QC):  88


Picking up an Object (QC):  88


Wheel 50 feet with 2 turns (QC:  6


Wheel 150 feet:  6





PT Plan


Treatment/Plan


Treatment Plan:  Continue Plan of Care


Treatment Plan:  Bed Mobility, Education, Functional Activity [place], Functional 

Strength, Group Therapy, Safety, Therapeutic Exercise, Transfers


Treatment Duration:  Jul 29, 2022


Frequency:  At least 5 of 7 days/Wk (IRF)


Estimated Hrs Per Day:  1.5 hours per day


Patient and/or Family Agrees t:  Yes





Safety Risks/Education


Patient Education:  Gait Training, Transfer Techniques, W/C Management


Teaching Recipient:  Patient


Teaching Methods:  Demonstration, Discussion


Response to Teaching:  Verbalize Understanding, Reinforcement Needed





Time/GCodes


Time In:  900


Time Out:  1000


Total Billed Treatment Time:  60


Total Billed Treatment


Visit, FA(3), W/C











NESTOR FOFANA PT                Jul 26, 2022 13:11

## 2022-07-27 VITALS — SYSTOLIC BLOOD PRESSURE: 143 MMHG | DIASTOLIC BLOOD PRESSURE: 65 MMHG

## 2022-07-27 VITALS — DIASTOLIC BLOOD PRESSURE: 65 MMHG | SYSTOLIC BLOOD PRESSURE: 143 MMHG

## 2022-07-27 RX ADMIN — LEVOTHYROXINE SODIUM SCH MCG: 100 TABLET ORAL at 06:05

## 2022-07-27 RX ADMIN — LOSARTAN POTASSIUM SCH MG: 50 TABLET, FILM COATED ORAL at 08:08

## 2022-07-27 RX ADMIN — GLIMEPIRIDE SCH MG: 1 TABLET ORAL at 06:04

## 2022-07-27 RX ADMIN — POLYETHYLENE GLYCOL (3350) SCH GM: 17 POWDER, FOR SOLUTION ORAL at 08:09

## 2022-07-27 RX ADMIN — METOPROLOL SUCCINATE SCH MG: 50 TABLET, EXTENDED RELEASE ORAL at 08:07

## 2022-07-27 RX ADMIN — POTASSIUM CHLORIDE SCH MEQ: 750 TABLET, FILM COATED, EXTENDED RELEASE ORAL at 08:08

## 2022-07-27 RX ADMIN — AMIODARONE HYDROCHLORIDE SCH MG: 200 TABLET ORAL at 08:07

## 2022-07-27 RX ADMIN — APIXABAN SCH MG: 5 TABLET, FILM COATED ORAL at 08:07

## 2022-07-27 RX ADMIN — Medication SCH EACH: at 08:08

## 2022-07-27 RX ADMIN — DOCUSATE SODIUM SCH MG: 100 CAPSULE ORAL at 08:09

## 2022-07-27 RX ADMIN — INSULIN ASPART SCH UNIT: 100 INJECTION, SOLUTION INTRAVENOUS; SUBCUTANEOUS at 13:35

## 2022-07-27 RX ADMIN — DOCUSATE SODIUM AND SENNOSIDES SCH EA: 8.6; 5 TABLET, FILM COATED ORAL at 08:09

## 2022-07-27 RX ADMIN — PANTOPRAZOLE SODIUM SCH MG: 40 TABLET, DELAYED RELEASE ORAL at 08:08

## 2022-07-27 RX ADMIN — FUROSEMIDE SCH MG: 40 TABLET ORAL at 08:08

## 2022-07-27 RX ADMIN — INSULIN ASPART SCH UNIT: 100 INJECTION, SOLUTION INTRAVENOUS; SUBCUTANEOUS at 06:05

## 2022-07-27 RX ADMIN — Medication SCH MCG: at 06:04

## 2022-07-27 NOTE — DISCHARGE SUMMARY
Diagnosis/Chief Complaint


Date of Admission


Jul 8, 2022 at 11:22


Date of Discharge





Discharge Date:  Jul 27, 2022


Discharge Diagnosis


Assessment:


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


Fall 7/5/22 resulting in left shoulder injury causing further debility and 

limiting ADL/independence


Severe PVD


PAF


OAC


HTN


HLP


DM


Hypothyroidism


Previous hypoglycemia required holding metformin and glimepiride but now on 

lower dose


Constipation resolved


Poor venous access requiring PICC 7/11/22


Anemia post op requiring transfusion 7/13/22


Volume overload CHF requiring IV Lasix 7/18/2022


Infected left stump with Pseudomonas started on abx Zosyn 7/20


Iron deficiency receiving iron infusions








Plan:


PT OT


w/c mobility


Home meds





7/10/2022:


Pain control


Monitor sugar


Hold Metformin





7/11/22:


Empiric broad spectrum abx


Hold Metformin and OAC


PICC line





7/12/2022:


IV abx


Monitor closely





7/13/2022:


Transfuse


Broad spectrum abx





7/14/2022:


Monitor hgb


Broad spectrum abx





7/15/2022:


IV abx


Monitor hgb





7/16/2022:


DC Melatonin


IV abx





7/17/2022:


Monitor closely


Check labs in am





7/18/2022:


IV Lasix


Oxygen





7/19/2022:


Monitor labs





7/20/22:


Abx





7/21/2022:


Zosyn





7/22/2022:


Monitor pain





7/23/2022:


Continue abx





7/24/2022:


Venofer





7/25/2022:


Home O2 eval





7/26/2022:


Home O2 eval





(1) S/P AKA (above knee amputation)


(2) Gangrene





Discharge Summary


Discharge Physical Examination


Allergies:  


Coded Allergies:  


     gabapentin (Verified  Allergy, Unknown, 7/6/22)


   nightmares


     lisinopril (Verified  Allergy, Unknown, 6/24/22)


     niacin (Verified  Allergy, Unknown, 6/24/22)


     oxycodone (Verified  Allergy, Unknown, 6/24/22)


     rivaroxaban (Verified  Allergy, Unknown, 6/24/22)


Uncoded Allergies:  


     CI Pigment Blue 63 (Adverse Reaction, Severe, Itching, 6/24/22)


Vitals & I&Os





Vital Signs








  Date Time  Temp Pulse Resp B/P (MAP) Pulse Ox O2 Delivery O2 Flow Rate FiO2


 


7/27/22 13:00 36.6 82 18 143/65 90 Nasal Cannula 1.50 








General Appearance:  Alert, Oriented X3, Cooperative, Other (chronically ill and

pale and fatigued)


Respiratory:  Clear to Auscultation


Cardiovascular:  Regular Rate


Psych/Mental Status:  Mental Status NL





Hospital Course


Was the Problem List Reviewed?:  Yes


Lengthy course after he was readmitted 3 days after he was DC when he was 

readmitted following a fall at home and subsequent left AKA on BKA due to 

gangrenous tissue. He responded to therapy and required blood transfusion along 

with IV iron and adjustment to his DM meds. Overall he was able to regain 

function with use of Samira lift and wheelchair mobility. His wife and patient 

were comfortable DC home with HH with family help.


Labs (last 24 hrs)


Laboratory Tests


7/8/22 11:22: Lab Scanned Report Referred Lab Report


7/8/22 16:37: Glucometer 151H


7/8/22 20:27: Glucometer 127H


7/9/22 05:26: 


White Blood Count 15.6H, Red Blood Count 3.79L, Hemoglobin 9.4L, Hematocrit 32L,

Mean Corpuscular Volume 85, Mean Corpuscular Hemoglobin 25, Mean Corpuscular 

Hemoglobin Concent 29L, Red Cell Distribution Width 18.7H, Platelet Count 274, 

Mean Platelet Volume 12.1, Immature Granulocyte % (Auto) 1, Neutrophils (%) 

(Auto) 85H, Lymphocytes (%) (Auto) 5L, Monocytes (%) (Auto) 9, Eosinophils (%) 

(Auto) 0, Basophils (%) (Auto) 0, Neutrophils # (Auto) 13.2H, Lymphocytes # 

(Auto) 0.8L, Monocytes # (Auto) 1.4H, Eosinophils # (Auto) 0.0, Basophils # 

(Auto) 0.0, Immature Granulocyte # (Auto) 0.1, Sodium Level 135, Potassium Level

3.5L, Chloride Level 97L, Carbon Dioxide Level 24, Anion Gap 14, Blood Urea 

Nitrogen 15, Creatinine 0.80, Estimat Glomerular Filtration Rate 95, 

BUN/Creatinine Ratio 19, Glucose Level 84, Calcium Level 9.1, Corrected Calcium 

9.8, Total Bilirubin 0.9, Aspartate Amino Transf (AST/SGOT) 55H, Alanine 

Aminotransferase (ALT/SGPT) 31, Alkaline Phosphatase 85, Total Protein 6.9, 

Albumin 3.1L


7/9/22 10:58: Glucometer 113H


7/9/22 15:21: Glucometer 103


7/9/22 20:15: Glucometer 104


7/10/22 06:33: Glucometer 66L


7/10/22 07:20: Glucometer 121H


7/10/22 11:08: Glucometer 94


7/10/22 16:38: Glucometer 70


7/10/22 20:22: Glucometer 93


7/11/22 05:28: 


White Blood Count 19.6H, Red Blood Count 3.57L, Hemoglobin 8.9L, Hematocrit 30L,

Mean Corpuscular Volume 85, Mean Corpuscular Hemoglobin 25, Mean Corpuscular 

Hemoglobin Concent 29L, Red Cell Distribution Width 18.6H, Platelet Count 237, 

Mean Platelet Volume 12.3H, Immature Granulocyte % (Auto) 5, Neutrophils (%) 

(Auto) 81H, Lymphocytes (%) (Auto) 5L, Monocytes (%) (Auto) 8, Eosinophils (%) 

(Auto) 1, Basophils (%) (Auto) 0, Neutrophils # (Auto) 15.9H, Lymphocytes # 

(Auto) 1.1, Monocytes # (Auto) 1.5H, Eosinophils # (Auto) 0.1, Basophils # 

(Auto) 0.1, Immature Granulocyte # (Auto) 1.0H, Sodium Level 131L, Potassium 

Level 4.0, Chloride Level 96L, Carbon Dioxide Level 23, Anion Gap 12, Blood Urea

Nitrogen 34H, Creatinine 1.10, Estimat Glomerular Filtration Rate 72, 

BUN/Creatinine Ratio 31, Glucose Level 88, Calcium Level 8.6, Corrected Calcium 

9.6, Total Bilirubin 0.6, Aspartate Amino Transf (AST/SGOT) 99H, Alanine 

Aminotransferase (ALT/SGPT) 50, Alkaline Phosphatase 93, Total Protein 6.2L, 

Albumin 2.7L, Procalcitonin 1.19H


7/11/22 10:12: 


Urine Color YELLOW, Urine Clarity CLEAR, Urine pH 5.5, Urine Specific Gravity 

1.010L, Urine Protein NEGATIVE, Urine Glucose (UA) NEGATIVE, Urine Ketones 

NEGATIVE, Urine Nitrite NEGATIVE, Urine Bilirubin NEGATIVE, Urine Urobilinogen 

1.0, Urine Leukocyte Esterase NEGATIVE, Urine RBC (Auto) NEGATIVE, Urine RBC 

NONE, Urine WBC 0-2, Urine Squamous Epithelial Cells NONE, Urine Crystals 

PRESENTH, Urine Amorphous Sediment FEW ANTONY URATESH, Urine Bacteria TRACE, Urine

Casts NONE, Urine Mucus NEGATIVE, Urine Culture Indicated NO


7/11/22 12:08: Glucometer 138H


7/11/22 16:23: Glucometer 126H


7/11/22 21:08: Glucometer 108


7/12/22 05:55: Glucometer 116H


7/12/22 06:55: 


White Blood Count 15.6H, Red Blood Count 3.01L, Hemoglobin 7.4L, Hematocrit 25L,

Mean Corpuscular Volume 82, Mean Corpuscular Hemoglobin 25, Mean Corpuscular 

Hemoglobin Concent 30L, Red Cell Distribution Width 18.4H, Platelet Count 227, 

Mean Platelet Volume 11.8, Immature Granulocyte % (Auto) 1, Neutrophils (%) 

(Auto) 88H, Lymphocytes (%) (Auto) 4L, Monocytes (%) (Auto) 6, Eosinophils (%) 

(Auto) 1, Basophils (%) (Auto) 0, Neutrophils # (Auto) 13.7H, Lymphocytes # 

(Auto) 0.6L, Monocytes # (Auto) 0.9, Eosinophils # (Auto) 0.1, Basophils # 

(Auto) 0.0, Immature Granulocyte # (Auto) 0.2H, Sodium Level 133L, Potassium 

Level 4.3, Chloride Level 98, Carbon Dioxide Level 22, Anion Gap 13, Blood Urea 

Nitrogen 38H, Creatinine 1.02, Estimat Glomerular Filtration Rate 79, 

BUN/Creatinine Ratio 37, Glucose Level 115H, Calcium Level 8.2L, Corrected 

Calcium 9.6, Total Bilirubin 0.5, Aspartate Amino Transf (AST/SGOT) 90H, Alanine

Aminotransferase (ALT/SGPT) 56H, Alkaline Phosphatase 70, Total Protein 5.1L, 

Albumin 2.3L, Procalcitonin 0.78H


7/12/22 11:04: Glucometer 142H


7/12/22 16:31: Glucometer 165H


7/12/22 20:22: Glucometer 125H


7/13/22 05:39: Glucometer 116H


7/13/22 06:40: 


White Blood Count 17.0H, Red Blood Count 3.01L, Hemoglobin 7.3L, Hematocrit 25L,

Mean Corpuscular Volume 82, Mean Corpuscular Hemoglobin 24L, Mean Corpuscular 

Hemoglobin Concent 29L, Red Cell Distribution Width 18.3H, Platelet Count 256, 

Mean Platelet Volume 12.1, Immature Granulocyte % (Auto) 1, Neutrophils (%) 

(Auto) 89H, Lymphocytes (%) (Auto) 3L, Monocytes (%) (Auto) 5, Eosinophils (%) 

(Auto) 1, Basophils (%) (Auto) 0, Neutrophils # (Auto) 15.1H, Lymphocytes # 

(Auto) 0.6L, Monocytes # (Auto) 0.9, Eosinophils # (Auto) 0.2, Basophils # 

(Auto) 0.1, Immature Granulocyte # (Auto) 0.2H, Sodium Level 132L, Potassium 

Level 4.0, Chloride Level 102, Carbon Dioxide Level 22, Anion Gap 8, Blood Urea 

Nitrogen 26H, Creatinine 0.77, Estimat Glomerular Filtration Rate 96, 

BUN/Creatinine Ratio 34, Glucose Level 117H, Calcium Level 8.2L, Corrected 

Calcium 9.5, Iron Level 9L, Total Bilirubin 0.5, Aspartate Amino Transf 

(AST/SGOT) 65H, Alanine Aminotransferase (ALT/SGPT) 51, Alkaline Phosphatase 87,

Total Protein 5.5L, Albumin 2.4L, Vitamin B12 Level 565


7/13/22 09:00: Vancomycin Level Trough 9.1L


7/13/22 10:45: Glucometer 180H


7/13/22 16:03: Glucometer 167H


7/13/22 17:37: Glucometer 140H


7/13/22 20:33: Glucometer 138H


7/14/22 05:24: Glucometer 120H


7/14/22 06:50: 


White Blood Count 16.2H, Red Blood Count 3.47L, Hemoglobin 8.5L, Hematocrit 28L,

Mean Corpuscular Volume 82, Mean Corpuscular Hemoglobin 25, Mean Corpuscular 

Hemoglobin Concent 30L, Red Cell Distribution Width 18.6H, Platelet Count 259, 

Mean Platelet Volume 11.2, Immature Granulocyte % (Auto) 2, Neutrophils (%) 

(Auto) 86H, Lymphocytes (%) (Auto) 5L, Monocytes (%) (Auto) 5, Eosinophils (%) 

(Auto) 2, Basophils (%) (Auto) 0, Neutrophils # (Auto) 14.0H, Lymphocytes # 

(Auto) 0.7L, Monocytes # (Auto) 0.8, Eosinophils # (Auto) 0.3, Basophils # 

(Auto) 0.1, Immature Granulocyte # (Auto) 0.3H, Neutrophils % (Manual) 90, 

Lymphocytes % (Manual) 3, Monocytes % (Manual) 4, Eosinophils % (Manual) 2, Band

Neutrophils 1, Hypochromasia MODERATE, Anisocytosis MODERATE, Sodium Level 138, 

Potassium Level 3.7, Chloride Level 105, Carbon Dioxide Level 21, Anion Gap 12, 

Blood Urea Nitrogen 18, Creatinine 0.70, Estimat Glomerular Filtration Rate 99, 

BUN/Creatinine Ratio 26, Glucose Level 127H, Calcium Level 8.3L, Corrected 

Calcium 9.7, Total Bilirubin 0.5, Aspartate Amino Transf (AST/SGOT) 45H, Alanine

Aminotransferase (ALT/SGPT) 49, Alkaline Phosphatase 94, Total Protein 5.6L, 

Albumin 2.3L


7/14/22 10:47: Glucometer 176H


7/14/22 15:40: Glucometer 162H


7/14/22 21:10: Glucometer 176H


7/15/22 05:39: Glucometer 108


7/15/22 07:45: 


White Blood Count 14.8H, Red Blood Count 3.66L, Hemoglobin 8.9L, Hematocrit 30L,

Mean Corpuscular Volume 82, Mean Corpuscular Hemoglobin 24L, Mean Corpuscular 

Hemoglobin Concent 30L, Red Cell Distribution Width 18.8H, Platelet Count 304, 

Mean Platelet Volume 11.2, Immature Granulocyte % (Auto) 3, Neutrophils (%) 

(Auto) 86H, Lymphocytes (%) (Auto) 6L, Monocytes (%) (Auto) 4, Eosinophils (%) 

(Auto) 2, Basophils (%) (Auto) 0, Neutrophils # (Auto) 12.7H, Lymphocytes # 

(Auto) 0.8L, Monocytes # (Auto) 0.6, Eosinophils # (Auto) 0.2, Basophils # 

(Auto) 0.1, Immature Granulocyte # (Auto) 0.4H, Sodium Level 140, Potassium 

Level 3.6, Chloride Level 106, Carbon Dioxide Level 22, Anion Gap 12, Blood Urea

Nitrogen 14, Creatinine 0.68, Estimat Glomerular Filtration Rate 99, 

BUN/Creatinine Ratio 21, Glucose Level 163H, Calcium Level 8.4L, Corrected 

Calcium 9.7, Total Bilirubin 0.4, Aspartate Amino Transf (AST/SGOT) 31, Alanine 

Aminotransferase (ALT/SGPT) 44, Alkaline Phosphatase 94, Total Protein 5.7L, 

Albumin 2.4L


7/15/22 10:53: Glucometer 221H


7/15/22 15:25: Glucometer 215H


7/15/22 20:28: Glucometer 220H


7/16/22 06:18: Glucometer 138H


7/16/22 09:00: Vancomycin Level Trough 11.0


7/16/22 10:49: Glucometer 205H


7/16/22 15:17: Glucometer 187H


7/16/22 20:10: Glucometer 205H


7/17/22 06:23: Glucometer 165H


7/17/22 11:19: Glucometer 239H


7/17/22 15:10: Glucometer 204H


7/17/22 16:10: Vancomycin Level Trough 23.0H


7/17/22 20:31: Glucometer 227H


7/18/22 05:10: 


White Blood Count 20.1H, Red Blood Count 4.20L, Hemoglobin 10.2L, Hematocrit 35L

, Mean Corpuscular Volume 84, Mean Corpuscular Hemoglobin 24L, Mean Corpuscular 

Hemoglobin Concent 29L, Red Cell Distribution Width 18.7H, Platelet Count 364, 

Mean Platelet Volume 10.6, Immature Granulocyte % (Auto) 2, Neutrophils (%) 

(Auto) 90H, Lymphocytes (%) (Auto) 4L, Monocytes (%) (Auto) 3, Eosinophils (%) 

(Auto) 0, Basophils (%) (Auto) 0, Neutrophils # (Auto) 18.2H, Lymphocytes # 

(Auto) 0.8L, Monocytes # (Auto) 0.6, Eosinophils # (Auto) 0.1, Basophils # 

(Auto) 0.1, Immature Granulocyte # (Auto) 0.4H, Sodium Level 139, Potassium 

Level 3.9, Chloride Level 101, Carbon Dioxide Level 26, Anion Gap 12, Blood Urea

Nitrogen 9, Creatinine 0.67, Estimat Glomerular Filtration Rate 100, 

BUN/Creatinine Ratio 13, Glucose Level 241H, Calcium Level 9.2, Corrected Ca

lcium 10.1, Total Bilirubin 0.4, Aspartate Amino Transf (AST/SGOT) 22, Alanine 

Aminotransferase (ALT/SGPT) 31, Alkaline Phosphatase 97, B-Type Natriuretic 

Peptide 913.4H, Total Protein 6.6, Albumin 2.9L


7/18/22 06:51: Procalcitonin 0.07


7/18/22 08:46: 


Blood Gas Puncture Site L RAD, Blood Gas Patient Temperature 37, Arterial Blood 

pH 7.40, Arterial Blood Partial Pressure CO2 52H, Arterial Blood Partial 

Pressure O2 79, Arterial Blood HCO3 31H, Arterial Blood Total CO2 33.0H, 

Arterial Blood Oxygen Saturation 96, Arterial Blood Base Excess 6.6H, Ronal Test

YES-POS, Blood Gas Ventilator Setting NO, Blood Gas Inspired Oxygen 3


7/18/22 08:55: Lactic Acid Level 1.28


7/18/22 10:54: Glucometer 253H


7/18/22 15:22: Glucometer 185H


7/18/22 20:10: Glucometer 218H


7/19/22 05:28: 


White Blood Count 12.9H, Red Blood Count 3.90L, Hemoglobin 9.5L, Hematocrit 32L,

Mean Corpuscular Volume 83, Mean Corpuscular Hemoglobin 24L, Mean Corpuscular 

Hemoglobin Concent 29L, Red Cell Distribution Width 18.6H, Platelet Count 348, 

Mean Platelet Volume 10.4, Sodium Level 143, Potassium Level 3.6, Chloride Level

101, Carbon Dioxide Level 29, Anion Gap 13, Blood Urea Nitrogen 9, Creatinine 

0.67, Estimat Glomerular Filtration Rate 100, BUN/Creatinine Ratio 13, Glucose 

Level 188H, Calcium Level 9.1, Magnesium Level 1.6


7/19/22 10:54: Glucometer 199H


7/19/22 15:33: Glucometer 173H


7/19/22 20:10: Glucometer 200H


7/20/22 05:47: Glucometer 166H


7/20/22 06:25: 


White Blood Count 14.7H, Red Blood Count 3.92L, Hemoglobin 9.7L, Hematocrit 33L,

Mean Corpuscular Volume 83, Mean Corpuscular Hemoglobin 25, Mean Corpuscular 

Hemoglobin Concent 30L, Red Cell Distribution Width 18.6H, Platelet Count 359, 

Mean Platelet Volume 10.4, Immature Granulocyte % (Auto) 1, Neutrophils (%) 

(Auto) 88H, Lymphocytes (%) (Auto) 6L, Monocytes (%) (Auto) 4, Eosinophils (%) 

(Auto) 1, Basophils (%) (Auto) 0, Neutrophils # (Auto) 13.0H, Lymphocytes # 

(Auto) 0.9L, Monocytes # (Auto) 0.5, Eosinophils # (Auto) 0.1, Basophils # 

(Auto) 0.1, Immature Granulocyte # (Auto) 0.1, Neutrophils % (Manual) 90, 

Lymphocytes % (Manual) 8, Monocytes % (Manual) 2, Hypochromasia MODERATE, 

Poikilocytosis , Anisocytosis SLIGHT, Microcytosis SLIGHT, Stomatocytes SLIGHT, 

Sodium Level 141, Potassium Level 4.0, Chloride Level 99, Carbon Dioxide Level 

31, Anion Gap 11, Blood Urea Nitrogen 10, Creatinine 0.69, Estimat Glomerular 

Filtration Rate 99, BUN/Creatinine Ratio 14, Glucose Level 206H, Calcium Level 

9.3, Corrected Calcium 10.3H, Total Bilirubin 0.4, Aspartate Amino Transf 

(AST/SGOT) 19, Alanine Aminotransferase (ALT/SGPT) 21, Alkaline Phosphatase 79, 

Total Protein 6.3L, Albumin 2.8L


7/20/22 11:01: Glucometer 219H


7/20/22 16:20: Glucometer 166H


7/20/22 20:25: Glucometer 170H


7/21/22 05:49: Glucometer 134H


7/21/22 06:20: 


White Blood Count 14.0H, Red Blood Count 3.81L, Hemoglobin 9.3L, Hematocrit 32L,

Mean Corpuscular Volume 83, Mean Corpuscular Hemoglobin 24L, Mean Corpuscular 

Hemoglobin Concent 29L, Red Cell Distribution Width 18.5H, Platelet Count 355, 

Mean Platelet Volume 10.6, Immature Granulocyte % (Auto) 1, Neutrophils (%) 

(Auto) 89H, Lymphocytes (%) (Auto) 6L, Monocytes (%) (Auto) 4, Eosinophils (%) 

(Auto) 1, Basophils (%) (Auto) 0, Neutrophils # (Auto) 12.4H, Lymphocytes # 

(Auto) 0.8L, Monocytes # (Auto) 0.5, Eosinophils # (Auto) 0.1, Basophils # 

(Auto) 0.1, Immature Granulocyte # (Auto) 0.1, Sodium Level 140, Potassium Level

3.7, Chloride Level 99, Carbon Dioxide Level 31, Anion Gap 10, Blood Urea 

Nitrogen 11, Creatinine 0.76, Estimat Glomerular Filtration Rate 96, 

BUN/Creatinine Ratio 14, Glucose Level 151H, Calcium Level 9.1, Corrected 

Calcium 10.1, Total Bilirubin 0.4, Aspartate Amino Transf (AST/SGOT) 15, Alanine

Aminotransferase (ALT/SGPT) 18, Alkaline Phosphatase 71, Total Protein 6.1L, 

Albumin 2.7L


7/21/22 10:45: Glucometer 178H


7/21/22 15:57: Glucometer 171H


7/21/22 20:20: Glucometer 180H


7/22/22 05:28: Glucometer 107


7/22/22 10:58: Glucometer 190H


7/22/22 15:17: Glucometer 172H


7/22/22 20:34: Glucometer 233H


7/23/22 05:35: 


White Blood Count 11.2H, Red Blood Count 3.67L, Hemoglobin 9.0L, Hematocrit 31L,

Mean Corpuscular Volume 84, Mean Corpuscular Hemoglobin 25, Mean Corpuscular 

Hemoglobin Concent 29L, Red Cell Distribution Width 18.6H, Platelet Count 327, 

Mean Platelet Volume 10.9, Immature Granulocyte % (Auto) 1, Neutrophils (%) 

(Auto) 85H, Lymphocytes (%) (Auto) 7L, Monocytes (%) (Auto) 6, Eosinophils (%) 

(Auto) 1, Basophils (%) (Auto) 1, Neutrophils # (Auto) 9.5H, Lymphocytes # 

(Auto) 0.8L, Monocytes # (Auto) 0.7, Eosinophils # (Auto) 0.2, Basophils # 

(Auto) 0.1, Immature Granulocyte # (Auto) 0.1, Sodium Level 144, Potassium Level

3.7, Chloride Level 102, Carbon Dioxide Level 29, Anion Gap 13, Blood Urea 

Nitrogen 10, Creatinine 0.76, Estimat Glomerular Filtration Rate 96, 

BUN/Creatinine Ratio 13, Glucose Level 102, Calcium Level 9.0, Corrected Calcium

10.0, Total Bilirubin 0.3, Aspartate Amino Transf (AST/SGOT) 16, Alanine 

Aminotransferase (ALT/SGPT) 17, Alkaline Phosphatase 66, Total Protein 6.0L, 

Albumin 2.7L


7/23/22 11:16: Glucometer 192H


7/23/22 16:00: Glucometer 173H


7/23/22 20:41: Glucometer 174H


7/24/22 05:01: Glucometer 112H


7/24/22 11:06: Glucometer 159H


7/24/22 15:26: Glucometer 170H


7/24/22 20:08: Glucometer 174H


7/25/22 04:05: 


White Blood Count 10.9, Red Blood Count 3.59L, Hemoglobin 8.8L, Hematocrit 30L, 

Mean Corpuscular Volume 85, Mean Corpuscular Hemoglobin 25, Mean Corpuscular Hem

oglobin Concent 29L, Red Cell Distribution Width 19.3H, Platelet Count 295, Mean

Platelet Volume 10.5, Immature Granulocyte % (Auto) 1, Neutrophils (%) (Auto) 

81H, Lymphocytes (%) (Auto) 9L, Monocytes (%) (Auto) 7, Eosinophils (%) (Auto) 

2, Basophils (%) (Auto) 1, Neutrophils # (Auto) 8.8H, Lymphocytes # (Auto) 1.0, 

Monocytes # (Auto) 0.8, Eosinophils # (Auto) 0.2, Basophils # (Auto) 0.1, 

Immature Granulocyte # (Auto) 0.1, Sodium Level 142, Potassium Level 3.7, 

Chloride Level 102, Carbon Dioxide Level 29, Anion Gap 11, Blood Urea Nitrogen 1

3, Creatinine 0.78, Estimat Glomerular Filtration Rate 95, BUN/Creatinine Ratio 

17, Glucose Level 110H, Calcium Level 9.2, Corrected Calcium 10.2H, Total 

Bilirubin 0.4, Aspartate Amino Transf (AST/SGOT) 19, Alanine Aminotransferase 

(ALT/SGPT) 13, Alkaline Phosphatase 63, Total Protein 6.2L, Albumin 2.8L


7/25/22 10:39: Glucometer 249H


7/25/22 15:26: Glucometer 183H


7/25/22 20:07: Glucometer 146H


7/26/22 05:31: Glucometer 110


7/26/22 10:51: Glucometer 226H


7/26/22 15:33: Glucometer 178H


7/26/22 20:08: Glucometer 193H


7/27/22 05:32: Glucometer 113H


7/27/22 17:47: Lab Scanned Report Transfusion Reaction Form





Microbiology


7/18/22 Gram Stain - Final, Complete


          


7/18/22 Wound Culture - Final, Complete


          Serratia marcescens


          Pseudomonas species


7/11/22 Blood Culture - Final, Complete


          No growth





Pending Labs





Microbiology








 Date/Time


Source Procedure


Growth Status





 


 7/18/22 14:45


Incision Leg, Left Gram Stain - Final Complete


 


 7/18/22 14:45 Wound Culture - Final


Serratia marcescens


Pseudomonas species Complete


 


 7/11/22 11:50


Blood Not Indicated On Bottle Blood Culture - Final


No growth Complete


 


 7/11/22 10:55


Peripheral Peripheral, Nos Blood Culture - Final


No growth Complete





Laboratory Tests


7/8/22 11:22: Lab Scanned Report Referred Lab Report


7/8/22 16:37: Glucometer 151


7/8/22 20:27: Glucometer 127


7/9/22 05:26: 


White Blood Count 15.6, Red Blood Count 3.79, Hemoglobin 9.4, Hematocrit 32, 

Mean Corpuscular Volume 85, Mean Corpuscular Hemoglobin 25, Mean Corpuscular 

Hemoglobin Concent 29, Red Cell Distribution Width 18.7, Platelet Count 274, 

Mean Platelet Volume 12.1, Immature Granulocyte % (Auto) 1, Neutrophils (%) 

(Auto) 85, Lymphocytes (%) (Auto) 5, Monocytes (%) (Auto) 9, Eosinophils (%) 

(Auto) 0, Basophils (%) (Auto) 0, Neutrophils # (Auto) 13.2, Lymphocytes # 

(Auto) 0.8, Monocytes # (Auto) 1.4, Eosinophils # (Auto) 0.0, Basophils # (Auto)

0.0, Immature Granulocyte # (Auto) 0.1, Sodium Level 135, Potassium Level 3.5, 

Chloride Level 97, Carbon Dioxide Level 24, Anion Gap 14, Blood Urea Nitrogen 

15, Creatinine 0.80, Estimat Glomerular Filtration Rate 95, BUN/Creatinine Ratio

19, Glucose Level 84, Calcium Level 9.1, Corrected Calcium 9.8, Total Bilirubin 

0.9, Aspartate Amino Transf (AST/SGOT) 55, Alanine Aminotransferase (ALT/SGPT) 

31, Alkaline Phosphatase 85, Total Protein 6.9, Albumin 3.1


7/9/22 10:58: Glucometer 113


7/9/22 15:21: Glucometer 103


7/9/22 20:15: Glucometer 104


7/10/22 06:33: Glucometer 66


7/10/22 07:20: Glucometer 121


7/10/22 11:08: Glucometer 94


7/10/22 16:38: Glucometer 70


7/10/22 20:22: Glucometer 93


7/11/22 05:28: 


White Blood Count 19.6, Red Blood Count 3.57, Hemoglobin 8.9, Hematocrit 30, 

Mean Corpuscular Volume 85, Mean Corpuscular Hemoglobin 25, Mean Corpuscular 

Hemoglobin Concent 29, Red Cell Distribution Width 18.6, Platelet Count 237, 

Mean Platelet Volume 12.3, Immature Granulocyte % (Auto) 5, Neutrophils (%) 

(Auto) 81, Lymphocytes (%) (Auto) 5, Monocytes (%) (Auto) 8, Eosinophils (%) 

(Auto) 1, Basophils (%) (Auto) 0, Neutrophils # (Auto) 15.9, Lymphocytes # 

(Auto) 1.1, Monocytes # (Auto) 1.5, Eosinophils # (Auto) 0.1, Basophils # (Auto)

0.1, Immature Granulocyte # (Auto) 1.0, Sodium Level 131, Potassium Level 4.0, 

Chloride Level 96, Carbon Dioxide Level 23, Anion Gap 12, Blood Urea Nitrogen 

34, Creatinine 1.10, Estimat Glomerular Filtration Rate 72, BUN/Creatinine Ratio

31, Glucose Level 88, Calcium Level 8.6, Corrected Calcium 9.6, Total Bilirubin 

0.6, Aspartate Amino Transf (AST/SGOT) 99, Alanine Aminotransferase (ALT/SGPT) 

50, Alkaline Phosphatase 93, Total Protein 6.2, Albumin 2.7, Procalcitonin 1.19


7/11/22 10:12: 


Urine Color YELLOW, Urine Clarity CLEAR, Urine pH 5.5, Urine Specific Gravity 

1.010, Urine Protein NEGATIVE, Urine Glucose (UA) NEGATIVE, Urine Ketones 

NEGATIVE, Urine Nitrite NEGATIVE, Urine Bilirubin NEGATIVE, Urine Urobilinogen 

1.0, Urine Leukocyte Esterase NEGATIVE, Urine RBC (Auto) NEGATIVE, Urine RBC 

NONE, Urine WBC 0-2, Urine Squamous Epithelial Cells NONE, Urine Crystals 

PRESENT, Urine Amorphous Sediment FEW ANTONY URATES, Urine Bacteria TRACE, Urine 

Casts NONE, Urine Mucus NEGATIVE, Urine Culture Indicated NO


7/11/22 12:08: Glucometer 138


7/11/22 16:23: Glucometer 126


7/11/22 21:08: Glucometer 108


7/12/22 05:55: Glucometer 116


7/12/22 06:55: 


White Blood Count 15.6, Red Blood Count 3.01, Hemoglobin 7.4, Hematocrit 25, 

Mean Corpuscular Volume 82, Mean Corpuscular Hemoglobin 25, Mean Corpuscular 

Hemoglobin Concent 30, Red Cell Distribution Width 18.4, Platelet Count 227, 

Mean Platelet Volume 11.8, Immature Granulocyte % (Auto) 1, Neutrophils (%) 

(Auto) 88, Lymphocytes (%) (Auto) 4, Monocytes (%) (Auto) 6, Eosinophils (%) 

(Auto) 1, Basophils (%) (Auto) 0, Neutrophils # (Auto) 13.7, Lymphocytes # 

(Auto) 0.6, Monocytes # (Auto) 0.9, Eosinophils # (Auto) 0.1, Basophils # (Auto)

0.0, Immature Granulocyte # (Auto) 0.2, Sodium Level 133, Potassium Level 4.3, 

Chloride Level 98, Carbon Dioxide Level 22, Anion Gap 13, Blood Urea Nitrogen 

38, Creatinine 1.02, Estimat Glomerular Filtration Rate 79, BUN/Creatinine Ratio

37, Glucose Level 115, Calcium Level 8.2, Corrected Calcium 9.6, Total Bilirubin

0.5, Aspartate Amino Transf (AST/SGOT) 90, Alanine Aminotransferase (ALT/SGPT) 

56, Alkaline Phosphatase 70, Total Protein 5.1, Albumin 2.3, Procalcitonin 0.78


7/12/22 11:04: Glucometer 142


7/12/22 16:31: Glucometer 165


7/12/22 20:22: Glucometer 125


7/13/22 05:39: Glucometer 116


7/13/22 06:40: 


White Blood Count 17.0, Red Blood Count 3.01, Hemoglobin 7.3, Hematocrit 25, 

Mean Corpuscular Volume 82, Mean Corpuscular Hemoglobin 24, Mean Corpuscular 

Hemoglobin Concent 29, Red Cell Distribution Width 18.3, Platelet Count 256, 

Mean Platelet Volume 12.1, Immature Granulocyte % (Auto) 1, Neutrophils (%) 

(Auto) 89, Lymphocytes (%) (Auto) 3, Monocytes (%) (Auto) 5, Eosinophils (%) 

(Auto) 1, Basophils (%) (Auto) 0, Neutrophils # (Auto) 15.1, Lymphocytes # 

(Auto) 0.6, Monocytes # (Auto) 0.9, Eosinophils # (Auto) 0.2, Basophils # (Auto)

0.1, Immature Granulocyte # (Auto) 0.2, Sodium Level 132, Potassium Level 4.0, 

Chloride Level 102, Carbon Dioxide Level 22, Anion Gap 8, Blood Urea Nitrogen 

26, Creatinine 0.77, Estimat Glomerular Filtration Rate 96, BUN/Creatinine Ratio

34, Glucose Level 117, Calcium Level 8.2, Corrected Calcium 9.5, Iron Level 9, 

Total Bilirubin 0.5, Aspartate Amino Transf (AST/SGOT) 65, Alanine 

Aminotransferase (ALT/SGPT) 51, Alkaline Phosphatase 87, Total Protein 5.5, 

Albumin 2.4, Vitamin B12 Level 565


7/13/22 09:00: Vancomycin Level Trough 9.1


7/13/22 10:45: Glucometer 180


7/13/22 16:03: Glucometer 167


7/13/22 17:37: Glucometer 140


7/13/22 20:33: Glucometer 138


7/14/22 05:24: Glucometer 120


7/14/22 06:50: 


White Blood Count 16.2, Red Blood Count 3.47, Hemoglobin 8.5, Hematocrit 28, 

Mean Corpuscular Volume 82, Mean Corpuscular Hemoglobin 25, Mean Corpuscular 

Hemoglobin Concent 30, Red Cell Distribution Width 18.6, Platelet Count 259, 

Mean Platelet Volume 11.2, Immature Granulocyte % (Auto) 2, Neutrophils (%) 

(Auto) 86, Lymphocytes (%) (Auto) 5, Monocytes (%) (Auto) 5, Eosinophils (%) 

(Auto) 2, Basophils (%) (Auto) 0, Neutrophils # (Auto) 14.0, Lymphocytes # 

(Auto) 0.7, Monocytes # (Auto) 0.8, Eosinophils # (Auto) 0.3, Basophils # (Auto)

0.1, Immature Granulocyte # (Auto) 0.3, Neutrophils % (Manual) 90, Lymphocytes %

(Manual) 3, Monocytes % (Manual) 4, Eosinophils % (Manual) 2, Band Neutrophils 

1, Hypochromasia MODERATE, Anisocytosis MODERATE, Sodium Level 138, Potassium 

Level 3.7, Chloride Level 105, Carbon Dioxide Level 21, Anion Gap 12, Blood Urea

Nitrogen 18, Creatinine 0.70, Estimat Glomerular Filtration Rate 99, 

BUN/Creatinine Ratio 26, Glucose Level 127, Calcium Level 8.3, Corrected Calcium

9.7, Total Bilirubin 0.5, Aspartate Amino Transf (AST/SGOT) 45, Alanine 

Aminotransferase (ALT/SGPT) 49, Alkaline Phosphatase 94, Total Protein 5.6, 

Albumin 2.3


7/14/22 10:47: Glucometer 176


7/14/22 15:40: Glucometer 162


7/14/22 21:10: Glucometer 176


7/15/22 05:39: Glucometer 108


7/15/22 07:45: 


White Blood Count 14.8, Red Blood Count 3.66, Hemoglobin 8.9, Hematocrit 30, 

Mean Corpuscular Volume 82, Mean Corpuscular Hemoglobin 24, Mean Corpuscular 

Hemoglobin Concent 30, Red Cell Distribution Width 18.8, Platelet Count 304, 

Mean Platelet Volume 11.2, Immature Granulocyte % (Auto) 3, Neutrophils (%) 

(Auto) 86, Lymphocytes (%) (Auto) 6, Monocytes (%) (Auto) 4, Eosinophils (%) 

(Auto) 2, Basophils (%) (Auto) 0, Neutrophils # (Auto) 12.7, Lymphocytes # 

(Auto) 0.8, Monocytes # (Auto) 0.6, Eosinophils # (Auto) 0.2, Basophils # (Auto)

0.1, Immature Granulocyte # (Auto) 0.4, Sodium Level 140, Potassium Level 3.6, 

Chloride Level 106, Carbon Dioxide Level 22, Anion Gap 12, Blood Urea Nitrogen 

14, Creatinine 0.68, Estimat Glomerular Filtration Rate 99, BUN/Creatinine Ratio

21, Glucose Level 163, Calcium Level 8.4, Corrected Calcium 9.7, Total Bilirubin

0.4, Aspartate Amino Transf (AST/SGOT) 31, Alanine Aminotransferase (ALT/SGPT) 

44, Alkaline Phosphatase 94, Total Protein 5.7, Albumin 2.4


7/15/22 10:53: Glucometer 221


7/15/22 15:25: Glucometer 215


7/15/22 20:28: Glucometer 220


7/16/22 06:18: Glucometer 138


7/16/22 09:00: Vancomycin Level Trough 11.0


7/16/22 10:49: Glucometer 205


7/16/22 15:17: Glucometer 187


7/16/22 20:10: Glucometer 205


7/17/22 06:23: Glucometer 165


7/17/22 11:19: Glucometer 239


7/17/22 15:10: Glucometer 204


7/17/22 16:10: Vancomycin Level Trough 23.0


7/17/22 20:31: Glucometer 227


7/18/22 05:10: 


White Blood Count 20.1, Red Blood Count 4.20, Hemoglobin 10.2, Hematocrit 35, 

Mean Corpuscular Volume 84, Mean Corpuscular Hemoglobin 24, Mean Corpuscular 

Hemoglobin Concent 29, Red Cell Distribution Width 18.7, Platelet Count 364, 

Mean Platelet Volume 10.6, Immature Granulocyte % (Auto) 2, Neutrophils (%) 

(Auto) 90, Lymphocytes (%) (Auto) 4, Monocytes (%) (Auto) 3, Eosinophils (%) 

(Auto) 0, Basophils (%) (Auto) 0, Neutrophils # (Auto) 18.2, Lymphocytes # 

(Auto) 0.8, Monocytes # (Auto) 0.6, Eosinophils # (Auto) 0.1, Basophils # (Auto)

0.1, Immature Granulocyte # (Auto) 0.4, Sodium Level 139, Potassium Level 3.9, 

Chloride Level 101, Carbon Dioxide Level 26, Anion Gap 12, Blood Urea Nitrogen 

9, Creatinine 0.67, Estimat Glomerular Filtration Rate 100, BUN/Creatinine Ratio

13, Glucose Level 241, Calcium Level 9.2, Corrected Calcium 10.1, Total B

ilirubin 0.4, Aspartate Amino Transf (AST/SGOT) 22, Alanine Aminotransferase 

(ALT/SGPT) 31, Alkaline Phosphatase 97, B-Type Natriuretic Peptide 913.4, Total 

Protein 6.6, Albumin 2.9


7/18/22 06:51: Procalcitonin 0.07


7/18/22 08:46: 


Blood Gas Puncture Site L RAD, Blood Gas Patient Temperature 37, Arterial Blood 

pH 7.40, Arterial Blood Partial Pressure CO2 52, Arterial Blood Partial Pressure

O2 79, Arterial Blood HCO3 31, Arterial Blood Total CO2 33.0, Arterial Blood 

Oxygen Saturation 96, Arterial Blood Base Excess 6.6, Ronal Test YES-POS, Blood 

Gas Ventilator Setting NO, Blood Gas Inspired Oxygen 3


7/18/22 08:55: Lactic Acid Level 1.28


7/18/22 10:54: Glucometer 253


7/18/22 15:22: Glucometer 185


7/18/22 20:10: Glucometer 218


7/19/22 05:28: 


White Blood Count 12.9, Red Blood Count 3.90, Hemoglobin 9.5, Hematocrit 32, 

Mean Corpuscular Volume 83, Mean Corpuscular Hemoglobin 24, Mean Corpuscular 

Hemoglobin Concent 29, Red Cell Distribution Width 18.6, Platelet Count 348, 

Mean Platelet Volume 10.4, Sodium Level 143, Potassium Level 3.6, Chloride Level

101, Carbon Dioxide Level 29, Anion Gap 13, Blood Urea Nitrogen 9, Creatinine 

0.67, Estimat Glomerular Filtration Rate 100, BUN/Creatinine Ratio 13, Glucose 

Level 188, Calcium Level 9.1, Magnesium Level 1.6


7/19/22 10:54: Glucometer 199


7/19/22 15:33: Glucometer 173


7/19/22 20:10: Glucometer 200


7/20/22 05:47: Glucometer 166


7/20/22 06:25: 


White Blood Count 14.7, Red Blood Count 3.92, Hemoglobin 9.7, Hematocrit 33, 

Mean Corpuscular Volume 83, Mean Corpuscular Hemoglobin 25, Mean Corpuscular 

Hemoglobin Concent 30, Red Cell Distribution Width 18.6, Platelet Count 359, 

Mean Platelet Volume 10.4, Immature Granulocyte % (Auto) 1, Neutrophils (%) 

(Auto) 88, Lymphocytes (%) (Auto) 6, Monocytes (%) (Auto) 4, Eosinophils (%) 

(Auto) 1, Basophils (%) (Auto) 0, Neutrophils # (Auto) 13.0, Lymphocytes # 

(Auto) 0.9, Monocytes # (Auto) 0.5, Eosinophils # (Auto) 0.1, Basophils # (Auto)

0.1, Immature Granulocyte # (Auto) 0.1, Neutrophils % (Manual) 90, Lymphocytes %

(Manual) 8, Monocytes % (Manual) 2, Hypochromasia MODERATE, Poikilocytosis , 

Anisocytosis SLIGHT, Microcytosis SLIGHT, Stomatocytes SLIGHT, Sodium Level 141,

Potassium Level 4.0, Chloride Level 99, Carbon Dioxide Level 31, Anion Gap 11, 

Blood Urea Nitrogen 10, Creatinine 0.69, Estimat Glomerular Filtration Rate 99, 

BUN/Creatinine Ratio 14, Glucose Level 206, Calcium Level 9.3, Corrected Calcium

10.3, Total Bilirubin 0.4, Aspartate Amino Transf (AST/SGOT) 19, Alanine 

Aminotransferase (ALT/SGPT) 21, Alkaline Phosphatase 79, Total Protein 6.3, 

Albumin 2.8


7/20/22 11:01: Glucometer 219


7/20/22 16:20: Glucometer 166


7/20/22 20:25: Glucometer 170


7/21/22 05:49: Glucometer 134


7/21/22 06:20: 


White Blood Count 14.0, Red Blood Count 3.81, Hemoglobin 9.3, Hematocrit 32, 

Mean Corpuscular Volume 83, Mean Corpuscular Hemoglobin 24, Mean Corpuscular 

Hemoglobin Concent 29, Red Cell Distribution Width 18.5, Platelet Count 355, 

Mean Platelet Volume 10.6, Immature Granulocyte % (Auto) 1, Neutrophils (%) 

(Auto) 89, Lymphocytes (%) (Auto) 6, Monocytes (%) (Auto) 4, Eosinophils (%) 

(Auto) 1, Basophils (%) (Auto) 0, Neutrophils # (Auto) 12.4, Lymphocytes # 

(Auto) 0.8, Monocytes # (Auto) 0.5, Eosinophils # (Auto) 0.1, Basophils # (Auto)

0.1, Immature Granulocyte # (Auto) 0.1, Sodium Level 140, Potassium Level 3.7, 

Chloride Level 99, Carbon Dioxide Level 31, Anion Gap 10, Blood Urea Nitrogen 

11, Creatinine 0.76, Estimat Glomerular Filtration Rate 96, BUN/Creatinine Ratio

14, Glucose Level 151, Calcium Level 9.1, Corrected Calcium 10.1, Total 

Bilirubin 0.4, Aspartate Amino Transf (AST/SGOT) 15, Alanine Aminotransferase 

(ALT/SGPT) 18, Alkaline Phosphatase 71, Total Protein 6.1, Albumin 2.7


7/21/22 10:45: Glucometer 178


7/21/22 15:57: Glucometer 171


7/21/22 20:20: Glucometer 180


7/22/22 05:28: Glucometer 107


7/22/22 10:58: Glucometer 190


7/22/22 15:17: Glucometer 172


7/22/22 20:34: Glucometer 233


7/23/22 05:35: 


White Blood Count 11.2, Red Blood Count 3.67, Hemoglobin 9.0, Hematocrit 31, 

Mean Corpuscular Volume 84, Mean Corpuscular Hemoglobin 25, Mean Corpuscular 

Hemoglobin Concent 29, Red Cell Distribution Width 18.6, Platelet Count 327, 

Mean Platelet Volume 10.9, Immature Granulocyte % (Auto) 1, Neutrophils (%) 

(Auto) 85, Lymphocytes (%) (Auto) 7, Monocytes (%) (Auto) 6, Eosinophils (%) 

(Auto) 1, Basophils (%) (Auto) 1, Neutrophils # (Auto) 9.5, Lymphocytes # (Auto)

0.8, Monocytes # (Auto) 0.7, Eosinophils # (Auto) 0.2, Basophils # (Auto) 0.1, 

Immature Granulocyte # (Auto) 0.1, Sodium Level 144, Potassium Level 3.7, 

Chloride Level 102, Carbon Dioxide Level 29, Anion Gap 13, Blood Urea Nitrogen 

10, Creatinine 0.76, Estimat Glomerular Filtration Rate 96, BUN/Creatinine Ratio

13, Glucose Level 102, Calcium Level 9.0, Corrected Calcium 10.0, Total 

Bilirubin 0.3, Aspartate Amino Transf (AST/SGOT) 16, Alanine Aminotransferase 

(ALT/SGPT) 17, Alkaline Phosphatase 66, Total Protein 6.0, Albumin 2.7


7/23/22 11:16: Glucometer 192


7/23/22 16:00: Glucometer 173


7/23/22 20:41: Glucometer 174


7/24/22 05:01: Glucometer 112


7/24/22 11:06: Glucometer 159


7/24/22 15:26: Glucometer 170


7/24/22 20:08: Glucometer 174


7/25/22 04:05: 


White Blood Count 10.9, Red Blood Count 3.59, Hemoglobin 8.8, Hematocrit 30, 

Mean Corpuscular Volume 85, Mean Corpuscular Hemoglobin 25, Mean Corpuscular 

Hemoglobin Concent 29, Red Cell Distribution Width 19.3, Platelet Count 295, 

Mean Platelet Volume 10.5, Immature Granulocyte % (Auto) 1, Neutrophils (%) 

(Auto) 81, Lymphocytes (%) (Auto) 9, Monocytes (%) (Auto) 7, Eosinophils (%) 

(Auto) 2, Basophils (%) (Auto) 1, Neutrophils # (Auto) 8.8, Lymphocytes # (Auto)

1.0, Monocytes # (Auto) 0.8, Eosinophils # (Auto) 0.2, Basophils # (Auto) 0.1, 

Immature Granulocyte # (Auto) 0.1, Sodium Level 142, Potassium Level 3.7, 

Chloride Level 102, Carbon Dioxide Level 29, Anion Gap 11, Blood Urea Nitrogen 

13, Creatinine 0.78, Estimat Glomerular Filtration Rate 95, BUN/Creatinine Ratio

17, Glucose Level 110, Calcium Level 9.2, Corrected Calcium 10.2, Total 

Bilirubin 0.4, Aspartate Amino Transf (AST/SGOT) 19, Alanine Aminotransferase 

(ALT/SGPT) 13, Alkaline Phosphatase 63, Total Protein 6.2, Albumin 2.8


7/25/22 10:39: Glucometer 249


7/25/22 15:26: Glucometer 183


7/25/22 20:07: Glucometer 146


7/26/22 05:31: Glucometer 110


7/26/22 10:51: Glucometer 226


7/26/22 15:33: Glucometer 178


7/26/22 20:08: Glucometer 193


7/27/22 05:32: Glucometer 113


7/27/22 17:47: Lab Scanned Report Transfusion Reaction Form








Discharge


Home Medications:





Active Scripts


Active


Vitamin B-12 (Cyanocobalamin (Vitamin B-12)) 1,000 Mcg Tablet 1,000 Mcg PO 

DAILY@0700


Klor-Con 10 (Potassium Chloride) 10 Meq Tablet.er 10 Meq PO BID


Metoprolol Succinate 50 Mg Tab.er.24h 50 Mg PO DAILY


Eliquis (Apixaban) 5 Mg Tablet 5 Mg PO BID


Euthyrox (Levothyroxine Sodium) 100 Mcg Tablet 100 Mcg PO DAILY


Amaryl (Glimepiride) 1 Mg Tab 0.5 Mg PO DAILY@0630


Metformin HCl 500 Mg Tablet 500 Mg PO BID@07,17


Hydrocodone-Acetamin  mg (Hydrocodone/Acetaminophen) 10 Mg-325 Mg Tablet 1

Each PO Q8H PRN


Docusate Sodium 100 Mg Capsule 100 Mg PO BID PRN


Pantoprazole Sodium 40 Mg Tablet.dr 40 Mg PO DAILY


Amiodarone HCl 200 Mg Tablet 200 Mg PO DAILY


Furosemide 80 Mg Tablet 80 Mg PO DAILY


Losartan Potassium 50 Mg Tablet 50 Mg PO DAILY





Instructions to patient/family


Please see electronic discharge instructions given to patient.





Diagnosis/Problems


Diagnosis/Problems





(1) S/P AKA (above knee amputation)


(2) Gangrene











DIANA HENRIQUEZ DO                Jul 27, 2022 06:18

## 2022-07-27 NOTE — D/C HH FACE TO FACE ORDER
D/C HH Face to Face Orders


Reconcile Patient Problems


Problems Reviewed?:  Yes





Instructions for Patient


HH


Patient Instructions/FollowUp:  


PCP 1 week


Physician to follow Patient:  PCP


Discharge Diet for Home:  ADA Diet


Patient Problems:  


Left AKA





Patient Data-Allergies,Ht & Wt


Patient Allergies:  


Coded Allergies:  


     gabapentin (Verified  Allergy, Unknown, 7/6/22)


   nightmares


     lisinopril (Verified  Allergy, Unknown, 6/24/22)


     niacin (Verified  Allergy, Unknown, 6/24/22)


     oxycodone (Verified  Allergy, Unknown, 6/24/22)


     rivaroxaban (Verified  Allergy, Unknown, 6/24/22)


Uncoded Allergies:  


     CI Pigment Blue 63 (Adverse Reaction, Severe, Itching, 6/24/22)





Home Health Need/Face to Face


Date of Face to Face:  Jul 27, 2022


Clinical Findings:  Generalized weakness and fatigue, Instability, Muscle 

weakness, Non or partial weight bearing, Unsteady gait, Non-healing wound


I have seen Pt face-to-face:  Yes


Discharged To:  Home


Diagnosis/Conditions:  


Left AKA


Patient is Homebound due to:  Amanuel fall risk due to instabilty, Muscle weakness


Homebound Status


   Due to the above stated illness, injury or surgical procedure (medical 

condition or diagnosis) and associated clinical findings, the patient is 

homebound because of his/her inability to leave home except with aid of a 

supportive device and/or person AND leaving the home requires a considerable and

taxing effort or is medically contraindicated.


Pt req the following assistanc:  Walker, Wheelchair





Home Health Nursing Orders


Home Health Services Order:  Nursing Services, Occupational Ther-Evaluate & 

Treat, Physical Therapy-Evaluate & Treat, Wound Care-Eval/Treat





Home Health Infusion Therapy


Line Start Date:  Jul 11, 2022


Certify Stmt


I certify that this patient is under my care and that I, a nurse practitioner or

a physician; a assistant working with me, had a face to face encounter that -

meets the physician face to face encounter requirements with this patient as 

dated.











DIANA HENRIQUEZ DO                Jul 27, 2022 06:17

## 2022-07-27 NOTE — THERAPY TEAM DISCHARGE SUMMARY
Therapy Discharge Summary


Discharge Recommendations


Date of Discharge








Physical Therapy


Patient came to rehab with left AKA.  Upon evaluation patient performs rolling 

with min assist, supine <-> sit mod assist, sit <-> stand max assist, transfer 

with sliding board max assist, car transfer max assist, and propel a manual WC 

100' with SBA.  Patient has been performing bed mobility and transfer training, 

balance and endurance training, functional strengthening, gait training, and 

education.  Patient has made little progress but has been able to ambulate a few

feet in the parallel bars.  He has not met any long term goals.  Now, patient 

performs rolling and supine <-> sit with min/mod assist, sit <-> stand and 

transfers with max assist, car transfer max assist, ambulates 8' in the parallel

bars, and propels a manual WC at least 150' with SBA.  Patient is being 

discharged from this facility today and will be discharged from PT at this time.


Roll Left to Right (QC):  3


Sit to Lying (QC):  3


Lying to Sitting/Side of Bed(Q:  3


Sit to Stand (QC):  3 (x 3 sets in //bars)


Chair/Bed-to-Chair Xfer(QC):  2


Toilet Transfer (QC):  1


Car Transfer (QC):  2


Does the Patient Walk:  Yes


Walk 10 feet (QC):  88


Walk 50 ft with 2 Turns(QC):  88


Walk 150 ft (QC):  88


Walking 10ft on uneven surface:  88


Gait Assistive Device:  Parallel Bars


Does the Pt Use a Wheelchair:  Yes


Wheelchair Distance:  100'x2


Wheel 50 ft with 2 turns (QC):  4


Wheel 150 ft (QC):  4


Type of Wheelchair:  Manual


#of Steps:  1


1 Step (curb) (QC):  2


4 Steps (QC):  88


12 Steps (QC):  88


Balance Sitting Static:  Fair


Balance Sitting Dynamic:  Fair


Balance-Standing Static:  Poor


Picking up an Object (QC):  88





Occupational Therapy


Decreased Activ Tolerance, Decreased UE Strength, Dependent Transfers, Impaired 

Bed Mobility, Impaired Cognition, Impaired Coordination, Impaired Funct Balance,

Impaired I ADL's, Impaired Self-Care Skills, Restricted Funct UE ROM


Eating (QC):  6


Oral Hygiene (QC):  6


Shower/Bathe Self (QC):  3


Upper Body Dressing (QC):  3


Lower Body Dressing (QC):  3 (mod A)


On/Off Footwear (QC):  5


Toileting Hygiene (QC):  1





PT Long Term Goals


Long Term Goals


PT Long Term Goals Time Frame:  Jul 29, 2022


Roll Left to Right (QC):  6


Sit to Lying (QC):  4 (SBA)


Lying-Sitting on Side/Bed(QC):  4 (SBA)


Sit to Stand (QC):  3 (Kacie)


Chair/Bed-to-Chair Xfer(QC):  3 (Kacie)


Car Transfer (QC):  3 (Kacie)


Does the Patient Walk:  No and Walking Goal NOT indicated


Walk 10 feet (QC):  88


Walk 10ft-Uneven Surface(QC):  88


Walk 50ft with 2 Turns (QC):  88


Walk 150 ft (QC):  88


Wheel 50 feet with 2 turns (QC:  6


1 Step (curb) (QC):  88


4 Steps (QC):  88


12 Steps (QC):  88


Picking up an Object (QC):  88





OT Long Term Goals


Long Term Goals


Time Frame:  Aug 5, 2022


Eating (QC):  6


Oral Hygiene (QC):  6


Shower/Bathe Self (QC):  4


Upper Body Dressing (QC):  5


Lower Body Dressing (QC):  3


On/Off Footwear (QC):  4


Toileting Hygiene (QC):  3 (bowel movements on toilet)


Toilet/Commode Transfer (QC):  3 (Kacie)


Additional Goals:  1-Demonstrate ADL Tasks, 2-Verbalize Understanding, 3-

ImproveStrength/[place]


1=Demonstrate adherence to instructed precautions during ADL tasks.


2=Patient will verbalize/demonstrate understanding of assistive 

devices/modifications for ADL.


3=Patient will improve strength/tolerance for activity to enable patient to 

perform ADL's.











KIRAN RANGEL PT                Jul 27, 2022 11:18

## 2022-07-27 NOTE — THERAPY TEAM DISCHARGE SUMMARY
Therapy Discharge Summary


Discharge Recommendations


Date of Discharge








Physical Therapy


Roll Left to Right (QC):  3


Sit to Lying (QC):  3


Lying to Sitting/Side of Bed(Q:  3


Sit to Stand (QC):  3 (x 3 sets in //bars)


Chair/Bed-to-Chair Xfer(QC):  2


Toilet Transfer (QC):  1


Car Transfer (QC):  2


Does the Patient Walk:  Yes


Walk 10 feet (QC):  88


Walk 50 ft with 2 Turns(QC):  88


Walk 150 ft (QC):  88


Walking 10ft on uneven surface:  88


Gait Assistive Device:  Parallel Bars


Does the Pt Use a Wheelchair:  Yes


Wheelchair Distance:  100'x2


Wheel 50 ft with 2 turns (QC):  4


Wheel 150 ft (QC):  4


Type of Wheelchair:  Manual


#of Steps:  1


1 Step (curb) (QC):  2


4 Steps (QC):  88


12 Steps (QC):  88


Balance Sitting Static:  Fair


Balance Sitting Dynamic:  Fair


Balance-Standing Static:  Poor


Picking up an Object (QC):  88





Occupational Therapy


Pt presented to [facility] s/p L AKA. At PLOF, pt required assistance from wife with 

ADLs (showering, dressing, and footwear) and IADLs, and used a w/c for 

functional mobility. At eval, pt scored setup with eating and oral care while 

seated, assist x2 for showering, toileting, and LBD, mod A with UBD, and total A

with footwear. OT tx focused on increasing IND in ADLs, activity tolerance, 

functional mobility, and BUE strength and endurance. Pt made functional progress

towards goals, meeting 4/7. OT does not have any further DME or AE 

recommendations at this time. Pt is being d/c home to spouse. D/c from skilled 

OT services at this time.


Decreased Activ Tolerance, Decreased UE Strength, Dependent Transfers, Impaired 

Bed Mobility, Impaired Cognition, Impaired Coordination, Impaired Funct Balance,

Impaired I ADL's, Impaired Self-Care Skills, Restricted Funct UE ROM


Eating (QC):  6 (met)


Oral Hygiene (QC):  6 (met)


Shower/Bathe Self (QC):  3 (not met)


Upper Body Dressing (QC):  3 (not met)


Lower Body Dressing (QC):  3 (mod A; met)


On/Off Footwear (QC):  5 (met)


Toileting Hygiene (QC):  1 (not met)





PT Long Term Goals


Long Term Goals


PT Long Term Goals Time Frame:  Jul 29, 2022


Roll Left to Right (QC):  6


Sit to Lying (QC):  4 (SBA)


Lying-Sitting on Side/Bed(QC):  4 (SBA)


Sit to Stand (QC):  3 (Kacie)


Chair/Bed-to-Chair Xfer(QC):  3 (Kacie)


Car Transfer (QC):  3 (Kacie)


Does the Patient Walk:  No and Walking Goal NOT indicated


Walk 10 feet (QC):  88


Walk 10ft-Uneven Surface(QC):  88


Walk 50ft with 2 Turns (QC):  88


Walk 150 ft (QC):  88


Wheel 50 feet with 2 turns (QC:  6


1 Step (curb) (QC):  88


4 Steps (QC):  88


12 Steps (QC):  88


Picking up an Object (QC):  88





OT Long Term Goals


Long Term Goals


Time Frame:  Aug 5, 2022


Eating (QC):  6


Oral Hygiene (QC):  6


Shower/Bathe Self (QC):  4


Upper Body Dressing (QC):  5


Lower Body Dressing (QC):  3


On/Off Footwear (QC):  4


Toileting Hygiene (QC):  3 (bowel movements on toilet)


Toilet/Commode Transfer (QC):  3 (Kacie)


Additional Goals:  1-Demonstrate ADL Tasks, 2-Verbalize Understanding, 3-

ImproveStrength/[place]


1=Demonstrate adherence to instructed precautions during ADL tasks.


2=Patient will verbalize/demonstrate understanding of assistive 

devices/modifications for ADL.


3=Patient will improve strength/tolerance for activity to enable patient to 

perform ADL's.











DHIRAJ EDWARDS OT          Jul 27, 2022 11:25